# Patient Record
Sex: MALE | Race: WHITE | HISPANIC OR LATINO | Employment: UNEMPLOYED | URBAN - METROPOLITAN AREA
[De-identification: names, ages, dates, MRNs, and addresses within clinical notes are randomized per-mention and may not be internally consistent; named-entity substitution may affect disease eponyms.]

---

## 2021-04-07 ENCOUNTER — HOSPITAL ENCOUNTER (INPATIENT)
Facility: HOSPITAL | Age: 46
LOS: 12 days | Discharge: HOME | DRG: 674 | End: 2021-04-19
Attending: INTERNAL MEDICINE | Admitting: INTERNAL MEDICINE
Payer: MEDICAID

## 2021-04-07 DIAGNOSIS — N17.9 ACUTE RENAL FAILURE ON DIALYSIS: ICD-10-CM

## 2021-04-07 DIAGNOSIS — E11.9 TYPE 2 DIABETES MELLITUS WITHOUT COMPLICATION, WITH LONG-TERM CURRENT USE OF INSULIN: ICD-10-CM

## 2021-04-07 DIAGNOSIS — Z79.4 TYPE 2 DIABETES MELLITUS WITHOUT COMPLICATION, WITH LONG-TERM CURRENT USE OF INSULIN: ICD-10-CM

## 2021-04-07 DIAGNOSIS — T38.0X5S ADVERSE EFFECT OF ADRENAL CORTICAL STEROIDS, SEQUELA: ICD-10-CM

## 2021-04-07 DIAGNOSIS — Z99.2 ACUTE RENAL FAILURE ON DIALYSIS: ICD-10-CM

## 2021-04-07 DIAGNOSIS — N17.9 AKI (ACUTE KIDNEY INJURY): ICD-10-CM

## 2021-04-07 DIAGNOSIS — I10 HTN (HYPERTENSION): ICD-10-CM

## 2021-04-07 DIAGNOSIS — E11.29 TYPE 2 DIABETES MELLITUS WITH OTHER DIABETIC KIDNEY COMPLICATION, WITH LONG-TERM CURRENT USE OF INSULIN: ICD-10-CM

## 2021-04-07 DIAGNOSIS — Z94.0 H/O KIDNEY TRANSPLANT: ICD-10-CM

## 2021-04-07 DIAGNOSIS — J30.89 ALLERGIC RHINITIS DUE TO OTHER ALLERGIC TRIGGER, UNSPECIFIED SEASONALITY: ICD-10-CM

## 2021-04-07 DIAGNOSIS — Z79.60 LONG-TERM USE OF IMMUNOSUPPRESSANT MEDICATION: ICD-10-CM

## 2021-04-07 DIAGNOSIS — E87.20 ACIDOSIS: ICD-10-CM

## 2021-04-07 DIAGNOSIS — E87.5 HYPERKALEMIA: ICD-10-CM

## 2021-04-07 DIAGNOSIS — D84.9 IMMUNOCOMPROMISED STATE: ICD-10-CM

## 2021-04-07 DIAGNOSIS — I15.0 RENOVASCULAR HYPERTENSION: Primary | ICD-10-CM

## 2021-04-07 DIAGNOSIS — Z29.89 PROPHYLACTIC IMMUNOTHERAPY: ICD-10-CM

## 2021-04-07 DIAGNOSIS — Z79.4 TYPE 2 DIABETES MELLITUS WITH OTHER DIABETIC KIDNEY COMPLICATION, WITH LONG-TERM CURRENT USE OF INSULIN: ICD-10-CM

## 2021-04-07 DIAGNOSIS — T86.11 ACUTE REJECTION OF KIDNEY TRANSPLANT: ICD-10-CM

## 2021-04-07 LAB
25(OH)D3+25(OH)D2 SERPL-MCNC: 18 NG/ML (ref 30–96)
ALBUMIN SERPL BCP-MCNC: 3.4 G/DL (ref 3.5–5.2)
ALLENS TEST: ABNORMAL
ALP SERPL-CCNC: 86 U/L (ref 55–135)
ALT SERPL W/O P-5'-P-CCNC: 8 U/L (ref 10–44)
ANION GAP SERPL CALC-SCNC: 18 MMOL/L (ref 8–16)
ASCENDING AORTA: 3.5 CM
AST SERPL-CCNC: 11 U/L (ref 10–40)
AV INDEX (PROSTH): 1.03
AV MEAN GRADIENT: 4 MMHG
AV PEAK GRADIENT: 6 MMHG
AV VALVE AREA: 3.41 CM2
AV VELOCITY RATIO: 1.05
BACTERIA #/AREA URNS AUTO: ABNORMAL /HPF
BASOPHILS # BLD AUTO: 0.05 K/UL (ref 0–0.2)
BASOPHILS NFR BLD: 0.6 % (ref 0–1.9)
BILIRUB SERPL-MCNC: 0.3 MG/DL (ref 0.1–1)
BILIRUB UR QL STRIP: NEGATIVE
BSA FOR ECHO PROCEDURE: 2.19 M2
BUN SERPL-MCNC: 115 MG/DL (ref 6–20)
CALCIUM SERPL-MCNC: 8.2 MG/DL (ref 8.7–10.5)
CHLORIDE SERPL-SCNC: 106 MMOL/L (ref 95–110)
CLARITY UR REFRACT.AUTO: CLEAR
CO2 SERPL-SCNC: 11 MMOL/L (ref 23–29)
COLOR UR AUTO: ABNORMAL
CREAT SERPL-MCNC: 14.6 MG/DL (ref 0.5–1.4)
CREAT UR-MCNC: 68 MG/DL (ref 23–375)
CV ECHO LV RWT: 0.6 CM
DELSYS: ABNORMAL
DIFFERENTIAL METHOD: ABNORMAL
DOP CALC AO PEAK VEL: 1.21 M/S
DOP CALC AO VTI: 23.26 CM
DOP CALC LVOT AREA: 3.3 CM2
DOP CALC LVOT DIAMETER: 2.05 CM
DOP CALC LVOT PEAK VEL: 1.27 M/S
DOP CALC LVOT STROKE VOLUME: 79.31 CM3
DOP CALCLVOT PEAK VEL VTI: 24.04 CM
E WAVE DECELERATION TIME: 221.07 MSEC
E/A RATIO: 1.56
E/E' RATIO: 6.78 M/S
ECHO LV POSTERIOR WALL: 1.38 CM (ref 0.6–1.1)
EJECTION FRACTION: 70 %
EOSINOPHIL # BLD AUTO: 0.2 K/UL (ref 0–0.5)
EOSINOPHIL NFR BLD: 1.8 % (ref 0–8)
ERYTHROCYTE [DISTWIDTH] IN BLOOD BY AUTOMATED COUNT: 13.3 % (ref 11.5–14.5)
EST. GFR  (AFRICAN AMERICAN): 4.1 ML/MIN/1.73 M^2
EST. GFR  (NON AFRICAN AMERICAN): 3.6 ML/MIN/1.73 M^2
FRACTIONAL SHORTENING: 31 % (ref 28–44)
GLUCOSE SERPL-MCNC: 104 MG/DL (ref 70–110)
GLUCOSE UR QL STRIP: NEGATIVE
HCO3 UR-SCNC: 9.1 MMOL/L (ref 24–28)
HCT VFR BLD AUTO: 44.3 % (ref 40–54)
HGB BLD-MCNC: 15 G/DL (ref 14–18)
HGB UR QL STRIP: ABNORMAL
HYALINE CASTS UR QL AUTO: 0 /LPF
IMM GRANULOCYTES # BLD AUTO: 0.05 K/UL (ref 0–0.04)
IMM GRANULOCYTES NFR BLD AUTO: 0.6 % (ref 0–0.5)
INR PPP: 1 (ref 0.8–1.2)
INTERVENTRICULAR SEPTUM: 1.31 CM (ref 0.6–1.1)
KETONES UR QL STRIP: NEGATIVE
LA MAJOR: 6.51 CM
LA MINOR: 5.38 CM
LA WIDTH: 4.36 CM
LEFT ATRIUM SIZE: 3.34 CM
LEFT ATRIUM VOLUME INDEX: 34.2 ML/M2
LEFT ATRIUM VOLUME: 72.92 CM3
LEFT INTERNAL DIMENSION IN SYSTOLE: 3.17 CM (ref 2.1–4)
LEFT VENTRICLE DIASTOLIC VOLUME INDEX: 46.11 ML/M2
LEFT VENTRICLE DIASTOLIC VOLUME: 98.21 ML
LEFT VENTRICLE MASS INDEX: 114 G/M2
LEFT VENTRICLE SYSTOLIC VOLUME INDEX: 18.8 ML/M2
LEFT VENTRICLE SYSTOLIC VOLUME: 40.05 ML
LEFT VENTRICULAR INTERNAL DIMENSION IN DIASTOLE: 4.62 CM (ref 3.5–6)
LEFT VENTRICULAR MASS: 243.55 G
LEUKOCYTE ESTERASE UR QL STRIP: NEGATIVE
LV LATERAL E/E' RATIO: 6.5 M/S
LV SEPTAL E/E' RATIO: 7.09 M/S
LYMPHOCYTES # BLD AUTO: 1.1 K/UL (ref 1–4.8)
LYMPHOCYTES NFR BLD: 13.8 % (ref 18–48)
MAGNESIUM SERPL-MCNC: 1.9 MG/DL (ref 1.6–2.6)
MCH RBC QN AUTO: 28.8 PG (ref 27–31)
MCHC RBC AUTO-ENTMCNC: 33.9 G/DL (ref 32–36)
MCV RBC AUTO: 85 FL (ref 82–98)
MICROSCOPIC COMMENT: ABNORMAL
MONOCYTES # BLD AUTO: 0.7 K/UL (ref 0.3–1)
MONOCYTES NFR BLD: 8.3 % (ref 4–15)
MV PEAK A VEL: 0.5 M/S
MV PEAK E VEL: 0.78 M/S
MV STENOSIS PRESSURE HALF TIME: 64.11 MS
MV VALVE AREA P 1/2 METHOD: 3.43 CM2
NEUTROPHILS # BLD AUTO: 6.1 K/UL (ref 1.8–7.7)
NEUTROPHILS NFR BLD: 74.9 % (ref 38–73)
NITRITE UR QL STRIP: NEGATIVE
NRBC BLD-RTO: 0 /100 WBC
PCO2 BLDA: 25.3 MMHG (ref 35–45)
PH SMN: 7.16 [PH] (ref 7.35–7.45)
PH UR STRIP: 6 [PH] (ref 5–8)
PHOSPHATE SERPL-MCNC: 10.7 MG/DL (ref 2.7–4.5)
PLATELET # BLD AUTO: 291 K/UL (ref 150–450)
PMV BLD AUTO: 9.6 FL (ref 9.2–12.9)
PO2 BLDA: 110 MMHG (ref 40–60)
POC BE: -20 MMOL/L
POC PCO2 TEMP: 8.1 MMHG
POC PH TEMP: 7.5
POC PO2 TEMP: 26 MMHG
POC SATURATED O2: 97 % (ref 95–100)
POC TCO2: 10 MMOL/L (ref 24–29)
POC TEMPERATURE: ABNORMAL
POCT GLUCOSE: 115 MG/DL (ref 70–110)
POTASSIUM SERPL-SCNC: 4.8 MMOL/L (ref 3.5–5.1)
PROT SERPL-MCNC: 7 G/DL (ref 6–8.4)
PROT UR QL STRIP: ABNORMAL
PROT UR-MCNC: 82 MG/DL (ref 0–15)
PROT/CREAT UR: 1.21 MG/G{CREAT} (ref 0–0.2)
PROTHROMBIN TIME: 11 SEC (ref 9–12.5)
PTH-INTACT SERPL-MCNC: 841 PG/ML (ref 9–77)
RA MAJOR: 5.71 CM
RA PRESSURE: 3 MMHG
RA WIDTH: 3.37 CM
RBC # BLD AUTO: 5.2 M/UL (ref 4.6–6.2)
RBC #/AREA URNS AUTO: 2 /HPF (ref 0–4)
RIGHT VENTRICULAR END-DIASTOLIC DIMENSION: 3.19 CM
SAMPLE: ABNORMAL
SINUS: 3.58 CM
SITE: ABNORMAL
SODIUM SERPL-SCNC: 135 MMOL/L (ref 136–145)
SODIUM UR-SCNC: 66 MMOL/L (ref 20–250)
SP GR UR STRIP: 1.01 (ref 1–1.03)
SQUAMOUS #/AREA URNS AUTO: 1 /HPF
STJ: 3.22 CM
TACROLIMUS BLD-MCNC: 4.8 NG/ML (ref 5–15)
TDI LATERAL: 0.12 M/S
TDI SEPTAL: 0.11 M/S
TDI: 0.12 M/S
TRICUSPID ANNULAR PLANE SYSTOLIC EXCURSION: 2.81 CM
TSH SERPL DL<=0.005 MIU/L-ACNC: 0.98 UIU/ML (ref 0.4–4)
URN SPEC COLLECT METH UR: ABNORMAL
WBC # BLD AUTO: 8.2 K/UL (ref 3.9–12.7)
WBC #/AREA URNS AUTO: 6 /HPF (ref 0–5)

## 2021-04-07 PROCEDURE — 99223 1ST HOSP IP/OBS HIGH 75: CPT | Mod: ,,, | Performed by: EMERGENCY MEDICINE

## 2021-04-07 PROCEDURE — 82306 VITAMIN D 25 HYDROXY: CPT | Performed by: STUDENT IN AN ORGANIZED HEALTH CARE EDUCATION/TRAINING PROGRAM

## 2021-04-07 PROCEDURE — 25000003 PHARM REV CODE 250: Performed by: STUDENT IN AN ORGANIZED HEALTH CARE EDUCATION/TRAINING PROGRAM

## 2021-04-07 PROCEDURE — C9399 UNCLASSIFIED DRUGS OR BIOLOG: HCPCS | Performed by: STUDENT IN AN ORGANIZED HEALTH CARE EDUCATION/TRAINING PROGRAM

## 2021-04-07 PROCEDURE — 80197 ASSAY OF TACROLIMUS: CPT | Performed by: STUDENT IN AN ORGANIZED HEALTH CARE EDUCATION/TRAINING PROGRAM

## 2021-04-07 PROCEDURE — 20000000 HC ICU ROOM

## 2021-04-07 PROCEDURE — 85610 PROTHROMBIN TIME: CPT | Performed by: NURSE PRACTITIONER

## 2021-04-07 PROCEDURE — 99233 SBSQ HOSP IP/OBS HIGH 50: CPT | Mod: ,,, | Performed by: INTERNAL MEDICINE

## 2021-04-07 PROCEDURE — 87040 BLOOD CULTURE FOR BACTERIA: CPT | Performed by: STUDENT IN AN ORGANIZED HEALTH CARE EDUCATION/TRAINING PROGRAM

## 2021-04-07 PROCEDURE — 80100014 HC HEMODIALYSIS 1:1

## 2021-04-07 PROCEDURE — 83735 ASSAY OF MAGNESIUM: CPT | Performed by: NURSE PRACTITIONER

## 2021-04-07 PROCEDURE — 80053 COMPREHEN METABOLIC PANEL: CPT | Performed by: NURSE PRACTITIONER

## 2021-04-07 PROCEDURE — 84300 ASSAY OF URINE SODIUM: CPT | Performed by: STUDENT IN AN ORGANIZED HEALTH CARE EDUCATION/TRAINING PROGRAM

## 2021-04-07 PROCEDURE — 84100 ASSAY OF PHOSPHORUS: CPT | Performed by: NURSE PRACTITIONER

## 2021-04-07 PROCEDURE — 25000003 PHARM REV CODE 250: Performed by: NURSE PRACTITIONER

## 2021-04-07 PROCEDURE — 81001 URINALYSIS AUTO W/SCOPE: CPT | Performed by: NURSE PRACTITIONER

## 2021-04-07 PROCEDURE — 82570 ASSAY OF URINE CREATININE: CPT | Performed by: NURSE PRACTITIONER

## 2021-04-07 PROCEDURE — 99223 PR INITIAL HOSPITAL CARE,LEVL III: ICD-10-PCS | Mod: ,,, | Performed by: EMERGENCY MEDICINE

## 2021-04-07 PROCEDURE — 85025 COMPLETE CBC W/AUTO DIFF WBC: CPT | Performed by: NURSE PRACTITIONER

## 2021-04-07 PROCEDURE — 82803 BLOOD GASES ANY COMBINATION: CPT

## 2021-04-07 PROCEDURE — 84443 ASSAY THYROID STIM HORMONE: CPT | Performed by: EMERGENCY MEDICINE

## 2021-04-07 PROCEDURE — 99233 PR SUBSEQUENT HOSPITAL CARE,LEVL III: ICD-10-PCS | Mod: ,,, | Performed by: INTERNAL MEDICINE

## 2021-04-07 PROCEDURE — 83036 HEMOGLOBIN GLYCOSYLATED A1C: CPT | Performed by: NURSE PRACTITIONER

## 2021-04-07 PROCEDURE — 63600175 PHARM REV CODE 636 W HCPCS: Performed by: STUDENT IN AN ORGANIZED HEALTH CARE EDUCATION/TRAINING PROGRAM

## 2021-04-07 PROCEDURE — 83970 ASSAY OF PARATHORMONE: CPT | Performed by: STUDENT IN AN ORGANIZED HEALTH CARE EDUCATION/TRAINING PROGRAM

## 2021-04-07 PROCEDURE — 25000003 PHARM REV CODE 250: Performed by: EMERGENCY MEDICINE

## 2021-04-07 PROCEDURE — 99900035 HC TECH TIME PER 15 MIN (STAT)

## 2021-04-07 PROCEDURE — 25000003 PHARM REV CODE 250

## 2021-04-07 RX ORDER — LIDOCAINE HYDROCHLORIDE 10 MG/ML
INJECTION INFILTRATION; PERINEURAL
Status: COMPLETED
Start: 2021-04-07 | End: 2021-04-07

## 2021-04-07 RX ORDER — SODIUM CHLORIDE 0.9 % (FLUSH) 0.9 %
10 SYRINGE (ML) INJECTION
Status: DISCONTINUED | OUTPATIENT
Start: 2021-04-07 | End: 2021-04-12

## 2021-04-07 RX ORDER — LIDOCAINE HYDROCHLORIDE 10 MG/ML
10 INJECTION INFILTRATION; PERINEURAL ONCE
Status: COMPLETED | OUTPATIENT
Start: 2021-04-07 | End: 2021-04-07

## 2021-04-07 RX ORDER — ACETAMINOPHEN 325 MG/1
650 TABLET ORAL EVERY 4 HOURS PRN
Status: DISCONTINUED | OUTPATIENT
Start: 2021-04-07 | End: 2021-04-07

## 2021-04-07 RX ORDER — INSULIN ASPART 100 [IU]/ML
1-10 INJECTION, SOLUTION INTRAVENOUS; SUBCUTANEOUS EVERY 6 HOURS PRN
Status: DISCONTINUED | OUTPATIENT
Start: 2021-04-07 | End: 2021-04-16

## 2021-04-07 RX ORDER — NIFEDIPINE 30 MG/1
30 TABLET, EXTENDED RELEASE ORAL DAILY
Status: DISCONTINUED | OUTPATIENT
Start: 2021-04-07 | End: 2021-04-10

## 2021-04-07 RX ORDER — CARVEDILOL 25 MG/1
25 TABLET ORAL 2 TIMES DAILY
Status: DISCONTINUED | OUTPATIENT
Start: 2021-04-07 | End: 2021-04-19 | Stop reason: HOSPADM

## 2021-04-07 RX ORDER — ACETAMINOPHEN 325 MG/1
650 TABLET ORAL EVERY 4 HOURS PRN
Status: DISCONTINUED | OUTPATIENT
Start: 2021-04-07 | End: 2021-04-19 | Stop reason: HOSPADM

## 2021-04-07 RX ORDER — SODIUM CHLORIDE 9 MG/ML
INJECTION, SOLUTION INTRAVENOUS ONCE
Status: DISCONTINUED | OUTPATIENT
Start: 2021-04-07 | End: 2021-04-08

## 2021-04-07 RX ORDER — ONDANSETRON 2 MG/ML
4 INJECTION INTRAMUSCULAR; INTRAVENOUS EVERY 8 HOURS PRN
Status: DISCONTINUED | OUTPATIENT
Start: 2021-04-07 | End: 2021-04-19 | Stop reason: HOSPADM

## 2021-04-07 RX ORDER — MUPIROCIN 20 MG/G
OINTMENT TOPICAL 2 TIMES DAILY
Status: DISPENSED | OUTPATIENT
Start: 2021-04-07 | End: 2021-04-12

## 2021-04-07 RX ORDER — GLUCAGON 1 MG
1 KIT INJECTION
Status: DISCONTINUED | OUTPATIENT
Start: 2021-04-07 | End: 2021-04-16

## 2021-04-07 RX ORDER — ACETAMINOPHEN 650 MG/1
650 SUPPOSITORY RECTAL EVERY 4 HOURS PRN
Status: DISCONTINUED | OUTPATIENT
Start: 2021-04-07 | End: 2021-04-07

## 2021-04-07 RX ADMIN — LIDOCAINE HYDROCHLORIDE 10 ML: 10 INJECTION, SOLUTION INFILTRATION; PERINEURAL at 02:04

## 2021-04-07 RX ADMIN — NIFEDIPINE 30 MG: 30 TABLET, FILM COATED, EXTENDED RELEASE ORAL at 12:04

## 2021-04-07 RX ADMIN — CARVEDILOL 25 MG: 25 TABLET, FILM COATED ORAL at 11:04

## 2021-04-07 RX ADMIN — LIDOCAINE HYDROCHLORIDE 10 ML: 10 INJECTION INFILTRATION; PERINEURAL at 02:04

## 2021-04-07 RX ADMIN — ACETAMINOPHEN 650 MG: 325 TABLET ORAL at 07:04

## 2021-04-07 RX ADMIN — INSULIN ASPART 2 UNITS: 100 INJECTION, SOLUTION INTRAVENOUS; SUBCUTANEOUS at 09:04

## 2021-04-07 RX ADMIN — CARVEDILOL 25 MG: 25 TABLET, FILM COATED ORAL at 09:04

## 2021-04-07 RX ADMIN — ACETAMINOPHEN 650 MG: 325 TABLET ORAL at 11:04

## 2021-04-07 RX ADMIN — INSULIN DETEMIR 5 UNITS: 100 INJECTION, SOLUTION SUBCUTANEOUS at 09:04

## 2021-04-07 RX ADMIN — MUPIROCIN: 20 OINTMENT TOPICAL at 09:04

## 2021-04-07 RX ADMIN — MUPIROCIN: 20 OINTMENT TOPICAL at 11:04

## 2021-04-08 LAB
ALBUMIN SERPL BCP-MCNC: 3.2 G/DL (ref 3.5–5.2)
ALP SERPL-CCNC: 90 U/L (ref 55–135)
ALT SERPL W/O P-5'-P-CCNC: 9 U/L (ref 10–44)
ANION GAP SERPL CALC-SCNC: 17 MMOL/L (ref 8–16)
AST SERPL-CCNC: 12 U/L (ref 10–40)
BASOPHILS # BLD AUTO: 0.07 K/UL (ref 0–0.2)
BASOPHILS NFR BLD: 1.1 % (ref 0–1.9)
BILIRUB SERPL-MCNC: 0.4 MG/DL (ref 0.1–1)
BUN SERPL-MCNC: 113 MG/DL (ref 6–20)
CALCIUM SERPL-MCNC: 8 MG/DL (ref 8.7–10.5)
CHLORIDE SERPL-SCNC: 104 MMOL/L (ref 95–110)
CO2 SERPL-SCNC: 16 MMOL/L (ref 23–29)
CREAT SERPL-MCNC: 14.1 MG/DL (ref 0.5–1.4)
DIFFERENTIAL METHOD: ABNORMAL
EOSINOPHIL # BLD AUTO: 0.3 K/UL (ref 0–0.5)
EOSINOPHIL NFR BLD: 4.9 % (ref 0–8)
ERYTHROCYTE [DISTWIDTH] IN BLOOD BY AUTOMATED COUNT: 13.2 % (ref 11.5–14.5)
EST. GFR  (AFRICAN AMERICAN): 4.3 ML/MIN/1.73 M^2
EST. GFR  (NON AFRICAN AMERICAN): 3.7 ML/MIN/1.73 M^2
ESTIMATED AVG GLUCOSE: 240 MG/DL (ref 68–131)
GLUCOSE SERPL-MCNC: 110 MG/DL (ref 70–110)
HBA1C MFR BLD: 10 % (ref 4–5.6)
HBV CORE IGM SERPL QL IA: NEGATIVE
HBV SURFACE AG SERPL QL IA: NEGATIVE
HCT VFR BLD AUTO: 39.1 % (ref 40–54)
HGB BLD-MCNC: 13.9 G/DL (ref 14–18)
IMM GRANULOCYTES # BLD AUTO: 0.03 K/UL (ref 0–0.04)
IMM GRANULOCYTES NFR BLD AUTO: 0.5 % (ref 0–0.5)
LYMPHOCYTES # BLD AUTO: 0.8 K/UL (ref 1–4.8)
LYMPHOCYTES NFR BLD: 13.5 % (ref 18–48)
MAGNESIUM SERPL-MCNC: 1.9 MG/DL (ref 1.6–2.6)
MCH RBC QN AUTO: 29 PG (ref 27–31)
MCHC RBC AUTO-ENTMCNC: 35.5 G/DL (ref 32–36)
MCV RBC AUTO: 82 FL (ref 82–98)
MONOCYTES # BLD AUTO: 0.7 K/UL (ref 0.3–1)
MONOCYTES NFR BLD: 11.7 % (ref 4–15)
NEUTROPHILS # BLD AUTO: 4.2 K/UL (ref 1.8–7.7)
NEUTROPHILS NFR BLD: 68.3 % (ref 38–73)
NRBC BLD-RTO: 0 /100 WBC
PHOSPHATE SERPL-MCNC: 10.2 MG/DL (ref 2.7–4.5)
PLATELET # BLD AUTO: 262 K/UL (ref 150–450)
PMV BLD AUTO: 9.8 FL (ref 9.2–12.9)
POCT GLUCOSE: 116 MG/DL (ref 70–110)
POCT GLUCOSE: 124 MG/DL (ref 70–110)
POCT GLUCOSE: 126 MG/DL (ref 70–110)
POCT GLUCOSE: 151 MG/DL (ref 70–110)
POCT GLUCOSE: 184 MG/DL (ref 70–110)
POCT GLUCOSE: 194 MG/DL (ref 70–110)
POTASSIUM SERPL-SCNC: 3.8 MMOL/L (ref 3.5–5.1)
PROT SERPL-MCNC: 6.7 G/DL (ref 6–8.4)
RBC # BLD AUTO: 4.79 M/UL (ref 4.6–6.2)
SODIUM SERPL-SCNC: 137 MMOL/L (ref 136–145)
TACROLIMUS BLD-MCNC: 2.8 NG/ML (ref 5–15)
WBC # BLD AUTO: 6.09 K/UL (ref 3.9–12.7)

## 2021-04-08 PROCEDURE — 99233 PR SUBSEQUENT HOSPITAL CARE,LEVL III: ICD-10-PCS | Mod: ,,, | Performed by: INTERNAL MEDICINE

## 2021-04-08 PROCEDURE — 85025 COMPLETE CBC W/AUTO DIFF WBC: CPT | Performed by: NURSE PRACTITIONER

## 2021-04-08 PROCEDURE — 11000001 HC ACUTE MED/SURG PRIVATE ROOM

## 2021-04-08 PROCEDURE — 99233 SBSQ HOSP IP/OBS HIGH 50: CPT | Mod: ,,, | Performed by: EMERGENCY MEDICINE

## 2021-04-08 PROCEDURE — 86833 HLA CLASS II HIGH DEFIN QUAL: CPT | Performed by: STUDENT IN AN ORGANIZED HEALTH CARE EDUCATION/TRAINING PROGRAM

## 2021-04-08 PROCEDURE — 25000003 PHARM REV CODE 250: Performed by: STUDENT IN AN ORGANIZED HEALTH CARE EDUCATION/TRAINING PROGRAM

## 2021-04-08 PROCEDURE — 80100014 HC HEMODIALYSIS 1:1

## 2021-04-08 PROCEDURE — 63600175 PHARM REV CODE 636 W HCPCS: Performed by: STUDENT IN AN ORGANIZED HEALTH CARE EDUCATION/TRAINING PROGRAM

## 2021-04-08 PROCEDURE — 80197 ASSAY OF TACROLIMUS: CPT | Performed by: STUDENT IN AN ORGANIZED HEALTH CARE EDUCATION/TRAINING PROGRAM

## 2021-04-08 PROCEDURE — 87340 HEPATITIS B SURFACE AG IA: CPT | Performed by: EMERGENCY MEDICINE

## 2021-04-08 PROCEDURE — 86832 HLA CLASS I HIGH DEFIN QUAL: CPT | Performed by: STUDENT IN AN ORGANIZED HEALTH CARE EDUCATION/TRAINING PROGRAM

## 2021-04-08 PROCEDURE — 83735 ASSAY OF MAGNESIUM: CPT | Performed by: NURSE PRACTITIONER

## 2021-04-08 PROCEDURE — 36415 COLL VENOUS BLD VENIPUNCTURE: CPT | Performed by: NURSE PRACTITIONER

## 2021-04-08 PROCEDURE — 86977 RBC SERUM PRETX INCUBJ/INHIB: CPT | Mod: 59 | Performed by: STUDENT IN AN ORGANIZED HEALTH CARE EDUCATION/TRAINING PROGRAM

## 2021-04-08 PROCEDURE — 99233 PR SUBSEQUENT HOSPITAL CARE,LEVL III: ICD-10-PCS | Mod: ,,, | Performed by: EMERGENCY MEDICINE

## 2021-04-08 PROCEDURE — 80053 COMPREHEN METABOLIC PANEL: CPT | Performed by: NURSE PRACTITIONER

## 2021-04-08 PROCEDURE — 99233 SBSQ HOSP IP/OBS HIGH 50: CPT | Mod: ,,, | Performed by: INTERNAL MEDICINE

## 2021-04-08 PROCEDURE — 84100 ASSAY OF PHOSPHORUS: CPT | Performed by: NURSE PRACTITIONER

## 2021-04-08 PROCEDURE — 86705 HEP B CORE ANTIBODY IGM: CPT | Performed by: EMERGENCY MEDICINE

## 2021-04-08 PROCEDURE — 25000003 PHARM REV CODE 250: Performed by: EMERGENCY MEDICINE

## 2021-04-08 RX ORDER — HEPARIN SODIUM 5000 [USP'U]/ML
5000 INJECTION, SOLUTION INTRAVENOUS; SUBCUTANEOUS EVERY 8 HOURS
Status: DISCONTINUED | OUTPATIENT
Start: 2021-04-08 | End: 2021-04-19 | Stop reason: HOSPADM

## 2021-04-08 RX ORDER — BUTALBITAL, ACETAMINOPHEN AND CAFFEINE 50; 325; 40 MG/1; MG/1; MG/1
1 TABLET ORAL ONCE
Status: COMPLETED | OUTPATIENT
Start: 2021-04-08 | End: 2021-04-08

## 2021-04-08 RX ORDER — SEVELAMER CARBONATE 800 MG/1
1600 TABLET, FILM COATED ORAL
Status: DISCONTINUED | OUTPATIENT
Start: 2021-04-08 | End: 2021-04-19

## 2021-04-08 RX ORDER — SODIUM CHLORIDE 9 MG/ML
INJECTION, SOLUTION INTRAVENOUS CONTINUOUS
Status: ACTIVE | OUTPATIENT
Start: 2021-04-08 | End: 2021-04-08

## 2021-04-08 RX ORDER — SODIUM CHLORIDE 9 MG/ML
INJECTION, SOLUTION INTRAVENOUS ONCE
Status: DISCONTINUED | OUTPATIENT
Start: 2021-04-08 | End: 2021-04-09

## 2021-04-08 RX ADMIN — SEVELAMER CARBONATE 1600 MG: 800 TABLET, FILM COATED ORAL at 05:04

## 2021-04-08 RX ADMIN — MUPIROCIN: 20 OINTMENT TOPICAL at 09:04

## 2021-04-08 RX ADMIN — CARVEDILOL 25 MG: 25 TABLET, FILM COATED ORAL at 08:04

## 2021-04-08 RX ADMIN — SEVELAMER CARBONATE 1600 MG: 800 TABLET, FILM COATED ORAL at 11:04

## 2021-04-08 RX ADMIN — SEVELAMER CARBONATE 1600 MG: 800 TABLET, FILM COATED ORAL at 08:04

## 2021-04-08 RX ADMIN — BUTALBITAL, ACETAMINOPHEN, AND CAFFEINE 1 TABLET: 50; 325; 40 TABLET ORAL at 01:04

## 2021-04-08 RX ADMIN — CARVEDILOL 25 MG: 25 TABLET, FILM COATED ORAL at 11:04

## 2021-04-08 RX ADMIN — HEPARIN SODIUM 5000 UNITS: 5000 INJECTION INTRAVENOUS; SUBCUTANEOUS at 10:04

## 2021-04-08 RX ADMIN — NIFEDIPINE 30 MG: 30 TABLET, FILM COATED, EXTENDED RELEASE ORAL at 08:04

## 2021-04-08 RX ADMIN — HEPARIN SODIUM 5000 UNITS: 5000 INJECTION INTRAVENOUS; SUBCUTANEOUS at 02:04

## 2021-04-08 RX ADMIN — INSULIN ASPART 2 UNITS: 100 INJECTION, SOLUTION INTRAVENOUS; SUBCUTANEOUS at 11:04

## 2021-04-08 RX ADMIN — INSULIN ASPART 2 UNITS: 100 INJECTION, SOLUTION INTRAVENOUS; SUBCUTANEOUS at 05:04

## 2021-04-08 RX ADMIN — INSULIN DETEMIR 5 UNITS: 100 INJECTION, SOLUTION SUBCUTANEOUS at 10:04

## 2021-04-09 LAB
ALBUMIN SERPL BCP-MCNC: 3.3 G/DL (ref 3.5–5.2)
ALP SERPL-CCNC: 88 U/L (ref 55–135)
ALT SERPL W/O P-5'-P-CCNC: 9 U/L (ref 10–44)
ANION GAP SERPL CALC-SCNC: 17 MMOL/L (ref 8–16)
AST SERPL-CCNC: 14 U/L (ref 10–40)
BASOPHILS # BLD AUTO: 0.09 K/UL (ref 0–0.2)
BASOPHILS NFR BLD: 1.3 % (ref 0–1.9)
BILIRUB SERPL-MCNC: 0.5 MG/DL (ref 0.1–1)
BUN SERPL-MCNC: 91 MG/DL (ref 6–20)
CALCIUM SERPL-MCNC: 8.1 MG/DL (ref 8.7–10.5)
CHLORIDE SERPL-SCNC: 100 MMOL/L (ref 95–110)
CLASS I ANTIBODIES - LUMINEX: NORMAL
CLASS I ANTIBODY COMMENTS - LUMINEX: NORMAL
CLASS II ANTIBODY COMMENTS - LUMINEX: NORMAL
CO2 SERPL-SCNC: 19 MMOL/L (ref 23–29)
CREAT SERPL-MCNC: 12.5 MG/DL (ref 0.5–1.4)
DIFFERENTIAL METHOD: ABNORMAL
DSA1 TESTING DATE: NORMAL
DSA12 TESTING DATE: NORMAL
DSA2 TESTING DATE: NORMAL
EOSINOPHIL # BLD AUTO: 0.4 K/UL (ref 0–0.5)
EOSINOPHIL NFR BLD: 6.4 % (ref 0–8)
ERYTHROCYTE [DISTWIDTH] IN BLOOD BY AUTOMATED COUNT: 13.1 % (ref 11.5–14.5)
EST. GFR  (AFRICAN AMERICAN): 5 ML/MIN/1.73 M^2
EST. GFR  (NON AFRICAN AMERICAN): 4.3 ML/MIN/1.73 M^2
GLUCOSE SERPL-MCNC: 115 MG/DL (ref 70–110)
HCT VFR BLD AUTO: 39.2 % (ref 40–54)
HGB BLD-MCNC: 13.8 G/DL (ref 14–18)
IMM GRANULOCYTES # BLD AUTO: 0.01 K/UL (ref 0–0.04)
IMM GRANULOCYTES NFR BLD AUTO: 0.1 % (ref 0–0.5)
LYMPHOCYTES # BLD AUTO: 1.3 K/UL (ref 1–4.8)
LYMPHOCYTES NFR BLD: 18.8 % (ref 18–48)
MAGNESIUM SERPL-MCNC: 1.8 MG/DL (ref 1.6–2.6)
MCH RBC QN AUTO: 28 PG (ref 27–31)
MCHC RBC AUTO-ENTMCNC: 35.2 G/DL (ref 32–36)
MCV RBC AUTO: 80 FL (ref 82–98)
MONOCYTES # BLD AUTO: 0.8 K/UL (ref 0.3–1)
MONOCYTES NFR BLD: 12.2 % (ref 4–15)
NEUTROPHILS # BLD AUTO: 4.1 K/UL (ref 1.8–7.7)
NEUTROPHILS NFR BLD: 61.2 % (ref 38–73)
NRBC BLD-RTO: 0 /100 WBC
PHOSPHATE SERPL-MCNC: 7.2 MG/DL (ref 2.7–4.5)
PLATELET # BLD AUTO: 247 K/UL (ref 150–450)
PMV BLD AUTO: 9.8 FL (ref 9.2–12.9)
POCT GLUCOSE: 102 MG/DL (ref 70–110)
POCT GLUCOSE: 121 MG/DL (ref 70–110)
POCT GLUCOSE: 122 MG/DL (ref 70–110)
POCT GLUCOSE: 150 MG/DL (ref 70–110)
POTASSIUM SERPL-SCNC: 3.7 MMOL/L (ref 3.5–5.1)
PROT SERPL-MCNC: 7 G/DL (ref 6–8.4)
RBC # BLD AUTO: 4.93 M/UL (ref 4.6–6.2)
SERUM COLLECTION DT - LUMINEX CLASS I: NORMAL
SERUM COLLECTION DT - LUMINEX CLASS II: NORMAL
SODIUM SERPL-SCNC: 136 MMOL/L (ref 136–145)
WBC # BLD AUTO: 6.71 K/UL (ref 3.9–12.7)

## 2021-04-09 PROCEDURE — 25000003 PHARM REV CODE 250: Performed by: STUDENT IN AN ORGANIZED HEALTH CARE EDUCATION/TRAINING PROGRAM

## 2021-04-09 PROCEDURE — 36415 COLL VENOUS BLD VENIPUNCTURE: CPT | Performed by: NURSE PRACTITIONER

## 2021-04-09 PROCEDURE — 99233 SBSQ HOSP IP/OBS HIGH 50: CPT | Mod: ,,, | Performed by: INTERNAL MEDICINE

## 2021-04-09 PROCEDURE — 80053 COMPREHEN METABOLIC PANEL: CPT | Performed by: NURSE PRACTITIONER

## 2021-04-09 PROCEDURE — 90935 HEMODIALYSIS ONE EVALUATION: CPT

## 2021-04-09 PROCEDURE — 11000001 HC ACUTE MED/SURG PRIVATE ROOM

## 2021-04-09 PROCEDURE — 99233 PR SUBSEQUENT HOSPITAL CARE,LEVL III: ICD-10-PCS | Mod: ,,, | Performed by: INTERNAL MEDICINE

## 2021-04-09 PROCEDURE — 83735 ASSAY OF MAGNESIUM: CPT | Performed by: NURSE PRACTITIONER

## 2021-04-09 PROCEDURE — 99232 SBSQ HOSP IP/OBS MODERATE 35: CPT | Mod: ,,, | Performed by: HOSPITALIST

## 2021-04-09 PROCEDURE — 85025 COMPLETE CBC W/AUTO DIFF WBC: CPT | Performed by: NURSE PRACTITIONER

## 2021-04-09 PROCEDURE — 99232 PR SUBSEQUENT HOSPITAL CARE,LEVL II: ICD-10-PCS | Mod: ,,, | Performed by: HOSPITALIST

## 2021-04-09 PROCEDURE — 63600175 PHARM REV CODE 636 W HCPCS: Performed by: STUDENT IN AN ORGANIZED HEALTH CARE EDUCATION/TRAINING PROGRAM

## 2021-04-09 PROCEDURE — 63600175 PHARM REV CODE 636 W HCPCS: Performed by: INTERNAL MEDICINE

## 2021-04-09 PROCEDURE — 84100 ASSAY OF PHOSPHORUS: CPT | Performed by: NURSE PRACTITIONER

## 2021-04-09 RX ORDER — MYCOPHENOLATE MOFETIL 250 MG/1
1000 CAPSULE ORAL 2 TIMES DAILY
Status: DISCONTINUED | OUTPATIENT
Start: 2021-04-09 | End: 2021-04-19 | Stop reason: HOSPADM

## 2021-04-09 RX ORDER — SODIUM CHLORIDE 9 MG/ML
INJECTION, SOLUTION INTRAVENOUS ONCE
Status: COMPLETED | OUTPATIENT
Start: 2021-04-09 | End: 2021-04-09

## 2021-04-09 RX ORDER — TACROLIMUS 1 MG/1
4 CAPSULE ORAL 2 TIMES DAILY
Status: DISCONTINUED | OUTPATIENT
Start: 2021-04-09 | End: 2021-04-14

## 2021-04-09 RX ADMIN — NIFEDIPINE 30 MG: 30 TABLET, FILM COATED, EXTENDED RELEASE ORAL at 09:04

## 2021-04-09 RX ADMIN — MYCOPHENOLATE MOFETIL 1000 MG: 250 CAPSULE ORAL at 09:04

## 2021-04-09 RX ADMIN — MUPIROCIN: 20 OINTMENT TOPICAL at 09:04

## 2021-04-09 RX ADMIN — CARVEDILOL 25 MG: 25 TABLET, FILM COATED ORAL at 11:04

## 2021-04-09 RX ADMIN — SODIUM CHLORIDE: 0.9 INJECTION, SOLUTION INTRAVENOUS at 01:04

## 2021-04-09 RX ADMIN — SEVELAMER CARBONATE 1600 MG: 800 TABLET, FILM COATED ORAL at 12:04

## 2021-04-09 RX ADMIN — INSULIN DETEMIR 5 UNITS: 100 INJECTION, SOLUTION SUBCUTANEOUS at 10:04

## 2021-04-09 RX ADMIN — TACROLIMUS 4 MG: 1 CAPSULE ORAL at 05:04

## 2021-04-09 RX ADMIN — SEVELAMER CARBONATE 1600 MG: 800 TABLET, FILM COATED ORAL at 05:04

## 2021-04-09 RX ADMIN — HEPARIN SODIUM 5000 UNITS: 5000 INJECTION INTRAVENOUS; SUBCUTANEOUS at 10:04

## 2021-04-09 RX ADMIN — TACROLIMUS 4 MG: 1 CAPSULE ORAL at 09:04

## 2021-04-09 RX ADMIN — HEPARIN SODIUM 5000 UNITS: 5000 INJECTION INTRAVENOUS; SUBCUTANEOUS at 06:04

## 2021-04-09 RX ADMIN — SEVELAMER CARBONATE 1600 MG: 800 TABLET, FILM COATED ORAL at 09:04

## 2021-04-10 LAB
ALBUMIN SERPL BCP-MCNC: 3.2 G/DL (ref 3.5–5.2)
ALP SERPL-CCNC: 79 U/L (ref 55–135)
ALT SERPL W/O P-5'-P-CCNC: 10 U/L (ref 10–44)
ANION GAP SERPL CALC-SCNC: 14 MMOL/L (ref 8–16)
AST SERPL-CCNC: 19 U/L (ref 10–40)
BASOPHILS # BLD AUTO: 0.09 K/UL (ref 0–0.2)
BASOPHILS NFR BLD: 1.4 % (ref 0–1.9)
BILIRUB SERPL-MCNC: 0.5 MG/DL (ref 0.1–1)
BUN SERPL-MCNC: 57 MG/DL (ref 6–20)
CALCIUM SERPL-MCNC: 8.5 MG/DL (ref 8.7–10.5)
CHLORIDE SERPL-SCNC: 103 MMOL/L (ref 95–110)
CO2 SERPL-SCNC: 21 MMOL/L (ref 23–29)
CREAT SERPL-MCNC: 10.3 MG/DL (ref 0.5–1.4)
DIFFERENTIAL METHOD: ABNORMAL
EOSINOPHIL # BLD AUTO: 0.5 K/UL (ref 0–0.5)
EOSINOPHIL NFR BLD: 8.4 % (ref 0–8)
ERYTHROCYTE [DISTWIDTH] IN BLOOD BY AUTOMATED COUNT: 13.1 % (ref 11.5–14.5)
EST. GFR  (AFRICAN AMERICAN): 6.3 ML/MIN/1.73 M^2
EST. GFR  (NON AFRICAN AMERICAN): 5.5 ML/MIN/1.73 M^2
GLUCOSE SERPL-MCNC: 105 MG/DL (ref 70–110)
HCT VFR BLD AUTO: 38.9 % (ref 40–54)
HGB BLD-MCNC: 13.2 G/DL (ref 14–18)
IMM GRANULOCYTES # BLD AUTO: 0.02 K/UL (ref 0–0.04)
IMM GRANULOCYTES NFR BLD AUTO: 0.3 % (ref 0–0.5)
LYMPHOCYTES # BLD AUTO: 1.2 K/UL (ref 1–4.8)
LYMPHOCYTES NFR BLD: 18.9 % (ref 18–48)
MAGNESIUM SERPL-MCNC: 2 MG/DL (ref 1.6–2.6)
MCH RBC QN AUTO: 28.2 PG (ref 27–31)
MCHC RBC AUTO-ENTMCNC: 33.9 G/DL (ref 32–36)
MCV RBC AUTO: 83 FL (ref 82–98)
MONOCYTES # BLD AUTO: 0.8 K/UL (ref 0.3–1)
MONOCYTES NFR BLD: 11.9 % (ref 4–15)
NEUTROPHILS # BLD AUTO: 3.7 K/UL (ref 1.8–7.7)
NEUTROPHILS NFR BLD: 59.1 % (ref 38–73)
NRBC BLD-RTO: 0 /100 WBC
PHOSPHATE SERPL-MCNC: 5.5 MG/DL (ref 2.7–4.5)
PLATELET # BLD AUTO: 214 K/UL (ref 150–450)
PMV BLD AUTO: 9.4 FL (ref 9.2–12.9)
POCT GLUCOSE: 100 MG/DL (ref 70–110)
POCT GLUCOSE: 104 MG/DL (ref 70–110)
POCT GLUCOSE: 116 MG/DL (ref 70–110)
POCT GLUCOSE: 127 MG/DL (ref 70–110)
POTASSIUM SERPL-SCNC: 4 MMOL/L (ref 3.5–5.1)
PROT SERPL-MCNC: 6.5 G/DL (ref 6–8.4)
RBC # BLD AUTO: 4.68 M/UL (ref 4.6–6.2)
SODIUM SERPL-SCNC: 138 MMOL/L (ref 136–145)
WBC # BLD AUTO: 6.31 K/UL (ref 3.9–12.7)

## 2021-04-10 PROCEDURE — 80100014 HC HEMODIALYSIS 1:1

## 2021-04-10 PROCEDURE — 80053 COMPREHEN METABOLIC PANEL: CPT | Performed by: NURSE PRACTITIONER

## 2021-04-10 PROCEDURE — 25000003 PHARM REV CODE 250: Performed by: STUDENT IN AN ORGANIZED HEALTH CARE EDUCATION/TRAINING PROGRAM

## 2021-04-10 PROCEDURE — 83735 ASSAY OF MAGNESIUM: CPT | Performed by: NURSE PRACTITIONER

## 2021-04-10 PROCEDURE — 63600175 PHARM REV CODE 636 W HCPCS: Performed by: INTERNAL MEDICINE

## 2021-04-10 PROCEDURE — 99232 PR SUBSEQUENT HOSPITAL CARE,LEVL II: ICD-10-PCS | Mod: ,,, | Performed by: HOSPITALIST

## 2021-04-10 PROCEDURE — 99233 PR SUBSEQUENT HOSPITAL CARE,LEVL III: ICD-10-PCS | Mod: ,,, | Performed by: INTERNAL MEDICINE

## 2021-04-10 PROCEDURE — 36415 COLL VENOUS BLD VENIPUNCTURE: CPT | Performed by: NURSE PRACTITIONER

## 2021-04-10 PROCEDURE — 99233 SBSQ HOSP IP/OBS HIGH 50: CPT | Mod: ,,, | Performed by: INTERNAL MEDICINE

## 2021-04-10 PROCEDURE — 25000003 PHARM REV CODE 250: Performed by: EMERGENCY MEDICINE

## 2021-04-10 PROCEDURE — 11000001 HC ACUTE MED/SURG PRIVATE ROOM

## 2021-04-10 PROCEDURE — 99232 SBSQ HOSP IP/OBS MODERATE 35: CPT | Mod: ,,, | Performed by: HOSPITALIST

## 2021-04-10 PROCEDURE — 85025 COMPLETE CBC W/AUTO DIFF WBC: CPT | Performed by: NURSE PRACTITIONER

## 2021-04-10 PROCEDURE — 63600175 PHARM REV CODE 636 W HCPCS: Performed by: STUDENT IN AN ORGANIZED HEALTH CARE EDUCATION/TRAINING PROGRAM

## 2021-04-10 PROCEDURE — 84100 ASSAY OF PHOSPHORUS: CPT | Performed by: NURSE PRACTITIONER

## 2021-04-10 RX ORDER — NIFEDIPINE 30 MG/1
60 TABLET, EXTENDED RELEASE ORAL DAILY
Status: DISCONTINUED | OUTPATIENT
Start: 2021-04-11 | End: 2021-04-12

## 2021-04-10 RX ORDER — SODIUM CHLORIDE 9 MG/ML
INJECTION, SOLUTION INTRAVENOUS ONCE
Status: DISCONTINUED | OUTPATIENT
Start: 2021-04-10 | End: 2021-04-12

## 2021-04-10 RX ADMIN — CARVEDILOL 25 MG: 25 TABLET, FILM COATED ORAL at 09:04

## 2021-04-10 RX ADMIN — MUPIROCIN: 20 OINTMENT TOPICAL at 10:04

## 2021-04-10 RX ADMIN — MYCOPHENOLATE MOFETIL 1000 MG: 250 CAPSULE ORAL at 09:04

## 2021-04-10 RX ADMIN — SEVELAMER CARBONATE 1600 MG: 800 TABLET, FILM COATED ORAL at 04:04

## 2021-04-10 RX ADMIN — INSULIN DETEMIR 5 UNITS: 100 INJECTION, SOLUTION SUBCUTANEOUS at 09:04

## 2021-04-10 RX ADMIN — MYCOPHENOLATE MOFETIL 1000 MG: 250 CAPSULE ORAL at 10:04

## 2021-04-10 RX ADMIN — SEVELAMER CARBONATE 1600 MG: 800 TABLET, FILM COATED ORAL at 08:04

## 2021-04-10 RX ADMIN — TACROLIMUS 4 MG: 1 CAPSULE ORAL at 09:04

## 2021-04-10 RX ADMIN — SEVELAMER CARBONATE 1600 MG: 800 TABLET, FILM COATED ORAL at 12:04

## 2021-04-10 RX ADMIN — HEPARIN SODIUM 5000 UNITS: 5000 INJECTION INTRAVENOUS; SUBCUTANEOUS at 02:04

## 2021-04-10 RX ADMIN — HEPARIN SODIUM 5000 UNITS: 5000 INJECTION INTRAVENOUS; SUBCUTANEOUS at 06:04

## 2021-04-10 RX ADMIN — CARVEDILOL 25 MG: 25 TABLET, FILM COATED ORAL at 11:04

## 2021-04-10 RX ADMIN — MUPIROCIN: 20 OINTMENT TOPICAL at 09:04

## 2021-04-10 RX ADMIN — TACROLIMUS 4 MG: 1 CAPSULE ORAL at 06:04

## 2021-04-10 RX ADMIN — HEPARIN SODIUM 5000 UNITS: 5000 INJECTION INTRAVENOUS; SUBCUTANEOUS at 09:04

## 2021-04-11 PROBLEM — Z99.2 ACUTE RENAL FAILURE ON DIALYSIS: Status: ACTIVE | Noted: 2021-04-07

## 2021-04-11 LAB
ALBUMIN SERPL BCP-MCNC: 2.8 G/DL (ref 3.5–5.2)
ALP SERPL-CCNC: 71 U/L (ref 55–135)
ALT SERPL W/O P-5'-P-CCNC: 21 U/L (ref 10–44)
ANION GAP SERPL CALC-SCNC: 13 MMOL/L (ref 8–16)
AST SERPL-CCNC: 27 U/L (ref 10–40)
BASOPHILS # BLD AUTO: 0.08 K/UL (ref 0–0.2)
BASOPHILS NFR BLD: 1.1 % (ref 0–1.9)
BILIRUB SERPL-MCNC: 0.4 MG/DL (ref 0.1–1)
BUN SERPL-MCNC: 51 MG/DL (ref 6–20)
CALCIUM SERPL-MCNC: 8.5 MG/DL (ref 8.7–10.5)
CHLORIDE SERPL-SCNC: 105 MMOL/L (ref 95–110)
CO2 SERPL-SCNC: 21 MMOL/L (ref 23–29)
CREAT SERPL-MCNC: 9.7 MG/DL (ref 0.5–1.4)
DIFFERENTIAL METHOD: ABNORMAL
EOSINOPHIL # BLD AUTO: 0.6 K/UL (ref 0–0.5)
EOSINOPHIL NFR BLD: 7.8 % (ref 0–8)
ERYTHROCYTE [DISTWIDTH] IN BLOOD BY AUTOMATED COUNT: 12.9 % (ref 11.5–14.5)
EST. GFR  (AFRICAN AMERICAN): 6.8 ML/MIN/1.73 M^2
EST. GFR  (NON AFRICAN AMERICAN): 5.9 ML/MIN/1.73 M^2
GLUCOSE SERPL-MCNC: 79 MG/DL (ref 70–110)
HCT VFR BLD AUTO: 36.8 % (ref 40–54)
HGB BLD-MCNC: 12.2 G/DL (ref 14–18)
IMM GRANULOCYTES # BLD AUTO: 0.01 K/UL (ref 0–0.04)
IMM GRANULOCYTES NFR BLD AUTO: 0.1 % (ref 0–0.5)
LYMPHOCYTES # BLD AUTO: 1.4 K/UL (ref 1–4.8)
LYMPHOCYTES NFR BLD: 20.1 % (ref 18–48)
MAGNESIUM SERPL-MCNC: 1.8 MG/DL (ref 1.6–2.6)
MCH RBC QN AUTO: 28.1 PG (ref 27–31)
MCHC RBC AUTO-ENTMCNC: 33.2 G/DL (ref 32–36)
MCV RBC AUTO: 85 FL (ref 82–98)
MONOCYTES # BLD AUTO: 0.9 K/UL (ref 0.3–1)
MONOCYTES NFR BLD: 12.4 % (ref 4–15)
NEUTROPHILS # BLD AUTO: 4.2 K/UL (ref 1.8–7.7)
NEUTROPHILS NFR BLD: 58.5 % (ref 38–73)
NRBC BLD-RTO: 0 /100 WBC
PHOSPHATE SERPL-MCNC: 5.3 MG/DL (ref 2.7–4.5)
PLATELET # BLD AUTO: 202 K/UL (ref 150–450)
PMV BLD AUTO: 9.6 FL (ref 9.2–12.9)
POCT GLUCOSE: 100 MG/DL (ref 70–110)
POCT GLUCOSE: 109 MG/DL (ref 70–110)
POCT GLUCOSE: 142 MG/DL (ref 70–110)
POCT GLUCOSE: 149 MG/DL (ref 70–110)
POTASSIUM SERPL-SCNC: 4 MMOL/L (ref 3.5–5.1)
PROT SERPL-MCNC: 6.1 G/DL (ref 6–8.4)
RBC # BLD AUTO: 4.34 M/UL (ref 4.6–6.2)
SODIUM SERPL-SCNC: 139 MMOL/L (ref 136–145)
WBC # BLD AUTO: 7.16 K/UL (ref 3.9–12.7)

## 2021-04-11 PROCEDURE — 84100 ASSAY OF PHOSPHORUS: CPT | Performed by: NURSE PRACTITIONER

## 2021-04-11 PROCEDURE — 99233 SBSQ HOSP IP/OBS HIGH 50: CPT | Mod: 95,,, | Performed by: INTERNAL MEDICINE

## 2021-04-11 PROCEDURE — 63600175 PHARM REV CODE 636 W HCPCS: Performed by: INTERNAL MEDICINE

## 2021-04-11 PROCEDURE — 25000003 PHARM REV CODE 250: Performed by: INTERNAL MEDICINE

## 2021-04-11 PROCEDURE — 11000001 HC ACUTE MED/SURG PRIVATE ROOM

## 2021-04-11 PROCEDURE — 25000242 PHARM REV CODE 250 ALT 637 W/ HCPCS: Performed by: INTERNAL MEDICINE

## 2021-04-11 PROCEDURE — 25000003 PHARM REV CODE 250: Performed by: STUDENT IN AN ORGANIZED HEALTH CARE EDUCATION/TRAINING PROGRAM

## 2021-04-11 PROCEDURE — 63600175 PHARM REV CODE 636 W HCPCS: Performed by: STUDENT IN AN ORGANIZED HEALTH CARE EDUCATION/TRAINING PROGRAM

## 2021-04-11 PROCEDURE — 99233 PR SUBSEQUENT HOSPITAL CARE,LEVL III: ICD-10-PCS | Mod: 95,,, | Performed by: INTERNAL MEDICINE

## 2021-04-11 PROCEDURE — 80053 COMPREHEN METABOLIC PANEL: CPT | Performed by: NURSE PRACTITIONER

## 2021-04-11 PROCEDURE — 85025 COMPLETE CBC W/AUTO DIFF WBC: CPT | Performed by: NURSE PRACTITIONER

## 2021-04-11 PROCEDURE — 83735 ASSAY OF MAGNESIUM: CPT | Performed by: NURSE PRACTITIONER

## 2021-04-11 PROCEDURE — 25000003 PHARM REV CODE 250: Performed by: HOSPITALIST

## 2021-04-11 PROCEDURE — 36415 COLL VENOUS BLD VENIPUNCTURE: CPT | Performed by: NURSE PRACTITIONER

## 2021-04-11 PROCEDURE — 25000003 PHARM REV CODE 250: Performed by: EMERGENCY MEDICINE

## 2021-04-11 RX ORDER — CETIRIZINE HYDROCHLORIDE 5 MG/1
5 TABLET ORAL NIGHTLY
Status: DISCONTINUED | OUTPATIENT
Start: 2021-04-11 | End: 2021-04-19 | Stop reason: HOSPADM

## 2021-04-11 RX ORDER — FLUTICASONE PROPIONATE 50 MCG
2 SPRAY, SUSPENSION (ML) NASAL DAILY
Status: DISCONTINUED | OUTPATIENT
Start: 2021-04-11 | End: 2021-04-19 | Stop reason: HOSPADM

## 2021-04-11 RX ORDER — GUAIFENESIN/DEXTROMETHORPHAN 100-10MG/5
10 SYRUP ORAL EVERY 4 HOURS PRN
Status: DISCONTINUED | OUTPATIENT
Start: 2021-04-11 | End: 2021-04-19 | Stop reason: HOSPADM

## 2021-04-11 RX ORDER — HYDRALAZINE HYDROCHLORIDE 25 MG/1
25 TABLET, FILM COATED ORAL EVERY 6 HOURS PRN
Status: DISCONTINUED | OUTPATIENT
Start: 2021-04-11 | End: 2021-04-16

## 2021-04-11 RX ADMIN — TACROLIMUS 4 MG: 1 CAPSULE ORAL at 05:04

## 2021-04-11 RX ADMIN — CARVEDILOL 25 MG: 25 TABLET, FILM COATED ORAL at 09:04

## 2021-04-11 RX ADMIN — HEPARIN SODIUM 5000 UNITS: 5000 INJECTION INTRAVENOUS; SUBCUTANEOUS at 02:04

## 2021-04-11 RX ADMIN — ACETAMINOPHEN 650 MG: 325 TABLET ORAL at 09:04

## 2021-04-11 RX ADMIN — MYCOPHENOLATE MOFETIL 1000 MG: 250 CAPSULE ORAL at 08:04

## 2021-04-11 RX ADMIN — SEVELAMER CARBONATE 1600 MG: 800 TABLET, FILM COATED ORAL at 11:04

## 2021-04-11 RX ADMIN — INSULIN DETEMIR 5 UNITS: 100 INJECTION, SOLUTION SUBCUTANEOUS at 10:04

## 2021-04-11 RX ADMIN — NIFEDIPINE 60 MG: 30 TABLET, FILM COATED, EXTENDED RELEASE ORAL at 08:04

## 2021-04-11 RX ADMIN — MYCOPHENOLATE MOFETIL 1000 MG: 250 CAPSULE ORAL at 09:04

## 2021-04-11 RX ADMIN — SEVELAMER CARBONATE 1600 MG: 800 TABLET, FILM COATED ORAL at 08:04

## 2021-04-11 RX ADMIN — HEPARIN SODIUM 5000 UNITS: 5000 INJECTION INTRAVENOUS; SUBCUTANEOUS at 10:04

## 2021-04-11 RX ADMIN — FLUTICASONE PROPIONATE 100 MCG: 50 SPRAY, METERED NASAL at 03:04

## 2021-04-11 RX ADMIN — CETIRIZINE HYDROCHLORIDE 5 MG: 5 TABLET ORAL at 09:04

## 2021-04-11 RX ADMIN — HEPARIN SODIUM 5000 UNITS: 5000 INJECTION INTRAVENOUS; SUBCUTANEOUS at 06:04

## 2021-04-11 RX ADMIN — MUPIROCIN: 20 OINTMENT TOPICAL at 08:04

## 2021-04-11 RX ADMIN — SEVELAMER CARBONATE 1600 MG: 800 TABLET, FILM COATED ORAL at 04:04

## 2021-04-11 RX ADMIN — MUPIROCIN: 20 OINTMENT TOPICAL at 09:04

## 2021-04-11 RX ADMIN — TACROLIMUS 4 MG: 1 CAPSULE ORAL at 08:04

## 2021-04-12 LAB
BACTERIA BLD CULT: NORMAL
BACTERIA BLD CULT: NORMAL
BASOPHILS # BLD AUTO: 0.12 K/UL (ref 0–0.2)
BASOPHILS NFR BLD: 1.5 % (ref 0–1.9)
DIFFERENTIAL METHOD: ABNORMAL
EOSINOPHIL # BLD AUTO: 0.6 K/UL (ref 0–0.5)
EOSINOPHIL NFR BLD: 7.8 % (ref 0–8)
ERYTHROCYTE [DISTWIDTH] IN BLOOD BY AUTOMATED COUNT: 13 % (ref 11.5–14.5)
HCT VFR BLD AUTO: 41.6 % (ref 40–54)
HGB BLD-MCNC: 14.6 G/DL (ref 14–18)
IMM GRANULOCYTES # BLD AUTO: 0.2 K/UL (ref 0–0.04)
IMM GRANULOCYTES NFR BLD AUTO: 2.4 % (ref 0–0.5)
LYMPHOCYTES # BLD AUTO: 1.6 K/UL (ref 1–4.8)
LYMPHOCYTES NFR BLD: 19.7 % (ref 18–48)
MCH RBC QN AUTO: 29 PG (ref 27–31)
MCHC RBC AUTO-ENTMCNC: 35.1 G/DL (ref 32–36)
MCV RBC AUTO: 83 FL (ref 82–98)
MONOCYTES # BLD AUTO: 0.6 K/UL (ref 0.3–1)
MONOCYTES NFR BLD: 7.4 % (ref 4–15)
NEUTROPHILS # BLD AUTO: 5 K/UL (ref 1.8–7.7)
NEUTROPHILS NFR BLD: 61.2 % (ref 38–73)
NRBC BLD-RTO: 0 /100 WBC
PLATELET # BLD AUTO: 148 K/UL (ref 150–450)
PLATELET BLD QL SMEAR: ABNORMAL
PMV BLD AUTO: 10.5 FL (ref 9.2–12.9)
POCT GLUCOSE: 100 MG/DL (ref 70–110)
POCT GLUCOSE: 107 MG/DL (ref 70–110)
POCT GLUCOSE: 284 MG/DL (ref 70–110)
POCT GLUCOSE: 296 MG/DL (ref 70–110)
POCT GLUCOSE: 94 MG/DL (ref 70–110)
RBC # BLD AUTO: 5.03 M/UL (ref 4.6–6.2)
WBC # BLD AUTO: 8.23 K/UL (ref 3.9–12.7)

## 2021-04-12 PROCEDURE — 99233 PR SUBSEQUENT HOSPITAL CARE,LEVL III: ICD-10-PCS | Mod: ,,, | Performed by: INTERNAL MEDICINE

## 2021-04-12 PROCEDURE — 25000003 PHARM REV CODE 250: Performed by: EMERGENCY MEDICINE

## 2021-04-12 PROCEDURE — 99232 PR SUBSEQUENT HOSPITAL CARE,LEVL II: ICD-10-PCS | Mod: 95,,, | Performed by: INTERNAL MEDICINE

## 2021-04-12 PROCEDURE — 63600175 PHARM REV CODE 636 W HCPCS: Performed by: INTERNAL MEDICINE

## 2021-04-12 PROCEDURE — 25000003 PHARM REV CODE 250: Performed by: STUDENT IN AN ORGANIZED HEALTH CARE EDUCATION/TRAINING PROGRAM

## 2021-04-12 PROCEDURE — 99232 SBSQ HOSP IP/OBS MODERATE 35: CPT | Mod: 95,,, | Performed by: INTERNAL MEDICINE

## 2021-04-12 PROCEDURE — 85025 COMPLETE CBC W/AUTO DIFF WBC: CPT | Performed by: NURSE PRACTITIONER

## 2021-04-12 PROCEDURE — 90935 HEMODIALYSIS ONE EVALUATION: CPT

## 2021-04-12 PROCEDURE — C9399 UNCLASSIFIED DRUGS OR BIOLOG: HCPCS | Performed by: STUDENT IN AN ORGANIZED HEALTH CARE EDUCATION/TRAINING PROGRAM

## 2021-04-12 PROCEDURE — 99233 SBSQ HOSP IP/OBS HIGH 50: CPT | Mod: ,,, | Performed by: INTERNAL MEDICINE

## 2021-04-12 PROCEDURE — 63600175 PHARM REV CODE 636 W HCPCS: Performed by: STUDENT IN AN ORGANIZED HEALTH CARE EDUCATION/TRAINING PROGRAM

## 2021-04-12 PROCEDURE — 36415 COLL VENOUS BLD VENIPUNCTURE: CPT | Performed by: NURSE PRACTITIONER

## 2021-04-12 PROCEDURE — 11000001 HC ACUTE MED/SURG PRIVATE ROOM

## 2021-04-12 PROCEDURE — 63600175 PHARM REV CODE 636 W HCPCS: Performed by: RADIOLOGY

## 2021-04-12 PROCEDURE — 25000003 PHARM REV CODE 250: Performed by: INTERNAL MEDICINE

## 2021-04-12 RX ORDER — MIDAZOLAM HYDROCHLORIDE 1 MG/ML
INJECTION INTRAMUSCULAR; INTRAVENOUS CODE/TRAUMA/SEDATION MEDICATION
Status: COMPLETED | OUTPATIENT
Start: 2021-04-12 | End: 2021-04-12

## 2021-04-12 RX ORDER — FENTANYL CITRATE 50 UG/ML
INJECTION, SOLUTION INTRAMUSCULAR; INTRAVENOUS CODE/TRAUMA/SEDATION MEDICATION
Status: COMPLETED | OUTPATIENT
Start: 2021-04-12 | End: 2021-04-12

## 2021-04-12 RX ORDER — LIDOCAINE HYDROCHLORIDE 5 MG/ML
INJECTION, SOLUTION INFILTRATION; PERINEURAL CODE/TRAUMA/SEDATION MEDICATION
Status: COMPLETED | OUTPATIENT
Start: 2021-04-12 | End: 2021-04-12

## 2021-04-12 RX ORDER — HEPARIN SODIUM 1000 [USP'U]/ML
1000 INJECTION, SOLUTION INTRAVENOUS; SUBCUTANEOUS
Status: DISCONTINUED | OUTPATIENT
Start: 2021-04-12 | End: 2021-04-19 | Stop reason: HOSPADM

## 2021-04-12 RX ORDER — SODIUM CHLORIDE 9 MG/ML
INJECTION, SOLUTION INTRAVENOUS ONCE
Status: COMPLETED | OUTPATIENT
Start: 2021-04-12 | End: 2021-04-12

## 2021-04-12 RX ORDER — CEFAZOLIN SODIUM 1 G/50ML
SOLUTION INTRAVENOUS
Status: COMPLETED | OUTPATIENT
Start: 2021-04-12 | End: 2021-04-12

## 2021-04-12 RX ORDER — HEPARIN SODIUM 1000 [USP'U]/ML
INJECTION, SOLUTION INTRAVENOUS; SUBCUTANEOUS CODE/TRAUMA/SEDATION MEDICATION
Status: COMPLETED | OUTPATIENT
Start: 2021-04-12 | End: 2021-04-12

## 2021-04-12 RX ORDER — SODIUM CHLORIDE 9 MG/ML
INJECTION, SOLUTION INTRAVENOUS
Status: DISCONTINUED | OUTPATIENT
Start: 2021-04-12 | End: 2021-04-16

## 2021-04-12 RX ORDER — NIFEDIPINE 30 MG/1
90 TABLET, EXTENDED RELEASE ORAL DAILY
Status: DISCONTINUED | OUTPATIENT
Start: 2021-04-12 | End: 2021-04-14

## 2021-04-12 RX ORDER — NIFEDIPINE 30 MG/1
90 TABLET, EXTENDED RELEASE ORAL DAILY
Status: DISCONTINUED | OUTPATIENT
Start: 2021-04-13 | End: 2021-04-12

## 2021-04-12 RX ADMIN — HEPARIN SODIUM 1000 UNITS: 1000 INJECTION, SOLUTION INTRAVENOUS; SUBCUTANEOUS at 05:04

## 2021-04-12 RX ADMIN — CARVEDILOL 25 MG: 25 TABLET, FILM COATED ORAL at 01:04

## 2021-04-12 RX ADMIN — SEVELAMER CARBONATE 1600 MG: 800 TABLET, FILM COATED ORAL at 11:04

## 2021-04-12 RX ADMIN — FENTANYL CITRATE 50 MCG: 50 INJECTION INTRAMUSCULAR; INTRAVENOUS at 09:04

## 2021-04-12 RX ADMIN — HEPARIN SODIUM 3600 UNITS: 1000 INJECTION, SOLUTION INTRAVENOUS; SUBCUTANEOUS at 09:04

## 2021-04-12 RX ADMIN — NIFEDIPINE 90 MG: 30 TABLET, FILM COATED, EXTENDED RELEASE ORAL at 11:04

## 2021-04-12 RX ADMIN — LIDOCAINE HYDROCHLORIDE 5 ML: 5 INJECTION, SOLUTION INFILTRATION; PERINEURAL at 09:04

## 2021-04-12 RX ADMIN — CEFAZOLIN SODIUM 2 G: 1 SOLUTION INTRAVENOUS at 09:04

## 2021-04-12 RX ADMIN — CETIRIZINE HYDROCHLORIDE 5 MG: 5 TABLET ORAL at 09:04

## 2021-04-12 RX ADMIN — ACETAMINOPHEN 650 MG: 325 TABLET ORAL at 11:04

## 2021-04-12 RX ADMIN — TACROLIMUS 4 MG: 1 CAPSULE ORAL at 11:04

## 2021-04-12 RX ADMIN — TACROLIMUS 4 MG: 1 CAPSULE ORAL at 07:04

## 2021-04-12 RX ADMIN — HEPARIN SODIUM 5000 UNITS: 5000 INJECTION INTRAVENOUS; SUBCUTANEOUS at 01:04

## 2021-04-12 RX ADMIN — CARVEDILOL 25 MG: 25 TABLET, FILM COATED ORAL at 09:04

## 2021-04-12 RX ADMIN — MIDAZOLAM HYDROCHLORIDE 1 MG: 1 INJECTION, SOLUTION INTRAMUSCULAR; INTRAVENOUS at 09:04

## 2021-04-12 RX ADMIN — INSULIN DETEMIR 5 UNITS: 100 INJECTION, SOLUTION SUBCUTANEOUS at 09:04

## 2021-04-12 RX ADMIN — SODIUM CHLORIDE: 0.9 INJECTION, SOLUTION INTRAVENOUS at 03:04

## 2021-04-12 RX ADMIN — MYCOPHENOLATE MOFETIL 1000 MG: 250 CAPSULE ORAL at 09:04

## 2021-04-12 RX ADMIN — HEPARIN SODIUM 5000 UNITS: 5000 INJECTION INTRAVENOUS; SUBCUTANEOUS at 09:04

## 2021-04-12 RX ADMIN — MYCOPHENOLATE MOFETIL 1000 MG: 250 CAPSULE ORAL at 11:04

## 2021-04-13 LAB
ALBUMIN SERPL BCP-MCNC: 3.4 G/DL (ref 3.5–5.2)
ALP SERPL-CCNC: 82 U/L (ref 55–135)
ALT SERPL W/O P-5'-P-CCNC: 21 U/L (ref 10–44)
ANION GAP SERPL CALC-SCNC: 11 MMOL/L (ref 8–16)
AST SERPL-CCNC: 24 U/L (ref 10–40)
BASOPHILS # BLD AUTO: 0.1 K/UL (ref 0–0.2)
BASOPHILS NFR BLD: 1.2 % (ref 0–1.9)
BILIRUB SERPL-MCNC: 0.5 MG/DL (ref 0.1–1)
BUN SERPL-MCNC: 52 MG/DL (ref 6–20)
CALCIUM SERPL-MCNC: 8.8 MG/DL (ref 8.7–10.5)
CHLORIDE SERPL-SCNC: 102 MMOL/L (ref 95–110)
CO2 SERPL-SCNC: 23 MMOL/L (ref 23–29)
CREAT SERPL-MCNC: 10.1 MG/DL (ref 0.5–1.4)
DIFFERENTIAL METHOD: ABNORMAL
EOSINOPHIL # BLD AUTO: 0.7 K/UL (ref 0–0.5)
EOSINOPHIL NFR BLD: 7.9 % (ref 0–8)
ERYTHROCYTE [DISTWIDTH] IN BLOOD BY AUTOMATED COUNT: 12.8 % (ref 11.5–14.5)
EST. GFR  (AFRICAN AMERICAN): 6.5 ML/MIN/1.73 M^2
EST. GFR  (NON AFRICAN AMERICAN): 5.6 ML/MIN/1.73 M^2
GLUCOSE SERPL-MCNC: 81 MG/DL (ref 70–110)
HCT VFR BLD AUTO: 41 % (ref 40–54)
HGB BLD-MCNC: 13.9 G/DL (ref 14–18)
IMM GRANULOCYTES # BLD AUTO: 0.03 K/UL (ref 0–0.04)
IMM GRANULOCYTES NFR BLD AUTO: 0.4 % (ref 0–0.5)
LYMPHOCYTES # BLD AUTO: 1.5 K/UL (ref 1–4.8)
LYMPHOCYTES NFR BLD: 18.3 % (ref 18–48)
MAGNESIUM SERPL-MCNC: 2.2 MG/DL (ref 1.6–2.6)
MCH RBC QN AUTO: 28.4 PG (ref 27–31)
MCHC RBC AUTO-ENTMCNC: 33.9 G/DL (ref 32–36)
MCV RBC AUTO: 84 FL (ref 82–98)
MONOCYTES # BLD AUTO: 0.7 K/UL (ref 0.3–1)
MONOCYTES NFR BLD: 8.4 % (ref 4–15)
NEUTROPHILS # BLD AUTO: 5.3 K/UL (ref 1.8–7.7)
NEUTROPHILS NFR BLD: 63.8 % (ref 38–73)
NRBC BLD-RTO: 0 /100 WBC
PHOSPHATE SERPL-MCNC: 5.9 MG/DL (ref 2.7–4.5)
PLATELET # BLD AUTO: 234 K/UL (ref 150–450)
PMV BLD AUTO: 9.9 FL (ref 9.2–12.9)
POCT GLUCOSE: 120 MG/DL (ref 70–110)
POCT GLUCOSE: 146 MG/DL (ref 70–110)
POCT GLUCOSE: 155 MG/DL (ref 70–110)
POCT GLUCOSE: 196 MG/DL (ref 70–110)
POCT GLUCOSE: 286 MG/DL (ref 70–110)
POTASSIUM SERPL-SCNC: 4.6 MMOL/L (ref 3.5–5.1)
PROT SERPL-MCNC: 7.1 G/DL (ref 6–8.4)
RBC # BLD AUTO: 4.89 M/UL (ref 4.6–6.2)
SODIUM SERPL-SCNC: 136 MMOL/L (ref 136–145)
WBC # BLD AUTO: 8.24 K/UL (ref 3.9–12.7)

## 2021-04-13 PROCEDURE — 99232 SBSQ HOSP IP/OBS MODERATE 35: CPT | Mod: 95,,, | Performed by: INTERNAL MEDICINE

## 2021-04-13 PROCEDURE — 36415 COLL VENOUS BLD VENIPUNCTURE: CPT | Performed by: NURSE PRACTITIONER

## 2021-04-13 PROCEDURE — 25000003 PHARM REV CODE 250: Performed by: STUDENT IN AN ORGANIZED HEALTH CARE EDUCATION/TRAINING PROGRAM

## 2021-04-13 PROCEDURE — 85025 COMPLETE CBC W/AUTO DIFF WBC: CPT | Performed by: NURSE PRACTITIONER

## 2021-04-13 PROCEDURE — 80053 COMPREHEN METABOLIC PANEL: CPT | Performed by: NURSE PRACTITIONER

## 2021-04-13 PROCEDURE — 88342 IMHCHEM/IMCYTCHM 1ST ANTB: CPT | Performed by: PATHOLOGY

## 2021-04-13 PROCEDURE — 25000003 PHARM REV CODE 250: Performed by: INTERNAL MEDICINE

## 2021-04-13 PROCEDURE — 11000001 HC ACUTE MED/SURG PRIVATE ROOM

## 2021-04-13 PROCEDURE — 88305 TISSUE EXAM BY PATHOLOGIST: CPT | Performed by: PATHOLOGY

## 2021-04-13 PROCEDURE — 63600175 PHARM REV CODE 636 W HCPCS: Mod: JG | Performed by: STUDENT IN AN ORGANIZED HEALTH CARE EDUCATION/TRAINING PROGRAM

## 2021-04-13 PROCEDURE — 84100 ASSAY OF PHOSPHORUS: CPT | Performed by: NURSE PRACTITIONER

## 2021-04-13 PROCEDURE — 83735 ASSAY OF MAGNESIUM: CPT | Performed by: NURSE PRACTITIONER

## 2021-04-13 PROCEDURE — 99233 SBSQ HOSP IP/OBS HIGH 50: CPT | Mod: ,,, | Performed by: INTERNAL MEDICINE

## 2021-04-13 PROCEDURE — 63600175 PHARM REV CODE 636 W HCPCS: Performed by: STUDENT IN AN ORGANIZED HEALTH CARE EDUCATION/TRAINING PROGRAM

## 2021-04-13 PROCEDURE — 88346 IMFLUOR 1ST 1ANTB STAIN PX: CPT | Performed by: PATHOLOGY

## 2021-04-13 PROCEDURE — 99232 PR SUBSEQUENT HOSPITAL CARE,LEVL II: ICD-10-PCS | Mod: 95,,, | Performed by: INTERNAL MEDICINE

## 2021-04-13 PROCEDURE — 99233 PR SUBSEQUENT HOSPITAL CARE,LEVL III: ICD-10-PCS | Mod: ,,, | Performed by: INTERNAL MEDICINE

## 2021-04-13 PROCEDURE — 63600175 PHARM REV CODE 636 W HCPCS: Performed by: INTERNAL MEDICINE

## 2021-04-13 PROCEDURE — 88313 SPECIAL STAINS GROUP 2: CPT | Mod: 59 | Performed by: PATHOLOGY

## 2021-04-13 PROCEDURE — 88350 IMFLUOR EA ADDL 1ANTB STN PX: CPT | Mod: 59 | Performed by: PATHOLOGY

## 2021-04-13 RX ORDER — FENTANYL CITRATE 50 UG/ML
INJECTION, SOLUTION INTRAMUSCULAR; INTRAVENOUS CODE/TRAUMA/SEDATION MEDICATION
Status: COMPLETED | OUTPATIENT
Start: 2021-04-13 | End: 2021-04-13

## 2021-04-13 RX ORDER — MIDAZOLAM HYDROCHLORIDE 1 MG/ML
INJECTION INTRAMUSCULAR; INTRAVENOUS CODE/TRAUMA/SEDATION MEDICATION
Status: COMPLETED | OUTPATIENT
Start: 2021-04-13 | End: 2021-04-13

## 2021-04-13 RX ORDER — CALCITRIOL 0.5 UG/1
0.5 CAPSULE ORAL DAILY
Status: DISCONTINUED | OUTPATIENT
Start: 2021-04-13 | End: 2021-04-19 | Stop reason: HOSPADM

## 2021-04-13 RX ADMIN — NIFEDIPINE 90 MG: 30 TABLET, FILM COATED, EXTENDED RELEASE ORAL at 08:04

## 2021-04-13 RX ADMIN — INSULIN DETEMIR 5 UNITS: 100 INJECTION, SOLUTION SUBCUTANEOUS at 09:04

## 2021-04-13 RX ADMIN — HEPARIN SODIUM 5000 UNITS: 5000 INJECTION INTRAVENOUS; SUBCUTANEOUS at 09:04

## 2021-04-13 RX ADMIN — SEVELAMER CARBONATE 1600 MG: 800 TABLET, FILM COATED ORAL at 05:04

## 2021-04-13 RX ADMIN — TACROLIMUS 4 MG: 1 CAPSULE ORAL at 05:04

## 2021-04-13 RX ADMIN — INSULIN ASPART 2 UNITS: 100 INJECTION, SOLUTION INTRAVENOUS; SUBCUTANEOUS at 05:04

## 2021-04-13 RX ADMIN — DESMOPRESSIN ACETATE 15.09 MCG: 4 SOLUTION INTRAVENOUS at 11:04

## 2021-04-13 RX ADMIN — CETIRIZINE HYDROCHLORIDE 5 MG: 5 TABLET ORAL at 09:04

## 2021-04-13 RX ADMIN — FENTANYL CITRATE 50 MCG: 50 INJECTION INTRAMUSCULAR; INTRAVENOUS at 12:04

## 2021-04-13 RX ADMIN — FLUTICASONE PROPIONATE 100 MCG: 50 SPRAY, METERED NASAL at 09:04

## 2021-04-13 RX ADMIN — MYCOPHENOLATE MOFETIL 1000 MG: 250 CAPSULE ORAL at 08:04

## 2021-04-13 RX ADMIN — MIDAZOLAM HYDROCHLORIDE 1 MG: 1 INJECTION, SOLUTION INTRAMUSCULAR; INTRAVENOUS at 12:04

## 2021-04-13 RX ADMIN — TACROLIMUS 4 MG: 1 CAPSULE ORAL at 08:04

## 2021-04-13 RX ADMIN — CARVEDILOL 25 MG: 25 TABLET, FILM COATED ORAL at 09:04

## 2021-04-13 RX ADMIN — CALCITRIOL 0.5 MCG: 0.5 CAPSULE, LIQUID FILLED ORAL at 02:04

## 2021-04-13 RX ADMIN — MYCOPHENOLATE MOFETIL 1000 MG: 250 CAPSULE ORAL at 09:04

## 2021-04-13 RX ADMIN — CARVEDILOL 25 MG: 25 TABLET, FILM COATED ORAL at 08:04

## 2021-04-14 ENCOUNTER — TELEPHONE (OUTPATIENT)
Dept: TRANSPLANT | Facility: CLINIC | Age: 46
End: 2021-04-14

## 2021-04-14 LAB
ALBUMIN SERPL BCP-MCNC: 3.4 G/DL (ref 3.5–5.2)
ALP SERPL-CCNC: 79 U/L (ref 55–135)
ALT SERPL W/O P-5'-P-CCNC: 12 U/L (ref 10–44)
ANION GAP SERPL CALC-SCNC: 13 MMOL/L (ref 8–16)
AST SERPL-CCNC: 21 U/L (ref 10–40)
BASOPHILS # BLD AUTO: 0.07 K/UL (ref 0–0.2)
BASOPHILS NFR BLD: 1 % (ref 0–1.9)
BILIRUB SERPL-MCNC: 0.4 MG/DL (ref 0.1–1)
BUN SERPL-MCNC: 66 MG/DL (ref 6–20)
CALCIUM SERPL-MCNC: 8.9 MG/DL (ref 8.7–10.5)
CHLORIDE SERPL-SCNC: 101 MMOL/L (ref 95–110)
CO2 SERPL-SCNC: 20 MMOL/L (ref 23–29)
CREAT SERPL-MCNC: 12.4 MG/DL (ref 0.5–1.4)
DIFFERENTIAL METHOD: ABNORMAL
EOSINOPHIL # BLD AUTO: 0.6 K/UL (ref 0–0.5)
EOSINOPHIL NFR BLD: 8.4 % (ref 0–8)
ERYTHROCYTE [DISTWIDTH] IN BLOOD BY AUTOMATED COUNT: 12.9 % (ref 11.5–14.5)
EST. GFR  (AFRICAN AMERICAN): 5.1 ML/MIN/1.73 M^2
EST. GFR  (NON AFRICAN AMERICAN): 4.4 ML/MIN/1.73 M^2
GLUCOSE SERPL-MCNC: 125 MG/DL (ref 70–110)
HCT VFR BLD AUTO: 38.4 % (ref 40–54)
HGB BLD-MCNC: 12.6 G/DL (ref 14–18)
IMM GRANULOCYTES # BLD AUTO: 0.02 K/UL (ref 0–0.04)
IMM GRANULOCYTES NFR BLD AUTO: 0.3 % (ref 0–0.5)
LYMPHOCYTES # BLD AUTO: 1.3 K/UL (ref 1–4.8)
LYMPHOCYTES NFR BLD: 19.3 % (ref 18–48)
MAGNESIUM SERPL-MCNC: 2.1 MG/DL (ref 1.6–2.6)
MCH RBC QN AUTO: 27.9 PG (ref 27–31)
MCHC RBC AUTO-ENTMCNC: 32.8 G/DL (ref 32–36)
MCV RBC AUTO: 85 FL (ref 82–98)
MONOCYTES # BLD AUTO: 0.6 K/UL (ref 0.3–1)
MONOCYTES NFR BLD: 8.4 % (ref 4–15)
NEUTROPHILS # BLD AUTO: 4.3 K/UL (ref 1.8–7.7)
NEUTROPHILS NFR BLD: 62.6 % (ref 38–73)
NRBC BLD-RTO: 0 /100 WBC
PHOSPHATE SERPL-MCNC: 6.4 MG/DL (ref 2.7–4.5)
PLATELET # BLD AUTO: 188 K/UL (ref 150–450)
PMV BLD AUTO: 9.4 FL (ref 9.2–12.9)
POCT GLUCOSE: 127 MG/DL (ref 70–110)
POCT GLUCOSE: 184 MG/DL (ref 70–110)
POCT GLUCOSE: 84 MG/DL (ref 70–110)
POCT GLUCOSE: 98 MG/DL (ref 70–110)
POTASSIUM SERPL-SCNC: 4.5 MMOL/L (ref 3.5–5.1)
PROT SERPL-MCNC: 6.9 G/DL (ref 6–8.4)
RBC # BLD AUTO: 4.52 M/UL (ref 4.6–6.2)
SODIUM SERPL-SCNC: 134 MMOL/L (ref 136–145)
TACROLIMUS BLD-MCNC: 15 NG/ML (ref 5–15)
WBC # BLD AUTO: 6.8 K/UL (ref 3.9–12.7)

## 2021-04-14 PROCEDURE — 63600175 PHARM REV CODE 636 W HCPCS: Performed by: STUDENT IN AN ORGANIZED HEALTH CARE EDUCATION/TRAINING PROGRAM

## 2021-04-14 PROCEDURE — 90935 HEMODIALYSIS ONE EVALUATION: CPT

## 2021-04-14 PROCEDURE — 99232 SBSQ HOSP IP/OBS MODERATE 35: CPT | Mod: 95,,, | Performed by: INTERNAL MEDICINE

## 2021-04-14 PROCEDURE — 25000003 PHARM REV CODE 250: Performed by: INTERNAL MEDICINE

## 2021-04-14 PROCEDURE — 25000003 PHARM REV CODE 250: Performed by: STUDENT IN AN ORGANIZED HEALTH CARE EDUCATION/TRAINING PROGRAM

## 2021-04-14 PROCEDURE — 99233 PR SUBSEQUENT HOSPITAL CARE,LEVL III: ICD-10-PCS | Mod: ,,, | Performed by: INTERNAL MEDICINE

## 2021-04-14 PROCEDURE — 63600175 PHARM REV CODE 636 W HCPCS: Performed by: INTERNAL MEDICINE

## 2021-04-14 PROCEDURE — 83735 ASSAY OF MAGNESIUM: CPT | Performed by: INTERNAL MEDICINE

## 2021-04-14 PROCEDURE — 84100 ASSAY OF PHOSPHORUS: CPT | Performed by: INTERNAL MEDICINE

## 2021-04-14 PROCEDURE — 99233 SBSQ HOSP IP/OBS HIGH 50: CPT | Mod: ,,, | Performed by: INTERNAL MEDICINE

## 2021-04-14 PROCEDURE — 80197 ASSAY OF TACROLIMUS: CPT | Performed by: NURSE PRACTITIONER

## 2021-04-14 PROCEDURE — 80053 COMPREHEN METABOLIC PANEL: CPT | Performed by: INTERNAL MEDICINE

## 2021-04-14 PROCEDURE — 36415 COLL VENOUS BLD VENIPUNCTURE: CPT | Performed by: INTERNAL MEDICINE

## 2021-04-14 PROCEDURE — 87799 DETECT AGENT NOS DNA QUANT: CPT | Performed by: INTERNAL MEDICINE

## 2021-04-14 PROCEDURE — 11000001 HC ACUTE MED/SURG PRIVATE ROOM

## 2021-04-14 PROCEDURE — 99232 PR SUBSEQUENT HOSPITAL CARE,LEVL II: ICD-10-PCS | Mod: 95,,, | Performed by: INTERNAL MEDICINE

## 2021-04-14 PROCEDURE — 85025 COMPLETE CBC W/AUTO DIFF WBC: CPT | Performed by: INTERNAL MEDICINE

## 2021-04-14 RX ORDER — NIFEDIPINE 30 MG/1
60 TABLET, EXTENDED RELEASE ORAL DAILY
Status: DISCONTINUED | OUTPATIENT
Start: 2021-04-15 | End: 2021-04-19

## 2021-04-14 RX ORDER — NIFEDIPINE 30 MG/1
60 TABLET, EXTENDED RELEASE ORAL DAILY
Status: DISCONTINUED | OUTPATIENT
Start: 2021-04-14 | End: 2021-04-14

## 2021-04-14 RX ADMIN — CALCITRIOL 0.5 MCG: 0.5 CAPSULE, LIQUID FILLED ORAL at 10:04

## 2021-04-14 RX ADMIN — SEVELAMER CARBONATE 1600 MG: 800 TABLET, FILM COATED ORAL at 08:04

## 2021-04-14 RX ADMIN — SEVELAMER CARBONATE 1600 MG: 800 TABLET, FILM COATED ORAL at 12:04

## 2021-04-14 RX ADMIN — FLUTICASONE PROPIONATE 100 MCG: 50 SPRAY, METERED NASAL at 10:04

## 2021-04-14 RX ADMIN — INSULIN DETEMIR 5 UNITS: 100 INJECTION, SOLUTION SUBCUTANEOUS at 09:04

## 2021-04-14 RX ADMIN — MYCOPHENOLATE MOFETIL 1000 MG: 250 CAPSULE ORAL at 09:04

## 2021-04-14 RX ADMIN — SEVELAMER CARBONATE 1600 MG: 800 TABLET, FILM COATED ORAL at 05:04

## 2021-04-14 RX ADMIN — HEPARIN SODIUM 1000 UNITS: 1000 INJECTION, SOLUTION INTRAVENOUS; SUBCUTANEOUS at 04:04

## 2021-04-14 RX ADMIN — CARVEDILOL 25 MG: 25 TABLET, FILM COATED ORAL at 09:04

## 2021-04-14 RX ADMIN — CETIRIZINE HYDROCHLORIDE 5 MG: 5 TABLET ORAL at 09:04

## 2021-04-14 RX ADMIN — NIFEDIPINE 90 MG: 30 TABLET, FILM COATED, EXTENDED RELEASE ORAL at 08:04

## 2021-04-14 RX ADMIN — CARVEDILOL 25 MG: 25 TABLET, FILM COATED ORAL at 01:04

## 2021-04-14 RX ADMIN — TACROLIMUS 4 MG: 1 CAPSULE ORAL at 10:04

## 2021-04-14 RX ADMIN — SODIUM CHLORIDE: 0.9 INJECTION, SOLUTION INTRAVENOUS at 01:04

## 2021-04-15 PROBLEM — T86.11 ACUTE REJECTION OF KIDNEY TRANSPLANT: Status: ACTIVE | Noted: 2021-04-15

## 2021-04-15 LAB
ALBUMIN SERPL BCP-MCNC: 3.5 G/DL (ref 3.5–5.2)
ALP SERPL-CCNC: 91 U/L (ref 55–135)
ALT SERPL W/O P-5'-P-CCNC: 16 U/L (ref 10–44)
ANION GAP SERPL CALC-SCNC: 14 MMOL/L (ref 8–16)
AST SERPL-CCNC: 37 U/L (ref 10–40)
BASOPHILS # BLD AUTO: 0.08 K/UL (ref 0–0.2)
BASOPHILS NFR BLD: 1.1 % (ref 0–1.9)
BILIRUB SERPL-MCNC: 0.6 MG/DL (ref 0.1–1)
BUN SERPL-MCNC: 51 MG/DL (ref 6–20)
CALCIUM SERPL-MCNC: 9.2 MG/DL (ref 8.7–10.5)
CHLORIDE SERPL-SCNC: 101 MMOL/L (ref 95–110)
CO2 SERPL-SCNC: 20 MMOL/L (ref 23–29)
CREAT SERPL-MCNC: 10.9 MG/DL (ref 0.5–1.4)
DIFFERENTIAL METHOD: ABNORMAL
EOSINOPHIL # BLD AUTO: 0.5 K/UL (ref 0–0.5)
EOSINOPHIL NFR BLD: 7.3 % (ref 0–8)
ERYTHROCYTE [DISTWIDTH] IN BLOOD BY AUTOMATED COUNT: 12.6 % (ref 11.5–14.5)
EST. GFR  (AFRICAN AMERICAN): 5.9 ML/MIN/1.73 M^2
EST. GFR  (NON AFRICAN AMERICAN): 5.1 ML/MIN/1.73 M^2
GLUCOSE SERPL-MCNC: 103 MG/DL (ref 70–110)
HCT VFR BLD AUTO: 37.6 % (ref 40–54)
HGB BLD-MCNC: 12.7 G/DL (ref 14–18)
IMM GRANULOCYTES # BLD AUTO: 0.02 K/UL (ref 0–0.04)
IMM GRANULOCYTES NFR BLD AUTO: 0.3 % (ref 0–0.5)
LYMPHOCYTES # BLD AUTO: 1.3 K/UL (ref 1–4.8)
LYMPHOCYTES NFR BLD: 17.9 % (ref 18–48)
MAGNESIUM SERPL-MCNC: 2 MG/DL (ref 1.6–2.6)
MCH RBC QN AUTO: 28.2 PG (ref 27–31)
MCHC RBC AUTO-ENTMCNC: 33.8 G/DL (ref 32–36)
MCV RBC AUTO: 83 FL (ref 82–98)
MONOCYTES # BLD AUTO: 0.6 K/UL (ref 0.3–1)
MONOCYTES NFR BLD: 8.8 % (ref 4–15)
NEUTROPHILS # BLD AUTO: 4.7 K/UL (ref 1.8–7.7)
NEUTROPHILS NFR BLD: 64.6 % (ref 38–73)
NRBC BLD-RTO: 0 /100 WBC
PHOSPHATE SERPL-MCNC: 5.7 MG/DL (ref 2.7–4.5)
PLATELET # BLD AUTO: 192 K/UL (ref 150–450)
PMV BLD AUTO: 9.6 FL (ref 9.2–12.9)
POCT GLUCOSE: 110 MG/DL (ref 70–110)
POCT GLUCOSE: 110 MG/DL (ref 70–110)
POCT GLUCOSE: 169 MG/DL (ref 70–110)
POCT GLUCOSE: 241 MG/DL (ref 70–110)
POTASSIUM SERPL-SCNC: 4.4 MMOL/L (ref 3.5–5.1)
PROT SERPL-MCNC: 7.2 G/DL (ref 6–8.4)
RBC # BLD AUTO: 4.51 M/UL (ref 4.6–6.2)
SODIUM SERPL-SCNC: 135 MMOL/L (ref 136–145)
TACROLIMUS BLD-MCNC: 8.4 NG/ML (ref 5–15)
WBC # BLD AUTO: 7.31 K/UL (ref 3.9–12.7)

## 2021-04-15 PROCEDURE — 80197 ASSAY OF TACROLIMUS: CPT | Performed by: INTERNAL MEDICINE

## 2021-04-15 PROCEDURE — 63600175 PHARM REV CODE 636 W HCPCS: Performed by: PHYSICIAN ASSISTANT

## 2021-04-15 PROCEDURE — 94761 N-INVAS EAR/PLS OXIMETRY MLT: CPT

## 2021-04-15 PROCEDURE — 99233 PR SUBSEQUENT HOSPITAL CARE,LEVL III: ICD-10-PCS | Mod: ,,, | Performed by: INTERNAL MEDICINE

## 2021-04-15 PROCEDURE — 80053 COMPREHEN METABOLIC PANEL: CPT | Performed by: NURSE PRACTITIONER

## 2021-04-15 PROCEDURE — 11000001 HC ACUTE MED/SURG PRIVATE ROOM

## 2021-04-15 PROCEDURE — 25000003 PHARM REV CODE 250: Performed by: INTERNAL MEDICINE

## 2021-04-15 PROCEDURE — 63600175 PHARM REV CODE 636 W HCPCS: Performed by: INTERNAL MEDICINE

## 2021-04-15 PROCEDURE — 99233 SBSQ HOSP IP/OBS HIGH 50: CPT | Mod: 95,,, | Performed by: INTERNAL MEDICINE

## 2021-04-15 PROCEDURE — 36415 COLL VENOUS BLD VENIPUNCTURE: CPT | Performed by: INTERNAL MEDICINE

## 2021-04-15 PROCEDURE — 25000003 PHARM REV CODE 250: Performed by: STUDENT IN AN ORGANIZED HEALTH CARE EDUCATION/TRAINING PROGRAM

## 2021-04-15 PROCEDURE — 83735 ASSAY OF MAGNESIUM: CPT | Performed by: NURSE PRACTITIONER

## 2021-04-15 PROCEDURE — 99233 SBSQ HOSP IP/OBS HIGH 50: CPT | Mod: ,,, | Performed by: INTERNAL MEDICINE

## 2021-04-15 PROCEDURE — 85025 COMPLETE CBC W/AUTO DIFF WBC: CPT | Performed by: NURSE PRACTITIONER

## 2021-04-15 PROCEDURE — 99233 PR SUBSEQUENT HOSPITAL CARE,LEVL III: ICD-10-PCS | Mod: 95,,, | Performed by: INTERNAL MEDICINE

## 2021-04-15 PROCEDURE — 84100 ASSAY OF PHOSPHORUS: CPT | Performed by: NURSE PRACTITIONER

## 2021-04-15 PROCEDURE — 63600175 PHARM REV CODE 636 W HCPCS: Performed by: STUDENT IN AN ORGANIZED HEALTH CARE EDUCATION/TRAINING PROGRAM

## 2021-04-15 PROCEDURE — 25000003 PHARM REV CODE 250: Performed by: PHYSICIAN ASSISTANT

## 2021-04-15 PROCEDURE — 99900035 HC TECH TIME PER 15 MIN (STAT)

## 2021-04-15 RX ORDER — SODIUM CHLORIDE 0.9 % (FLUSH) 0.9 %
10 SYRINGE (ML) INJECTION
Status: CANCELLED | OUTPATIENT
Start: 2021-04-19

## 2021-04-15 RX ORDER — NYSTATIN 100000 [USP'U]/ML
500000 SUSPENSION ORAL
Status: DISCONTINUED | OUTPATIENT
Start: 2021-04-15 | End: 2021-04-19 | Stop reason: HOSPADM

## 2021-04-15 RX ORDER — TACROLIMUS 1 MG/1
2 CAPSULE ORAL 2 TIMES DAILY
Status: DISCONTINUED | OUTPATIENT
Start: 2021-04-15 | End: 2021-04-17

## 2021-04-15 RX ORDER — ACETAMINOPHEN 500 MG
500 TABLET ORAL
Status: CANCELLED | OUTPATIENT
Start: 2021-04-19

## 2021-04-15 RX ORDER — SULFAMETHOXAZOLE AND TRIMETHOPRIM 400; 80 MG/1; MG/1
1 TABLET ORAL
Status: DISCONTINUED | OUTPATIENT
Start: 2021-04-16 | End: 2021-04-16

## 2021-04-15 RX ORDER — SODIUM CHLORIDE 9 MG/ML
INJECTION, SOLUTION INTRAVENOUS
Status: DISCONTINUED | OUTPATIENT
Start: 2021-04-15 | End: 2021-04-19 | Stop reason: HOSPADM

## 2021-04-15 RX ORDER — VALGANCICLOVIR 450 MG/1
450 TABLET, FILM COATED ORAL
Status: DISCONTINUED | OUTPATIENT
Start: 2021-04-16 | End: 2021-04-19 | Stop reason: HOSPADM

## 2021-04-15 RX ORDER — HEPARIN 100 UNIT/ML
500 SYRINGE INTRAVENOUS
Status: CANCELLED | OUTPATIENT
Start: 2021-04-19

## 2021-04-15 RX ORDER — DIPHENHYDRAMINE HCL 25 MG
25 CAPSULE ORAL
Status: CANCELLED | OUTPATIENT
Start: 2021-04-19

## 2021-04-15 RX ADMIN — CETIRIZINE HYDROCHLORIDE 5 MG: 5 TABLET ORAL at 08:04

## 2021-04-15 RX ADMIN — SEVELAMER CARBONATE 1600 MG: 800 TABLET, FILM COATED ORAL at 08:04

## 2021-04-15 RX ADMIN — HEPARIN SODIUM 5000 UNITS: 5000 INJECTION INTRAVENOUS; SUBCUTANEOUS at 06:04

## 2021-04-15 RX ADMIN — MYCOPHENOLATE MOFETIL 1000 MG: 250 CAPSULE ORAL at 10:04

## 2021-04-15 RX ADMIN — HYDRALAZINE HYDROCHLORIDE 25 MG: 25 TABLET, FILM COATED ORAL at 02:04

## 2021-04-15 RX ADMIN — CARVEDILOL 25 MG: 25 TABLET, FILM COATED ORAL at 08:04

## 2021-04-15 RX ADMIN — FLUTICASONE PROPIONATE 100 MCG: 50 SPRAY, METERED NASAL at 10:04

## 2021-04-15 RX ADMIN — CARVEDILOL 25 MG: 25 TABLET, FILM COATED ORAL at 11:04

## 2021-04-15 RX ADMIN — SODIUM CHLORIDE: 0.9 INJECTION, SOLUTION INTRAVENOUS at 02:04

## 2021-04-15 RX ADMIN — DEXTROSE 500 MG: 50 INJECTION, SOLUTION INTRAVENOUS at 02:04

## 2021-04-15 RX ADMIN — NYSTATIN 500000 UNITS: 500000 SUSPENSION ORAL at 06:04

## 2021-04-15 RX ADMIN — INSULIN ASPART 2 UNITS: 100 INJECTION, SOLUTION INTRAVENOUS; SUBCUTANEOUS at 01:04

## 2021-04-15 RX ADMIN — NYSTATIN 500000 UNITS: 500000 SUSPENSION ORAL at 02:04

## 2021-04-15 RX ADMIN — NIFEDIPINE 60 MG: 30 TABLET, FILM COATED, EXTENDED RELEASE ORAL at 11:04

## 2021-04-15 RX ADMIN — CALCITRIOL 0.5 MCG: 0.5 CAPSULE, LIQUID FILLED ORAL at 10:04

## 2021-04-15 RX ADMIN — INSULIN DETEMIR 5 UNITS: 100 INJECTION, SOLUTION SUBCUTANEOUS at 08:04

## 2021-04-15 RX ADMIN — SEVELAMER CARBONATE 1600 MG: 800 TABLET, FILM COATED ORAL at 06:04

## 2021-04-15 RX ADMIN — SEVELAMER CARBONATE 1600 MG: 800 TABLET, FILM COATED ORAL at 12:04

## 2021-04-15 RX ADMIN — TACROLIMUS 2 MG: 1 CAPSULE ORAL at 06:04

## 2021-04-15 RX ADMIN — MYCOPHENOLATE MOFETIL 1000 MG: 250 CAPSULE ORAL at 08:04

## 2021-04-16 PROBLEM — Z29.89 PROPHYLACTIC IMMUNOTHERAPY: Status: ACTIVE | Noted: 2021-04-16

## 2021-04-16 PROBLEM — T38.0X5S ADVERSE EFFECT OF ADRENAL CORTICAL STEROIDS, SEQUELA: Status: ACTIVE | Noted: 2021-04-16

## 2021-04-16 LAB
ALBUMIN SERPL BCP-MCNC: 3.3 G/DL (ref 3.5–5.2)
ALP SERPL-CCNC: 96 U/L (ref 55–135)
ALT SERPL W/O P-5'-P-CCNC: 15 U/L (ref 10–44)
ANION GAP SERPL CALC-SCNC: 18 MMOL/L (ref 8–16)
AST SERPL-CCNC: 28 U/L (ref 10–40)
BASOPHILS # BLD AUTO: 0.01 K/UL (ref 0–0.2)
BASOPHILS NFR BLD: 0.1 % (ref 0–1.9)
BILIRUB SERPL-MCNC: 0.4 MG/DL (ref 0.1–1)
BKV DNA SERPL NAA+PROBE-ACNC: <125 COPIES/ML
BKV DNA SERPL NAA+PROBE-LOG#: <2.1 LOG (10) COPIES/ML
BKV DNA SERPL QL NAA+PROBE: NOT DETECTED
BUN SERPL-MCNC: 75 MG/DL (ref 6–20)
CALCIUM SERPL-MCNC: 9.2 MG/DL (ref 8.7–10.5)
CHLORIDE SERPL-SCNC: 100 MMOL/L (ref 95–110)
CO2 SERPL-SCNC: 14 MMOL/L (ref 23–29)
CREAT SERPL-MCNC: 13.5 MG/DL (ref 0.5–1.4)
DIFFERENTIAL METHOD: ABNORMAL
EOSINOPHIL # BLD AUTO: 0 K/UL (ref 0–0.5)
EOSINOPHIL NFR BLD: 0 % (ref 0–8)
ERYTHROCYTE [DISTWIDTH] IN BLOOD BY AUTOMATED COUNT: 12.2 % (ref 11.5–14.5)
EST. GFR  (AFRICAN AMERICAN): 4.6 ML/MIN/1.73 M^2
EST. GFR  (NON AFRICAN AMERICAN): 3.9 ML/MIN/1.73 M^2
FINAL PATHOLOGIC DIAGNOSIS: NORMAL
GLUCOSE SERPL-MCNC: 187 MG/DL (ref 70–110)
GROSS: NORMAL
HCT VFR BLD AUTO: 36.9 % (ref 40–54)
HGB BLD-MCNC: 12.8 G/DL (ref 14–18)
IMM GRANULOCYTES # BLD AUTO: 0.03 K/UL (ref 0–0.04)
IMM GRANULOCYTES NFR BLD AUTO: 0.4 % (ref 0–0.5)
LYMPHOCYTES # BLD AUTO: 0.7 K/UL (ref 1–4.8)
LYMPHOCYTES NFR BLD: 8.1 % (ref 18–48)
Lab: NORMAL
MAGNESIUM SERPL-MCNC: 2.1 MG/DL (ref 1.6–2.6)
MCH RBC QN AUTO: 28.2 PG (ref 27–31)
MCHC RBC AUTO-ENTMCNC: 34.7 G/DL (ref 32–36)
MCV RBC AUTO: 81 FL (ref 82–98)
MONOCYTES # BLD AUTO: 0.1 K/UL (ref 0.3–1)
MONOCYTES NFR BLD: 0.7 % (ref 4–15)
NEUTROPHILS # BLD AUTO: 7.8 K/UL (ref 1.8–7.7)
NEUTROPHILS NFR BLD: 90.7 % (ref 38–73)
NRBC BLD-RTO: 0 /100 WBC
PHOSPHATE SERPL-MCNC: 6.4 MG/DL (ref 2.7–4.5)
PLATELET # BLD AUTO: 205 K/UL (ref 150–450)
PMV BLD AUTO: 10 FL (ref 9.2–12.9)
POCT GLUCOSE: 142 MG/DL (ref 70–110)
POCT GLUCOSE: 183 MG/DL (ref 70–110)
POCT GLUCOSE: 243 MG/DL (ref 70–110)
POCT GLUCOSE: 318 MG/DL (ref 70–110)
POTASSIUM SERPL-SCNC: 5.6 MMOL/L (ref 3.5–5.1)
PROT SERPL-MCNC: 7.1 G/DL (ref 6–8.4)
RBC # BLD AUTO: 4.54 M/UL (ref 4.6–6.2)
SODIUM SERPL-SCNC: 132 MMOL/L (ref 136–145)
TACROLIMUS BLD-MCNC: 7.4 NG/ML (ref 5–15)
WBC # BLD AUTO: 8.55 K/UL (ref 3.9–12.7)

## 2021-04-16 PROCEDURE — 63600175 PHARM REV CODE 636 W HCPCS: Performed by: INTERNAL MEDICINE

## 2021-04-16 PROCEDURE — 84100 ASSAY OF PHOSPHORUS: CPT | Performed by: NURSE PRACTITIONER

## 2021-04-16 PROCEDURE — 25000003 PHARM REV CODE 250: Performed by: NURSE PRACTITIONER

## 2021-04-16 PROCEDURE — 99233 PR SUBSEQUENT HOSPITAL CARE,LEVL III: ICD-10-PCS | Mod: ,,, | Performed by: NURSE PRACTITIONER

## 2021-04-16 PROCEDURE — 85025 COMPLETE CBC W/AUTO DIFF WBC: CPT | Performed by: NURSE PRACTITIONER

## 2021-04-16 PROCEDURE — 20600001 HC STEP DOWN PRIVATE ROOM

## 2021-04-16 PROCEDURE — 80053 COMPREHEN METABOLIC PANEL: CPT | Performed by: NURSE PRACTITIONER

## 2021-04-16 PROCEDURE — 63600175 PHARM REV CODE 636 W HCPCS: Performed by: PATHOLOGY

## 2021-04-16 PROCEDURE — 63600175 PHARM REV CODE 636 W HCPCS: Performed by: STUDENT IN AN ORGANIZED HEALTH CARE EDUCATION/TRAINING PROGRAM

## 2021-04-16 PROCEDURE — 90935 HEMODIALYSIS ONE EVALUATION: CPT

## 2021-04-16 PROCEDURE — 25000003 PHARM REV CODE 250: Performed by: INTERNAL MEDICINE

## 2021-04-16 PROCEDURE — 36514 APHERESIS PLASMA: CPT | Mod: ,,, | Performed by: PATHOLOGY

## 2021-04-16 PROCEDURE — P9045 ALBUMIN (HUMAN), 5%, 250 ML: HCPCS | Mod: JG | Performed by: PATHOLOGY

## 2021-04-16 PROCEDURE — 99222 PR INITIAL HOSPITAL CARE,LEVL II: ICD-10-PCS | Mod: ,,, | Performed by: NURSE PRACTITIONER

## 2021-04-16 PROCEDURE — 63600175 PHARM REV CODE 636 W HCPCS: Performed by: NURSE PRACTITIONER

## 2021-04-16 PROCEDURE — 36514 APHERESIS PLASMA: CPT

## 2021-04-16 PROCEDURE — 25000003 PHARM REV CODE 250: Performed by: PHYSICIAN ASSISTANT

## 2021-04-16 PROCEDURE — 80197 ASSAY OF TACROLIMUS: CPT | Performed by: INTERNAL MEDICINE

## 2021-04-16 PROCEDURE — 36415 COLL VENOUS BLD VENIPUNCTURE: CPT | Performed by: INTERNAL MEDICINE

## 2021-04-16 PROCEDURE — 80502 PR  LAB PATHOLOGY CONSULT-COMPLETE: ICD-10-PCS | Mod: ,,, | Performed by: PATHOLOGY

## 2021-04-16 PROCEDURE — 63600175 PHARM REV CODE 636 W HCPCS: Performed by: PHYSICIAN ASSISTANT

## 2021-04-16 PROCEDURE — 25000003 PHARM REV CODE 250: Performed by: STUDENT IN AN ORGANIZED HEALTH CARE EDUCATION/TRAINING PROGRAM

## 2021-04-16 PROCEDURE — C9399 UNCLASSIFIED DRUGS OR BIOLOG: HCPCS | Performed by: NURSE PRACTITIONER

## 2021-04-16 PROCEDURE — 99222 1ST HOSP IP/OBS MODERATE 55: CPT | Mod: ,,, | Performed by: NURSE PRACTITIONER

## 2021-04-16 PROCEDURE — 36514 PR THER APHERESIS,PLASMA PHERESIS: ICD-10-PCS | Mod: ,,, | Performed by: PATHOLOGY

## 2021-04-16 PROCEDURE — 83735 ASSAY OF MAGNESIUM: CPT | Performed by: NURSE PRACTITIONER

## 2021-04-16 PROCEDURE — 99233 SBSQ HOSP IP/OBS HIGH 50: CPT | Mod: ,,, | Performed by: NURSE PRACTITIONER

## 2021-04-16 PROCEDURE — 25000003 PHARM REV CODE 250: Performed by: PATHOLOGY

## 2021-04-16 PROCEDURE — 80502 PR  LAB PATHOLOGY CONSULT-COMPLETE: CPT | Mod: ,,, | Performed by: PATHOLOGY

## 2021-04-16 RX ORDER — HEPARIN SODIUM 1000 [USP'U]/ML
3200 INJECTION, SOLUTION INTRAVENOUS; SUBCUTANEOUS ONCE
Status: COMPLETED | OUTPATIENT
Start: 2021-04-17 | End: 2021-04-17

## 2021-04-16 RX ORDER — GLUCAGON 1 MG
1 KIT INJECTION
Status: DISCONTINUED | OUTPATIENT
Start: 2021-04-16 | End: 2021-04-19 | Stop reason: HOSPADM

## 2021-04-16 RX ORDER — ALBUMIN HUMAN 50 G/1000ML
175 SOLUTION INTRAVENOUS ONCE
Status: COMPLETED | OUTPATIENT
Start: 2021-04-17 | End: 2021-04-17

## 2021-04-16 RX ORDER — IBUPROFEN 200 MG
24 TABLET ORAL
Status: DISCONTINUED | OUTPATIENT
Start: 2021-04-16 | End: 2021-04-19 | Stop reason: HOSPADM

## 2021-04-16 RX ORDER — SODIUM CHLORIDE 9 MG/ML
INJECTION, SOLUTION INTRAVENOUS
Status: DISCONTINUED | OUTPATIENT
Start: 2021-04-16 | End: 2021-04-19 | Stop reason: HOSPADM

## 2021-04-16 RX ORDER — HEPARIN SODIUM 1000 [USP'U]/ML
3000 INJECTION, SOLUTION INTRAVENOUS; SUBCUTANEOUS ONCE
Status: COMPLETED | OUTPATIENT
Start: 2021-04-16 | End: 2021-04-16

## 2021-04-16 RX ORDER — IBUPROFEN 200 MG
16 TABLET ORAL
Status: DISCONTINUED | OUTPATIENT
Start: 2021-04-16 | End: 2021-04-19 | Stop reason: HOSPADM

## 2021-04-16 RX ORDER — SODIUM CHLORIDE 9 MG/ML
INJECTION, SOLUTION INTRAVENOUS
Status: CANCELLED | OUTPATIENT
Start: 2021-04-16

## 2021-04-16 RX ORDER — INSULIN ASPART 100 [IU]/ML
1-10 INJECTION, SOLUTION INTRAVENOUS; SUBCUTANEOUS
Status: DISCONTINUED | OUTPATIENT
Start: 2021-04-16 | End: 2021-04-19 | Stop reason: HOSPADM

## 2021-04-16 RX ORDER — CLONIDINE HYDROCHLORIDE 0.1 MG/1
0.1 TABLET ORAL EVERY 6 HOURS PRN
Status: DISCONTINUED | OUTPATIENT
Start: 2021-04-16 | End: 2021-04-19 | Stop reason: HOSPADM

## 2021-04-16 RX ORDER — INSULIN ASPART 100 [IU]/ML
2 INJECTION, SOLUTION INTRAVENOUS; SUBCUTANEOUS
Status: DISCONTINUED | OUTPATIENT
Start: 2021-04-16 | End: 2021-04-17

## 2021-04-16 RX ORDER — HYDRALAZINE HYDROCHLORIDE 50 MG/1
50 TABLET, FILM COATED ORAL EVERY 8 HOURS
Status: DISCONTINUED | OUTPATIENT
Start: 2021-04-16 | End: 2021-04-17

## 2021-04-16 RX ORDER — SODIUM CHLORIDE 9 MG/ML
INJECTION, SOLUTION INTRAVENOUS ONCE
Status: CANCELLED | OUTPATIENT
Start: 2021-04-16 | End: 2021-04-16

## 2021-04-16 RX ORDER — ALBUMIN HUMAN 50 G/1000ML
175 SOLUTION INTRAVENOUS ONCE
Status: COMPLETED | OUTPATIENT
Start: 2021-04-16 | End: 2021-04-16

## 2021-04-16 RX ADMIN — HYDRALAZINE HYDROCHLORIDE 50 MG: 50 TABLET, FILM COATED ORAL at 03:04

## 2021-04-16 RX ADMIN — DEXTROSE 500 MG: 50 INJECTION, SOLUTION INTRAVENOUS at 03:04

## 2021-04-16 RX ADMIN — CALCIUM GLUCONATE 2000 MG: 98 INJECTION, SOLUTION INTRAVENOUS at 02:04

## 2021-04-16 RX ADMIN — VALGANCICLOVIR 450 MG: 450 TABLET, FILM COATED ORAL at 05:04

## 2021-04-16 RX ADMIN — HEPARIN SODIUM 1000 UNITS: 1000 INJECTION, SOLUTION INTRAVENOUS; SUBCUTANEOUS at 12:04

## 2021-04-16 RX ADMIN — TACROLIMUS 2 MG: 1 CAPSULE ORAL at 05:04

## 2021-04-16 RX ADMIN — NYSTATIN 500000 UNITS: 500000 SUSPENSION ORAL at 05:04

## 2021-04-16 RX ADMIN — CARVEDILOL 25 MG: 25 TABLET, FILM COATED ORAL at 09:04

## 2021-04-16 RX ADMIN — NIFEDIPINE 60 MG: 30 TABLET, FILM COATED, EXTENDED RELEASE ORAL at 08:04

## 2021-04-16 RX ADMIN — HYDRALAZINE HYDROCHLORIDE 50 MG: 50 TABLET, FILM COATED ORAL at 09:04

## 2021-04-16 RX ADMIN — CARVEDILOL 25 MG: 25 TABLET, FILM COATED ORAL at 08:04

## 2021-04-16 RX ADMIN — INSULIN ASPART 2 UNITS: 100 INJECTION, SOLUTION INTRAVENOUS; SUBCUTANEOUS at 09:04

## 2021-04-16 RX ADMIN — MYCOPHENOLATE MOFETIL 1000 MG: 250 CAPSULE ORAL at 09:04

## 2021-04-16 RX ADMIN — ALBUMIN (HUMAN) 175 G: 12.5 SOLUTION INTRAVENOUS at 02:04

## 2021-04-16 RX ADMIN — SEVELAMER CARBONATE 1600 MG: 800 TABLET, FILM COATED ORAL at 08:04

## 2021-04-16 RX ADMIN — INSULIN ASPART 8 UNITS: 100 INJECTION, SOLUTION INTRAVENOUS; SUBCUTANEOUS at 05:04

## 2021-04-16 RX ADMIN — CETIRIZINE HYDROCHLORIDE 5 MG: 5 TABLET ORAL at 09:04

## 2021-04-16 RX ADMIN — INSULIN DETEMIR 5 UNITS: 100 INJECTION, SOLUTION SUBCUTANEOUS at 09:04

## 2021-04-16 RX ADMIN — SODIUM CHLORIDE: 0.9 INJECTION, SOLUTION INTRAVENOUS at 09:04

## 2021-04-16 RX ADMIN — TACROLIMUS 2 MG: 1 CAPSULE ORAL at 08:04

## 2021-04-16 RX ADMIN — MYCOPHENOLATE MOFETIL 1000 MG: 250 CAPSULE ORAL at 08:04

## 2021-04-16 RX ADMIN — CALCITRIOL 0.5 MCG: 0.5 CAPSULE, LIQUID FILLED ORAL at 08:04

## 2021-04-16 RX ADMIN — FLUTICASONE PROPIONATE 100 MCG: 50 SPRAY, METERED NASAL at 09:04

## 2021-04-16 RX ADMIN — HEPARIN SODIUM 3200 UNITS: 1000 INJECTION, SOLUTION INTRAVENOUS; SUBCUTANEOUS at 03:04

## 2021-04-16 RX ADMIN — FUROSEMIDE 180 MG: 10 INJECTION, SOLUTION INTRAMUSCULAR; INTRAVENOUS at 04:04

## 2021-04-16 RX ADMIN — HYDRALAZINE HYDROCHLORIDE 25 MG: 25 TABLET, FILM COATED ORAL at 11:04

## 2021-04-16 RX ADMIN — SEVELAMER CARBONATE 1600 MG: 800 TABLET, FILM COATED ORAL at 05:04

## 2021-04-17 LAB
ALBUMIN SERPL BCP-MCNC: 3.9 G/DL (ref 3.5–5.2)
ALP SERPL-CCNC: 55 U/L (ref 55–135)
ALT SERPL W/O P-5'-P-CCNC: 7 U/L (ref 10–44)
ANION GAP SERPL CALC-SCNC: 17 MMOL/L (ref 8–16)
AST SERPL-CCNC: 12 U/L (ref 10–40)
BASOPHILS # BLD AUTO: 0.01 K/UL (ref 0–0.2)
BASOPHILS NFR BLD: 0.1 % (ref 0–1.9)
BILIRUB SERPL-MCNC: 0.5 MG/DL (ref 0.1–1)
BUN SERPL-MCNC: 68 MG/DL (ref 6–20)
CALCIUM SERPL-MCNC: 8.3 MG/DL (ref 8.7–10.5)
CHLORIDE SERPL-SCNC: 95 MMOL/L (ref 95–110)
CO2 SERPL-SCNC: 20 MMOL/L (ref 23–29)
CREAT SERPL-MCNC: 10.8 MG/DL (ref 0.5–1.4)
DIFFERENTIAL METHOD: ABNORMAL
EOSINOPHIL # BLD AUTO: 0 K/UL (ref 0–0.5)
EOSINOPHIL NFR BLD: 0 % (ref 0–8)
ERYTHROCYTE [DISTWIDTH] IN BLOOD BY AUTOMATED COUNT: 12.5 % (ref 11.5–14.5)
EST. GFR  (AFRICAN AMERICAN): 6 ML/MIN/1.73 M^2
EST. GFR  (NON AFRICAN AMERICAN): 5.2 ML/MIN/1.73 M^2
GLUCOSE SERPL-MCNC: 270 MG/DL (ref 70–110)
HCT VFR BLD AUTO: 34.8 % (ref 40–54)
HGB BLD-MCNC: 11.9 G/DL (ref 14–18)
IMM GRANULOCYTES # BLD AUTO: 0.08 K/UL (ref 0–0.04)
IMM GRANULOCYTES NFR BLD AUTO: 0.6 % (ref 0–0.5)
LYMPHOCYTES # BLD AUTO: 0.7 K/UL (ref 1–4.8)
LYMPHOCYTES NFR BLD: 5.2 % (ref 18–48)
MAGNESIUM SERPL-MCNC: 2 MG/DL (ref 1.6–2.6)
MCH RBC QN AUTO: 28.3 PG (ref 27–31)
MCHC RBC AUTO-ENTMCNC: 34.2 G/DL (ref 32–36)
MCV RBC AUTO: 83 FL (ref 82–98)
MONOCYTES # BLD AUTO: 0.5 K/UL (ref 0.3–1)
MONOCYTES NFR BLD: 3.6 % (ref 4–15)
NEUTROPHILS # BLD AUTO: 12.3 K/UL (ref 1.8–7.7)
NEUTROPHILS NFR BLD: 90.5 % (ref 38–73)
NRBC BLD-RTO: 0 /100 WBC
PHOSPHATE SERPL-MCNC: 6.3 MG/DL (ref 2.7–4.5)
PLATELET # BLD AUTO: 186 K/UL (ref 150–450)
PMV BLD AUTO: 10.3 FL (ref 9.2–12.9)
POCT GLUCOSE: 224 MG/DL (ref 70–110)
POCT GLUCOSE: 236 MG/DL (ref 70–110)
POCT GLUCOSE: 266 MG/DL (ref 70–110)
POCT GLUCOSE: 310 MG/DL (ref 70–110)
POTASSIUM SERPL-SCNC: 5 MMOL/L (ref 3.5–5.1)
PROT SERPL-MCNC: 5.7 G/DL (ref 6–8.4)
RBC # BLD AUTO: 4.2 M/UL (ref 4.6–6.2)
SODIUM SERPL-SCNC: 132 MMOL/L (ref 136–145)
TACROLIMUS BLD-MCNC: 5.8 NG/ML (ref 5–15)
WBC # BLD AUTO: 13.55 K/UL (ref 3.9–12.7)

## 2021-04-17 PROCEDURE — 83735 ASSAY OF MAGNESIUM: CPT | Performed by: NURSE PRACTITIONER

## 2021-04-17 PROCEDURE — 63600175 PHARM REV CODE 636 W HCPCS: Performed by: INTERNAL MEDICINE

## 2021-04-17 PROCEDURE — 63600175 PHARM REV CODE 636 W HCPCS: Performed by: PHYSICIAN ASSISTANT

## 2021-04-17 PROCEDURE — 25000003 PHARM REV CODE 250: Performed by: NURSE PRACTITIONER

## 2021-04-17 PROCEDURE — 25000003 PHARM REV CODE 250: Performed by: PATHOLOGY

## 2021-04-17 PROCEDURE — 25000003 PHARM REV CODE 250: Performed by: STUDENT IN AN ORGANIZED HEALTH CARE EDUCATION/TRAINING PROGRAM

## 2021-04-17 PROCEDURE — P9045 ALBUMIN (HUMAN), 5%, 250 ML: HCPCS | Mod: JG | Performed by: PATHOLOGY

## 2021-04-17 PROCEDURE — 63600175 PHARM REV CODE 636 W HCPCS: Performed by: NURSE PRACTITIONER

## 2021-04-17 PROCEDURE — 25000003 PHARM REV CODE 250: Performed by: PHYSICIAN ASSISTANT

## 2021-04-17 PROCEDURE — 99233 PR SUBSEQUENT HOSPITAL CARE,LEVL III: ICD-10-PCS | Mod: ,,, | Performed by: INTERNAL MEDICINE

## 2021-04-17 PROCEDURE — 85025 COMPLETE CBC W/AUTO DIFF WBC: CPT | Performed by: NURSE PRACTITIONER

## 2021-04-17 PROCEDURE — 63600175 PHARM REV CODE 636 W HCPCS: Performed by: PATHOLOGY

## 2021-04-17 PROCEDURE — 99233 SBSQ HOSP IP/OBS HIGH 50: CPT | Mod: ,,, | Performed by: INTERNAL MEDICINE

## 2021-04-17 PROCEDURE — 82960 TEST FOR G6PD ENZYME: CPT | Performed by: NURSE PRACTITIONER

## 2021-04-17 PROCEDURE — 80197 ASSAY OF TACROLIMUS: CPT | Performed by: INTERNAL MEDICINE

## 2021-04-17 PROCEDURE — 63600175 PHARM REV CODE 636 W HCPCS: Performed by: STUDENT IN AN ORGANIZED HEALTH CARE EDUCATION/TRAINING PROGRAM

## 2021-04-17 PROCEDURE — 25000003 PHARM REV CODE 250: Performed by: INTERNAL MEDICINE

## 2021-04-17 PROCEDURE — 20600001 HC STEP DOWN PRIVATE ROOM

## 2021-04-17 PROCEDURE — 36514 APHERESIS PLASMA: CPT

## 2021-04-17 PROCEDURE — 80053 COMPREHEN METABOLIC PANEL: CPT | Performed by: NURSE PRACTITIONER

## 2021-04-17 PROCEDURE — 84100 ASSAY OF PHOSPHORUS: CPT | Performed by: NURSE PRACTITIONER

## 2021-04-17 RX ORDER — INSULIN ASPART 100 [IU]/ML
4 INJECTION, SOLUTION INTRAVENOUS; SUBCUTANEOUS
Status: DISCONTINUED | OUTPATIENT
Start: 2021-04-18 | End: 2021-04-18

## 2021-04-17 RX ORDER — FUROSEMIDE 10 MG/ML
120 INJECTION INTRAMUSCULAR; INTRAVENOUS
Status: DISCONTINUED | OUTPATIENT
Start: 2021-04-18 | End: 2021-04-18

## 2021-04-17 RX ORDER — TACROLIMUS 1 MG/1
3 CAPSULE ORAL 2 TIMES DAILY
Status: DISCONTINUED | OUTPATIENT
Start: 2021-04-17 | End: 2021-04-18

## 2021-04-17 RX ORDER — TACROLIMUS 1 MG/1
1 CAPSULE ORAL ONCE
Status: COMPLETED | OUTPATIENT
Start: 2021-04-17 | End: 2021-04-17

## 2021-04-17 RX ORDER — MAG HYDROX/ALUMINUM HYD/SIMETH 200-200-20
30 SUSPENSION, ORAL (FINAL DOSE FORM) ORAL ONCE
Status: COMPLETED | OUTPATIENT
Start: 2021-04-17 | End: 2021-04-17

## 2021-04-17 RX ORDER — FUROSEMIDE 10 MG/ML
120 INJECTION INTRAMUSCULAR; INTRAVENOUS
Status: DISCONTINUED | OUTPATIENT
Start: 2021-04-17 | End: 2021-04-17

## 2021-04-17 RX ORDER — HYDRALAZINE HYDROCHLORIDE 50 MG/1
100 TABLET, FILM COATED ORAL EVERY 8 HOURS
Status: DISCONTINUED | OUTPATIENT
Start: 2021-04-17 | End: 2021-04-19 | Stop reason: HOSPADM

## 2021-04-17 RX ADMIN — INSULIN ASPART 4 UNITS: 100 INJECTION, SOLUTION INTRAVENOUS; SUBCUTANEOUS at 01:04

## 2021-04-17 RX ADMIN — DEXTROSE 500 MG: 50 INJECTION, SOLUTION INTRAVENOUS at 10:04

## 2021-04-17 RX ADMIN — FUROSEMIDE 180 MG: 10 INJECTION, SOLUTION INTRAMUSCULAR; INTRAVENOUS at 11:04

## 2021-04-17 RX ADMIN — NYSTATIN 500000 UNITS: 500000 SUSPENSION ORAL at 08:04

## 2021-04-17 RX ADMIN — SEVELAMER CARBONATE 1600 MG: 800 TABLET, FILM COATED ORAL at 12:04

## 2021-04-17 RX ADMIN — HYDRALAZINE HYDROCHLORIDE 50 MG: 50 TABLET, FILM COATED ORAL at 06:04

## 2021-04-17 RX ADMIN — CARVEDILOL 25 MG: 25 TABLET, FILM COATED ORAL at 08:04

## 2021-04-17 RX ADMIN — CALCITRIOL 0.5 MCG: 0.5 CAPSULE, LIQUID FILLED ORAL at 08:04

## 2021-04-17 RX ADMIN — HEPARIN SODIUM 3200 UNITS: 1000 INJECTION, SOLUTION INTRAVENOUS; SUBCUTANEOUS at 08:04

## 2021-04-17 RX ADMIN — SEVELAMER CARBONATE 1600 MG: 800 TABLET, FILM COATED ORAL at 08:04

## 2021-04-17 RX ADMIN — CETIRIZINE HYDROCHLORIDE 5 MG: 5 TABLET ORAL at 09:04

## 2021-04-17 RX ADMIN — NIFEDIPINE 60 MG: 30 TABLET, FILM COATED, EXTENDED RELEASE ORAL at 08:04

## 2021-04-17 RX ADMIN — TACROLIMUS 1 MG: 1 CAPSULE ORAL at 12:04

## 2021-04-17 RX ADMIN — INSULIN ASPART 3 UNITS: 100 INJECTION, SOLUTION INTRAVENOUS; SUBCUTANEOUS at 09:04

## 2021-04-17 RX ADMIN — INSULIN ASPART 2 UNITS: 100 INJECTION, SOLUTION INTRAVENOUS; SUBCUTANEOUS at 01:04

## 2021-04-17 RX ADMIN — INSULIN ASPART 2 UNITS: 100 INJECTION, SOLUTION INTRAVENOUS; SUBCUTANEOUS at 09:04

## 2021-04-17 RX ADMIN — FLUTICASONE PROPIONATE 100 MCG: 50 SPRAY, METERED NASAL at 09:04

## 2021-04-17 RX ADMIN — INSULIN ASPART 4 UNITS: 100 INJECTION, SOLUTION INTRAVENOUS; SUBCUTANEOUS at 09:04

## 2021-04-17 RX ADMIN — ALUMINUM HYDROXIDE, MAGNESIUM HYDROXIDE, AND SIMETHICONE 30 ML: 200; 200; 20 SUSPENSION ORAL at 12:04

## 2021-04-17 RX ADMIN — TACROLIMUS 3 MG: 1 CAPSULE ORAL at 05:04

## 2021-04-17 RX ADMIN — MYCOPHENOLATE MOFETIL 1000 MG: 250 CAPSULE ORAL at 08:04

## 2021-04-17 RX ADMIN — HEPARIN SODIUM 5000 UNITS: 5000 INJECTION INTRAVENOUS; SUBCUTANEOUS at 06:04

## 2021-04-17 RX ADMIN — INSULIN DETEMIR 5 UNITS: 100 INJECTION, SOLUTION SUBCUTANEOUS at 09:04

## 2021-04-17 RX ADMIN — SEVELAMER CARBONATE 1600 MG: 800 TABLET, FILM COATED ORAL at 05:04

## 2021-04-17 RX ADMIN — INSULIN ASPART 8 UNITS: 100 INJECTION, SOLUTION INTRAVENOUS; SUBCUTANEOUS at 05:04

## 2021-04-17 RX ADMIN — ALBUMIN (HUMAN) 175 G: 12.5 SOLUTION INTRAVENOUS at 08:04

## 2021-04-17 RX ADMIN — HYDRALAZINE HYDROCHLORIDE 100 MG: 50 TABLET, FILM COATED ORAL at 03:04

## 2021-04-17 RX ADMIN — CALCIUM GLUCONATE 2000 MG: 98 INJECTION, SOLUTION INTRAVENOUS at 08:04

## 2021-04-17 RX ADMIN — NYSTATIN 500000 UNITS: 500000 SUSPENSION ORAL at 12:04

## 2021-04-17 RX ADMIN — HYDRALAZINE HYDROCHLORIDE 100 MG: 50 TABLET, FILM COATED ORAL at 09:04

## 2021-04-17 RX ADMIN — TACROLIMUS 2 MG: 1 CAPSULE ORAL at 08:04

## 2021-04-17 RX ADMIN — MYCOPHENOLATE MOFETIL 1000 MG: 250 CAPSULE ORAL at 09:04

## 2021-04-17 RX ADMIN — CARVEDILOL 25 MG: 25 TABLET, FILM COATED ORAL at 09:04

## 2021-04-17 RX ADMIN — NYSTATIN 500000 UNITS: 500000 SUSPENSION ORAL at 05:04

## 2021-04-18 LAB
ALBUMIN SERPL BCP-MCNC: 4.1 G/DL (ref 3.5–5.2)
ALP SERPL-CCNC: 49 U/L (ref 55–135)
ALT SERPL W/O P-5'-P-CCNC: 7 U/L (ref 10–44)
ANION GAP SERPL CALC-SCNC: 18 MMOL/L (ref 8–16)
AST SERPL-CCNC: 11 U/L (ref 10–40)
BASOPHILS # BLD AUTO: 0.02 K/UL (ref 0–0.2)
BASOPHILS NFR BLD: 0.1 % (ref 0–1.9)
BILIRUB SERPL-MCNC: 0.4 MG/DL (ref 0.1–1)
BUN SERPL-MCNC: 92 MG/DL (ref 6–20)
CALCIUM SERPL-MCNC: 8 MG/DL (ref 8.7–10.5)
CHLORIDE SERPL-SCNC: 97 MMOL/L (ref 95–110)
CO2 SERPL-SCNC: 16 MMOL/L (ref 23–29)
CREAT SERPL-MCNC: 12.3 MG/DL (ref 0.5–1.4)
DIFFERENTIAL METHOD: ABNORMAL
EOSINOPHIL # BLD AUTO: 0 K/UL (ref 0–0.5)
EOSINOPHIL NFR BLD: 0 % (ref 0–8)
ERYTHROCYTE [DISTWIDTH] IN BLOOD BY AUTOMATED COUNT: 12.5 % (ref 11.5–14.5)
EST. GFR  (AFRICAN AMERICAN): 5.1 ML/MIN/1.73 M^2
EST. GFR  (NON AFRICAN AMERICAN): 4.4 ML/MIN/1.73 M^2
GLUCOSE SERPL-MCNC: 220 MG/DL (ref 70–110)
HCT VFR BLD AUTO: 34.3 % (ref 40–54)
HGB BLD-MCNC: 11.8 G/DL (ref 14–18)
IMM GRANULOCYTES # BLD AUTO: 0.14 K/UL (ref 0–0.04)
IMM GRANULOCYTES NFR BLD AUTO: 0.7 % (ref 0–0.5)
LYMPHOCYTES # BLD AUTO: 1 K/UL (ref 1–4.8)
LYMPHOCYTES NFR BLD: 5.3 % (ref 18–48)
MAGNESIUM SERPL-MCNC: 2 MG/DL (ref 1.6–2.6)
MCH RBC QN AUTO: 28.4 PG (ref 27–31)
MCHC RBC AUTO-ENTMCNC: 34.4 G/DL (ref 32–36)
MCV RBC AUTO: 83 FL (ref 82–98)
MONOCYTES # BLD AUTO: 1.1 K/UL (ref 0.3–1)
MONOCYTES NFR BLD: 5.8 % (ref 4–15)
NEUTROPHILS # BLD AUTO: 16.8 K/UL (ref 1.8–7.7)
NEUTROPHILS NFR BLD: 88.1 % (ref 38–73)
NRBC BLD-RTO: 0 /100 WBC
PHOSPHATE SERPL-MCNC: 7.8 MG/DL (ref 2.7–4.5)
PLATELET # BLD AUTO: 190 K/UL (ref 150–450)
PMV BLD AUTO: 10.6 FL (ref 9.2–12.9)
POCT GLUCOSE: 189 MG/DL (ref 70–110)
POCT GLUCOSE: 202 MG/DL (ref 70–110)
POCT GLUCOSE: 204 MG/DL (ref 70–110)
POCT GLUCOSE: 261 MG/DL (ref 70–110)
POTASSIUM SERPL-SCNC: 5.7 MMOL/L (ref 3.5–5.1)
PROT SERPL-MCNC: 5.5 G/DL (ref 6–8.4)
RBC # BLD AUTO: 4.15 M/UL (ref 4.6–6.2)
SODIUM SERPL-SCNC: 131 MMOL/L (ref 136–145)
TACROLIMUS BLD-MCNC: 6.4 NG/ML (ref 5–15)
WBC # BLD AUTO: 19.01 K/UL (ref 3.9–12.7)

## 2021-04-18 PROCEDURE — 25000003 PHARM REV CODE 250: Performed by: INTERNAL MEDICINE

## 2021-04-18 PROCEDURE — 85025 COMPLETE CBC W/AUTO DIFF WBC: CPT | Performed by: NURSE PRACTITIONER

## 2021-04-18 PROCEDURE — 63600175 PHARM REV CODE 636 W HCPCS: Performed by: PATHOLOGY

## 2021-04-18 PROCEDURE — 25000003 PHARM REV CODE 250: Performed by: PATHOLOGY

## 2021-04-18 PROCEDURE — 36415 COLL VENOUS BLD VENIPUNCTURE: CPT | Performed by: INTERNAL MEDICINE

## 2021-04-18 PROCEDURE — 80197 ASSAY OF TACROLIMUS: CPT | Performed by: INTERNAL MEDICINE

## 2021-04-18 PROCEDURE — 99233 SBSQ HOSP IP/OBS HIGH 50: CPT | Mod: ,,, | Performed by: INTERNAL MEDICINE

## 2021-04-18 PROCEDURE — 36514 PR THER APHERESIS,PLASMA PHERESIS: ICD-10-PCS | Mod: ,,, | Performed by: PATHOLOGY

## 2021-04-18 PROCEDURE — 63600175 PHARM REV CODE 636 W HCPCS: Performed by: INTERNAL MEDICINE

## 2021-04-18 PROCEDURE — 36514 APHERESIS PLASMA: CPT | Mod: ,,, | Performed by: PATHOLOGY

## 2021-04-18 PROCEDURE — P9045 ALBUMIN (HUMAN), 5%, 250 ML: HCPCS | Mod: JG | Performed by: PATHOLOGY

## 2021-04-18 PROCEDURE — 36514 APHERESIS PLASMA: CPT

## 2021-04-18 PROCEDURE — 25000003 PHARM REV CODE 250: Performed by: PHYSICIAN ASSISTANT

## 2021-04-18 PROCEDURE — 25000003 PHARM REV CODE 250: Performed by: STUDENT IN AN ORGANIZED HEALTH CARE EDUCATION/TRAINING PROGRAM

## 2021-04-18 PROCEDURE — 20600001 HC STEP DOWN PRIVATE ROOM

## 2021-04-18 PROCEDURE — 99233 PR SUBSEQUENT HOSPITAL CARE,LEVL III: ICD-10-PCS | Mod: ,,, | Performed by: INTERNAL MEDICINE

## 2021-04-18 PROCEDURE — 80100014 HC HEMODIALYSIS 1:1

## 2021-04-18 PROCEDURE — 63600175 PHARM REV CODE 636 W HCPCS: Performed by: STUDENT IN AN ORGANIZED HEALTH CARE EDUCATION/TRAINING PROGRAM

## 2021-04-18 PROCEDURE — 80053 COMPREHEN METABOLIC PANEL: CPT | Performed by: NURSE PRACTITIONER

## 2021-04-18 PROCEDURE — 84100 ASSAY OF PHOSPHORUS: CPT | Performed by: NURSE PRACTITIONER

## 2021-04-18 PROCEDURE — 83735 ASSAY OF MAGNESIUM: CPT | Performed by: NURSE PRACTITIONER

## 2021-04-18 RX ORDER — ALBUMIN HUMAN 50 G/1000ML
175 SOLUTION INTRAVENOUS ONCE
Status: COMPLETED | OUTPATIENT
Start: 2021-04-18 | End: 2021-04-18

## 2021-04-18 RX ORDER — PREDNISONE 20 MG/1
20 TABLET ORAL DAILY
Status: DISCONTINUED | OUTPATIENT
Start: 2021-04-18 | End: 2021-04-19 | Stop reason: HOSPADM

## 2021-04-18 RX ORDER — SODIUM CHLORIDE 9 MG/ML
INJECTION, SOLUTION INTRAVENOUS ONCE
Status: CANCELLED | OUTPATIENT
Start: 2021-04-18 | End: 2021-04-18

## 2021-04-18 RX ORDER — TACROLIMUS 1 MG/1
4 CAPSULE ORAL 2 TIMES DAILY
Status: DISCONTINUED | OUTPATIENT
Start: 2021-04-18 | End: 2021-04-19 | Stop reason: HOSPADM

## 2021-04-18 RX ORDER — HEPARIN SODIUM 1000 [USP'U]/ML
3200 INJECTION, SOLUTION INTRAVENOUS; SUBCUTANEOUS ONCE
Status: COMPLETED | OUTPATIENT
Start: 2021-04-18 | End: 2021-04-18

## 2021-04-18 RX ORDER — CALCIUM CARBONATE 200(500)MG
1000 TABLET,CHEWABLE ORAL DAILY PRN
Status: DISCONTINUED | OUTPATIENT
Start: 2021-04-18 | End: 2021-04-19 | Stop reason: HOSPADM

## 2021-04-18 RX ORDER — TACROLIMUS 1 MG/1
1 CAPSULE ORAL ONCE
Status: COMPLETED | OUTPATIENT
Start: 2021-04-18 | End: 2021-04-18

## 2021-04-18 RX ORDER — SODIUM CHLORIDE 9 MG/ML
INJECTION, SOLUTION INTRAVENOUS
Status: CANCELLED | OUTPATIENT
Start: 2021-04-18

## 2021-04-18 RX ADMIN — HYDRALAZINE HYDROCHLORIDE 100 MG: 50 TABLET, FILM COATED ORAL at 02:04

## 2021-04-18 RX ADMIN — CARVEDILOL 25 MG: 25 TABLET, FILM COATED ORAL at 07:04

## 2021-04-18 RX ADMIN — MYCOPHENOLATE MOFETIL 1000 MG: 250 CAPSULE ORAL at 08:04

## 2021-04-18 RX ADMIN — SEVELAMER CARBONATE 1600 MG: 800 TABLET, FILM COATED ORAL at 11:04

## 2021-04-18 RX ADMIN — NYSTATIN 500000 UNITS: 500000 SUSPENSION ORAL at 05:04

## 2021-04-18 RX ADMIN — INSULIN ASPART 2 UNITS: 100 INJECTION, SOLUTION INTRAVENOUS; SUBCUTANEOUS at 08:04

## 2021-04-18 RX ADMIN — CARVEDILOL 25 MG: 25 TABLET, FILM COATED ORAL at 08:04

## 2021-04-18 RX ADMIN — NYSTATIN 500000 UNITS: 500000 SUSPENSION ORAL at 02:04

## 2021-04-18 RX ADMIN — INSULIN ASPART 2 UNITS: 100 INJECTION, SOLUTION INTRAVENOUS; SUBCUTANEOUS at 09:04

## 2021-04-18 RX ADMIN — FLUTICASONE PROPIONATE 100 MCG: 50 SPRAY, METERED NASAL at 07:04

## 2021-04-18 RX ADMIN — HYDRALAZINE HYDROCHLORIDE 100 MG: 50 TABLET, FILM COATED ORAL at 08:04

## 2021-04-18 RX ADMIN — TACROLIMUS 3 MG: 1 CAPSULE ORAL at 07:04

## 2021-04-18 RX ADMIN — TACROLIMUS 4 MG: 1 CAPSULE ORAL at 05:04

## 2021-04-18 RX ADMIN — HEPARIN SODIUM 1000 UNITS: 1000 INJECTION, SOLUTION INTRAVENOUS; SUBCUTANEOUS at 01:04

## 2021-04-18 RX ADMIN — INSULIN ASPART 6 UNITS: 100 INJECTION, SOLUTION INTRAVENOUS; SUBCUTANEOUS at 05:04

## 2021-04-18 RX ADMIN — CALCITRIOL 0.5 MCG: 0.5 CAPSULE, LIQUID FILLED ORAL at 07:04

## 2021-04-18 RX ADMIN — NYSTATIN 500000 UNITS: 500000 SUSPENSION ORAL at 07:04

## 2021-04-18 RX ADMIN — FUROSEMIDE 120 MG: 10 INJECTION, SOLUTION INTRAMUSCULAR; INTRAVENOUS at 07:04

## 2021-04-18 RX ADMIN — TACROLIMUS 1 MG: 1 CAPSULE, GELATIN COATED ORAL at 12:04

## 2021-04-18 RX ADMIN — CALCIUM GLUCONATE 2000 MG: 98 INJECTION, SOLUTION INTRAVENOUS at 10:04

## 2021-04-18 RX ADMIN — SEVELAMER CARBONATE 1600 MG: 800 TABLET, FILM COATED ORAL at 05:04

## 2021-04-18 RX ADMIN — HYDRALAZINE HYDROCHLORIDE 100 MG: 50 TABLET, FILM COATED ORAL at 05:04

## 2021-04-18 RX ADMIN — HEPARIN SODIUM 3200 UNITS: 1000 INJECTION, SOLUTION INTRAVENOUS; SUBCUTANEOUS at 10:04

## 2021-04-18 RX ADMIN — NIFEDIPINE 60 MG: 30 TABLET, FILM COATED, EXTENDED RELEASE ORAL at 07:04

## 2021-04-18 RX ADMIN — INSULIN ASPART 4 UNITS: 100 INJECTION, SOLUTION INTRAVENOUS; SUBCUTANEOUS at 09:04

## 2021-04-18 RX ADMIN — ALBUMIN (HUMAN) 175 G: 12.5 SOLUTION INTRAVENOUS at 10:04

## 2021-04-18 RX ADMIN — MYCOPHENOLATE MOFETIL 1000 MG: 250 CAPSULE ORAL at 07:04

## 2021-04-18 RX ADMIN — PREDNISONE 20 MG: 20 TABLET ORAL at 10:04

## 2021-04-18 RX ADMIN — SEVELAMER CARBONATE 1600 MG: 800 TABLET, FILM COATED ORAL at 07:04

## 2021-04-18 RX ADMIN — CALCIUM CARBONATE (ANTACID) CHEW TAB 500 MG 1000 MG: 500 CHEW TAB at 10:04

## 2021-04-18 RX ADMIN — CETIRIZINE HYDROCHLORIDE 5 MG: 5 TABLET ORAL at 08:04

## 2021-04-19 VITALS
OXYGEN SATURATION: 95 % | TEMPERATURE: 98 F | HEIGHT: 68 IN | RESPIRATION RATE: 18 BRPM | HEART RATE: 85 BPM | WEIGHT: 221.56 LBS | DIASTOLIC BLOOD PRESSURE: 94 MMHG | SYSTOLIC BLOOD PRESSURE: 163 MMHG | BODY MASS INDEX: 33.58 KG/M2

## 2021-04-19 LAB
ALBUMIN SERPL BCP-MCNC: 4.3 G/DL (ref 3.5–5.2)
ALP SERPL-CCNC: 31 U/L (ref 55–135)
ALT SERPL W/O P-5'-P-CCNC: 6 U/L (ref 10–44)
ANION GAP SERPL CALC-SCNC: 14 MMOL/L (ref 8–16)
AST SERPL-CCNC: 11 U/L (ref 10–40)
BASOPHILS # BLD AUTO: 0.01 K/UL (ref 0–0.2)
BASOPHILS NFR BLD: 0.1 % (ref 0–1.9)
BILIRUB SERPL-MCNC: 0.6 MG/DL (ref 0.1–1)
BUN SERPL-MCNC: 85 MG/DL (ref 6–20)
CALCIUM SERPL-MCNC: 8.1 MG/DL (ref 8.7–10.5)
CHLORIDE SERPL-SCNC: 96 MMOL/L (ref 95–110)
CO2 SERPL-SCNC: 20 MMOL/L (ref 23–29)
CREAT SERPL-MCNC: 11.2 MG/DL (ref 0.5–1.4)
DIFFERENTIAL METHOD: ABNORMAL
EOSINOPHIL # BLD AUTO: 0 K/UL (ref 0–0.5)
EOSINOPHIL NFR BLD: 0.2 % (ref 0–8)
ERYTHROCYTE [DISTWIDTH] IN BLOOD BY AUTOMATED COUNT: 12.8 % (ref 11.5–14.5)
EST. GFR  (AFRICAN AMERICAN): 5.7 ML/MIN/1.73 M^2
EST. GFR  (NON AFRICAN AMERICAN): 4.9 ML/MIN/1.73 M^2
GLUCOSE SERPL-MCNC: 151 MG/DL (ref 70–110)
GLUCOSE-6-PHOSPHATE DEHYDROGENASE QUAL: NORMAL
HCT VFR BLD AUTO: 37.3 % (ref 40–54)
HGB BLD-MCNC: 12.4 G/DL (ref 14–18)
IMM GRANULOCYTES # BLD AUTO: 0.09 K/UL (ref 0–0.04)
IMM GRANULOCYTES NFR BLD AUTO: 0.6 % (ref 0–0.5)
LYMPHOCYTES # BLD AUTO: 1.8 K/UL (ref 1–4.8)
LYMPHOCYTES NFR BLD: 10.9 % (ref 18–48)
MAGNESIUM SERPL-MCNC: 1.9 MG/DL (ref 1.6–2.6)
MCH RBC QN AUTO: 27.9 PG (ref 27–31)
MCHC RBC AUTO-ENTMCNC: 33.2 G/DL (ref 32–36)
MCV RBC AUTO: 84 FL (ref 82–98)
MONOCYTES # BLD AUTO: 1.7 K/UL (ref 0.3–1)
MONOCYTES NFR BLD: 10.5 % (ref 4–15)
NEUTROPHILS # BLD AUTO: 12.6 K/UL (ref 1.8–7.7)
NEUTROPHILS NFR BLD: 77.7 % (ref 38–73)
NRBC BLD-RTO: 0 /100 WBC
PHOSPHATE SERPL-MCNC: 7.7 MG/DL (ref 2.7–4.5)
PLATELET # BLD AUTO: 197 K/UL (ref 150–450)
PMV BLD AUTO: 10.1 FL (ref 9.2–12.9)
POCT GLUCOSE: 133 MG/DL (ref 70–110)
POCT GLUCOSE: 211 MG/DL (ref 70–110)
POTASSIUM SERPL-SCNC: 4.9 MMOL/L (ref 3.5–5.1)
PROT SERPL-MCNC: 5.5 G/DL (ref 6–8.4)
RBC # BLD AUTO: 4.44 M/UL (ref 4.6–6.2)
SODIUM SERPL-SCNC: 130 MMOL/L (ref 136–145)
TACROLIMUS BLD-MCNC: 6.6 NG/ML (ref 5–15)
WBC # BLD AUTO: 16.14 K/UL (ref 3.9–12.7)

## 2021-04-19 PROCEDURE — 80053 COMPREHEN METABOLIC PANEL: CPT | Performed by: NURSE PRACTITIONER

## 2021-04-19 PROCEDURE — 99239 PR HOSPITAL DISCHARGE DAY,>30 MIN: ICD-10-PCS | Mod: ,,, | Performed by: PHYSICIAN ASSISTANT

## 2021-04-19 PROCEDURE — 99231 SBSQ HOSP IP/OBS SF/LOW 25: CPT | Mod: ,,, | Performed by: NURSE PRACTITIONER

## 2021-04-19 PROCEDURE — 25000003 PHARM REV CODE 250: Performed by: INTERNAL MEDICINE

## 2021-04-19 PROCEDURE — 63600175 PHARM REV CODE 636 W HCPCS: Performed by: INTERNAL MEDICINE

## 2021-04-19 PROCEDURE — 83735 ASSAY OF MAGNESIUM: CPT | Performed by: NURSE PRACTITIONER

## 2021-04-19 PROCEDURE — 80197 ASSAY OF TACROLIMUS: CPT | Performed by: INTERNAL MEDICINE

## 2021-04-19 PROCEDURE — 25000003 PHARM REV CODE 250: Performed by: PHYSICIAN ASSISTANT

## 2021-04-19 PROCEDURE — 84100 ASSAY OF PHOSPHORUS: CPT | Performed by: NURSE PRACTITIONER

## 2021-04-19 PROCEDURE — 85025 COMPLETE CBC W/AUTO DIFF WBC: CPT | Performed by: NURSE PRACTITIONER

## 2021-04-19 PROCEDURE — 25000003 PHARM REV CODE 250: Performed by: STUDENT IN AN ORGANIZED HEALTH CARE EDUCATION/TRAINING PROGRAM

## 2021-04-19 PROCEDURE — 99231 PR SUBSEQUENT HOSPITAL CARE,LEVL I: ICD-10-PCS | Mod: ,,, | Performed by: NURSE PRACTITIONER

## 2021-04-19 PROCEDURE — 99239 HOSP IP/OBS DSCHRG MGMT >30: CPT | Mod: ,,, | Performed by: PHYSICIAN ASSISTANT

## 2021-04-19 PROCEDURE — 36415 COLL VENOUS BLD VENIPUNCTURE: CPT | Performed by: INTERNAL MEDICINE

## 2021-04-19 RX ORDER — NIFEDIPINE 30 MG/1
60 TABLET, EXTENDED RELEASE ORAL 2 TIMES DAILY
Status: DISCONTINUED | OUTPATIENT
Start: 2021-04-19 | End: 2021-04-19 | Stop reason: HOSPADM

## 2021-04-19 RX ORDER — DAPSONE 100 MG/1
100 TABLET ORAL DAILY
Qty: 30 TABLET | Refills: 2 | Status: SHIPPED | OUTPATIENT
Start: 2021-04-19 | End: 2021-12-14

## 2021-04-19 RX ORDER — MYCOPHENOLATE MOFETIL 250 MG/1
1000 CAPSULE ORAL 2 TIMES DAILY
Qty: 240 CAPSULE | Refills: 11 | Status: SHIPPED | OUTPATIENT
Start: 2021-04-19 | End: 2021-12-14

## 2021-04-19 RX ORDER — LANCETS 33 GAUGE
EACH MISCELLANEOUS
Qty: 100 EACH | Refills: 3 | Status: SHIPPED | OUTPATIENT
Start: 2021-04-19

## 2021-04-19 RX ORDER — CARVEDILOL 25 MG/1
25 TABLET ORAL 2 TIMES DAILY
Qty: 60 TABLET | Refills: 11 | Status: SHIPPED | OUTPATIENT
Start: 2021-04-19 | End: 2022-10-10

## 2021-04-19 RX ORDER — DEXTROSE 4 G
TABLET,CHEWABLE ORAL
Qty: 1 EACH | Refills: 0 | Status: SHIPPED | OUTPATIENT
Start: 2021-04-19

## 2021-04-19 RX ORDER — TACROLIMUS 1 MG/1
4 CAPSULE ORAL EVERY 12 HOURS
Qty: 240 CAPSULE | Refills: 11 | Status: ON HOLD | OUTPATIENT
Start: 2021-04-19 | End: 2022-10-07

## 2021-04-19 RX ORDER — NIFEDIPINE 60 MG/1
60 TABLET, EXTENDED RELEASE ORAL 2 TIMES DAILY
Qty: 60 TABLET | Refills: 11 | Status: SHIPPED | OUTPATIENT
Start: 2021-04-19 | End: 2021-12-14

## 2021-04-19 RX ORDER — HYDRALAZINE HYDROCHLORIDE 100 MG/1
100 TABLET, FILM COATED ORAL EVERY 8 HOURS
Qty: 90 TABLET | Refills: 11 | Status: SHIPPED | OUTPATIENT
Start: 2021-04-19 | End: 2022-10-10

## 2021-04-19 RX ORDER — PREDNISONE 5 MG/1
5 TABLET ORAL DAILY
Qty: 30 TABLET | Refills: 11 | Status: ON HOLD | OUTPATIENT
Start: 2021-04-20 | End: 2022-10-07 | Stop reason: SDUPTHER

## 2021-04-19 RX ORDER — ACETAMINOPHEN 325 MG/1
650 TABLET ORAL ONCE
Status: CANCELLED | OUTPATIENT
Start: 2021-04-19

## 2021-04-19 RX ORDER — DAPSONE 100 MG/1
100 TABLET ORAL DAILY
Status: DISCONTINUED | OUTPATIENT
Start: 2021-04-19 | End: 2021-04-19 | Stop reason: HOSPADM

## 2021-04-19 RX ORDER — CALCITRIOL 0.5 UG/1
0.5 CAPSULE ORAL DAILY
Qty: 30 CAPSULE | Refills: 11 | Status: SHIPPED | OUTPATIENT
Start: 2021-04-20 | End: 2021-12-14

## 2021-04-19 RX ORDER — INSULIN ASPART 100 [IU]/ML
1-5 INJECTION, SOLUTION INTRAVENOUS; SUBCUTANEOUS
Qty: 15 ML | Refills: 3 | Status: SHIPPED | OUTPATIENT
Start: 2021-04-19 | End: 2023-05-25

## 2021-04-19 RX ORDER — SEVELAMER CARBONATE 800 MG/1
2400 TABLET, FILM COATED ORAL
Qty: 270 TABLET | Refills: 11 | Status: SHIPPED | OUTPATIENT
Start: 2021-04-19 | End: 2022-10-10

## 2021-04-19 RX ORDER — SEVELAMER CARBONATE 800 MG/1
2400 TABLET, FILM COATED ORAL
Status: DISCONTINUED | OUTPATIENT
Start: 2021-04-19 | End: 2021-04-19 | Stop reason: HOSPADM

## 2021-04-19 RX ORDER — DIPHENHYDRAMINE HCL 25 MG
25 CAPSULE ORAL ONCE
Status: CANCELLED | OUTPATIENT
Start: 2021-04-19

## 2021-04-19 RX ORDER — PEN NEEDLE, DIABETIC 30 GX3/16"
1 NEEDLE, DISPOSABLE MISCELLANEOUS
Qty: 100 EACH | Refills: 2 | Status: SHIPPED | OUTPATIENT
Start: 2021-04-19 | End: 2023-05-25

## 2021-04-19 RX ADMIN — INSULIN ASPART 4 UNITS: 100 INJECTION, SOLUTION INTRAVENOUS; SUBCUTANEOUS at 12:04

## 2021-04-19 RX ADMIN — HYDRALAZINE HYDROCHLORIDE 100 MG: 50 TABLET, FILM COATED ORAL at 01:04

## 2021-04-19 RX ADMIN — SEVELAMER CARBONATE 1600 MG: 800 TABLET, FILM COATED ORAL at 08:04

## 2021-04-19 RX ADMIN — NYSTATIN 500000 UNITS: 500000 SUSPENSION ORAL at 08:04

## 2021-04-19 RX ADMIN — NIFEDIPINE 60 MG: 30 TABLET, FILM COATED, EXTENDED RELEASE ORAL at 08:04

## 2021-04-19 RX ADMIN — MYCOPHENOLATE MOFETIL 1000 MG: 250 CAPSULE ORAL at 08:04

## 2021-04-19 RX ADMIN — DAPSONE 100 MG: 100 TABLET ORAL at 11:04

## 2021-04-19 RX ADMIN — SEVELAMER CARBONATE 2400 MG: 800 TABLET, FILM COATED ORAL at 12:04

## 2021-04-19 RX ADMIN — PREDNISONE 20 MG: 20 TABLET ORAL at 08:04

## 2021-04-19 RX ADMIN — CARVEDILOL 25 MG: 25 TABLET, FILM COATED ORAL at 08:04

## 2021-04-19 RX ADMIN — HYDRALAZINE HYDROCHLORIDE 100 MG: 50 TABLET, FILM COATED ORAL at 05:04

## 2021-04-19 RX ADMIN — NYSTATIN 500000 UNITS: 500000 SUSPENSION ORAL at 01:04

## 2021-04-19 RX ADMIN — TACROLIMUS 4 MG: 1 CAPSULE ORAL at 08:04

## 2021-04-19 RX ADMIN — CALCITRIOL 0.5 MCG: 0.5 CAPSULE, LIQUID FILLED ORAL at 08:04

## 2021-07-14 DIAGNOSIS — N18.6 END STAGE RENAL DISEASE: Primary | ICD-10-CM

## 2021-07-21 ENCOUNTER — OFFICE VISIT (OUTPATIENT)
Dept: SURGERY | Facility: CLINIC | Age: 46
End: 2021-07-21
Payer: MEDICAID

## 2021-07-21 DIAGNOSIS — N18.6 END STAGE RENAL DISEASE: Primary | ICD-10-CM

## 2021-07-21 PROCEDURE — 99214 OFFICE O/P EST MOD 30 MIN: CPT | Mod: S$GLB,,, | Performed by: SURGERY

## 2021-07-21 PROCEDURE — 99214 PR OFFICE/OUTPT VISIT, EST, LEVL IV, 30-39 MIN: ICD-10-PCS | Mod: S$GLB,,, | Performed by: SURGERY

## 2021-07-26 LAB
ANION GAP SERPL CALC-SCNC: 8 MMOL/L (ref 3–11)
BANDS: 8 % (ref 0–5)
BUN SERPL-MCNC: 24 MG/DL (ref 7–18)
BUN/CREAT SERPL: 3.35 RATIO (ref 7–18)
CALCIUM BLD-MCNC: POSITIVE MG/DL
CALCIUM SERPL-MCNC: 8.3 MG/DL (ref 8.8–10.5)
CELLS COUNTED: 100
CHLORIDE SERPL-SCNC: 91 MMOL/L (ref 100–108)
CO2 SERPL-SCNC: 35 MMOL/L (ref 21–32)
COVID-19 AB, IGM: NEGATIVE
CREAT SERPL-MCNC: 7.16 MG/DL (ref 0.7–1.3)
ERYTHROCYTE [DISTWIDTH] IN BLOOD BY AUTOMATED COUNT: 13.2 % (ref 12.5–18)
GFR ESTIMATION: 8
GLUCOSE SERPL-MCNC: 114 MG/DL (ref 70–110)
HCT VFR BLD AUTO: 40.4 % (ref 42–52)
HGB BLD-MCNC: 13.5 G/DL (ref 14–18)
LYMPHOCYTES NFR BLD: 15 % (ref 25–40)
MCH RBC QN AUTO: 30.3 PG (ref 27–31.2)
MCHC RBC AUTO-ENTMCNC: 33.4 G/DL (ref 31.8–35.4)
MCV RBC AUTO: 90.8 FL (ref 80–97)
MONOCYTES NFR BLD: 4 % (ref 1–15)
NEUTROPHILS # BLD AUTO: 2.8 10*3/UL (ref 1.8–7.7)
NEUTROPHILS NFR BLD: 73 % (ref 37–80)
NUCLEATED RED BLOOD CELLS: 0 %
PLATELETS: 129 10*3/UL (ref 142–424)
POTASSIUM SERPL-SCNC: 4 MMOL/L (ref 3.6–5.2)
RBC # BLD AUTO: 4.45 10*6/UL (ref 4.7–6.1)
RBC MORPH BLD: ABNORMAL
SMALL PLATELETS BLD QL SMEAR: ADEQUATE
SODIUM BLD-SCNC: 134 MMOL/L (ref 135–145)
WBC # BLD: 3.4 10*3/UL (ref 4.6–10.2)

## 2021-07-27 ENCOUNTER — OUTSIDE PLACE OF SERVICE (OUTPATIENT)
Dept: ADMINISTRATIVE | Facility: OTHER | Age: 46
End: 2021-07-27
Payer: MEDICAID

## 2021-07-27 LAB
GLUCOSE SERPL-MCNC: 107 MG/DL (ref 70–105)
GLUCOSE SERPL-MCNC: 114 MG/DL (ref 70–105)
POTASSIUM SERPL-SCNC: 4.5 MMOL/L (ref 3.6–5.2)

## 2021-07-27 PROCEDURE — 49324 LAP INSERT TUNNEL IP CATH: CPT | Mod: ,,, | Performed by: SURGERY

## 2021-07-27 PROCEDURE — 49324 PR LAP INSERTION TUNNELED INTRAPERITONEAL CATHETER: ICD-10-PCS | Mod: ,,, | Performed by: SURGERY

## 2021-08-04 ENCOUNTER — OFFICE VISIT (OUTPATIENT)
Dept: SURGERY | Facility: CLINIC | Age: 46
End: 2021-08-04
Payer: MEDICAID

## 2021-08-04 DIAGNOSIS — Z98.890 POST-OPERATIVE STATE: Primary | ICD-10-CM

## 2021-08-04 PROCEDURE — 99024 PR POST-OP FOLLOW-UP VISIT: ICD-10-PCS | Mod: S$GLB,,, | Performed by: SURGERY

## 2021-08-04 PROCEDURE — 99024 POSTOP FOLLOW-UP VISIT: CPT | Mod: S$GLB,,, | Performed by: SURGERY

## 2021-10-20 ENCOUNTER — TELEPHONE (OUTPATIENT)
Dept: TRANSPLANT | Facility: CLINIC | Age: 46
End: 2021-10-20

## 2021-10-26 DIAGNOSIS — Z76.82 ORGAN TRANSPLANT CANDIDATE: Primary | ICD-10-CM

## 2021-10-28 ENCOUNTER — TELEPHONE (OUTPATIENT)
Dept: TRANSPLANT | Facility: CLINIC | Age: 46
End: 2021-10-28
Payer: MEDICAID

## 2021-10-29 ENCOUNTER — TELEPHONE (OUTPATIENT)
Dept: TRANSPLANT | Facility: CLINIC | Age: 46
End: 2021-10-29
Payer: MEDICAID

## 2021-12-14 ENCOUNTER — HOSPITAL ENCOUNTER (OUTPATIENT)
Dept: RADIOLOGY | Facility: HOSPITAL | Age: 46
Discharge: HOME | End: 2021-12-14
Attending: NURSE PRACTITIONER
Payer: MEDICAID

## 2021-12-14 ENCOUNTER — TELEPHONE (OUTPATIENT)
Dept: TRANSPLANT | Facility: CLINIC | Age: 46
End: 2021-12-14
Payer: MEDICAID

## 2021-12-14 ENCOUNTER — OFFICE VISIT (OUTPATIENT)
Dept: TRANSPLANT | Facility: CLINIC | Age: 46
End: 2021-12-14
Payer: MEDICAID

## 2021-12-14 VITALS
RESPIRATION RATE: 16 BRPM | HEIGHT: 68 IN | TEMPERATURE: 98 F | BODY MASS INDEX: 32.14 KG/M2 | HEART RATE: 70 BPM | DIASTOLIC BLOOD PRESSURE: 76 MMHG | OXYGEN SATURATION: 96 % | WEIGHT: 212.06 LBS | SYSTOLIC BLOOD PRESSURE: 111 MMHG

## 2021-12-14 DIAGNOSIS — Z01.818 PRE-TRANSPLANT EVALUATION FOR CHRONIC KIDNEY DISEASE: Primary | ICD-10-CM

## 2021-12-14 DIAGNOSIS — T86.12 FAILED KIDNEY TRANSPLANT: ICD-10-CM

## 2021-12-14 DIAGNOSIS — Z76.82 ORGAN TRANSPLANT CANDIDATE: ICD-10-CM

## 2021-12-14 DIAGNOSIS — Z79.60 LONG-TERM USE OF IMMUNOSUPPRESSANT MEDICATION: ICD-10-CM

## 2021-12-14 DIAGNOSIS — E11.22 TYPE 2 DIABETES MELLITUS WITH CHRONIC KIDNEY DISEASE ON CHRONIC DIALYSIS, WITH LONG-TERM CURRENT USE OF INSULIN: ICD-10-CM

## 2021-12-14 DIAGNOSIS — N25.81 SECONDARY HYPERPARATHYROIDISM OF RENAL ORIGIN: ICD-10-CM

## 2021-12-14 DIAGNOSIS — D63.1 ANEMIA IN CHRONIC KIDNEY DISEASE, ON CHRONIC DIALYSIS: ICD-10-CM

## 2021-12-14 DIAGNOSIS — Z79.4 TYPE 2 DIABETES MELLITUS WITH CHRONIC KIDNEY DISEASE ON CHRONIC DIALYSIS, WITH LONG-TERM CURRENT USE OF INSULIN: ICD-10-CM

## 2021-12-14 DIAGNOSIS — Q61.3 PKD (POLYCYSTIC KIDNEY DISEASE): ICD-10-CM

## 2021-12-14 DIAGNOSIS — Z99.2 ANEMIA IN CHRONIC KIDNEY DISEASE, ON CHRONIC DIALYSIS: ICD-10-CM

## 2021-12-14 DIAGNOSIS — Z99.2 ESRD ON PERITONEAL DIALYSIS: ICD-10-CM

## 2021-12-14 DIAGNOSIS — Z99.2 TYPE 2 DIABETES MELLITUS WITH CHRONIC KIDNEY DISEASE ON CHRONIC DIALYSIS, WITH LONG-TERM CURRENT USE OF INSULIN: ICD-10-CM

## 2021-12-14 DIAGNOSIS — N18.6 TYPE 2 DIABETES MELLITUS WITH CHRONIC KIDNEY DISEASE ON CHRONIC DIALYSIS, WITH LONG-TERM CURRENT USE OF INSULIN: ICD-10-CM

## 2021-12-14 DIAGNOSIS — N18.6 ESRD ON PERITONEAL DIALYSIS: ICD-10-CM

## 2021-12-14 DIAGNOSIS — I15.0 RENOVASCULAR HYPERTENSION: ICD-10-CM

## 2021-12-14 DIAGNOSIS — N18.6 ANEMIA IN CHRONIC KIDNEY DISEASE, ON CHRONIC DIALYSIS: ICD-10-CM

## 2021-12-14 DIAGNOSIS — E66.01 CLASS 2 SEVERE OBESITY DUE TO EXCESS CALORIES WITH SERIOUS COMORBIDITY IN ADULT, UNSPECIFIED BMI: ICD-10-CM

## 2021-12-14 PROBLEM — E66.812 CLASS 2 SEVERE OBESITY DUE TO EXCESS CALORIES WITH SERIOUS COMORBIDITY IN ADULT: Status: ACTIVE | Noted: 2021-12-14

## 2021-12-14 PROBLEM — N18.9 ANEMIA IN CHRONIC KIDNEY DISEASE: Status: ACTIVE | Noted: 2021-11-26

## 2021-12-14 PROCEDURE — 76770 US RETROPERITONEAL COMPLETE: ICD-10-PCS | Mod: 26,TXP,, | Performed by: STUDENT IN AN ORGANIZED HEALTH CARE EDUCATION/TRAINING PROGRAM

## 2021-12-14 PROCEDURE — 71046 X-RAY EXAM CHEST 2 VIEWS: CPT | Mod: TC,TXP

## 2021-12-14 PROCEDURE — 3066F PR DOCUMENTATION OF TREATMENT FOR NEPHROPATHY: ICD-10-PCS | Mod: CPTII,TXP,, | Performed by: NURSE PRACTITIONER

## 2021-12-14 PROCEDURE — 76770 US EXAM ABDO BACK WALL COMP: CPT | Mod: 26,TXP,, | Performed by: STUDENT IN AN ORGANIZED HEALTH CARE EDUCATION/TRAINING PROGRAM

## 2021-12-14 PROCEDURE — 99204 OFFICE O/P NEW MOD 45 MIN: CPT | Mod: S$PBB,TXP,, | Performed by: TRANSPLANT SURGERY

## 2021-12-14 PROCEDURE — 72170 X-RAY EXAM OF PELVIS: CPT | Mod: 26,TXP,, | Performed by: RADIOLOGY

## 2021-12-14 PROCEDURE — 76770 US EXAM ABDO BACK WALL COMP: CPT | Mod: TC,TXP

## 2021-12-14 PROCEDURE — 99999 PR PBB SHADOW E&M-EST. PATIENT-LVL IV: ICD-10-PCS | Mod: PBBFAC,TXP,, | Performed by: NURSE PRACTITIONER

## 2021-12-14 PROCEDURE — 99999 PR PBB SHADOW E&M-EST. PATIENT-LVL IV: CPT | Mod: PBBFAC,TXP,, | Performed by: NURSE PRACTITIONER

## 2021-12-14 PROCEDURE — 72170 XR PELVIS ROUTINE AP: ICD-10-PCS | Mod: 26,TXP,, | Performed by: RADIOLOGY

## 2021-12-14 PROCEDURE — 93978 VASCULAR STUDY: CPT | Mod: 26,TXP,, | Performed by: STUDENT IN AN ORGANIZED HEALTH CARE EDUCATION/TRAINING PROGRAM

## 2021-12-14 PROCEDURE — 93978 US DOPP ILIACS BILATERAL: ICD-10-PCS | Mod: 26,TXP,, | Performed by: STUDENT IN AN ORGANIZED HEALTH CARE EDUCATION/TRAINING PROGRAM

## 2021-12-14 PROCEDURE — 99204 PR OFFICE/OUTPT VISIT, NEW, LEVL IV, 45-59 MIN: ICD-10-PCS | Mod: S$PBB,TXP,, | Performed by: TRANSPLANT SURGERY

## 2021-12-14 PROCEDURE — 3066F NEPHROPATHY DOC TX: CPT | Mod: CPTII,TXP,, | Performed by: NURSE PRACTITIONER

## 2021-12-14 PROCEDURE — 99205 OFFICE O/P NEW HI 60 MIN: CPT | Mod: S$PBB,TXP,, | Performed by: NURSE PRACTITIONER

## 2021-12-14 PROCEDURE — 76776 US EXAM K TRANSPL W/DOPPLER: CPT | Mod: 26,TXP,, | Performed by: STUDENT IN AN ORGANIZED HEALTH CARE EDUCATION/TRAINING PROGRAM

## 2021-12-14 PROCEDURE — 93978 VASCULAR STUDY: CPT | Mod: TC,TXP

## 2021-12-14 PROCEDURE — 71046 XR CHEST PA AND LATERAL: ICD-10-PCS | Mod: 26,TXP,, | Performed by: RADIOLOGY

## 2021-12-14 PROCEDURE — 71046 X-RAY EXAM CHEST 2 VIEWS: CPT | Mod: 26,TXP,, | Performed by: RADIOLOGY

## 2021-12-14 PROCEDURE — 99214 OFFICE O/P EST MOD 30 MIN: CPT | Mod: PBBFAC,25,TXP | Performed by: NURSE PRACTITIONER

## 2021-12-14 PROCEDURE — 72170 X-RAY EXAM OF PELVIS: CPT | Mod: TC,TXP

## 2021-12-14 PROCEDURE — 76776 US EXAM K TRANSPL W/DOPPLER: CPT | Mod: TC,TXP

## 2021-12-14 PROCEDURE — 76776 US TRANSPLANT KIDNEY WITH DOPPLER: ICD-10-PCS | Mod: 26,TXP,, | Performed by: STUDENT IN AN ORGANIZED HEALTH CARE EDUCATION/TRAINING PROGRAM

## 2021-12-14 PROCEDURE — 99205 PR OFFICE/OUTPT VISIT, NEW, LEVL V, 60-74 MIN: ICD-10-PCS | Mod: S$PBB,TXP,, | Performed by: NURSE PRACTITIONER

## 2021-12-14 RX ORDER — ASCORBIC ACID, THIAMINE, RIBOFLAVIN, NIACINAMIDE, PYRIDOXINE, FOLIC ACID, COBALAMIN, BIOTIN, PANTOTHENIC ACID 100; 1.5; 1.7; 20; 10; 1; 6; 300; 1 MG/1; MG/1; MG/1; MG/1; MG/1; MG/1; UG/1; UG/1; MG/1
1 TABLET, COATED ORAL DAILY
COMMUNITY
Start: 2021-11-21 | End: 2021-12-14 | Stop reason: SDUPTHER

## 2021-12-14 RX ORDER — FUROSEMIDE 80 MG/1
80 TABLET ORAL DAILY
COMMUNITY
Start: 2021-12-14

## 2021-12-14 RX ORDER — ASCORBIC ACID, THIAMINE, RIBOFLAVIN, NIACINAMIDE, PYRIDOXINE, FOLIC ACID, COBALAMIN, BIOTIN, PANTOTHENIC ACID, ZINC 100; 1.5; 1.7; 20; 10; 1; 6; 300; 10; 5 MG/1; MG/1; MG/1; MG/1; MG/1; MG/1; UG/1; UG/1; MG/1; MG/1
1 TABLET, COATED ORAL DAILY
COMMUNITY
Start: 2021-09-15

## 2021-12-14 RX ORDER — NIFEDIPINE 30 MG/1
30 TABLET, FILM COATED, EXTENDED RELEASE ORAL NIGHTLY
COMMUNITY
Start: 2021-12-14

## 2021-12-14 RX ORDER — CALCITRIOL 0.25 UG/1
0.25 CAPSULE ORAL DAILY
COMMUNITY
Start: 2021-11-13

## 2021-12-14 RX ORDER — ERGOCALCIFEROL 1.25 MG/1
50000 CAPSULE ORAL WEEKLY
COMMUNITY
Start: 2021-11-11

## 2021-12-21 ENCOUNTER — TELEPHONE (OUTPATIENT)
Dept: TRANSPLANT | Facility: CLINIC | Age: 46
End: 2021-12-21
Payer: MEDICAID

## 2022-01-10 ENCOUNTER — EPISODE CHANGES (OUTPATIENT)
Dept: TRANSPLANT | Facility: CLINIC | Age: 47
End: 2022-01-10

## 2022-01-18 ENCOUNTER — TELEPHONE (OUTPATIENT)
Dept: TRANSPLANT | Facility: CLINIC | Age: 47
End: 2022-01-18
Payer: MEDICAID

## 2022-01-18 DIAGNOSIS — Z76.82 ORGAN TRANSPLANT CANDIDATE: ICD-10-CM

## 2022-01-18 DIAGNOSIS — Q61.3 POLYCYSTIC KIDNEY DISEASE: Primary | ICD-10-CM

## 2022-01-27 ENCOUNTER — DOCUMENTATION ONLY (OUTPATIENT)
Dept: TRANSPLANT | Facility: CLINIC | Age: 47
End: 2022-01-27
Payer: MEDICAID

## 2022-02-10 ENCOUNTER — HOSPITAL ENCOUNTER (OUTPATIENT)
Dept: RADIOLOGY | Facility: HOSPITAL | Age: 47
Discharge: HOME | End: 2022-02-10
Attending: NURSE PRACTITIONER
Payer: MEDICAID

## 2022-02-10 ENCOUNTER — OFFICE VISIT (OUTPATIENT)
Dept: INFECTIOUS DISEASES | Facility: CLINIC | Age: 47
End: 2022-02-10
Payer: MEDICAID

## 2022-02-10 VITALS
SYSTOLIC BLOOD PRESSURE: 123 MMHG | DIASTOLIC BLOOD PRESSURE: 79 MMHG | WEIGHT: 223.75 LBS | HEART RATE: 74 BPM | HEIGHT: 68 IN | TEMPERATURE: 99 F | BODY MASS INDEX: 33.91 KG/M2

## 2022-02-10 DIAGNOSIS — Q61.3 POLYCYSTIC KIDNEY DISEASE: ICD-10-CM

## 2022-02-10 DIAGNOSIS — Z01.818 PRE-TRANSPLANT EVALUATION FOR CHRONIC KIDNEY DISEASE: Primary | ICD-10-CM

## 2022-02-10 DIAGNOSIS — Z76.82 ORGAN TRANSPLANT CANDIDATE: ICD-10-CM

## 2022-02-10 DIAGNOSIS — Z51.81 THERAPEUTIC DRUG MONITORING: ICD-10-CM

## 2022-02-10 DIAGNOSIS — Z22.7 TB LUNG, LATENT: ICD-10-CM

## 2022-02-10 PROCEDURE — 99204 PR OFFICE/OUTPT VISIT, NEW, LEVL IV, 45-59 MIN: ICD-10-PCS | Mod: S$PBB,TXP,, | Performed by: PHYSICIAN ASSISTANT

## 2022-02-10 PROCEDURE — 99214 OFFICE O/P EST MOD 30 MIN: CPT | Mod: PBBFAC,25,TXP | Performed by: PHYSICIAN ASSISTANT

## 2022-02-10 PROCEDURE — 3078F PR MOST RECENT DIASTOLIC BLOOD PRESSURE < 80 MM HG: ICD-10-PCS | Mod: CPTII,TXP,, | Performed by: PHYSICIAN ASSISTANT

## 2022-02-10 PROCEDURE — 90471 IMMUNIZATION ADMIN: CPT | Mod: PBBFAC,TXP

## 2022-02-10 PROCEDURE — 99999 PR PBB SHADOW E&M-EST. PATIENT-LVL IV: ICD-10-PCS | Mod: PBBFAC,TXP,, | Performed by: PHYSICIAN ASSISTANT

## 2022-02-10 PROCEDURE — 3078F DIAST BP <80 MM HG: CPT | Mod: CPTII,TXP,, | Performed by: PHYSICIAN ASSISTANT

## 2022-02-10 PROCEDURE — 3008F BODY MASS INDEX DOCD: CPT | Mod: CPTII,TXP,, | Performed by: PHYSICIAN ASSISTANT

## 2022-02-10 PROCEDURE — 70544 MRA BRAIN WITHOUT CONTRAST: ICD-10-PCS | Mod: 26,TXP,, | Performed by: RADIOLOGY

## 2022-02-10 PROCEDURE — 1159F MED LIST DOCD IN RCRD: CPT | Mod: CPTII,TXP,, | Performed by: PHYSICIAN ASSISTANT

## 2022-02-10 PROCEDURE — 70544 MR ANGIOGRAPHY HEAD W/O DYE: CPT | Mod: 26,TXP,, | Performed by: RADIOLOGY

## 2022-02-10 PROCEDURE — 1159F PR MEDICATION LIST DOCUMENTED IN MEDICAL RECORD: ICD-10-PCS | Mod: CPTII,TXP,, | Performed by: PHYSICIAN ASSISTANT

## 2022-02-10 PROCEDURE — 99999 PR PBB SHADOW E&M-EST. PATIENT-LVL IV: CPT | Mod: PBBFAC,TXP,, | Performed by: PHYSICIAN ASSISTANT

## 2022-02-10 PROCEDURE — 3008F PR BODY MASS INDEX (BMI) DOCUMENTED: ICD-10-PCS | Mod: CPTII,TXP,, | Performed by: PHYSICIAN ASSISTANT

## 2022-02-10 PROCEDURE — 3074F PR MOST RECENT SYSTOLIC BLOOD PRESSURE < 130 MM HG: ICD-10-PCS | Mod: CPTII,TXP,, | Performed by: PHYSICIAN ASSISTANT

## 2022-02-10 PROCEDURE — 3074F SYST BP LT 130 MM HG: CPT | Mod: CPTII,TXP,, | Performed by: PHYSICIAN ASSISTANT

## 2022-02-10 PROCEDURE — 99204 OFFICE O/P NEW MOD 45 MIN: CPT | Mod: S$PBB,TXP,, | Performed by: PHYSICIAN ASSISTANT

## 2022-02-10 PROCEDURE — 70544 MR ANGIOGRAPHY HEAD W/O DYE: CPT | Mod: TC,TXP

## 2022-02-10 PROCEDURE — 90715 TDAP VACCINE 7 YRS/> IM: CPT | Mod: PBBFAC,TXP

## 2022-02-10 RX ORDER — ISONIAZID 300 MG/1
300 TABLET ORAL DAILY
Qty: 30 TABLET | Refills: 8 | Status: SHIPPED | OUTPATIENT
Start: 2022-02-10 | End: 2023-02-10

## 2022-02-10 RX ORDER — LANOLIN ALCOHOL/MO/W.PET/CERES
50 CREAM (GRAM) TOPICAL DAILY
Qty: 90 TABLET | Refills: 3 | Status: SHIPPED | OUTPATIENT
Start: 2022-02-10 | End: 2023-02-10

## 2022-02-10 NOTE — PROGRESS NOTES
Pre Transplant Infectious Diseases Consult  Kidney Transplant Recipient Evaluation    Requesting Physician: Emigdio Santana NP    Reason for Visit:  No chief complaint on file.    History of Present Illness  Adilson Sylvester is a 46 y.o. year old  or  male with advanced Kidney disease currently being evaluated for Kidney transplant. He has a history of HTN, DM, polycystic kidney disease sp renal transplant 2015. The transplant failed 4/2021 and he was transitioned to HD then now to PD.  He denies any PD infectious issues.  Patient denies any recent fever, chills, or infective illnesses.      1) Do you have a history of:   YES NO   Diabetes      [] [x]     Diabetic Foot Infection/Bone Infection  []        [x]     Surgical Removal of Spleen   []  [x]                  2) Have you had recurrent infections involving:             YES NO  Sinus infections  []         [x]   Sore Throat   []         [x]                 Prostate Infections  []         [x]              Bladder Infections  []         [x]                     Kidney Infections  []         [x]                               Intestinal Infections  []         [x]      Skin Infections   []         [x]       Reproductive Infections          []  [x]   Periodontal Disease  []         [x]        3)Have you ever had: YES     NO UNKNOWN      Chicken Pox   [x]         []  []   Shingles   []         [x]  []   Orolabial Herpes             []  [x]  []   Genital Herpes  []         [x]  []   Cytomegalovirus  []         [x]  []   Naseem-Barr Virus  []         [x]  []   Genital Warts   []         [x]  []   Hepatitis A   []         [x]  []   Hepatitis B   []         [x]  []   Hepatitis C   []         [x]  []   Syphilis   []         [x]  []   Gonorrhea   []         [x]  []   Pelvic Inflammatory   Disease   []         []  []   Chlamydia Infection  []         [x]  []   Intestinal parasites   or worms   []         [x]  []   Fungal Infections  []         [x]  []    Blood Infections  []         [x]  []       Comment:       4) Have you ever been exposed   YES NO  To someone with tuberculosis?  []   [x]   If yes, what treatment did you receive:   He denies any exposure or risk factors after extensive questioning.    5) What states have you lived in? [place], [place], [place] -emigrated from [place] 25 yr ago      6) What countries have you visited for more than 2 weeks?    [place]                     YES NO  7) Did you have any associated infections?  []  [x]       8) Are you planning to travel outside the    []  [x]   [place] after your transplant?    9) Household                   YES NO  Do you have pets living in your house?    []         [x]   If yes, describe: cat (indoor) - son changes litter box    Do you spend time or live on a farm or     []         [x]   have livestock or other farm animals?  If yes, which ones:    Do you have a fish tank?          []  [x]       Do you have a litter box?      [x]         []     Do you fish or hunt?     `  []         [x]     Do you clean or skin fish or animals?    []         [x]     Do you consume raw or undercooked    []         [x]   meat, fish, or shellfish?      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          12)Do you garden or otherwise  YES NO   work in the soil?    [x]         []   13)Do you hike, camp, or spend     time in wooded areas?   []         [x]        14) The patient's immunization history was reviewed.    Have you ever received:  YES NO UNKNOWN DATES   Routine Childhood vaccines  [x]         []  []      Influenza vaccine   [x]  []  [] 10/21   Pneumovax/Prevnar   [x]  []  []     Tetanus-diptheria-p   []         [x]  []    Hepatitis A vaccine series       []  [x]  []    Hepatitis B vaccine series         [x]  []  []    Meningitis vaccine   []         [x]  []    Varicella vaccine   []         [x]  []    COVID 3/4/21    Y    Based on the patients  immunization history and serologies, immunizations were ordered:         Ordered  Not Ordered  Influenza Vaccine     []    [x]   Hepatitis A series at 0,  6 months   [x]    []   Hepatitis B seriesat 0, 1, and 6 months  []    [x]   Hepatitis B High Dose 0,1, and 6 months  []    [x]   Prevnar      []    [x]   Pneumovax      []    [x]    TDap       [x]    []    Zoster       []    [x]   Menactra      []    [x]            The patient was encouraged to contact us about any problems that may develop after immunization and possible side effects were reviewed.      Previous Transplant: yes; organ: kidney, date: 2015, complications: none but ultimately failed.    Etiology of Kidney Disease: polycystic kidney disease    Allergies: Patient has no known allergies.    There is no immunization history on file for this patient.  Past Medical History:   Diagnosis Date    Diabetes     Diabetes mellitus, type 2     Gout     Hypertension     Hypothyroidism     Hypothyroidism     Kidney transplant recipient     Obesity     Polycystic kidney disease      Past Surgical History:   Procedure Laterality Date    ANKLE SURGERY      HERNIA REPAIR      KIDNEY TRANSPLANT  2015    [facility] [employer]    LITHOTRIPSY Left 2017    naitive kidney     PD catheter      PERITONEAL CATHETER INSERTION        Social History     Socioeconomic History    Marital status:      Spouse name: Vianey Sylvester    Number of children: 2   Tobacco Use    Smoking status: Former Smoker    Smokeless tobacco: Never Used   Substance and Sexual Activity    Alcohol use: Not Currently    Drug use: Never    Sexual activity: Yes     Partners: Female   Social History Narrative    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 Negative for chills, decreased appetite, malaise/fatigue, night sweats, weight gain and weight loss.   HENT: Negative for congestion, ear pain, hearing loss, hoarse voice, sore throat and tinnitus.     Eyes: Negative for blurred vision, redness and visual disturbance.   Cardiovascular: Negative for chest pain, leg swelling and palpitations.   Respiratory: Negative for cough, hemoptysis, shortness of breath, sputum production and wheezing.    Endocrine: Negative for cold intolerance and heat intolerance.   Hematologic/Lymphatic: Negative for adenopathy. Does not bruise/bleed easily.   Skin: Negative for dry skin, itching, rash and suspicious lesions.   Musculoskeletal: Negative for back pain, joint pain, myalgias and neck pain.   Gastrointestinal: Positive for diarrhea. Negative for abdominal pain, constipation, heartburn, nausea and vomiting.   Genitourinary: Negative for dysuria, flank pain, frequency, hematuria, hesitancy and urgency.   Neurological: Negative for dizziness, headaches, numbness, paresthesias and weakness.   Psychiatric/Behavioral: Negative for depression and memory loss. The patient has insomnia. The patient is not nervous/anxious.    Allergic/Immunologic: Negative for environmental allergies, HIV exposure, hives and persistent infections.     Physical Exam  Constitutional:       Appearance: Normal appearance. He is well-developed and well-nourished. He is not diaphoretic.       HENT:      Head: Normocephalic and atraumatic.      Mouth/Throat:      Mouth: Oropharynx is clear and moist and mucous membranes are normal. No lacerations or oral lesions.      Dentition: Normal dentition. Does not have dentures. No dental caries or dental abscesses.      Pharynx: Uvula midline.   Eyes:      General: Lids are normal. No scleral icterus.     Conjunctiva/sclera: Conjunctivae normal.      Pupils: Pupils are equal, round, and reactive to light.   Cardiovascular:      Rate and Rhythm: Normal rate and regular rhythm.      Heart sounds: No murmur heard.  No friction rub. No gallop.    Pulmonary:      Effort: Pulmonary effort is normal. No respiratory distress.      Breath sounds: Normal breath sounds. No  decreased breath sounds, wheezing, rhonchi or rales.   Chest:   Breasts:      Right: No supraclavicular adenopathy.      Left: No supraclavicular adenopathy.       Abdominal:      General: Bowel sounds are normal. There is no distension. Aorta is normal.      Palpations: Abdomen is soft. There is no hepatosplenomegaly or mass.      Tenderness: There is no abdominal tenderness. There is no guarding or rebound.   Musculoskeletal:         General: No edema.      Cervical back: Neck supple.   Lymphadenopathy:      Head:      Right side of head: No submental, submandibular, tonsillar, preauricular, posterior auricular or occipital adenopathy.      Left side of head: No submental, submandibular, tonsillar, preauricular, posterior auricular or occipital adenopathy.      Cervical: No cervical adenopathy.      Upper Body:   No axillary adenopathy present.     Right upper body: No supraclavicular or epitrochlear adenopathy.      Left upper body: No supraclavicular or epitrochlear adenopathy.      Lower Body: No right inguinal adenopathy. No left inguinal adenopathy.   Skin:     General: Skin is warm, dry and intact.      Coloration: Skin is not pale.      Findings: No erythema, lesion or rash.   Neurological:      Mental Status: He is alert and oriented to person, place, and time.      Cranial Nerves: No cranial nerve deficit.   Psychiatric:         Mood and Affect: Mood and affect normal.         Behavior: Behavior normal.       Diagnostics:   RPR   Date Value Ref Range Status   12/14/2021 Non-reactive Non-reactive Final     No results found for: CMVANTIBODIE  No results found for: HIV1X2  No results found for: HTLVIIIANTIB  Hepatitis B Surface Ag   Date Value Ref Range Status   12/14/2021 Negative Negative Final     Hep B Core Total Ab   Date Value Ref Range Status   12/14/2021 Negative  Final     Hepatitis C Ab   Date Value Ref Range Status   12/14/2021 Negative Negative Final     No results found for: TOXOIGG  No  components found for: TOXOIGGINTER  No results found for: SKV2GOH  No results found for: JPG5LZZ  Varicella Zoster IgG   Date Value Ref Range Status   12/14/2021 3.79 (H) 0.00 - 0.90 ISR Final     Varicella Interpretation   Date Value Ref Range Status   12/14/2021 Positive (A) Negative Final     Comment:     <or=0.8    Negative        No detectable IgG antibody to Varicella zoster  by the KAROLYN test. Such individuals are presumed to be   uninfected with Varicella zoster and to be susceptible to   primary infection.    0.9-1.0    Equivocal    >or=1.1    Positive        Indicates presence of detectable IgG antibody to Varicella   zoster by the KAROLYN test. Indicative of previous or current   infection.       Strongyloides Ab IgG   Date Value Ref Range Status   12/14/2021 Negative Negative Final     Comment:     No detectable levels of IgG antibodies to Strongyloides.  Repeat testing in 1-2 weeks if clinically indicated.    Test Performed by:  [facility]  [address] [place] [address]  : Frank Hale M.D. Ph.D.; CLIA# [id number]        Ref. Range 4/8/2021 11:17 4/14/2021 11:17 7/26/2021 14:26 12/14/2021 07:26   Hep B C IgM Latest Ref Range: Negative  Negative      Hep B Core Total Ab Unknown    Negative   Hep. B Surf Ab, Qual Unknown    POSITIVE   Hep. B Surf Ab, Quant. Latest Units: mIU/mL    36   Hepatitis B Surface Ag Latest Ref Range: Negative  Negative   Negative   Hepatitis C Ab Latest Ref Range: Negative     Negative   BK Virus DNA PCR, Quant, Blood Latest Ref Range: <125 Copies/mL  <125     BK Virus DNA, Blood Latest Ref Range: Not detected   Not detected     Mitogen - Nil Latest Ref Range: See text IU/mL    8.340   NIL Latest Ref Range: See text IU/mL    0.040   TB Gold Plus Unknown    Positive (A)   TB1 - Nil Latest Ref Range: See text IU/mL    0.560   TB2 - Nil Latest Ref Range: See text IU/mL    0.500   HIV 1/2 Ag/Ab Latest Ref Range:  Negative     Negative   RPR Latest Ref Range: Non-reactive     Non-reactive   CMV IgG Interpretation Latest Ref Range: Non-Reactive     Reactive (A)   Strongyloides Ab IgG Latest Ref Range: Negative     Negative   Varicella IgG Latest Ref Range: 0.00 - 0.90 ISR    3.79 (H)   Varicella Interpretation Latest Ref Range: Negative     Positive (A)   Antibody SARS CoV-2 Latest Ref Range: Negative    Positive (A)            Imaging:  Transthoracic echo (TTE) complete  Order# [id number]  Reading physician: Zeke Conroy MD Ordering physician: Peyman Briseno MD Study date: 4/7/21       Reason for Exam  Priority: STAT  Dx: HTN (hypertension) [I10 (ICD-10-CM)]     Result Image Hyperlink     Show images for Echo Color Flow Doppler? Yes    Summary    · The left ventricle is normal in size with concentric remodeling and normal systolic function.  · The estimated ejection fraction is 70%.  · Normal left ventricular diastolic function.  · Normal right ventricular size with normal right ventricular systolic function.  · Moderate right atrial enlargement.  · Normal central venous pressure (3 mmHg).      X-ray chest PA and lateral  Order: [id number]   Status: Final result     Visible to patient: Yes (not seen)     Next appt: Today at 10:00 AM in Infectious Diseases ([facility])     Dx: Organ transplant candidate     0 Result Notes    Details    Reading Physician Reading Date Result Priority   Mar Parnell MD  [phone]  [phone] 12/14/2021 Routine   Kathie Damian MD  [phone]  [phone] 12/14/2021      Narrative & Impression  EXAMINATION:  XR CHEST PA AND LATERAL     CLINICAL HISTORY:  Awaiting organ transplant status     TECHNIQUE:  PA and lateral views of the chest were performed.     COMPARISON:  Chest x-ray 07/26/2021     FINDINGS:  The lungs are clear, with normal appearance of pulmonary vasculature and no pleural effusion or pneumothorax.     The cardiac silhouette is normal in size. The hilar  and mediastinal contours are unremarkable.     Bones are intact.     Impression:     No acute abnormality.     Electronically signed by resident: Kathie Damian  Date:                                            12/14/2021  Time:                                           14:20     Electronically signed by: Mar Parnell  Date:                                            12/14/2021  Time:                                           14:44     X-Ray Pelvis Routine AP  Order: [id number]   Status: Final result     Visible to patient: Yes (not seen)     Next appt: Today at 10:00 AM in Infectious Diseases ([facility])     Dx: Organ transplant candidate     1 Result Note    Details    Reading Physician Reading Date Result Priority   Laila Williamson MD  [phone] 12/14/2021 Routine     Narrative & Impression  EXAMINATION:  XR PELVIS ROUTINE AP     CLINICAL HISTORY:  assess for aorta/iliac calcifications;  Awaiting organ transplant status     TECHNIQUE:  AP view of the pelvis was performed.     COMPARISON:  None     FINDINGS:  The distal and of a coiled line is seen, probably and intraperitoneal dialysis catheter given the history of awaiting organ transplant.     No significant vascular calcifications are seen.  The bones are unremarkable.  The bowel gas pattern is nonobstructive.     Impression:     Peritoneal dialysis catheter.  Otherwise unremarkable radiograph of the pelvis.        Electronically signed by: Laila Williamson  Date:                                            12/14/2021  Time:                                           15:07                         Transplant Infectious Diseases - Candidacy    Assessment/Plan:     Transplant Candidacy: Based on available information, there are no identified significant barriers to transplantation from an infectious disease standpoint pending starting treatment for latent TB. He will start on planned 9 month course of INH 300mg po daily and B6 50mg po daily.  He will  have CMP today, in 2 weeks,1 month then monthly with FU at 3,6,and 9 months with ID.  He was counseled heavily on treatment of latent TB, plan, required fu, and is to contact ID immediately for any neurologic symptoms or ocular complaints while on INH - he agreed to do.  Final determination of transplant candidacy will be made once evaluation is complete and reviewed by the Transplant Selection Committee.    [facility]  Vaccine needs:  1. TDAP  2. Hep A series - rx given for second dose  3. Shingrix series - rx given  4. COVID 19 vaccine series - need to complete       Counseling:I discussed with Adilson the risk for increased susceptibility to infections following transplantation including increased risk for infection right after transplant and if rejection should occur.  The patients has been counseled on the importance of vaccinations including but not limited to a yearly flu vaccine.  Specific guidance has been provided to the patient regarding the patients occupation, hobbies and activities to avoid future infectious complications including but not limited to avoiding undercooked meats and seafood, proper hygiene, and contact with animals.

## 2022-02-10 NOTE — PROGRESS NOTES
Patient received 2 vaccines IM to the right deltoid, Tdap anterior, and Hep A posterior.  Tolerated well and left in NAD

## 2022-02-10 NOTE — Clinical Note
FU 3 months CMP in 2 weeks then 1 month then monthly - lives in [place] but says there is an [facility] there where he can get labs - see if he can have done there and if not then let me know. thx

## 2022-02-15 ENCOUNTER — TELEPHONE (OUTPATIENT)
Dept: TRANSPLANT | Facility: CLINIC | Age: 47
End: 2022-02-15
Payer: MEDICAID

## 2022-02-16 ENCOUNTER — TELEPHONE (OUTPATIENT)
Dept: TRANSPLANT | Facility: CLINIC | Age: 47
End: 2022-02-16
Payer: MEDICAID

## 2022-02-16 DIAGNOSIS — Z76.82 ORGAN TRANSPLANT CANDIDATE: Primary | ICD-10-CM

## 2022-02-16 NOTE — TELEPHONE ENCOUNTER
Contacted the pt regarding MRA results. The pt requested that we schedule neurosx at [facility]. The pt stated he has never had a colonoscopy and requested that we sched with [facility] and stress test. The pt stated he had only the 1st dose of pfizer vaccine and he will f/u to finish the series.

## 2022-02-18 ENCOUNTER — PATIENT MESSAGE (OUTPATIENT)
Dept: INFECTIOUS DISEASES | Facility: CLINIC | Age: 47
End: 2022-02-18
Payer: MEDICAID

## 2022-02-18 ENCOUNTER — TELEPHONE (OUTPATIENT)
Dept: INFECTIOUS DISEASES | Facility: CLINIC | Age: 47
End: 2022-02-18
Payer: MEDICAID

## 2022-02-18 NOTE — TELEPHONE ENCOUNTER
----- Message from Lulu Colbert MA sent at 2/14/2022  4:33 PM CST -----  Luciano Olivas Jr., PA  Lulu Colbert MA  FU 3 months   CMP in 2 weeks then 1 month then monthly - lives in [place] but says there is an [facility] there where he can get labs - see if he can have done there and if not then let me know.   thx

## 2022-02-18 NOTE — TELEPHONE ENCOUNTER
----- Message from Lulu Colbert MA sent at 2/14/2022  4:33 PM CST -----  Luciano Olivas Jr., PA  Lulu Colbert MA  FU 3 months   CMP in 2 weeks then 1 month then monthly - lives in [place] but says there is an [facility] there where he can get labs - see if he can have done there and if not then let me know.   thx

## 2022-02-18 NOTE — TELEPHONE ENCOUNTER
Called patient back and explained to check with [facility] to see if they can see our order. He stated he may not need the labs due to him finding a donor and would like to speak with his coordinator. Transferred him to transplant.

## 2022-03-25 ENCOUNTER — TELEPHONE (OUTPATIENT)
Dept: TRANSPLANT | Facility: CLINIC | Age: 47
End: 2022-03-25
Payer: MEDICAID

## 2022-03-25 DIAGNOSIS — Z76.82 ORGAN TRANSPLANT CANDIDATE: Primary | ICD-10-CM

## 2022-03-28 ENCOUNTER — TELEPHONE (OUTPATIENT)
Dept: TRANSPLANT | Facility: CLINIC | Age: 47
End: 2022-03-28
Payer: MEDICAID

## 2022-03-28 NOTE — TELEPHONE ENCOUNTER
----- Message from Scout Stauffer sent at 3/28/2022 11:36 AM CDT -----  Regarding: call back  Pt call to speak with someone in regards to his appt    Call

## 2022-03-28 NOTE — TELEPHONE ENCOUNTER
Spoke w/pt regarding upcoming appts. Pt confirmed appt date, time, and location. Pt states that he will try to set up stress and neurosurgery at home. He will call if he can and I will cancel scheduled appts.

## 2022-04-22 ENCOUNTER — TELEPHONE (OUTPATIENT)
Dept: CARDIOLOGY | Facility: HOSPITAL | Age: 47
End: 2022-04-22
Payer: MEDICAID

## 2022-04-26 ENCOUNTER — HOSPITAL ENCOUNTER (OUTPATIENT)
Dept: CARDIOLOGY | Facility: HOSPITAL | Age: 47
Discharge: HOME | End: 2022-04-26
Attending: NURSE PRACTITIONER
Payer: MEDICAID

## 2022-04-26 ENCOUNTER — OFFICE VISIT (OUTPATIENT)
Dept: NEUROSURGERY | Facility: CLINIC | Age: 47
End: 2022-04-26
Payer: MEDICAID

## 2022-04-26 VITALS
BODY MASS INDEX: 33.8 KG/M2 | SYSTOLIC BLOOD PRESSURE: 118 MMHG | HEIGHT: 68 IN | WEIGHT: 223 LBS | HEART RATE: 69 BPM | DIASTOLIC BLOOD PRESSURE: 83 MMHG | TEMPERATURE: 98 F

## 2022-04-26 VITALS
BODY MASS INDEX: 33.8 KG/M2 | SYSTOLIC BLOOD PRESSURE: 125 MMHG | DIASTOLIC BLOOD PRESSURE: 81 MMHG | HEART RATE: 64 BPM | HEIGHT: 68 IN | WEIGHT: 223 LBS

## 2022-04-26 DIAGNOSIS — Z76.82 ORGAN TRANSPLANT CANDIDATE: ICD-10-CM

## 2022-04-26 DIAGNOSIS — I72.5 BASILAR ARTERY ANEURYSM: ICD-10-CM

## 2022-04-26 LAB
CV PHARM DOSE: 0.4 MG
CV STRESS BASE HR: 64 BPM
DIASTOLIC BLOOD PRESSURE: 81 MMHG
EJECTION FRACTION- HIGH: 65 %
END DIASTOLIC INDEX-HIGH: 153 ML/M2
END DIASTOLIC INDEX-LOW: 93 ML/M2
END SYSTOLIC INDEX-HIGH: 71 ML/M2
END SYSTOLIC INDEX-LOW: 31 ML/M2
NUC REST DIASTOLIC VOLUME INDEX: 132
NUC REST EJECTION FRACTION: 52
NUC REST SYSTOLIC VOLUME INDEX: 64
NUC STRESS DIASTOLIC VOLUME INDEX: 105
NUC STRESS EJECTION FRACTION: 53 %
NUC STRESS SYSTOLIC VOLUME INDEX: 49
OHS CV CPX 85 PERCENT MAX PREDICTED HEART RATE MALE: 150
OHS CV CPX MAX PREDICTED HEART RATE: 176
OHS CV CPX PATIENT IS FEMALE: 0
OHS CV CPX PATIENT IS MALE: 1
OHS CV CPX PEAK DIASTOLIC BLOOD PRESSURE: 82 MMHG
OHS CV CPX PEAK HEAR RATE: 65 BPM
OHS CV CPX PEAK RATE PRESSURE PRODUCT: 7605
OHS CV CPX PEAK SYSTOLIC BLOOD PRESSURE: 117 MMHG
OHS CV CPX PERCENT MAX PREDICTED HEART RATE ACHIEVED: 37
OHS CV CPX RATE PRESSURE PRODUCT PRESENTING: 8000
RETIRED EF AND QEF - SEE NOTES: 53 %
SYSTOLIC BLOOD PRESSURE: 125 MMHG

## 2022-04-26 PROCEDURE — A9502 TC99M TETROFOSMIN: HCPCS | Mod: TXP

## 2022-04-26 PROCEDURE — 99999 PR PBB SHADOW E&M-EST. PATIENT-LVL V: ICD-10-PCS | Mod: PBBFAC,TXP,, | Performed by: PHYSICIAN ASSISTANT

## 2022-04-26 PROCEDURE — 63600175 PHARM REV CODE 636 W HCPCS: Mod: TXP | Performed by: NURSE PRACTITIONER

## 2022-04-26 PROCEDURE — 78452 HT MUSCLE IMAGE SPECT MULT: CPT | Mod: 26,TXP,, | Performed by: INTERNAL MEDICINE

## 2022-04-26 PROCEDURE — 78452 STRESS TEST WITH MYOCARDIAL PERFUSION (CUPID ONLY): ICD-10-PCS | Mod: 26,TXP,, | Performed by: INTERNAL MEDICINE

## 2022-04-26 PROCEDURE — 93016 CV STRESS TEST SUPVJ ONLY: CPT | Mod: TXP,,, | Performed by: INTERNAL MEDICINE

## 2022-04-26 PROCEDURE — 1160F PR REVIEW ALL MEDS BY PRESCRIBER/CLIN PHARMACIST DOCUMENTED: ICD-10-PCS | Mod: CPTII,TXP,, | Performed by: PHYSICIAN ASSISTANT

## 2022-04-26 PROCEDURE — 3074F SYST BP LT 130 MM HG: CPT | Mod: CPTII,TXP,, | Performed by: PHYSICIAN ASSISTANT

## 2022-04-26 PROCEDURE — 93016 STRESS TEST WITH MYOCARDIAL PERFUSION (CUPID ONLY): ICD-10-PCS | Mod: TXP,,, | Performed by: INTERNAL MEDICINE

## 2022-04-26 PROCEDURE — 1159F MED LIST DOCD IN RCRD: CPT | Mod: CPTII,TXP,, | Performed by: PHYSICIAN ASSISTANT

## 2022-04-26 PROCEDURE — 93018 STRESS TEST WITH MYOCARDIAL PERFUSION (CUPID ONLY): ICD-10-PCS | Mod: TXP,,, | Performed by: INTERNAL MEDICINE

## 2022-04-26 PROCEDURE — 99205 PR OFFICE/OUTPT VISIT, NEW, LEVL V, 60-74 MIN: ICD-10-PCS | Mod: S$PBB,TXP,, | Performed by: PHYSICIAN ASSISTANT

## 2022-04-26 PROCEDURE — 3074F PR MOST RECENT SYSTOLIC BLOOD PRESSURE < 130 MM HG: ICD-10-PCS | Mod: CPTII,TXP,, | Performed by: PHYSICIAN ASSISTANT

## 2022-04-26 PROCEDURE — 3079F PR MOST RECENT DIASTOLIC BLOOD PRESSURE 80-89 MM HG: ICD-10-PCS | Mod: CPTII,TXP,, | Performed by: PHYSICIAN ASSISTANT

## 2022-04-26 PROCEDURE — 3008F PR BODY MASS INDEX (BMI) DOCUMENTED: ICD-10-PCS | Mod: CPTII,TXP,, | Performed by: PHYSICIAN ASSISTANT

## 2022-04-26 PROCEDURE — 3008F BODY MASS INDEX DOCD: CPT | Mod: CPTII,TXP,, | Performed by: PHYSICIAN ASSISTANT

## 2022-04-26 PROCEDURE — 1160F RVW MEDS BY RX/DR IN RCRD: CPT | Mod: CPTII,TXP,, | Performed by: PHYSICIAN ASSISTANT

## 2022-04-26 PROCEDURE — 1159F PR MEDICATION LIST DOCUMENTED IN MEDICAL RECORD: ICD-10-PCS | Mod: CPTII,TXP,, | Performed by: PHYSICIAN ASSISTANT

## 2022-04-26 PROCEDURE — 3079F DIAST BP 80-89 MM HG: CPT | Mod: CPTII,TXP,, | Performed by: PHYSICIAN ASSISTANT

## 2022-04-26 PROCEDURE — 99205 OFFICE O/P NEW HI 60 MIN: CPT | Mod: S$PBB,TXP,, | Performed by: PHYSICIAN ASSISTANT

## 2022-04-26 PROCEDURE — 93018 CV STRESS TEST I&R ONLY: CPT | Mod: TXP,,, | Performed by: INTERNAL MEDICINE

## 2022-04-26 PROCEDURE — 99215 OFFICE O/P EST HI 40 MIN: CPT | Mod: PBBFAC,25,TXP | Performed by: PHYSICIAN ASSISTANT

## 2022-04-26 PROCEDURE — 99417 PR PROLONGED SVC, OUTPT, W/WO DIRECT PT CONTACT,  EA ADDTL 15 MIN: ICD-10-PCS | Mod: S$PBB,TXP,, | Performed by: PHYSICIAN ASSISTANT

## 2022-04-26 PROCEDURE — 99999 PR PBB SHADOW E&M-EST. PATIENT-LVL V: CPT | Mod: PBBFAC,TXP,, | Performed by: PHYSICIAN ASSISTANT

## 2022-04-26 PROCEDURE — 99417 PROLNG OP E/M EACH 15 MIN: CPT | Mod: S$PBB,TXP,, | Performed by: PHYSICIAN ASSISTANT

## 2022-04-26 RX ORDER — REGADENOSON 0.08 MG/ML
0.4 INJECTION, SOLUTION INTRAVENOUS ONCE
Status: COMPLETED | OUTPATIENT
Start: 2022-04-26 | End: 2022-04-26

## 2022-04-26 RX ORDER — ATORVASTATIN CALCIUM 10 MG/1
TABLET, FILM COATED ORAL DAILY
COMMUNITY
Start: 2022-04-08

## 2022-04-26 RX ORDER — AMINOPHYLLINE 25 MG/ML
75 INJECTION, SOLUTION INTRAVENOUS ONCE
Status: COMPLETED | OUTPATIENT
Start: 2022-04-26 | End: 2022-04-26

## 2022-04-26 RX ORDER — ASCORBIC ACID, THIAMINE, RIBOFLAVIN, NIACINAMIDE, PYRIDOXINE, FOLIC ACID, COBALAMIN, BIOTIN, PANTOTHENIC ACID 100; 1.5; 1.7; 20; 10; 1; 6; 300; 1 MG/1; MG/1; MG/1; MG/1; MG/1; MG/1; UG/1; UG/1; MG/1
1 TABLET, COATED ORAL DAILY
COMMUNITY
Start: 2022-04-18 | End: 2022-09-20 | Stop reason: SDUPTHER

## 2022-04-26 RX ORDER — NIFEDIPINE 60 MG/1
TABLET, EXTENDED RELEASE ORAL
Status: ON HOLD | COMMUNITY
Start: 2022-04-08 | End: 2022-10-07 | Stop reason: HOSPADM

## 2022-04-26 RX ADMIN — AMINOPHYLLINE 75 MG: 25 INJECTION, SOLUTION INTRAVENOUS at 08:04

## 2022-04-26 RX ADMIN — REGADENOSON 0.4 MG: 0.08 INJECTION, SOLUTION INTRAVENOUS at 08:04

## 2022-04-26 NOTE — PROGRESS NOTES
Neurosurgery  History & Physical    SUBJECTIVE:     Chief Complaint: Basilar artery aneurysm    History of Present Illness:  Adilson Sylvester is a 46 y.o. 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 Patient denies any HA's, visual disturbance, history of seizures, strokes or TIAs. No focal weakness or numbness or other neurologic deficits. No known family h/o aneurysms, thinks mom's sister  suddenly in her 30's from something to do with the brain but unsure of exact cause. Reports he used to smoke cigarettes occassionally, quit about 15 years ago. Pt is from [facility], moved to [place] at age 20, now living in [place], [place] for past 2 years.     Review of patient's allergies indicates:  No Known Allergies    Current Outpatient Medications   Medication Sig Dispense Refill    blood sugar diagnostic Strp Test 3 (three) times daily with meals. 100 each 3    blood-glucose meter Misc Use as instructed (Patient taking differently: Use as instructed) 1 each 0    calcitRIOL (ROCALTROL) 0.25 MCG Cap Take 0.25 mcg by mouth once daily.      carvediloL (COREG) 25 MG tablet Take 1 tablet (25 mg total) by mouth 2 (two) times daily. 60 tablet 11    DIALYVITE 3-148-988-50 mg-mg-mcg-mg Tab Take 1 tablet by mouth once daily.      ergocalciferol (ERGOCALCIFEROL) 50,000 unit Cap Take 50,000 Units by mouth once a week.       furosemide (LASIX) 80 MG tablet Take 80 mg by mouth once daily.      hydrALAZINE (APRESOLINE) 100 MG tablet Take 1 tablet (100 mg total) by mouth every 8 (eight) hours. 90 tablet 11    insulin aspart U-100 (NOVOLOG) 100 unit/mL (3 mL) InPn pen Inject 1-5 Units into the skin 3 (three) times daily with  "meals. 15 mL 3    isoniazid (NYDRAZID) 300 MG Tab Take 1 tablet (300 mg total) by mouth once daily. 30 tablet 8    lancets 33 gauge Misc Test 3 (three) times daily with meals. 100 each 3    NIFEdipine (ADALAT CC) 30 MG TbSR Take 30 mg by mouth once daily.      pen needle, diabetic 32 gauge x 5/32" Ndle 1 each by Misc.(Non-Drug; Combo Route) route 3 (three) times daily with meals. 100 each 2    predniSONE (DELTASONE) 5 MG tablet Take 1 tablet (5 mg total) by mouth once daily. 30 tablet 11    pyridoxine, vitamin B6, (VITAMIN B-6) 50 MG Tab Take 1 tablet (50 mg total) by mouth once daily. 90 tablet 3    sevelamer carbonate (RENVELA) 800 mg Tab Take 3 tablets (2,400 mg total) by mouth 3 (three) times daily with meals. (Patient taking differently: Take 3,200 mg by mouth 3 (three) times daily with meals.) 270 tablet 11    tacrolimus (PROGRAF) 1 MG Cap Take 4 capsules (4 mg total) by mouth every 12 (twelve) hours. (Patient taking differently: Take 1 mg by mouth every 12 (twelve) hours.) 240 capsule 11     No current facility-administered medications for this visit.       Past Medical History:   Diagnosis Date    Diabetes     Diabetes mellitus, type 2     Gout     Hypertension     Hypothyroidism     Hypothyroidism     Kidney transplant recipient     Obesity     Polycystic kidney disease      Past Surgical History:   Procedure Laterality Date    ANKLE SURGERY      HERNIA REPAIR      KIDNEY TRANSPLANT  2015    [employer]    LITHOTRIPSY Left 2017    naitive kidney     PD catheter      PERITONEAL CATHETER INSERTION       Family History     Problem Relation (Age of Onset)    Diabetes Mother    Hypertension Mother, Paternal Aunt    Kidney disease Father, Sister, Paternal Aunt        Social History     Socioeconomic History    Marital status:      Spouse name: Vianey Odoms    Number of children: 2   Tobacco Use    Smoking status: Former Smoker    Smokeless tobacco: Never Used   Substance " and Sexual Activity    Alcohol use: Not Currently    Drug use: Never    Sexual activity: Yes     Partners: Female   Social History Narrative    Caregiver Wife Vianey       Review of Systems  Positive per HPI, otherwise a pertinent ROS was performed and was negative.        OBJECTIVE:     Vital Signs     There is no height or weight on file to calculate BMI.      Neurosurgery Physical Exam  General: well developed, well nourished, no distress.   Head: normocephalic, atraumatic  Neck: No tracheal deviation. No palpable masses. Full ROM.   Neurologic: Alert and oriented. Thought content appropriate.  GCS: E4 V5 M6; Total: 15  Mental Status: Awake, Alert, Oriented x 4  Language: No aphasia  Speech: No dysarthria  Cranial nerves: face symmetric, tongue midline, CN II-XII grossly intact.   Eyes: pupils equal, round, reactive to light with accomodation, EOMI.   Pulmonary: normal respirations, no signs of respiratory distress  Abdomen: soft, non-distended, not tender to palpation  Vascular: No LE edema.   Skin: Skin is warm, dry and intact.    Sensory: intact to light touch throughout  Motor Strength: Moves all extremities spontaneously with good tone.  Full strength upper and lower extremities. No abnormal movements seen.     Finger-to-nose: intact bilaterally   Pronator drift: absent bilaterally  Gait: stable        Diagnostic Results:  Imaging was independently reviewed by myself, along with the associated radiology report.    MRA brain without contrast 2/10/22:  - Superiorly projecting basilar tip aneurysm, approximately 2.6 x 1.8 mm      ASSESSMENT/PLAN:     Adilson Sylvester is a 46 y.o. 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 Patient asymptomatic without any neurologic deficits. MRA with 2.6 x 1.8 mm superiorly projecting  basilar tip aneurysm. No prior imaging for comparison.     - Case discussed with staff Dr. Pastrana. Unable to obtain MRI with vessel wall imaging to assess for enhancement due to ESRD.  - Recommend diagnostic cerebral angiogram for further characterization for aneurysm prior to clearance for kidney transplant.   - Discussed procedure with patient, who is in agreement. We will schedule the angiogram.  - Encouraged patient to call the clinic with any questions, concerns, or exam changes prior to follow up appt.     Time spent on this encounter: 101 minutes. This includes face to face time and non-face to face time preparing to see the patient (eg, review of tests), obtaining and/or reviewing separately obtained history, documenting clinical information in the electronic or other health record, independently interpreting results and communicating results to the patient/family/caregiver, or care coordinator.        Francine Scales PA-C  Neurosurgery  [facility]

## 2022-05-17 ENCOUNTER — TELEPHONE (OUTPATIENT)
Dept: TRANSPLANT | Facility: CLINIC | Age: 47
End: 2022-05-17
Payer: MEDICAID

## 2022-05-17 NOTE — TELEPHONE ENCOUNTER
Spoke to the patient regarding plan going forward regarding aneurysm seen on MRA. Verified with pt that he is having a cerebral angiogram on 5/26 and we will have to wait for neurosx comments. The pt voiced understanding. He stated he will update his dialysis nurse. In the meantime, the pt was advised to contact his nephrologist regarding refill request for program that was sent to us. He stated he is not sure why that would have been requested from us. He is going to contact his nephrologist. He stated he has enough prograf for now and until he can get his refill.

## 2022-06-21 ENCOUNTER — TELEPHONE (OUTPATIENT)
Dept: INTERVENTIONAL RADIOLOGY/VASCULAR | Facility: HOSPITAL | Age: 47
End: 2022-06-21
Payer: MEDICAID

## 2022-06-21 NOTE — NURSING
Pre-procedure call complete.  Pt instructed not to eat or drink anything after midnight the night before procedure.  Pt aware will need someone to provide transport home and monitor pt 8 hours post procedure.  No driving for at least 24 hours after procedure.   Medications reviewed.  Arrival time and location given.  Expected length of stay reviewed.  Covid screening completed.  Pt verbalized understanding.   normal...

## 2022-06-23 ENCOUNTER — HOSPITAL ENCOUNTER (OUTPATIENT)
Dept: INTERVENTIONAL RADIOLOGY/VASCULAR | Facility: HOSPITAL | Age: 47
Discharge: HOME | End: 2022-06-23
Attending: PHYSICIAN ASSISTANT
Payer: MEDICAID

## 2022-06-23 VITALS
HEIGHT: 68 IN | DIASTOLIC BLOOD PRESSURE: 87 MMHG | HEART RATE: 55 BPM | TEMPERATURE: 98 F | OXYGEN SATURATION: 100 % | WEIGHT: 223 LBS | BODY MASS INDEX: 33.8 KG/M2 | RESPIRATION RATE: 16 BRPM | SYSTOLIC BLOOD PRESSURE: 147 MMHG

## 2022-06-23 DIAGNOSIS — I72.5 BASILAR ARTERY ANEURYSM: ICD-10-CM

## 2022-06-23 PROCEDURE — 36226 PLACE CATH VERTEBRAL ART: CPT | Mod: NTX | Performed by: NEUROLOGICAL SURGERY

## 2022-06-23 PROCEDURE — 76377 3D RENDER W/INTRP POSTPROCES: CPT | Mod: 26,NTX,, | Performed by: NEUROLOGICAL SURGERY

## 2022-06-23 PROCEDURE — C1894 INTRO/SHEATH, NON-LASER: HCPCS | Mod: TXP

## 2022-06-23 PROCEDURE — 63600175 PHARM REV CODE 636 W HCPCS: Mod: TXP | Performed by: FAMILY MEDICINE

## 2022-06-23 PROCEDURE — 76377 3D RENDER W/INTRP POSTPROCES: CPT | Mod: TC,TXP | Performed by: NEUROLOGICAL SURGERY

## 2022-06-23 PROCEDURE — A4550 SURGICAL TRAYS: HCPCS | Mod: TXP

## 2022-06-23 PROCEDURE — 36226 PLACE CATH VERTEBRAL ART: CPT | Mod: 51,RT,NTX, | Performed by: NEUROLOGICAL SURGERY

## 2022-06-23 PROCEDURE — 36224 PLACE CATH CAROTD ART: CPT | Mod: 50,NTX | Performed by: NEUROLOGICAL SURGERY

## 2022-06-23 PROCEDURE — 25500020 PHARM REV CODE 255: Mod: TXP | Performed by: NEUROLOGICAL SURGERY

## 2022-06-23 PROCEDURE — G0269 OCCLUSIVE DEVICE IN VEIN ART: HCPCS | Mod: TXP | Performed by: NEUROLOGICAL SURGERY

## 2022-06-23 PROCEDURE — 36226 PR ANGIO VERTEBRAL ARTERY +/- CERVIOCEREBRAL ARCH, VERTEBRAL ART, SELECTV CATH,S&I: ICD-10-PCS | Mod: 51,RT,NTX, | Performed by: NEUROLOGICAL SURGERY

## 2022-06-23 PROCEDURE — 76377 PR  3D RENDERING W/ IMAGE POSTPROCESS: ICD-10-PCS | Mod: 26,NTX,, | Performed by: NEUROLOGICAL SURGERY

## 2022-06-23 PROCEDURE — 63600175 PHARM REV CODE 636 W HCPCS: Mod: TXP | Performed by: STUDENT IN AN ORGANIZED HEALTH CARE EDUCATION/TRAINING PROGRAM

## 2022-06-23 PROCEDURE — 25000003 PHARM REV CODE 250: Mod: NTX | Performed by: FAMILY MEDICINE

## 2022-06-23 PROCEDURE — 36224 IR ANGIOGRAM CAROTID INTERNAL INC ARCH AND CEREBRAL BILAT: ICD-10-PCS | Mod: 50,NTX,, | Performed by: NEUROLOGICAL SURGERY

## 2022-06-23 PROCEDURE — 25000003 PHARM REV CODE 250: Mod: TXP | Performed by: STUDENT IN AN ORGANIZED HEALTH CARE EDUCATION/TRAINING PROGRAM

## 2022-06-23 PROCEDURE — 82962 GLUCOSE BLOOD TEST: CPT | Mod: NTX

## 2022-06-23 RX ORDER — LIDOCAINE AND PRILOCAINE 25; 25 MG/G; MG/G
CREAM TOPICAL ONCE
Status: COMPLETED | OUTPATIENT
Start: 2022-06-23 | End: 2022-06-23

## 2022-06-23 RX ORDER — MIDAZOLAM HYDROCHLORIDE 1 MG/ML
1 INJECTION INTRAMUSCULAR; INTRAVENOUS
Status: DISCONTINUED | OUTPATIENT
Start: 2022-06-23 | End: 2022-06-24 | Stop reason: HOSPADM

## 2022-06-23 RX ORDER — SODIUM CHLORIDE 0.9 % (FLUSH) 0.9 %
10 SYRINGE (ML) INJECTION
Status: DISCONTINUED | OUTPATIENT
Start: 2022-06-23 | End: 2022-06-24 | Stop reason: HOSPADM

## 2022-06-23 RX ORDER — VERAPAMIL HYDROCHLORIDE 2.5 MG/ML
10 INJECTION, SOLUTION INTRAVENOUS ONCE
Status: DISCONTINUED | OUTPATIENT
Start: 2022-06-23 | End: 2022-06-23 | Stop reason: SDUPTHER

## 2022-06-23 RX ORDER — IODIXANOL 320 MG/ML
150 INJECTION, SOLUTION INTRAVASCULAR
Status: COMPLETED | OUTPATIENT
Start: 2022-06-23 | End: 2022-06-23

## 2022-06-23 RX ORDER — VERAPAMIL HYDROCHLORIDE 2.5 MG/ML
INJECTION, SOLUTION INTRAVENOUS CODE/TRAUMA/SEDATION MEDICATION
Status: COMPLETED | OUTPATIENT
Start: 2022-06-23 | End: 2022-06-23

## 2022-06-23 RX ORDER — LIDOCAINE HYDROCHLORIDE 10 MG/ML
INJECTION INFILTRATION; PERINEURAL CODE/TRAUMA/SEDATION MEDICATION
Status: COMPLETED | OUTPATIENT
Start: 2022-06-23 | End: 2022-06-23

## 2022-06-23 RX ORDER — SODIUM CHLORIDE 9 MG/ML
INJECTION, SOLUTION INTRAVENOUS CONTINUOUS
Status: DISCONTINUED | OUTPATIENT
Start: 2022-06-23 | End: 2022-06-24 | Stop reason: HOSPADM

## 2022-06-23 RX ORDER — FENTANYL CITRATE 50 UG/ML
50 INJECTION, SOLUTION INTRAMUSCULAR; INTRAVENOUS
Status: DISCONTINUED | OUTPATIENT
Start: 2022-06-23 | End: 2022-06-24 | Stop reason: HOSPADM

## 2022-06-23 RX ORDER — FENTANYL CITRATE 50 UG/ML
INJECTION, SOLUTION INTRAMUSCULAR; INTRAVENOUS CODE/TRAUMA/SEDATION MEDICATION
Status: COMPLETED | OUTPATIENT
Start: 2022-06-23 | End: 2022-06-23

## 2022-06-23 RX ORDER — HEPARIN SODIUM 1000 [USP'U]/ML
5000 INJECTION, SOLUTION INTRAVENOUS; SUBCUTANEOUS ONCE
Status: DISCONTINUED | OUTPATIENT
Start: 2022-06-23 | End: 2022-06-24 | Stop reason: HOSPADM

## 2022-06-23 RX ORDER — HEPARIN SODIUM 1000 [USP'U]/ML
INJECTION, SOLUTION INTRAVENOUS; SUBCUTANEOUS CODE/TRAUMA/SEDATION MEDICATION
Status: COMPLETED | OUTPATIENT
Start: 2022-06-23 | End: 2022-06-23

## 2022-06-23 RX ORDER — MIDAZOLAM HYDROCHLORIDE 1 MG/ML
INJECTION INTRAMUSCULAR; INTRAVENOUS CODE/TRAUMA/SEDATION MEDICATION
Status: COMPLETED | OUTPATIENT
Start: 2022-06-23 | End: 2022-06-23

## 2022-06-23 RX ADMIN — HEPARIN SODIUM 2000 UNITS: 1000 INJECTION, SOLUTION INTRAVENOUS; SUBCUTANEOUS at 11:06

## 2022-06-23 RX ADMIN — MIDAZOLAM HYDROCHLORIDE 0.5 MG: 1 INJECTION INTRAMUSCULAR; INTRAVENOUS at 11:06

## 2022-06-23 RX ADMIN — LIDOCAINE HYDROCHLORIDE 5 ML: 10 INJECTION, SOLUTION INFILTRATION; PERINEURAL at 10:06

## 2022-06-23 RX ADMIN — FENTANYL CITRATE 50 MCG: 50 INJECTION, SOLUTION INTRAMUSCULAR; INTRAVENOUS at 10:06

## 2022-06-23 RX ADMIN — FENTANYL CITRATE 25 MCG: 50 INJECTION, SOLUTION INTRAMUSCULAR; INTRAVENOUS at 11:06

## 2022-06-23 RX ADMIN — SODIUM CHLORIDE: 0.9 INJECTION, SOLUTION INTRAVENOUS at 09:06

## 2022-06-23 RX ADMIN — VERAPAMIL HYDROCHLORIDE 10 MG: 2.5 INJECTION, SOLUTION INTRAVENOUS at 11:06

## 2022-06-23 RX ADMIN — IODIXANOL 70 ML: 320 INJECTION, SOLUTION INTRAVASCULAR at 12:06

## 2022-06-23 RX ADMIN — MIDAZOLAM HYDROCHLORIDE 1 MG: 1 INJECTION INTRAMUSCULAR; INTRAVENOUS at 10:06

## 2022-06-23 RX ADMIN — LIDOCAINE AND PRILOCAINE: 25; 25 CREAM TOPICAL at 09:06

## 2022-06-23 NOTE — PROCEDURES
Radiology Post-Procedure Note    Pre Op Diagnosis: Basilar artery aneurysm     Post Op Diagnosis: Same    Procedure: Cerebral angiogram    Procedure performed by: MEHRDAD ROBERT    Written Informed Consent Obtained: Yes    Specimen Removed: NO    Estimated Blood Loss: Minimal    Procedure report:     A 5F sheath was placed into the right radial artery and a 5F Frank 2 catheter was advanced into the aortic arch.  The right ICA, left ICA, right vertebral arteries were subselected and angiography of the brain was performed after injection into each of these vessels.    Preliminary interpretation: Prior described basilar tip end wall aneurysm was 3D characterized.  Please see Imaging report for full details.    A right radial artery angiogram was performed, the sheath removed and hemostasis achieved using TR compression band.  No hematoma was present at the time of hemostasis.    The patient tolerated the procedure well.         Deepa Castañeda MD     Neuro Endovascular Surgery Fellow   [facility]

## 2022-06-23 NOTE — PROGRESS NOTES
Pt w/ NAD / sleeping / periods of sleep apnea Sat decreasing to 85% pt placed to O2 2L N/C / site check done and no additional enlargement noted

## 2022-06-23 NOTE — PLAN OF CARE
Pt arrived to IR room 4 for diagnostic cerebral angiogram for characterization of intracranial aneurysm & rest of the vasculature,  no acute distress noted. Orders, consent and labs reviewed on chart.

## 2022-06-23 NOTE — DISCHARGE INSTRUCTIONS
"For scheduling: Call  [phone]    For questions or concerns call: KASHIF MON-FRI 8 AM- 5PM: [phone].   **After hours and weekends: Call [phone] and ask for "Radiology Resident on call".  Keep your incision clean & dry for 24 hours.  You may remove your bandage after 24 hours and shower.  Do not bathe or submerge your incision site in water until your incision site is completely healed.   "

## 2022-06-23 NOTE — PLAN OF CARE
Chart reviewed. Pre-op nursing care and surgery checklist complete. Family at bedside. Patient belongings given to family. Groins clipped. Lido cream placed to right wrist. IR called.

## 2022-06-23 NOTE — PLAN OF CARE
Hemostasis achieved with 13 cc air in TR band to right wrist at 1149. Non-pulsatile swelling noted to right forearm just proximal to TR band. Dr. Pastrana assessed. Sensation and cap refill WNL. Pulse ox to right index finger 100%. Bedside handoff to Brady BARKSDALE.

## 2022-06-23 NOTE — H&P
Radiology History & Physical      SUBJECTIVE:     Chief Complaint: Intracranial aneurysm     History of Present Illness:  Adilson Sylvester is a 46 y.o. male with medical history of HTN, DM, PCKD on PD with recent work-up for kidney transplant evaluation revealing basilar tip end wall aneurysm. Hence, plans for diagnostic cerebral angiogram for characterization of intracranial aneurysm & rest of the vasculature      Past Medical History:   Diagnosis Date    Diabetes     Diabetes mellitus, type 2     Gout     Hypertension     Hypothyroidism     Hypothyroidism     Kidney transplant recipient     Obesity     Polycystic kidney disease      Past Surgical History:   Procedure Laterality Date    ANKLE SURGERY      HERNIA REPAIR      KIDNEY TRANSPLANT  2015    [employer]    LITHOTRIPSY Left 2017    naitive kidney     PD catheter      PERITONEAL CATHETER INSERTION         Home Meds:   Prior to Admission medications    Medication Sig Start Date End Date Taking? Authorizing Provider   atorvastatin (LIPITOR) 10 MG tablet  4/8/22   Historical Provider   blood sugar diagnostic Strp Test 3 (three) times daily with meals.  Patient not taking: Reported on 4/26/2022 4/19/21   Maru Back MD   blood-glucose meter Misc Use as instructed  Patient taking differently: Use as instructed 4/19/21   Maru Back MD   calcitRIOL (ROCALTROL) 0.25 MCG Cap Take 0.25 mcg by mouth once daily. 11/13/21   Historical Provider   carvediloL (COREG) 25 MG tablet Take 1 tablet (25 mg total) by mouth 2 (two) times daily. 4/19/21 4/19/22  Veronica Ma MD   DIALYVITE 5-898-064-50 mg-mg-mcg-mg Tab Take 1 tablet by mouth once daily. 9/15/21   Historical Provider   DIALYVITE 100-1 mg Tab Take 1 tablet by mouth once daily. 4/18/22   Historical Provider   ergocalciferol (ERGOCALCIFEROL) 50,000 unit Cap Take 50,000 Units by mouth once a week. Tuesdays 11/11/21   Historical Provider   furosemide (LASIX) 80 MG tablet Take  "80 mg by mouth once daily. 12/14/21   Historical Provider   hydrALAZINE (APRESOLINE) 100 MG tablet Take 1 tablet (100 mg total) by mouth every 8 (eight) hours. 4/19/21 4/19/22  Veronica Ma MD   insulin aspart U-100 (NOVOLOG) 100 unit/mL (3 mL) InPn pen Inject 1-5 Units into the skin 3 (three) times daily with meals. 4/19/21 4/19/22  Maru Back MD   isoniazid (NYDRAZID) 300 MG Tab Take 1 tablet (300 mg total) by mouth once daily. 2/10/22 2/10/23  Luciano Olvias Jr., PA   lancets 33 gauge Misc Test 3 (three) times daily with meals. 4/19/21   Maru Back MD   NIFEdipine (ADALAT CC) 30 MG TbSR Take 30 mg by mouth once daily. 12/14/21   Historical Provider   NIFEdipine (PROCARDIA-XL) 60 MG (OSM) 24 hr tablet  4/8/22   Historical Provider   pen needle, diabetic 32 gauge x 5/32" Ndle 1 each by Misc.(Non-Drug; Combo Route) route 3 (three) times daily with meals. 4/19/21   Veronica Ma MD   predniSONE (DELTASONE) 5 MG tablet Take 1 tablet (5 mg total) by mouth once daily. 4/20/21   Veronica Ma MD   pyridoxine, vitamin B6, (VITAMIN B-6) 50 MG Tab Take 1 tablet (50 mg total) by mouth once daily. 2/10/22 2/10/23  Luciano Olivas Jr., PA   sevelamer carbonate (RENVELA) 800 mg Tab Take 3 tablets (2,400 mg total) by mouth 3 (three) times daily with meals.  Patient taking differently: Take 3,200 mg by mouth 3 (three) times daily with meals. 4/19/21 4/19/22  Veronica Ma MD   tacrolimus (PROGRAF) 1 MG Cap Take 4 capsules (4 mg total) by mouth every 12 (twelve) hours.  Patient taking differently: Take 1 mg by mouth every 12 (twelve) hours. 4/19/21 4/19/22  Veronica Ma MD     Anticoagulants/Antiplatelets: no anticoagulation    Allergies: Review of patient's allergies indicates:  No Known Allergies  Sedation History:  no adverse reactions    Review of Systems:   Hematological: no known coagulopathies  Respiratory: no shortness of " breath  Cardiovascular: no chest pain  Gastrointestinal: no abdominal pain  Genito-Urinary: no dysuria  Musculoskeletal: negative  Neurological: no TIA or stroke symptoms         OBJECTIVE:     Vital Signs (Most Recent)       Physical Exam:  ASA: 3  Mallampati: 1    General: no acute distress  Mental Status: alert and oriented to person, place and time  HEENT: normocephalic, atraumatic  Chest: unlabored breathing  Heart: regular heart rate  Abdomen: nondistended  Extremity: moves all extremities    Laboratory  Lab Results   Component Value Date    INR 0.9 12/14/2021       Lab Results   Component Value Date    WBC 8.73 12/14/2021    HGB 12.0 (L) 12/14/2021    HCT 36.7 (L) 12/14/2021    MCV 88 12/14/2021     12/14/2021      Lab Results   Component Value Date    GLU 96 06/23/2022     06/23/2022    K 4.4 06/23/2022    CL 98 06/23/2022    CO2 25 06/23/2022    BUN 69 (H) 06/23/2022    CREATININE 18.3 (H) 06/23/2022    CALCIUM 10.2 06/23/2022    MG 1.9 04/19/2021    ALT 9 (L) 02/10/2022    AST 9 (L) 02/10/2022    ALBUMIN 3.4 (L) 02/10/2022    BILITOT 0.7 02/10/2022    BILIDIR 0.1 12/14/2021       ASSESSMENT/PLAN:     Sedation Plan: Up to moderate     Patient will undergo: diagnostic cerebral angiogram for characterization of intracranial aneurysm & rest of the vasculature            Deepa Castañeda MD     Neuro Endovascular Surgery Fellow   [facility]

## 2022-06-23 NOTE — NURSING
Incision site CDI, gauze and Tegaderm applied. No bleeding. Swelling above site going down. WILTON Levin MD assessed area before discharge. Discharge instructions given in preferred language. Pt verbalized understanding and stated no further questions. Pt ambulated to restroom. Transported via wheelchair. Pt wife was .

## 2022-06-23 NOTE — PROGRESS NOTES
Pt arrived to room #4 from IR [facility] w/ NAD noted / TR Band to R wrist with noted lump / Dr Ariza aware of issue / cap refill <3 sec and no pain to site /

## 2022-06-28 DIAGNOSIS — Z12.11 COLON CANCER SCREENING: Primary | ICD-10-CM

## 2022-06-28 DIAGNOSIS — Z76.82 ORGAN TRANSPLANT CANDIDATE: ICD-10-CM

## 2022-07-06 LAB — POCT GLUCOSE: 96 MG/DL (ref 70–110)

## 2022-07-07 ENCOUNTER — TELEPHONE (OUTPATIENT)
Dept: NEUROSURGERY | Facility: CLINIC | Age: 47
End: 2022-07-07
Payer: MEDICAID

## 2022-07-07 DIAGNOSIS — I72.5 BASILAR ARTERY ANEURYSM: Primary | ICD-10-CM

## 2022-07-07 NOTE — TELEPHONE ENCOUNTER
Spoke with patient. Pt reports it is too short of notice to drive from [place]. Pt is requesting appointment to be done virtually. I switched patient appointment to virtual and advised to call with any questions or concerns.     ----- Message from Bela Tavarez sent at 7/7/2022  3:01 PM CDT -----  Type:  Patient Returning Call    Who Called: self     Who Left Message for Patient: n/a     Does the patient know what this is regarding?: no     Would the patient rather a call back or a response via [facility]? Call back     Best Call Back Number: [phone]

## 2022-07-11 ENCOUNTER — PATIENT MESSAGE (OUTPATIENT)
Dept: NEUROSURGERY | Facility: CLINIC | Age: 47
End: 2022-07-11
Payer: MEDICAID

## 2022-07-12 ENCOUNTER — OFFICE VISIT (OUTPATIENT)
Dept: NEUROSURGERY | Facility: CLINIC | Age: 47
End: 2022-07-12
Payer: MEDICAID

## 2022-07-12 ENCOUNTER — TELEPHONE (OUTPATIENT)
Dept: NEUROSURGERY | Facility: CLINIC | Age: 47
End: 2022-07-12

## 2022-07-12 ENCOUNTER — PATIENT MESSAGE (OUTPATIENT)
Dept: NEUROSURGERY | Facility: CLINIC | Age: 47
End: 2022-07-12

## 2022-07-12 DIAGNOSIS — I72.5 BASILAR ARTERY ANEURYSM: Primary | ICD-10-CM

## 2022-07-12 PROCEDURE — 1159F MED LIST DOCD IN RCRD: CPT | Mod: CPTII,95,TXP, | Performed by: NEUROLOGICAL SURGERY

## 2022-07-12 PROCEDURE — 99214 PR OFFICE/OUTPT VISIT, EST, LEVL IV, 30-39 MIN: ICD-10-PCS | Mod: 95,TXP,, | Performed by: NEUROLOGICAL SURGERY

## 2022-07-12 PROCEDURE — 1159F PR MEDICATION LIST DOCUMENTED IN MEDICAL RECORD: ICD-10-PCS | Mod: CPTII,95,TXP, | Performed by: NEUROLOGICAL SURGERY

## 2022-07-12 PROCEDURE — 1160F RVW MEDS BY RX/DR IN RCRD: CPT | Mod: CPTII,95,TXP, | Performed by: NEUROLOGICAL SURGERY

## 2022-07-12 PROCEDURE — 1160F PR REVIEW ALL MEDS BY PRESCRIBER/CLIN PHARMACIST DOCUMENTED: ICD-10-PCS | Mod: CPTII,95,TXP, | Performed by: NEUROLOGICAL SURGERY

## 2022-07-12 PROCEDURE — 99214 OFFICE O/P EST MOD 30 MIN: CPT | Mod: 95,TXP,, | Performed by: NEUROLOGICAL SURGERY

## 2022-07-12 NOTE — PROGRESS NOTES
Neurosurgery  Established Patient    SUBJECTIVE:     History of Present Illness:  Adilson Sylvester is a 46 y.o. 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 Patient denies any HA's, visual disturbance, history of seizures, strokes or TIAs. No focal weakness or numbness or other neurologic deficits. No known family h/o aneurysms, thinks mom's sister  suddenly in her 30's from something to do with the brain but unsure of exact cause. Reports he used to smoke cigarettes occassionally, quit about 15 years ago. Pt is from [facility], moved to [place] at age 20, now living in [place], [place] for past 2 years.        Interval history:  Patient most recently underwent a diagnostic cerebral angiogram.  Which revealed a sessile appearing 2.2 mm x 2 mm basilar apex aneurysm with some slight laterality to the left.  He denies any new headache, nausea, vomiting.  He denies any significant pain at his right radial arteriotomy access site.  He does note he had some minor tenderness to palpation 1-2 days after the right radial cerebral angiogram, but currently denies any new neurologic symptoms.  This is a virtual audio visual visit.    Review of patient's allergies indicates:  No Known Allergies    Current Outpatient Medications   Medication Sig Dispense Refill    atorvastatin (LIPITOR) 10 MG tablet       blood sugar diagnostic Strp Test 3 (three) times daily with meals. 100 each 3    blood-glucose meter Misc Use as instructed (Patient taking differently: Use as instructed) 1 each 0    calcitRIOL (ROCALTROL) 0.25 MCG Cap Take 0.25 mcg by mouth once daily.      carvediloL (COREG) 25 MG tablet Take 1 tablet (25 mg total) by mouth 2  "(two) times daily. 60 tablet 11    DIALYVITE [id number] mg-mg-mcg-mg Tab Take 1 tablet by mouth once daily.      DIALYVITE 100-1 mg Tab Take 1 tablet by mouth once daily.      ergocalciferol (ERGOCALCIFEROL) 50,000 unit Cap Take 50,000 Units by mouth once a week. Tuesdays      furosemide (LASIX) 80 MG tablet Take 80 mg by mouth once daily.      hydrALAZINE (APRESOLINE) 100 MG tablet Take 1 tablet (100 mg total) by mouth every 8 (eight) hours. 90 tablet 11    insulin aspart U-100 (NOVOLOG) 100 unit/mL (3 mL) InPn pen Inject 1-5 Units into the skin 3 (three) times daily with meals. 15 mL 3    isoniazid (NYDRAZID) 300 MG Tab Take 1 tablet (300 mg total) by mouth once daily. 30 tablet 8    lancets 33 gauge Misc Test 3 (three) times daily with meals. 100 each 3    NIFEdipine (ADALAT CC) 30 MG TbSR Take 30 mg by mouth once daily.      NIFEdipine (PROCARDIA-XL) 60 MG (OSM) 24 hr tablet       pen needle, diabetic 32 gauge x 5/32" Ndle 1 each by Misc.(Non-Drug; Combo Route) route 3 (three) times daily with meals. 100 each 2    predniSONE (DELTASONE) 5 MG tablet Take 1 tablet (5 mg total) by mouth once daily. 30 tablet 11    pyridoxine, vitamin B6, (VITAMIN B-6) 50 MG Tab Take 1 tablet (50 mg total) by mouth once daily. 90 tablet 3    sevelamer carbonate (RENVELA) 800 mg Tab Take 3 tablets (2,400 mg total) by mouth 3 (three) times daily with meals. (Patient taking differently: Take 3,200 mg by mouth 3 (three) times daily with meals.) 270 tablet 11    tacrolimus (PROGRAF) 1 MG Cap Take 4 capsules (4 mg total) by mouth every 12 (twelve) hours. (Patient taking differently: Take 1 mg by mouth every 12 (twelve) hours.) 240 capsule 11     No current facility-administered medications for this visit.       Past Medical History:   Diagnosis Date    Diabetes     Diabetes mellitus, type 2     Gout     Hypertension     Hypothyroidism     Hypothyroidism     Kidney transplant recipient     Obesity     Polycystic " kidney disease      Past Surgical History:   Procedure Laterality Date    ANKLE SURGERY      HERNIA REPAIR      KIDNEY TRANSPLANT  2015    [facility]    LITHOTRIPSY Left 2017    naitive kidney     PD catheter      PERITONEAL CATHETER INSERTION       Family History     Problem Relation (Age of Onset)    Diabetes Mother    Hypertension Mother, Paternal Aunt    Kidney disease Father, Sister, Paternal Aunt        Social History     Socioeconomic History    Marital status:      Spouse name: Vianey Sylvester    Number of children: 2   Tobacco Use    Smoking status: Former Smoker    Smokeless tobacco: Never Used   Substance and Sexual Activity    Alcohol use: Not Currently    Drug use: Never    Sexual activity: Yes     Partners: Female   Social History Narrative    Caregiver Wife Vianey       Review of Systems    OBJECTIVE:     Vital Signs     There is no height or weight on file to calculate BMI.    Neurosurgery Physical Exam  On virtual audio visual visit  The patient's head is normocephalic and atraumatic  The neck is grossly supple  No appreciable labored breathing on virtual audio visual visit  Patient able to move all extremities    The patient speech is fluent, goal directed without any noted dysarthria or aphasia  Unable to evaluate pupils on virtual audio visual visit  Face is grossly symmetric  Tongue is midline  Unable to assess palate on virtual audio visual visit  No gross motor asymmetry on virtual audio visual visit  Gait deferred    Diagnostic Results:  I personally reviewed patient's Diagnostic Imaging.  Patient has a wide necked 2.2 x 2 mm basilar apex aneurysm.  There is mild laterality to the left P1.      ASSESSMENT/PLAN:     46-year-old man with a history of polycystic kidney disease, and renal failure on the transplant list.  Is incidentally found to 0.2 mm x 2 mm basilar apex aneurysm.    1. No significant change impressions clinical exam on virtual audio visual visit  2.  Diagnostic cerebral angiogram with a 2.2 mm x 2 mm basilar apex aneurysm, sessile appealing, with slight laterality to the left.  3. Had long discussion with patient.  Patient is unclear history with respect to aneurysmal rupture.  He does feel he had an aunt, who may have had subarachnoid hemorrhage associated death.  Patient notes his both parents and other first-degree relatives are no longer alive.  He is currently a nonsmoker but does have a history of hypertension associated renal failure.  I would recommend an MRA with vessel wall imaging to assess for any concern for possible aneurysmal fragility.  I discussed with the patient in great detail.  Answered all of his questions to his satisfaction.  Will follow-up with the MRA with vessel wall imaging for the basilar apex aneurysm.    Thank you so very much for allowing me to participate in the care of this patient.  Please feel free to call any questions, comments, or concerns.    Ranjit Pastrana MD,MSc  Department of Neurosurgery   Department of Radiology  Department of Neurology  [facility]    [facility]   [employer] [facility] [employer]    Virtual visit Attestation   The patient location is: Home  The chief complaint leading to consultation is:  Basilar apex aneurysm  Visit type: audiovisual  Total time spent with patient: 30 min     Each patient to whom he or she provides medical services by telemedicine is:  (1) informed of the relationship between the physician and patient and the respective role of any other health care provider with respect to management of the patient; and (2) notified that he or she may decline to receive medical services by telemedicine and may withdraw from such care at any time.          Note dictated with voice recognition software, please excuse any grammatical errors.

## 2022-08-04 ENCOUNTER — TELEPHONE (OUTPATIENT)
Dept: TRANSPLANT | Facility: CLINIC | Age: 47
End: 2022-08-04
Payer: MEDICAID

## 2022-08-12 ENCOUNTER — HOSPITAL ENCOUNTER (OUTPATIENT)
Dept: RADIOLOGY | Facility: HOSPITAL | Age: 47
Discharge: HOME | End: 2022-08-12
Attending: NEUROLOGICAL SURGERY
Payer: MEDICAID

## 2022-08-12 DIAGNOSIS — I72.5 BASILAR ARTERY ANEURYSM: ICD-10-CM

## 2022-08-12 PROCEDURE — 70544 MR ANGIOGRAPHY HEAD W/O DYE: CPT | Mod: TC,NTX

## 2022-08-12 PROCEDURE — 70544 MR ANGIOGRAPHY HEAD W/O DYE: CPT | Mod: 26,TXP,, | Performed by: RADIOLOGY

## 2022-08-12 PROCEDURE — 70544 MRA BRAIN WITHOUT CONTRAST: ICD-10-PCS | Mod: 26,TXP,, | Performed by: RADIOLOGY

## 2022-08-16 ENCOUNTER — OFFICE VISIT (OUTPATIENT)
Dept: NEUROSURGERY | Facility: CLINIC | Age: 47
End: 2022-08-16
Payer: MEDICAID

## 2022-08-16 DIAGNOSIS — I72.5 BASILAR ARTERY ANEURYSM: Primary | ICD-10-CM

## 2022-08-16 PROCEDURE — 1159F PR MEDICATION LIST DOCUMENTED IN MEDICAL RECORD: ICD-10-PCS | Mod: CPTII,95,TXP, | Performed by: NEUROLOGICAL SURGERY

## 2022-08-16 PROCEDURE — 99215 OFFICE O/P EST HI 40 MIN: CPT | Mod: 95,TXP,, | Performed by: NEUROLOGICAL SURGERY

## 2022-08-16 PROCEDURE — 1160F PR REVIEW ALL MEDS BY PRESCRIBER/CLIN PHARMACIST DOCUMENTED: ICD-10-PCS | Mod: CPTII,95,TXP, | Performed by: NEUROLOGICAL SURGERY

## 2022-08-16 PROCEDURE — 99215 PR OFFICE/OUTPT VISIT, EST, LEVL V, 40-54 MIN: ICD-10-PCS | Mod: 95,TXP,, | Performed by: NEUROLOGICAL SURGERY

## 2022-08-16 PROCEDURE — 1160F RVW MEDS BY RX/DR IN RCRD: CPT | Mod: CPTII,95,TXP, | Performed by: NEUROLOGICAL SURGERY

## 2022-08-16 PROCEDURE — 1159F MED LIST DOCD IN RCRD: CPT | Mod: CPTII,95,TXP, | Performed by: NEUROLOGICAL SURGERY

## 2022-08-16 NOTE — PROGRESS NOTES
Neurosurgery  Established Patient    SUBJECTIVE:     History of Present Illness:  Adilson Sylvester is a 46 y.o. 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 Patient denies any HA's, visual disturbance, history of seizures, strokes or TIAs. No focal weakness or numbness or other neurologic deficits. No known family h/o aneurysms, thinks mom's sister  suddenly in her 30's from something to do with the brain but unsure of exact cause. Reports he used to smoke cigarettes occassionally, quit about 15 years ago. Pt is from [facility], moved to [place] at age 20, now living in [place], [place] for past 2 years.         Interval history:  Patient most recently underwent a diagnostic cerebral angiogram.  Which revealed a sessile appearing 2.2 mm x 2 mm basilar apex aneurysm with some slight laterality to the left.  He denies any new headache, nausea, vomiting.  He denies any significant pain at his right radial arteriotomy access site.  He does note he had some minor tenderness to palpation 1-2 days after the right radial cerebral angiogram, but currently denies any new neurologic symptoms.  We had recommended a MRA with vessel wall imaging to assess for any concerning vessel wall enhancement.  He was unable to obtained vessel wall imaging, secondary to his current renal function  This is a virtual audio visual visit.       Review of patient's allergies indicates:  No Known Allergies    Current Outpatient Medications   Medication Sig Dispense Refill    atorvastatin (LIPITOR) 10 MG tablet       blood sugar diagnostic Strp Test 3 (three) times daily with meals. 100 each 3    blood-glucose meter Misc Use as instructed (Patient taking  "differently: Use as instructed) 1 each 0    calcitRIOL (ROCALTROL) 0.25 MCG Cap Take 0.25 mcg by mouth once daily.      carvediloL (COREG) 25 MG tablet Take 1 tablet (25 mg total) by mouth 2 (two) times daily. 60 tablet 11    DIALYVITE [id number] mg-mg-mcg-mg Tab Take 1 tablet by mouth once daily.      DIALYVITE 100-1 mg Tab Take 1 tablet by mouth once daily.      ergocalciferol (ERGOCALCIFEROL) 50,000 unit Cap Take 50,000 Units by mouth once a week. Tuesdays      furosemide (LASIX) 80 MG tablet Take 80 mg by mouth once daily.      hydrALAZINE (APRESOLINE) 100 MG tablet Take 1 tablet (100 mg total) by mouth every 8 (eight) hours. 90 tablet 11    insulin aspart U-100 (NOVOLOG) 100 unit/mL (3 mL) InPn pen Inject 1-5 Units into the skin 3 (three) times daily with meals. 15 mL 3    isoniazid (NYDRAZID) 300 MG Tab Take 1 tablet (300 mg total) by mouth once daily. 30 tablet 8    lancets 33 gauge Misc Test 3 (three) times daily with meals. 100 each 3    NIFEdipine (ADALAT CC) 30 MG TbSR Take 30 mg by mouth once daily.      NIFEdipine (PROCARDIA-XL) 60 MG (OSM) 24 hr tablet       pen needle, diabetic 32 gauge x 5/32" Ndle 1 each by Misc.(Non-Drug; Combo Route) route 3 (three) times daily with meals. 100 each 2    predniSONE (DELTASONE) 5 MG tablet Take 1 tablet (5 mg total) by mouth once daily. 30 tablet 11    pyridoxine, vitamin B6, (VITAMIN B-6) 50 MG Tab Take 1 tablet (50 mg total) by mouth once daily. 90 tablet 3    sevelamer carbonate (RENVELA) 800 mg Tab Take 3 tablets (2,400 mg total) by mouth 3 (three) times daily with meals. (Patient taking differently: Take 3,200 mg by mouth 3 (three) times daily with meals.) 270 tablet 11    tacrolimus (PROGRAF) 1 MG Cap Take 4 capsules (4 mg total) by mouth every 12 (twelve) hours. (Patient taking differently: Take 1 mg by mouth every 12 (twelve) hours.) 240 capsule 11     No current facility-administered medications for this visit.       Past Medical History:   Diagnosis Date "    Diabetes     Diabetes mellitus, type 2     Gout     Hypertension     Hypothyroidism     Hypothyroidism     Kidney transplant recipient     Obesity     Polycystic kidney disease      Past Surgical History:   Procedure Laterality Date    ANKLE SURGERY      HERNIA REPAIR      KIDNEY TRANSPLANT  2015    [facility]    LITHOTRIPSY Left 2017    naitive kidney     PD catheter      PERITONEAL CATHETER INSERTION       Family History       Problem Relation (Age of Onset)    Diabetes Mother    Hypertension Mother, Paternal Aunt    Kidney disease Father, Sister, Paternal Aunt          Social History     Socioeconomic History    Marital status:      Spouse name: Vianey Sylvester    Number of children: 2   Tobacco Use    Smoking status: Former Smoker    Smokeless tobacco: Never Used   Substance and Sexual Activity    Alcohol use: Not Currently    Drug use: Never    Sexual activity: Yes     Partners: Female   Social History Narrative    Caregiver Wife Vianey       Review of Systems    OBJECTIVE:     Vital Signs     There is no height or weight on file to calculate BMI.    Neurosurgery Physical Exam      Diagnostic Results:  I personally reviewed patient's Diagnostic Imaging.  Patient has a wide necked 2.2 x 2 mm basilar apex aneurysm.  There is mild laterality to the left P1.         ASSESSMENT/PLAN:      46-year-old man with the small basilar apex aneurysm, incidentally found on workup for transplant.       No focal deficits on examination   2.  Patient has a wide neck 2.2 x 2 mm basilar apex aneurysm with some mild laterally to the left   Patient has been unable to take vessel wall imaging secondary to his renal status, as result unable to really adequately stratify.  Given the size of the aneurysm would recommend the primary coil embolization given the need for transplant.  Would like need need to do this in a balloon assisted verses coil assisted fashion.  Will start patient on dual anti-platelet therapy with  assessment of ARU/PRU prior to  embolization.     Ranjit Pastrana MD,MSc  Department of Neurosurgery   Department of Radiology  Department of Neurology  [facility]    [facility]   [employer] [facility] [employer]              Note dictated with voice recognition software, please excuse any grammatical errors.

## 2022-08-31 DIAGNOSIS — I72.5 BASILAR ARTERY ANEURYSM: Primary | ICD-10-CM

## 2022-08-31 RX ORDER — ASPIRIN 325 MG
325 TABLET ORAL DAILY
Qty: 30 TABLET | Refills: 11 | Status: SHIPPED | OUTPATIENT
Start: 2022-08-31 | End: 2022-09-21

## 2022-08-31 RX ORDER — CLOPIDOGREL BISULFATE 75 MG/1
75 TABLET ORAL DAILY
Qty: 30 TABLET | Refills: 11 | Status: SHIPPED | OUTPATIENT
Start: 2022-08-31 | End: 2022-09-21 | Stop reason: ALTCHOICE

## 2022-09-09 ENCOUNTER — TELEPHONE (OUTPATIENT)
Dept: NEUROSURGERY | Facility: CLINIC | Age: 47
End: 2022-09-09
Payer: MEDICAID

## 2022-09-09 ENCOUNTER — TELEPHONE (OUTPATIENT)
Dept: PREADMISSION TESTING | Facility: HOSPITAL | Age: 47
End: 2022-09-09
Payer: MEDICAID

## 2022-09-09 DIAGNOSIS — Z76.82 ORGAN TRANSPLANT CANDIDATE: ICD-10-CM

## 2022-09-09 DIAGNOSIS — I72.5 BASILAR ARTERY ANEURYSM: Primary | ICD-10-CM

## 2022-09-09 NOTE — TELEPHONE ENCOUNTER
Called and spoke with pt in regards to angiogram. Pt has not been taking plavix or asa. Instructed to start taking. Pt does not have a pcp to get pre clearance. Message sent to pre op desk clinic pool to see if can get done on 09/21/22 day before angio. Explained to pt will call once arranged. Pt verbalized understanding.

## 2022-09-09 NOTE — TELEPHONE ENCOUNTER
----- Message from Izabel Zavala RN sent at 9/9/2022  8:16 AM CDT -----  Good morning. Pt lives in [place]. He is having an angiogram for embolization and is requiring anesthesia. He is asking if he could be seen on Wednesday 09/21 the day prior to the angiogram. Please advise.

## 2022-09-20 ENCOUNTER — TELEPHONE (OUTPATIENT)
Dept: TRANSPLANT | Facility: HOSPITAL | Age: 47
End: 2022-09-20
Payer: MEDICAID

## 2022-09-20 NOTE — PRE-PROCEDURE INSTRUCTIONS
PreOp Instructions given:    -- Medication information (what to hold and what to take)   -- NPO guidelines as follows: (or as per your Surgeon)  Stop ALL solid food, gum, candy (including vitamins) 8 hours before surgery/procedure time.  Stop all CLOUDY liquids: coffee with creamer, cloudy juices, 6 hours prior to surgery/procedure  time.  The patient should be ENCOURAGED to drink carbohydrate-rich clear liquids (sports drinks, clear juices) until 2 hours prior to surgery/procedure  time.  CLEAR liquids include only water, black coffee NO creamer, clear oral rehydration drinks, clear sports drinks or clear fruit juices (no orange juice, no pulpy juices, no apple cider).   IF IN DOUBT, drink water instead.   NOTHING TO DRINK 2 hours before to surgery/procedure  time. If you are told to take medication on the morning of surgery, it may be taken with a sip of water.   -- Arrival place and directions given; time to be given the day before procedure by the Surgeon's Office   -- Bathing with antibacterial soap   -- Don't wear any jewelry or bring any valuables AM of surgery   -- No makeup or moisturizer to face   -- No perfume/cologne, powder, lotions or aftershave     Pt verbalized understanding.

## 2022-09-21 ENCOUNTER — PATIENT MESSAGE (OUTPATIENT)
Dept: NEUROSURGERY | Facility: CLINIC | Age: 47
End: 2022-09-21
Payer: MEDICAID

## 2022-09-21 ENCOUNTER — HOSPITAL ENCOUNTER (OUTPATIENT)
Dept: PREADMISSION TESTING | Facility: HOSPITAL | Age: 47
Discharge: HOME | End: 2022-09-21
Payer: MEDICAID

## 2022-09-21 ENCOUNTER — HOSPITAL ENCOUNTER (OUTPATIENT)
Dept: PREADMISSION TESTING | Facility: HOSPITAL | Age: 47
Discharge: HOME | End: 2022-09-21
Attending: NEUROLOGICAL SURGERY
Payer: MEDICAID

## 2022-09-21 ENCOUNTER — TELEPHONE (OUTPATIENT)
Dept: NEUROSURGERY | Facility: CLINIC | Age: 47
End: 2022-09-21
Payer: MEDICAID

## 2022-09-21 ENCOUNTER — DOCUMENTATION ONLY (OUTPATIENT)
Dept: PREADMISSION TESTING | Facility: HOSPITAL | Age: 47
End: 2022-09-21
Payer: MEDICAID

## 2022-09-21 ENCOUNTER — ANESTHESIA EVENT (OUTPATIENT)
Dept: INTERVENTIONAL RADIOLOGY/VASCULAR | Facility: HOSPITAL | Age: 47
DRG: 025 | End: 2022-09-21
Payer: MEDICAID

## 2022-09-21 VITALS
DIASTOLIC BLOOD PRESSURE: 90 MMHG | RESPIRATION RATE: 16 BRPM | HEART RATE: 63 BPM | OXYGEN SATURATION: 97 % | TEMPERATURE: 98 F | HEIGHT: 68 IN | BODY MASS INDEX: 33.95 KG/M2 | WEIGHT: 224 LBS | SYSTOLIC BLOOD PRESSURE: 149 MMHG

## 2022-09-21 DIAGNOSIS — I72.5 BASILAR ARTERY ANEURYSM: Primary | ICD-10-CM

## 2022-09-21 DIAGNOSIS — N18.6 ESRD ON PERITONEAL DIALYSIS: Primary | ICD-10-CM

## 2022-09-21 DIAGNOSIS — Z99.2 ESRD ON PERITONEAL DIALYSIS: Primary | ICD-10-CM

## 2022-09-21 DIAGNOSIS — Z76.82 ORGAN TRANSPLANT CANDIDATE: ICD-10-CM

## 2022-09-21 RX ORDER — ASPIRIN 81 MG/1
81 TABLET ORAL DAILY
Qty: 30 TABLET | Refills: 11 | Status: ON HOLD | OUTPATIENT
Start: 2022-09-21 | End: 2022-10-07 | Stop reason: HOSPADM

## 2022-09-21 NOTE — TELEPHONE ENCOUNTER
Dr. Pastrana notified plavix/asa not therapeutic. Ordered to hold angiogram. Change to brulinta and baby asa. Pt notified of change. Medication ordered. Pt received from pharmacy. Instructed angiogram now scheduled for 10/06/22. Arival time 830am. Pt to arrive day before to have labs done again. Pt verbalized understanding.

## 2022-09-21 NOTE — ANESTHESIA PREPROCEDURE EVALUATION
[facility]  Anesthesia Pre-Operative Evaluation         Patient Name/: Adilson Sylvester, 1975  MRN: [id number]    SUBJECTIVE:     Pre-operative evaluation for * No surgery found *     2022    Adilson Sylvester is a 46 y.o. male w/ a significant PMHx of HTN, GERD, DM2, polycystic kidney disease s/p KTx in 2017 that failed on nightly PD (being evaluated for kidney transplant), and recently discovered basilar artery aneurysm. Now planning for IR angiogram with possible intervention.     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    Labs drawn 22 pending    Patient now presents for the above procedure(s).    TTE 21  Summary     The left ventricle is normal in size with concentric remodeling and normal systolic function.   The estimated ejection fraction is 70%.   Normal left ventricular diastolic function.   Normal right ventricular size with normal right ventricular systolic function.   Moderate right atrial enlargement.   Normal central venous pressure (3 mmHg).    Stress test 22  Conclusion         Normal myocardial perfusion scan. There is no evidence of myocardial ischemia or infarction.    There is mild apical thinning which is a normal variant.    The gated perfusion images showed an ejection fraction of 52% at rest. The gated perfusion images showed an ejection fraction of 53% post stress. Normal ejection fraction is greater than 53%.    There is normal wall motion at rest and post stress.    LV cavity size is normal at rest and normal at stress.    The EKG portion of this study is negative for ischemia.    The patient reported chest pain during the stress test.    During stress, rare PVCs are noted.    There are no prior studies for comparison.        Prev airway: None documented.    LDA:        Hemodialysis Catheter 21 0949 right subclavian (Active)   Number of days: 526       Drips:  None documented.      Patient Active Problem List   Diagnosis    JUSTINE (acute kidney injury)    Renovascular hypertension    Type 2 diabetes mellitus    H/O kidney transplant    Long-term use of immunosuppressant medication    Allergic rhinitis    Immunocompromised state    Acidosis    Hyperkalemia    Acute rejection of kidney transplant    Prophylactic immunotherapy    Adverse effect of adrenal cortical steroids, sequela    PKD (polycystic kidney disease)    Class 2 severe obesity due to excess calories with serious comorbidity in adult    BMI 31.0-31.9,adult    Anemia in chronic kidney disease    Secondary hyperparathyroidism of renal origin    Pre-transplant evaluation for chronic kidney disease    ESRD on peritoneal dialysis    Failed kidney transplant    Basilar artery aneurysm       Review of patient's allergies indicates:  No Known Allergies    Current Inpatient Medications:       Current Outpatient Medications on File Prior to Encounter   Medication Sig Dispense Refill    aspirin 325 MG tablet Take 1 tablet (325 mg total) by mouth once daily. 30 tablet 11    atorvastatin (LIPITOR) 10 MG tablet       blood sugar diagnostic Strp Test 3 (three) times daily with meals. 100 each 3    blood-glucose meter Misc Use as instructed (Patient taking differently: Use as instructed) 1 each 0    calcitRIOL (ROCALTROL) 0.25 MCG Cap Take 0.25 mcg by mouth once daily.      carvediloL (COREG) 25 MG tablet Take 1 tablet (25 mg total) by mouth 2 (two) times daily. 60 tablet 11    clopidogreL (PLAVIX) 75 mg tablet Take 1 tablet (75 mg total) by mouth once daily. 30 tablet 11    DIALYVITE [id number] mg-mg-mcg-mg Tab Take 1 tablet by mouth once daily.      ergocalciferol (ERGOCALCIFEROL) 50,000 unit Cap Take 50,000 Units by mouth once a week. Monday      furosemide (LASIX) 80 MG tablet Take 80 mg by mouth once daily.      hydrALAZINE (APRESOLINE) 100 MG tablet Take 1 tablet (100 mg total) by mouth  "every 8 (eight) hours. 90 tablet 11    insulin aspart U-100 (NOVOLOG) 100 unit/mL (3 mL) InPn pen Inject 1-5 Units into the skin 3 (three) times daily with meals. 15 mL 3    isoniazid (NYDRAZID) 300 MG Tab Take 1 tablet (300 mg total) by mouth once daily. 30 tablet 8    lancets 33 gauge Misc Test 3 (three) times daily with meals. 100 each 3    NIFEdipine (ADALAT CC) 30 MG TbSR Take 30 mg by mouth once daily.      NIFEdipine (PROCARDIA-XL) 60 MG (OSM) 24 hr tablet       pen needle, diabetic 32 gauge x 5/32" Ndle 1 each by Misc.(Non-Drug; Combo Route) route 3 (three) times daily with meals. 100 each 2    predniSONE (DELTASONE) 5 MG tablet Take 1 tablet (5 mg total) by mouth once daily. 30 tablet 11    pyridoxine, vitamin B6, (VITAMIN B-6) 50 MG Tab Take 1 tablet (50 mg total) by mouth once daily. 90 tablet 3    sevelamer carbonate (RENVELA) 800 mg Tab Take 3 tablets (2,400 mg total) by mouth 3 (three) times daily with meals. (Patient taking differently: Take 3,200 mg by mouth 3 (three) times daily with meals.) 270 tablet 11    tacrolimus (PROGRAF) 1 MG Cap Take 4 capsules (4 mg total) by mouth every 12 (twelve) hours. (Patient taking differently: Take 1 mg by mouth every 12 (twelve) hours.) 240 capsule 11     No current facility-administered medications on file prior to encounter.       Past Surgical History:   Procedure Laterality Date    ANKLE SURGERY      HERNIA REPAIR      KIDNEY TRANSPLANT  2015    [facility]    LITHOTRIPSY Left 2017    naitive kidney     PD catheter      PERITONEAL CATHETER INSERTION         Social History:  Tobacco Use: Medium Risk    Smoking Tobacco Use: Former    Smokeless Tobacco Use: Never       Alcohol Use: Not on file       OBJECTIVE:     Vital Signs Range:  BMI Readings from Last 1 Encounters:   06/23/22 33.91 kg/m²               Significant Labs:        Component Value Date/Time    WBC 8.73 12/14/2021 0726    WBC 3.4 (L) 07/26/2021 1426    HGB 12.0 (L) 12/14/2021 " 0726    HCT 36.7 (L) 12/14/2021 0726     12/14/2021 0726     06/23/2022 0709    K 4.4 06/23/2022 0709    CL 98 06/23/2022 0709    CO2 25 06/23/2022 0709    GLU 96 06/23/2022 0709    BUN 69 (H) 06/23/2022 0709    CREATININE 18.3 (H) 06/23/2022 0709    MG 1.9 04/19/2021 0614    PHOS 6.9 (H) 12/14/2021 0726    CALCIUM 10.2 06/23/2022 0709    ALBUMIN 3.4 (L) 02/10/2022 1037    PROT 6.5 02/10/2022 1037    ALKPHOS 73 02/10/2022 1037    BILITOT 0.7 02/10/2022 1037    AST 9 (L) 02/10/2022 1037    ALT 9 (L) 02/10/2022 1037    INR 0.9 12/14/2021 0726    HGBA1C 5.5 12/14/2021 0726        Please see Results Review for additional labs.     Diagnostic Studies: No relevant studies.    EKG:   Results for orders placed or performed in visit on 04/07/21   EKG 12-lead    Collection Time: 04/07/21  9:53 AM    Narrative    Test Reason : R07.9    Vent. Rate : 071 BPM     Atrial Rate : 071 BPM     P-R Int : 162 ms          QRS Dur : 092 ms      QT Int : 412 ms       P-R-T Axes : 030 -85 069 degrees     QTc Int : 447 ms    Normal sinus rhythm  Left anterior fascicular block  Abnormal ECG  No previous ECGs available  Confirmed by Gilson Vang MD ([id number]) on 4/8/2021 10:52:24 AM    [facility]           Confirmed By:Gilson Vang MD       ECHO:  Results for orders placed during the hospital encounter of 04/07/21    Echo Color Flow Doppler? Yes    Interpretation Summary  · The left ventricle is normal in size with concentric remodeling and normal systolic function.  · The estimated ejection fraction is 70%.  · Normal left ventricular diastolic function.  · Normal right ventricular size with normal right ventricular systolic function.  · Moderate right atrial enlargement.  · Normal central venous pressure (3 mmHg).        ASSESSMENT/PLAN:       Pre-op Assessment    I have reviewed the Patient Summary Reports.     I have reviewed the Nursing Notes.    I have reviewed the Medications.     Review of  Systems  Anesthesia Hx:   Denies Personal Hx of Anesthesia complications.   Social:  Former Smoker    Hematology/Oncology:  Hematology Normal   Oncology Normal     Cardiovascular:   Hypertension    Pulmonary:  Pulmonary Normal    Renal/:   Chronic Renal Disease, ESRD, Dialysis    Hepatic/GI:   GERD    Musculoskeletal:  Musculoskeletal Normal    Neurological:   Aneurysm   Endocrine:   Diabetes Hypothyroidism    Psych:  Psychiatric Normal           Physical Exam  General: Well nourished, Cooperative, Alert and Oriented    Airway:  Mallampati: III / II  Mouth Opening: Normal  TM Distance: Normal  Tongue: Normal  Neck ROM: Normal ROM    Dental:        Anesthesia Plan  Type of Anesthesia, risks & benefits discussed:    Anesthesia Type: Gen ETT  Intra-op Monitoring Plan: Standard ASA Monitors and Art Line  Post Op Pain Control Plan: multimodal analgesia and IV/PO Opioids PRN  Induction:  IV  Airway Plan: Direct and Video, Post-Induction  Informed Consent: Informed consent signed with the Patient and all parties understand the risks and agree with anesthesia plan.  All questions answered. Patient consented to blood products? Yes  ASA Score: 3  Day of Surgery Review of History & Physical: H&P Update referred to the surgeon/provider.  Anesthesia Plan Notes: Patient evaluated in pre-op clinic 9-21-22, E-consent obtained.     Ready For Surgery From Anesthesia Perspective.     .

## 2022-09-22 ENCOUNTER — EPISODE CHANGES (OUTPATIENT)
Dept: TRANSPLANT | Facility: CLINIC | Age: 47
End: 2022-09-22

## 2022-09-29 ENCOUNTER — TELEPHONE (OUTPATIENT)
Dept: TRANSPLANT | Facility: CLINIC | Age: 47
End: 2022-09-29
Payer: MEDICAID

## 2022-10-05 ENCOUNTER — DOCUMENTATION ONLY (OUTPATIENT)
Dept: PREADMISSION TESTING | Facility: HOSPITAL | Age: 47
End: 2022-10-05
Payer: MEDICAID

## 2022-10-05 ENCOUNTER — TELEPHONE (OUTPATIENT)
Dept: NEUROSURGERY | Facility: CLINIC | Age: 47
End: 2022-10-05
Payer: MEDICAID

## 2022-10-05 DIAGNOSIS — I72.5 BASILAR ARTERY ANEURYSM: Primary | ICD-10-CM

## 2022-10-05 NOTE — TELEPHONE ENCOUNTER
Was notified by lab aspirin response showed errors and needs to be reordered and redrawn. Pt notified and is returning to have done.

## 2022-10-05 NOTE — TELEPHONE ENCOUNTER
----- Message from Irma Aleman sent at 10/5/2022 11:07 AM CDT -----  Contact: nurse lab  Call back nurse in regards to a problem with specimen sent over.  Nurse is requesting a call back at her ext.       Confirmed contact below:   Contact Name:[facility]   Phone Number:  Ext. [phone]

## 2022-10-05 NOTE — PRE-PROCEDURE INSTRUCTIONS
PRE-OP INSTRUCTIONS:  Instructed patient to have no food,milk or milk products after midnight   It is ok to take AM medications with a few sips of water   Medication instructions for pm prior to and am of surgery reviewed.  Instructed patient to avoid taking vitamins,supplements,aspirin or ibuprofen the am of surgery.  Shower instructions provided    Patient denies any side effects or issues with anesthesia or sedation.        S/P KIDNEY TRANSPLANT

## 2022-10-06 ENCOUNTER — HOSPITAL ENCOUNTER (INPATIENT)
Dept: INTERVENTIONAL RADIOLOGY/VASCULAR | Facility: HOSPITAL | Age: 47
LOS: 1 days | Discharge: HOME | DRG: 025 | End: 2022-10-07
Attending: PSYCHIATRY & NEUROLOGY | Admitting: PSYCHIATRY & NEUROLOGY
Payer: MEDICAID

## 2022-10-06 ENCOUNTER — ANESTHESIA (OUTPATIENT)
Dept: INTERVENTIONAL RADIOLOGY/VASCULAR | Facility: HOSPITAL | Age: 47
DRG: 025 | End: 2022-10-06
Payer: MEDICAID

## 2022-10-06 DIAGNOSIS — I72.5 BASILAR ARTERY ANEURYSM: Primary | ICD-10-CM

## 2022-10-06 PROBLEM — Z99.2 ESRD ON PERITONEAL DIALYSIS: Chronic | Status: ACTIVE | Noted: 2021-12-14

## 2022-10-06 PROBLEM — N18.6 ESRD ON PERITONEAL DIALYSIS: Chronic | Status: ACTIVE | Noted: 2021-12-14

## 2022-10-06 PROBLEM — Z94.0 H/O KIDNEY TRANSPLANT: Chronic | Status: ACTIVE | Noted: 2021-04-07

## 2022-10-06 PROBLEM — E11.9 TYPE 2 DIABETES MELLITUS: Chronic | Status: ACTIVE | Noted: 2021-04-07

## 2022-10-06 PROBLEM — I15.0 RENOVASCULAR HYPERTENSION: Chronic | Status: ACTIVE | Noted: 2021-04-07

## 2022-10-06 LAB
ALBUMIN SERPL BCP-MCNC: 3.2 G/DL (ref 3.5–5.2)
ALP SERPL-CCNC: 58 U/L (ref 55–135)
ALT SERPL W/O P-5'-P-CCNC: 7 U/L (ref 10–44)
ANION GAP SERPL CALC-SCNC: 20 MMOL/L (ref 8–16)
AST SERPL-CCNC: 7 U/L (ref 10–40)
BASOPHILS # BLD AUTO: 0.09 K/UL (ref 0–0.2)
BASOPHILS NFR BLD: 1 % (ref 0–1.9)
BILIRUB SERPL-MCNC: 0.6 MG/DL (ref 0.1–1)
BUN SERPL-MCNC: 69 MG/DL (ref 6–20)
CALCIUM SERPL-MCNC: 7.7 MG/DL (ref 8.7–10.5)
CHLORIDE SERPL-SCNC: 99 MMOL/L (ref 95–110)
CO2 SERPL-SCNC: 16 MMOL/L (ref 23–29)
CREAT SERPL-MCNC: 19.7 MG/DL (ref 0.5–1.4)
CTP QC/QA: YES
DIFFERENTIAL METHOD: ABNORMAL
EOSINOPHIL # BLD AUTO: 0.3 K/UL (ref 0–0.5)
EOSINOPHIL NFR BLD: 3 % (ref 0–8)
ERYTHROCYTE [DISTWIDTH] IN BLOOD BY AUTOMATED COUNT: 14.2 % (ref 11.5–14.5)
EST. GFR  (NO RACE VARIABLE): 2.7 ML/MIN/1.73 M^2
GLUCOSE SERPL-MCNC: 99 MG/DL (ref 70–110)
HCT VFR BLD AUTO: 31.9 % (ref 40–54)
HGB BLD-MCNC: 10.5 G/DL (ref 14–18)
IMM GRANULOCYTES # BLD AUTO: 0.06 K/UL (ref 0–0.04)
IMM GRANULOCYTES NFR BLD AUTO: 0.6 % (ref 0–0.5)
LYMPHOCYTES # BLD AUTO: 1.2 K/UL (ref 1–4.8)
LYMPHOCYTES NFR BLD: 12.8 % (ref 18–48)
MAGNESIUM SERPL-MCNC: 1.9 MG/DL (ref 1.6–2.6)
MCH RBC QN AUTO: 30.5 PG (ref 27–31)
MCHC RBC AUTO-ENTMCNC: 32.9 G/DL (ref 32–36)
MCV RBC AUTO: 93 FL (ref 82–98)
MONOCYTES # BLD AUTO: 0.3 K/UL (ref 0.3–1)
MONOCYTES NFR BLD: 3.1 % (ref 4–15)
NEUTROPHILS # BLD AUTO: 7.5 K/UL (ref 1.8–7.7)
NEUTROPHILS NFR BLD: 79.5 % (ref 38–73)
NRBC BLD-RTO: 0 /100 WBC
PHOSPHATE SERPL-MCNC: 8.6 MG/DL (ref 2.7–4.5)
PLATELET # BLD AUTO: 309 K/UL (ref 150–450)
PMV BLD AUTO: 9.8 FL (ref 9.2–12.9)
POCT GLUCOSE: 99 MG/DL (ref 70–110)
POTASSIUM SERPL-SCNC: 3.7 MMOL/L (ref 3.5–5.1)
POTASSIUM SERPL-SCNC: 4.2 MMOL/L (ref 3.5–5.1)
PROT SERPL-MCNC: 5.9 G/DL (ref 6–8.4)
RBC # BLD AUTO: 3.44 M/UL (ref 4.6–6.2)
SARS-COV-2 AG RESP QL IA.RAPID: NEGATIVE
SODIUM SERPL-SCNC: 135 MMOL/L (ref 136–145)
WBC # BLD AUTO: 9.38 K/UL (ref 3.9–12.7)

## 2022-10-06 PROCEDURE — 93010 EKG 12-LEAD: ICD-10-PCS | Mod: NTX,,, | Performed by: INTERNAL MEDICINE

## 2022-10-06 PROCEDURE — 83735 ASSAY OF MAGNESIUM: CPT | Mod: NTX | Performed by: PSYCHIATRY & NEUROLOGY

## 2022-10-06 PROCEDURE — 80053 COMPREHEN METABOLIC PANEL: CPT | Mod: NTX | Performed by: PSYCHIATRY & NEUROLOGY

## 2022-10-06 PROCEDURE — 84132 ASSAY OF SERUM POTASSIUM: CPT | Mod: NTX | Performed by: STUDENT IN AN ORGANIZED HEALTH CARE EDUCATION/TRAINING PROGRAM

## 2022-10-06 PROCEDURE — D9220A PRA ANESTHESIA: ICD-10-PCS | Mod: CRNA,NTX,, | Performed by: NURSE ANESTHETIST, CERTIFIED REGISTERED

## 2022-10-06 PROCEDURE — 25000003 PHARM REV CODE 250: Mod: NTX | Performed by: NURSE ANESTHETIST, CERTIFIED REGISTERED

## 2022-10-06 PROCEDURE — 63600175 PHARM REV CODE 636 W HCPCS: Mod: NTX

## 2022-10-06 PROCEDURE — 25500020 PHARM REV CODE 255: Mod: NTX | Performed by: NEUROLOGICAL SURGERY

## 2022-10-06 PROCEDURE — D9220A PRA ANESTHESIA: ICD-10-PCS | Mod: ANES,NTX,, | Performed by: STUDENT IN AN ORGANIZED HEALTH CARE EDUCATION/TRAINING PROGRAM

## 2022-10-06 PROCEDURE — 61624 TCAT PERM OCCLS/EMBOLJ CNS: CPT | Mod: NTX | Performed by: NEUROLOGICAL SURGERY

## 2022-10-06 PROCEDURE — 75898 FOLLOW-UP ANGIOGRAPHY: CPT | Mod: 26,59,NTX, | Performed by: NEUROLOGICAL SURGERY

## 2022-10-06 PROCEDURE — 61624 IR ANGIOGRAM CAROTID INTERNAL INC ARCH AND CEREBRAL BILAT: ICD-10-PCS | Mod: NTX,,, | Performed by: NEUROLOGICAL SURGERY

## 2022-10-06 PROCEDURE — 61624 TCAT PERM OCCLS/EMBOLJ CNS: CPT | Mod: NTX,,, | Performed by: NEUROLOGICAL SURGERY

## 2022-10-06 PROCEDURE — 36224 PLACE CATH CAROTD ART: CPT | Mod: 50,NTX | Performed by: NEUROLOGICAL SURGERY

## 2022-10-06 PROCEDURE — 36415 COLL VENOUS BLD VENIPUNCTURE: CPT | Mod: NTX | Performed by: PSYCHIATRY & NEUROLOGY

## 2022-10-06 PROCEDURE — 94761 N-INVAS EAR/PLS OXIMETRY MLT: CPT | Mod: NTX

## 2022-10-06 PROCEDURE — 36226 PLACE CATH VERTEBRAL ART: CPT | Mod: 51,LT,NTX, | Performed by: NEUROLOGICAL SURGERY

## 2022-10-06 PROCEDURE — 37000009 HC ANESTHESIA EA ADD 15 MINS: Mod: NTX

## 2022-10-06 PROCEDURE — 75898 FOLLOW-UP ANGIOGRAPHY: CPT | Mod: TC,59,NTX | Performed by: NEUROLOGICAL SURGERY

## 2022-10-06 PROCEDURE — 63600175 PHARM REV CODE 636 W HCPCS: Mod: NTX | Performed by: NURSE ANESTHETIST, CERTIFIED REGISTERED

## 2022-10-06 PROCEDURE — D9220A PRA ANESTHESIA: Mod: CRNA,NTX,, | Performed by: NURSE ANESTHETIST, CERTIFIED REGISTERED

## 2022-10-06 PROCEDURE — 85025 COMPLETE CBC W/AUTO DIFF WBC: CPT | Mod: NTX | Performed by: PSYCHIATRY & NEUROLOGY

## 2022-10-06 PROCEDURE — 80197 ASSAY OF TACROLIMUS: CPT | Mod: NTX | Performed by: PSYCHIATRY & NEUROLOGY

## 2022-10-06 PROCEDURE — D9220A PRA ANESTHESIA: Mod: ANES,NTX,, | Performed by: STUDENT IN AN ORGANIZED HEALTH CARE EDUCATION/TRAINING PROGRAM

## 2022-10-06 PROCEDURE — 20000000 HC ICU ROOM: Mod: NTX

## 2022-10-06 PROCEDURE — 75894 X-RAYS TRANSCATH THERAPY: CPT | Mod: TC,NTX | Performed by: NEUROLOGICAL SURGERY

## 2022-10-06 PROCEDURE — 75894 PR  TRANSCATHETER RX EMBOLIZATN: ICD-10-PCS | Mod: 26,NTX,, | Performed by: NEUROLOGICAL SURGERY

## 2022-10-06 PROCEDURE — 27201037 HC PRESSURE MONITORING SET UP: Mod: TXP

## 2022-10-06 PROCEDURE — 25000003 PHARM REV CODE 250: Mod: NTX

## 2022-10-06 PROCEDURE — 99223 1ST HOSP IP/OBS HIGH 75: CPT | Mod: FS,NTX,,

## 2022-10-06 PROCEDURE — 25000003 PHARM REV CODE 250: Mod: TXP | Performed by: STUDENT IN AN ORGANIZED HEALTH CARE EDUCATION/TRAINING PROGRAM

## 2022-10-06 PROCEDURE — 93005 ELECTROCARDIOGRAM TRACING: CPT | Mod: NTX

## 2022-10-06 PROCEDURE — 75898 PR  ANGIOGRAM,F/U STUDY,CATH THER/EMBOL/INF: ICD-10-PCS | Mod: 26,59,NTX, | Performed by: NEUROLOGICAL SURGERY

## 2022-10-06 PROCEDURE — 37000008 HC ANESTHESIA 1ST 15 MINUTES: Mod: NTX

## 2022-10-06 PROCEDURE — 99223 PR INITIAL HOSPITAL CARE,LEVL III: ICD-10-PCS | Mod: FS,NTX,,

## 2022-10-06 PROCEDURE — 75894 X-RAYS TRANSCATH THERAPY: CPT | Mod: 26,NTX,, | Performed by: NEUROLOGICAL SURGERY

## 2022-10-06 PROCEDURE — 84100 ASSAY OF PHOSPHORUS: CPT | Mod: NTX | Performed by: PSYCHIATRY & NEUROLOGY

## 2022-10-06 PROCEDURE — 27201082 IR ANGIOGRAM CAROTID INTERNAL INC ARCH AND CEREBRAL BILAT: Mod: NTX

## 2022-10-06 PROCEDURE — 93010 ELECTROCARDIOGRAM REPORT: CPT | Mod: NTX,,, | Performed by: INTERNAL MEDICINE

## 2022-10-06 PROCEDURE — 36226 PR ANGIO VERTEBRAL ARTERY +/- CERVIOCEREBRAL ARCH, VERTEBRAL ART, SELECTV CATH,S&I: ICD-10-PCS | Mod: 51,LT,NTX, | Performed by: NEUROLOGICAL SURGERY

## 2022-10-06 RX ORDER — FENTANYL CITRATE 50 UG/ML
INJECTION, SOLUTION INTRAMUSCULAR; INTRAVENOUS
Status: DISCONTINUED | OUTPATIENT
Start: 2022-10-06 | End: 2022-10-06

## 2022-10-06 RX ORDER — ONDANSETRON 2 MG/ML
4 INJECTION INTRAMUSCULAR; INTRAVENOUS DAILY PRN
Status: CANCELLED | OUTPATIENT
Start: 2022-10-06

## 2022-10-06 RX ORDER — ONDANSETRON 2 MG/ML
INJECTION INTRAMUSCULAR; INTRAVENOUS
Status: DISCONTINUED | OUTPATIENT
Start: 2022-10-06 | End: 2022-10-06

## 2022-10-06 RX ORDER — ATORVASTATIN CALCIUM 10 MG/1
10 TABLET, FILM COATED ORAL DAILY
Status: DISCONTINUED | OUTPATIENT
Start: 2022-10-07 | End: 2022-10-07 | Stop reason: HOSPADM

## 2022-10-06 RX ORDER — SODIUM CHLORIDE 0.9 % (FLUSH) 0.9 %
10 SYRINGE (ML) INJECTION
Status: DISCONTINUED | OUTPATIENT
Start: 2022-10-06 | End: 2022-10-07 | Stop reason: HOSPADM

## 2022-10-06 RX ORDER — INSULIN ASPART 100 [IU]/ML
1-10 INJECTION, SOLUTION INTRAVENOUS; SUBCUTANEOUS EVERY 6 HOURS PRN
Status: DISCONTINUED | OUTPATIENT
Start: 2022-10-06 | End: 2022-10-07

## 2022-10-06 RX ORDER — HYDRALAZINE HYDROCHLORIDE 20 MG/ML
10 INJECTION INTRAMUSCULAR; INTRAVENOUS EVERY 6 HOURS PRN
Status: DISCONTINUED | OUTPATIENT
Start: 2022-10-06 | End: 2022-10-07 | Stop reason: HOSPADM

## 2022-10-06 RX ORDER — NEOSTIGMINE METHYLSULFATE 0.5 MG/ML
INJECTION, SOLUTION INTRAVENOUS
Status: DISCONTINUED | OUTPATIENT
Start: 2022-10-06 | End: 2022-10-06

## 2022-10-06 RX ORDER — LIDOCAINE HYDROCHLORIDE 10 MG/ML
1 INJECTION, SOLUTION EPIDURAL; INFILTRATION; INTRACAUDAL; PERINEURAL ONCE
Status: DISCONTINUED | OUTPATIENT
Start: 2022-10-06 | End: 2022-10-06

## 2022-10-06 RX ORDER — ACETAMINOPHEN 325 MG/1
650 TABLET ORAL EVERY 6 HOURS PRN
Status: DISCONTINUED | OUTPATIENT
Start: 2022-10-06 | End: 2022-10-07 | Stop reason: HOSPADM

## 2022-10-06 RX ORDER — PHENYLEPHRINE HCL IN 0.9% NACL 1 MG/10 ML
SYRINGE (ML) INTRAVENOUS
Status: DISCONTINUED | OUTPATIENT
Start: 2022-10-06 | End: 2022-10-06

## 2022-10-06 RX ORDER — HYDROMORPHONE HYDROCHLORIDE 1 MG/ML
0.2 INJECTION, SOLUTION INTRAMUSCULAR; INTRAVENOUS; SUBCUTANEOUS EVERY 5 MIN PRN
Status: CANCELLED | OUTPATIENT
Start: 2022-10-06

## 2022-10-06 RX ORDER — VERAPAMIL HYDROCHLORIDE 2.5 MG/ML
10 INJECTION, SOLUTION INTRAVENOUS ONCE
Status: DISCONTINUED | OUTPATIENT
Start: 2022-10-06 | End: 2022-10-06

## 2022-10-06 RX ORDER — EPHEDRINE SULFATE 50 MG/ML
INJECTION, SOLUTION INTRAVENOUS
Status: DISCONTINUED | OUTPATIENT
Start: 2022-10-06 | End: 2022-10-06

## 2022-10-06 RX ORDER — LIDOCAINE HYDROCHLORIDE 10 MG/ML
INJECTION INFILTRATION; PERINEURAL
Status: COMPLETED | OUTPATIENT
Start: 2022-10-06 | End: 2022-10-06

## 2022-10-06 RX ORDER — LABETALOL HCL 20 MG/4 ML
10 SYRINGE (ML) INTRAVENOUS
Status: DISCONTINUED | OUTPATIENT
Start: 2022-10-06 | End: 2022-10-06

## 2022-10-06 RX ORDER — DEXAMETHASONE SODIUM PHOSPHATE 4 MG/ML
INJECTION, SOLUTION INTRA-ARTICULAR; INTRALESIONAL; INTRAMUSCULAR; INTRAVENOUS; SOFT TISSUE
Status: DISCONTINUED | OUTPATIENT
Start: 2022-10-06 | End: 2022-10-06

## 2022-10-06 RX ORDER — PROPOFOL 10 MG/ML
VIAL (ML) INTRAVENOUS
Status: DISCONTINUED | OUTPATIENT
Start: 2022-10-06 | End: 2022-10-06

## 2022-10-06 RX ORDER — FENTANYL CITRATE 50 UG/ML
25 INJECTION, SOLUTION INTRAMUSCULAR; INTRAVENOUS EVERY 5 MIN PRN
Status: CANCELLED | OUTPATIENT
Start: 2022-10-06

## 2022-10-06 RX ORDER — OXYCODONE HYDROCHLORIDE 5 MG/1
5 TABLET ORAL
Status: CANCELLED | OUTPATIENT
Start: 2022-10-06

## 2022-10-06 RX ORDER — SEVELAMER CARBONATE 800 MG/1
3200 TABLET, FILM COATED ORAL
Status: DISCONTINUED | OUTPATIENT
Start: 2022-10-07 | End: 2022-10-07 | Stop reason: HOSPADM

## 2022-10-06 RX ORDER — MIDAZOLAM HYDROCHLORIDE 1 MG/ML
INJECTION, SOLUTION INTRAMUSCULAR; INTRAVENOUS
Status: DISCONTINUED | OUTPATIENT
Start: 2022-10-06 | End: 2022-10-06

## 2022-10-06 RX ORDER — GLUCAGON 1 MG
1 KIT INJECTION
Status: DISCONTINUED | OUTPATIENT
Start: 2022-10-06 | End: 2022-10-07 | Stop reason: HOSPADM

## 2022-10-06 RX ORDER — LIDOCAINE HYDROCHLORIDE 20 MG/ML
INJECTION INTRAVENOUS
Status: DISCONTINUED | OUTPATIENT
Start: 2022-10-06 | End: 2022-10-06

## 2022-10-06 RX ORDER — AMOXICILLIN 250 MG
1 CAPSULE ORAL 2 TIMES DAILY
Status: DISCONTINUED | OUTPATIENT
Start: 2022-10-06 | End: 2022-10-07 | Stop reason: HOSPADM

## 2022-10-06 RX ORDER — HEPARIN SODIUM 1000 [USP'U]/ML
INJECTION, SOLUTION INTRAVENOUS; SUBCUTANEOUS
Status: DISCONTINUED | OUTPATIENT
Start: 2022-10-06 | End: 2022-10-06

## 2022-10-06 RX ORDER — TACROLIMUS 1 MG/1
1 CAPSULE ORAL 2 TIMES DAILY
Status: DISCONTINUED | OUTPATIENT
Start: 2022-10-06 | End: 2022-10-07 | Stop reason: HOSPADM

## 2022-10-06 RX ORDER — SODIUM CHLORIDE 9 MG/ML
75 INJECTION, SOLUTION INTRAVENOUS CONTINUOUS
Status: DISCONTINUED | OUTPATIENT
Start: 2022-10-06 | End: 2022-10-06

## 2022-10-06 RX ORDER — IODIXANOL 320 MG/ML
150 INJECTION, SOLUTION INTRAVASCULAR
Status: COMPLETED | OUTPATIENT
Start: 2022-10-06 | End: 2022-10-06

## 2022-10-06 RX ORDER — ROCURONIUM BROMIDE 10 MG/ML
INJECTION, SOLUTION INTRAVENOUS
Status: DISCONTINUED | OUTPATIENT
Start: 2022-10-06 | End: 2022-10-06

## 2022-10-06 RX ORDER — LABETALOL HCL 20 MG/4 ML
10 SYRINGE (ML) INTRAVENOUS EVERY 6 HOURS PRN
Status: DISCONTINUED | OUTPATIENT
Start: 2022-10-06 | End: 2022-10-07 | Stop reason: HOSPADM

## 2022-10-06 RX ORDER — VERAPAMIL HYDROCHLORIDE 2.5 MG/ML
INJECTION, SOLUTION INTRAVENOUS
Status: COMPLETED | OUTPATIENT
Start: 2022-10-06 | End: 2022-10-06

## 2022-10-06 RX ORDER — LIDOCAINE AND PRILOCAINE 25; 25 MG/G; MG/G
CREAM TOPICAL ONCE
Status: DISCONTINUED | OUTPATIENT
Start: 2022-10-06 | End: 2022-10-06

## 2022-10-06 RX ADMIN — HEPARIN SODIUM 1000 UNITS: 1000 INJECTION, SOLUTION INTRAVENOUS; SUBCUTANEOUS at 02:10

## 2022-10-06 RX ADMIN — MIDAZOLAM HYDROCHLORIDE 2 MG: 1 INJECTION, SOLUTION INTRAMUSCULAR; INTRAVENOUS at 01:10

## 2022-10-06 RX ADMIN — PROPOFOL 140 MG: 10 INJECTION, EMULSION INTRAVENOUS at 01:10

## 2022-10-06 RX ADMIN — LIDOCAINE HYDROCHLORIDE 3 ML: 10 INJECTION, SOLUTION INFILTRATION; PERINEURAL at 01:10

## 2022-10-06 RX ADMIN — GLYCOPYRROLATE 0.8 MG: 0.2 INJECTION INTRAMUSCULAR; INTRAVENOUS at 03:10

## 2022-10-06 RX ADMIN — SENNOSIDES AND DOCUSATE SODIUM 1 TABLET: 50; 8.6 TABLET ORAL at 09:10

## 2022-10-06 RX ADMIN — ROCURONIUM BROMIDE 40 MG: 10 INJECTION INTRAVENOUS at 01:10

## 2022-10-06 RX ADMIN — DEXAMETHASONE SODIUM PHOSPHATE 4 MG: 4 INJECTION INTRA-ARTICULAR; INTRALESIONAL; INTRAMUSCULAR; INTRAVENOUS; SOFT TISSUE at 01:10

## 2022-10-06 RX ADMIN — IODIXANOL 110 ML: 320 INJECTION, SOLUTION INTRAVASCULAR at 02:10

## 2022-10-06 RX ADMIN — LIDOCAINE HYDROCHLORIDE 100 MG: 20 INJECTION INTRAVENOUS at 01:10

## 2022-10-06 RX ADMIN — FENTANYL CITRATE 100 MCG: 50 INJECTION, SOLUTION INTRAMUSCULAR; INTRAVENOUS at 01:10

## 2022-10-06 RX ADMIN — NEOSTIGMINE METHYLSULFATE 5 MG: 0.5 INJECTION, SOLUTION INTRAVENOUS at 03:10

## 2022-10-06 RX ADMIN — EPHEDRINE SULFATE 10 MG: 50 INJECTION INTRAVENOUS at 01:10

## 2022-10-06 RX ADMIN — TACROLIMUS 1 MG: 1 CAPSULE ORAL at 07:10

## 2022-10-06 RX ADMIN — LIDOCAINE HYDROCHLORIDE 100 MG: 20 INJECTION INTRAVENOUS at 03:10

## 2022-10-06 RX ADMIN — SODIUM CHLORIDE 0.5 MCG/KG/MIN: 9 INJECTION, SOLUTION INTRAVENOUS at 01:10

## 2022-10-06 RX ADMIN — VERAPAMIL HYDROCHLORIDE 10 MG: 2.5 INJECTION, SOLUTION INTRAVENOUS at 01:10

## 2022-10-06 RX ADMIN — SODIUM CHLORIDE: 9 INJECTION, SOLUTION INTRAVENOUS at 01:10

## 2022-10-06 RX ADMIN — ONDANSETRON 4 MG: 2 INJECTION INTRAMUSCULAR; INTRAVENOUS at 01:10

## 2022-10-06 RX ADMIN — HEPARIN SODIUM 5000 UNITS: 1000 INJECTION, SOLUTION INTRAVENOUS; SUBCUTANEOUS at 01:10

## 2022-10-06 RX ADMIN — Medication 200 MCG: at 01:10

## 2022-10-06 RX ADMIN — PROPOFOL 50 MG: 10 INJECTION, EMULSION INTRAVENOUS at 03:10

## 2022-10-06 RX ADMIN — Medication 100 MCG: at 01:10

## 2022-10-06 NOTE — SUBJECTIVE & OBJECTIVE
Past Medical History:   Diagnosis Date    Diabetes     Diabetes mellitus, type 2     Gout     Hypertension     Hypothyroidism     Hypothyroidism     Kidney transplant recipient     Obesity     Polycystic kidney disease      Past Surgical History:   Procedure Laterality Date    ANKLE SURGERY      HERNIA REPAIR      KIDNEY TRANSPLANT  2015    [facility]    LITHOTRIPSY Left 2017    naitive kidney     PD catheter      PERITONEAL CATHETER INSERTION        No current facility-administered medications on file prior to encounter.     Current Outpatient Medications on File Prior to Encounter   Medication Sig Dispense Refill    aspirin (ECOTRIN) 81 MG EC tablet Take 1 tablet (81 mg total) by mouth once daily. 30 tablet 11    atorvastatin (LIPITOR) 10 MG tablet Take by mouth once daily.      calcitRIOL (ROCALTROL) 0.25 MCG Cap Take 0.25 mcg by mouth once daily.      carvediloL (COREG) 25 MG tablet Take 1 tablet (25 mg total) by mouth 2 (two) times daily. 60 tablet 11    DIALYVITE [id number] mg-mg-mcg-mg Tab Take 1 tablet by mouth once daily.      furosemide (LASIX) 80 MG tablet Take 80 mg by mouth once daily.      hydrALAZINE (APRESOLINE) 100 MG tablet Take 1 tablet (100 mg total) by mouth every 8 (eight) hours. 90 tablet 11    isoniazid (NYDRAZID) 300 MG Tab Take 1 tablet (300 mg total) by mouth once daily. (Patient taking differently: Take 300 mg by mouth every evening.) 30 tablet 8    NIFEdipine (ADALAT CC) 30 MG TbSR Take 30 mg by mouth every evening.      pyridoxine, vitamin B6, (VITAMIN B-6) 50 MG Tab Take 1 tablet (50 mg total) by mouth once daily. 90 tablet 3    sevelamer carbonate (RENVELA) 800 mg Tab Take 3 tablets (2,400 mg total) by mouth 3 (three) times daily with meals. (Patient taking differently: Take 3,200 mg by mouth 3 (three) times daily with meals.) 270 tablet 11    tacrolimus (PROGRAF) 1 MG Cap Take 4 capsules (4 mg total) by mouth every 12 (twelve) hours. (Patient taking differently: Take 1 mg by  "mouth every 12 (twelve) hours.) 240 capsule 11    ticagrelor (BRILINTA) 90 mg tablet Take 1 tablet (90 mg total) by mouth 2 (two) times daily. 60 tablet 11    blood sugar diagnostic Strp Test 3 (three) times daily with meals. 100 each 3    blood-glucose meter Misc Use as instructed (Patient taking differently: Use as instructed) 1 each 0    ergocalciferol (ERGOCALCIFEROL) 50,000 unit Cap Take 50,000 Units by mouth once a week. Monday      insulin aspart U-100 (NOVOLOG) 100 unit/mL (3 mL) InPn pen Inject 1-5 Units into the skin 3 (three) times daily with meals. 15 mL 3    lancets 33 gauge Misc Test 3 (three) times daily with meals. 100 each 3    NIFEdipine (PROCARDIA-XL) 60 MG (OSM) 24 hr tablet       pen needle, diabetic 32 gauge x 5/32" Ndle 1 each by Misc.(Non-Drug; Combo Route) route 3 (three) times daily with meals. 100 each 2    predniSONE (DELTASONE) 5 MG tablet Take 1 tablet (5 mg total) by mouth once daily. (Patient taking differently: Take 5 mg by mouth every other day.) 30 tablet 11      Allergies: Patient has no known allergies.    Family History   Problem Relation Age of Onset    Diabetes Mother     Hypertension Mother     Kidney disease Father     Kidney disease Sister     Kidney disease Paternal Aunt     Hypertension Paternal Aunt      Social History     Tobacco Use    Smoking status: Former     Types: Cigarettes     Quit date: 10/1/2008     Years since quittin.0    Smokeless tobacco: Never   Substance Use Topics    Alcohol use: Not Currently     Comment: occasional    Drug use: Never     Review of Systems   Constitutional:  Negative for chills, fatigue and fever.   HENT:  Negative for trouble swallowing.    Eyes:  Negative for visual disturbance.   Respiratory:  Negative for shortness of breath.    Cardiovascular:  Negative for chest pain.   Gastrointestinal:  Negative for abdominal pain, nausea and vomiting.   Musculoskeletal:  Negative for neck pain.   Skin:  Negative for wound.   Neurological: "  Negative for dizziness, seizures, facial asymmetry, speech difficulty, weakness, numbness and headaches.   Hematological:  Does not bruise/bleed easily.   Psychiatric/Behavioral:  Negative for agitation and confusion.    Objective:     Vitals:    Temp: 97 °F (36.1 °C)  Pulse: (!) 57  Rhythm: normal sinus rhythm  BP: 109/68  MAP (mmHg): 84  Resp: 16  ETCO2 (mmHg):  (Anesthesia at bedside; Refer to anesthesia record)  SpO2: (!) 94 %  O2 Device (Oxygen Therapy): room air    Temp  Min: 96.6 °F (35.9 °C)  Max: 97.9 °F (36.6 °C)  Pulse  Min: 57  Max: 72  BP  Min: 98/57  Max: 118/77  MAP (mmHg)  Min: 71  Max: 91  Resp  Min: 12  Max: 19  SpO2  Min: 93 %  Max: 96 %    No intake/output data recorded.           Physical Exam  Vitals and nursing note reviewed.   Constitutional:       General: He is not in acute distress.     Appearance: Normal appearance.      Comments: Well developed. Well nourished. Resting comfortably in bed.   HENT:      Head: Normocephalic and atraumatic.      Right Ear: External ear normal.      Left Ear: External ear normal.      Nose: Nose normal.      Mouth/Throat:      Mouth: Mucous membranes are moist.      Pharynx: Oropharynx is clear.   Eyes:      Extraocular Movements: Extraocular movements intact.      Pupils: Pupils are equal, round, and reactive to light.   Cardiovascular:      Rate and Rhythm: Normal rate and regular rhythm.      Pulses: Normal pulses.   Pulmonary:      Effort: Pulmonary effort is normal. No respiratory distress.   Abdominal:      General: Bowel sounds are normal. There is no distension.      Palpations: Abdomen is soft.      Tenderness: There is no abdominal tenderness.   Musculoskeletal:      Right lower leg: No edema.      Left lower leg: No edema.      Comments: Right groin site C/D/I. No hematoma.    Skin:     General: Skin is warm and dry.   Neurological:      Mental Status: He is alert.      Comments: [id number]  AA&Ox4. Speech fluent. Answers questions appropriately.  Follows commands briskly.  PERRL. EOMI. CN II-XII grossly intact.  HAYLEY & AG. SILT in all 4 extremities. No pronator drift.      Gait & coordination exams deferred.     Today I personally reviewed pertinent medications, lines/drains/airways, imaging,

## 2022-10-06 NOTE — PROCEDURES
Radiology Post-Procedure Note    Pre Op Diagnosis: Basilar tip aneurysm     Post Op Diagnosis: Basilar tip aneurysm S/p WEB embolization     Procedure: Cerebral angiogram    Procedure performed by: MEHRDAD ROBERT    Written Informed Consent Obtained: Yes    Specimen Removed: NO    Estimated Blood Loss: Minimal    Procedure report:     A 6F sheath was placed into the right femoral artery and a 5F Pen[employer] was advanced into the aortic arch. The right / left vertebral arteries were subselected and angiography of the brain was performed after injection into each of these vessels.    Preliminary interpretation: Basilar end wall aneurysm was identified and performed WEB embolization using VIA/Radha/synchro system.     Please see Imaging report for full details.    A right femoral artery angiogram was performed, the sheath removed and hemostasis achieved using mynx.  No hematoma was present at the time of hemostasis.        Deepa Castañeda MD     Neuro Endovascular Surgery Fellow   [facility]

## 2022-10-06 NOTE — ASSESSMENT & PLAN NOTE
History of, on PD nightly   - See h/o kidney transplant  - Consult nephrology  - Continue home meds

## 2022-10-06 NOTE — HPI
Adilson Sylvester is a 46 y.o. male with past medical history of of HTN, DM, PCKD on PD (nightly) s/p kidney transplant (2015, on tacro) with recent DSA work-up on unruptured basilar tip end wall aneurysm. Now s/p elective cerebral angiogram and web embolization. The procedure was tolerated well and was without complications. Currently, the patient denies any complaints, including HA, weakness, numbness, tingling, and confusion. He reports no identifying, alleviating, or exacerbating factors. The patient will be admitted to [facility] for close monitoring and a higher level of care.

## 2022-10-06 NOTE — H&P
Outpatient Radiology Pre-procedure Note    History of Present Illness:  Adilson Sylvester is a 46 y.o. male with medical history of HTN, DM, PCKD on PD with recent DSA work-up on unruptured basilar tip end wall aneurysm. Plans for characterization of intracranial aneurysm + WEB embolization.     Admission H&P reviewed.    Past Medical History:   Diagnosis Date    Diabetes     Diabetes mellitus, type 2     Gout     Hypertension     Hypothyroidism     Hypothyroidism     Kidney transplant recipient     Obesity     Polycystic kidney disease      Past Surgical History:   Procedure Laterality Date    ANKLE SURGERY      HERNIA REPAIR      KIDNEY TRANSPLANT  2015    [facility]    LITHOTRIPSY Left 2017    naitive kidney     PD catheter      PERITONEAL CATHETER INSERTION         Review of Systems:   As documented in primary team H&P    Home Meds:   Prior to Admission medications    Medication Sig Start Date End Date Taking? Authorizing Provider   aspirin (ECOTRIN) 81 MG EC tablet Take 1 tablet (81 mg total) by mouth once daily. 9/21/22 9/21/23 Yes Francine Scales PA-C   atorvastatin (LIPITOR) 10 MG tablet Take by mouth once daily. 4/8/22  Yes Historical Provider   calcitRIOL (ROCALTROL) 0.25 MCG Cap Take 0.25 mcg by mouth once daily. 11/13/21  Yes Historical Provider   carvediloL (COREG) 25 MG tablet Take 1 tablet (25 mg total) by mouth 2 (two) times daily. 4/19/21 10/6/22 Yes Veronica Ma MD   DIALYVITE [id number] mg-mg-mcg-mg Tab Take 1 tablet by mouth once daily. 9/15/21  Yes Historical Provider   furosemide (LASIX) 80 MG tablet Take 80 mg by mouth once daily. 12/14/21  Yes Historical Provider   hydrALAZINE (APRESOLINE) 100 MG tablet Take 1 tablet (100 mg total) by mouth every 8 (eight) hours. 4/19/21 10/6/22 Yes Veronica Ma MD   isoniazid (NYDRAZID) 300 MG Tab Take 1 tablet (300 mg total) by mouth once daily.  Patient taking differently: Take 300 mg by mouth every evening. 2/10/22  "2/10/23 Yes JAZMÍN Todd Jr.   NIFEdipine (ADALAT CC) 30 MG TbSR Take 30 mg by mouth every evening. 12/14/21  Yes Historical Provider   pyridoxine, vitamin B6, (VITAMIN B-6) 50 MG Tab Take 1 tablet (50 mg total) by mouth once daily. 2/10/22 2/10/23 Yes JAZMÍN Todd Jr.   sevelamer carbonate (RENVELA) 800 mg Tab Take 3 tablets (2,400 mg total) by mouth 3 (three) times daily with meals.  Patient taking differently: Take 3,200 mg by mouth 3 (three) times daily with meals. 4/19/21 10/6/22 Yes Veronica Ma MD   tacrolimus (PROGRAF) 1 MG Cap Take 4 capsules (4 mg total) by mouth every 12 (twelve) hours.  Patient taking differently: Take 1 mg by mouth every 12 (twelve) hours. 4/19/21 10/6/22 Yes Veronica Ma MD   ticagrelor (BRILINTA) 90 mg tablet Take 1 tablet (90 mg total) by mouth 2 (two) times daily. 9/21/22 9/21/23 Yes Francine Scales PA-C   blood sugar diagnostic Strp Test 3 (three) times daily with meals. 4/19/21   Maru Back MD   blood-glucose meter Misc Use as instructed  Patient taking differently: Use as instructed 4/19/21   Maru Back MD   ergocalciferol (ERGOCALCIFEROL) 50,000 unit Cap Take 50,000 Units by mouth once a week. Monday 11/11/21   Historical Provider   insulin aspart U-100 (NOVOLOG) 100 unit/mL (3 mL) InPn pen Inject 1-5 Units into the skin 3 (three) times daily with meals. 4/19/21 4/19/22  Maru Back MD   lancets 33 gauge Misc Test 3 (three) times daily with meals. 4/19/21   Maru Back MD   NIFEdipine (PROCARDIA-XL) 60 MG (OSM) 24 hr tablet  4/8/22   Historical Provider   pen needle, diabetic 32 gauge x 5/32" Ndle 1 each by Misc.(Non-Drug; Combo Route) route 3 (three) times daily with meals. 4/19/21   Veronica Ma MD   predniSONE (DELTASONE) 5 MG tablet Take 1 tablet (5 mg total) by mouth once daily.  Patient taking differently: Take 5 mg by mouth every other day. 4/20/21   Veronica Ma MD "     Scheduled Meds:    LIDOcaine (PF) 10 mg/ml (1%)  1 mL Other Once    LIDOcaine-prilocaine   Topical (Top) Once    verapamiL  10 mg Intra-arterial Once     Continuous Infusions:    sodium chloride 0.9%       PRN Meds:  Anticoagulants/Antiplatelets: aspirin and Plavix    Allergies: Review of patient's allergies indicates:  No Known Allergies  Sedation Hx: have not been any systemic reactions    Labs:  No results for input(s): INR in the last 168 hours.    Invalid input(s):  PT,  PTT  No results for input(s): WBC, HGB, HCT, MCV, PLT in the last 168 hours. No results for input(s): GLU, NA, K, CL, CO2, BUN, CREATININE, CALCIUM, MG, ALT, AST, ALBUMIN, BILITOT, BILIDIR in the last 168 hours.      Vitals:  Temp: 97.9 °F (36.6 °C) (10/06/22 0920)  Pulse: 72 (10/06/22 0920)  Resp: 18 (10/06/22 0920)  BP: 118/77 (10/06/22 0920)  SpO2: 96 % (10/06/22 0920)     Physical Exam:  ASA: 3  Mallampati: 2    General: no acute distress  Mental Status: alert and oriented to person, place and time  HEENT: normocephalic, atraumatic  Chest: unlabored breathing  Heart: regular heart rate  Abdomen: nondistended  Extremity: moves all extremities      Plan:  Sedation Plan: General anesthesia  Patient will undergo: cerebral angiogram +/- WEB embolization of the basilar end wall aneurysm         Deepa Castañeda MD     Neuro Endovascular Surgery Fellow   [facility]

## 2022-10-06 NOTE — ASSESSMENT & PLAN NOTE
46 y.o. 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 Now s/p elective cerebral angiogram with web embolization.   - Admit to [facility]  - q1 neuro checks, vitals, I/Os  - IR following  - Daily CBC, CMP, mag, phos  - SBP < 160  - PRN labetalol, hydralazine  - ASA tomorrow AM   - PT/OT as appropriate  - VTE prophylaxis: mechanical, hold chemical in the acute setting

## 2022-10-06 NOTE — NURSING
Patient arrived to [facility] from  IR to room 9090    Report received from: BROOK Perez    Type of stroke/diagnosis:  Basilar artery aneurysm    TPA : NA    Procedure: Angiography with WEB embo start and end time : 1325- 1508  Current symptoms: ELENA pt sedated    Skin assessment done: yes  Wounds noted: none    NA  Motley Completed? Pt sedated     Patient Belongings on Admit:yes. Blue jeans, , black socks, blue tshirt, white sandals.     NCC notified: JAZMÍN Aburto with NCC team

## 2022-10-06 NOTE — ANESTHESIA PROCEDURE NOTES
Intubation    Date/Time: 10/6/2022 1:12 PM  Performed by: Criselda Pizarro CRNA  Authorized by: Donna Jaffe MD     Intubation:     Induction:  Intravenous    Intubated:  Postinduction    Mask Ventilation:  Easy with oral airway    Attempts:  1    Attempted By:  CRNA    Method of Intubation:  Video laryngoscopy    Blade:  Tonia 3    Laryngeal View Grade: Grade I - full view of cords      Laryngeal View Grade comment:  Large floppy epiglottis    Difficult Airway Encountered?: No      Complications:  None    Airway Device:  Oral endotracheal tube    Airway Device Size:  7.5    Style/Cuff Inflation:  Cuffed (inflated to minimal occlusive pressure)    Tube secured:  22    Secured at:  The lips    Placement Verified By:  Capnometry    Complicating Factors:  None    Findings Post-Intubation:  BS equal bilateral and atraumatic/condition of teeth unchanged

## 2022-10-06 NOTE — H&P
[facility] Neuro Critical Care  Neurocritical Care  History & Physical    Admit Date: 10/6/2022  Service Date: 10/06/2022  Length of Stay: 0    Subjective:     Chief Complaint: Basilar artery aneurysm    History of Present Illness: Adilson Sylvester is a 46 y.o. male with past medical history of of HTN, DM, PCKD on PD (nightly) s/p kidney transplant (2015, on tacro) with recent DSA work-up on unruptured basilar tip end wall aneurysm. Now s/p elective cerebral angiogram and web embolization. The procedure was tolerated well and was without complications. Currently, the patient denies any complaints, including HA, weakness, numbness, tingling, and confusion. He reports no identifying, alleviating, or exacerbating factors. The patient will be admitted to [facility] for close monitoring and a higher level of care.       Past Medical History:   Diagnosis Date    Diabetes     Diabetes mellitus, type 2     Gout     Hypertension     Hypothyroidism     Hypothyroidism     Kidney transplant recipient     Obesity     Polycystic kidney disease      Past Surgical History:   Procedure Laterality Date    ANKLE SURGERY      HERNIA REPAIR      KIDNEY TRANSPLANT  2015    [facility]    LITHOTRIPSY Left 2017    naitive kidney     PD catheter      PERITONEAL CATHETER INSERTION        No current facility-administered medications on file prior to encounter.     Current Outpatient Medications on File Prior to Encounter   Medication Sig Dispense Refill    aspirin (ECOTRIN) 81 MG EC tablet Take 1 tablet (81 mg total) by mouth once daily. 30 tablet 11    atorvastatin (LIPITOR) 10 MG tablet Take by mouth once daily.      calcitRIOL (ROCALTROL) 0.25 MCG Cap Take 0.25 mcg by mouth once daily.      carvediloL (COREG) 25 MG tablet Take 1 tablet (25 mg total) by mouth 2 (two) times daily. 60 tablet 11    DIALYVITE 6-325-277-50 mg-mg-mcg-mg Tab Take 1 tablet by mouth once daily.      furosemide (LASIX) 80 MG tablet Take 80 mg by  "mouth once daily.      hydrALAZINE (APRESOLINE) 100 MG tablet Take 1 tablet (100 mg total) by mouth every 8 (eight) hours. 90 tablet 11    isoniazid (NYDRAZID) 300 MG Tab Take 1 tablet (300 mg total) by mouth once daily. (Patient taking differently: Take 300 mg by mouth every evening.) 30 tablet 8    NIFEdipine (ADALAT CC) 30 MG TbSR Take 30 mg by mouth every evening.      pyridoxine, vitamin B6, (VITAMIN B-6) 50 MG Tab Take 1 tablet (50 mg total) by mouth once daily. 90 tablet 3    sevelamer carbonate (RENVELA) 800 mg Tab Take 3 tablets (2,400 mg total) by mouth 3 (three) times daily with meals. (Patient taking differently: Take 3,200 mg by mouth 3 (three) times daily with meals.) 270 tablet 11    tacrolimus (PROGRAF) 1 MG Cap Take 4 capsules (4 mg total) by mouth every 12 (twelve) hours. (Patient taking differently: Take 1 mg by mouth every 12 (twelve) hours.) 240 capsule 11    ticagrelor (BRILINTA) 90 mg tablet Take 1 tablet (90 mg total) by mouth 2 (two) times daily. 60 tablet 11    blood sugar diagnostic Strp Test 3 (three) times daily with meals. 100 each 3    blood-glucose meter Misc Use as instructed (Patient taking differently: Use as instructed) 1 each 0    ergocalciferol (ERGOCALCIFEROL) 50,000 unit Cap Take 50,000 Units by mouth once a week. Monday      insulin aspart U-100 (NOVOLOG) 100 unit/mL (3 mL) InPn pen Inject 1-5 Units into the skin 3 (three) times daily with meals. 15 mL 3    lancets 33 gauge Misc Test 3 (three) times daily with meals. 100 each 3    NIFEdipine (PROCARDIA-XL) 60 MG (OSM) 24 hr tablet       pen needle, diabetic 32 gauge x 5/32" Ndle 1 each by Misc.(Non-Drug; Combo Route) route 3 (three) times daily with meals. 100 each 2    predniSONE (DELTASONE) 5 MG tablet Take 1 tablet (5 mg total) by mouth once daily. (Patient taking differently: Take 5 mg by mouth every other day.) 30 tablet 11      Allergies: Patient has no known allergies.    Family History   Problem " Relation Age of Onset    Diabetes Mother     Hypertension Mother     Kidney disease Father     Kidney disease Sister     Kidney disease Paternal Aunt     Hypertension Paternal Aunt      Social History     Tobacco Use    Smoking status: Former     Types: Cigarettes     Quit date: 10/1/2008     Years since quittin.0    Smokeless tobacco: Never   Substance Use Topics    Alcohol use: Not Currently     Comment: occasional    Drug use: Never     Review of Systems   Constitutional:  Negative for chills, fatigue and fever.   HENT:  Negative for trouble swallowing.    Eyes:  Negative for visual disturbance.   Respiratory:  Negative for shortness of breath.    Cardiovascular:  Negative for chest pain.   Gastrointestinal:  Negative for abdominal pain, nausea and vomiting.   Musculoskeletal:  Negative for neck pain.   Skin:  Negative for wound.   Neurological:  Negative for dizziness, seizures, facial asymmetry, speech difficulty, weakness, numbness and headaches.   Hematological:  Does not bruise/bleed easily.   Psychiatric/Behavioral:  Negative for agitation and confusion.    Objective:     Vitals:    Temp: 97 °F (36.1 °C)  Pulse: (!) 57  Rhythm: normal sinus rhythm  BP: 109/68  MAP (mmHg): 84  Resp: 16  ETCO2 (mmHg):  (Anesthesia at bedside; Refer to anesthesia record)  SpO2: (!) 94 %  O2 Device (Oxygen Therapy): room air    Temp  Min: 96.6 °F (35.9 °C)  Max: 97.9 °F (36.6 °C)  Pulse  Min: 57  Max: 72  BP  Min: 98/57  Max: 118/77  MAP (mmHg)  Min: 71  Max: 91  Resp  Min: 12  Max: 19  SpO2  Min: 93 %  Max: 96 %    No intake/output data recorded.           Physical Exam  Vitals and nursing note reviewed.   Constitutional:       General: He is not in acute distress.     Appearance: Normal appearance.      Comments: Well developed. Well nourished. Resting comfortably in bed.   HENT:      Head: Normocephalic and atraumatic.      Right Ear: External ear normal.      Left Ear: External ear normal.      Nose: Nose  normal.      Mouth/Throat:      Mouth: Mucous membranes are moist.      Pharynx: Oropharynx is clear.   Eyes:      Extraocular Movements: Extraocular movements intact.      Pupils: Pupils are equal, round, and reactive to light.   Cardiovascular:      Rate and Rhythm: Normal rate and regular rhythm.      Pulses: Normal pulses.   Pulmonary:      Effort: Pulmonary effort is normal. No respiratory distress.   Abdominal:      General: Bowel sounds are normal. There is no distension.      Palpations: Abdomen is soft.      Tenderness: There is no abdominal tenderness.   Musculoskeletal:      Right lower leg: No edema.      Left lower leg: No edema.      Comments: Right groin site C/D/I. No hematoma.    Skin:     General: Skin is warm and dry.   Neurological:      Mental Status: He is alert.      Comments: E4V5M6  AA&Ox4. Speech fluent. Answers questions appropriately. Follows commands briskly.  PERRL. EOMI. CN II-XII grossly intact.  HAYLEY & AG. SILT in all 4 extremities. No pronator drift.      Gait & coordination exams deferred.     Today I personally reviewed pertinent medications, lines/drains/airways, imaging,       Assessment/Plan:     Neuro  * Basilar artery aneurysm  46 y.o. 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 Now s/p elective cerebral angiogram with web embolization.   - Admit to [facility]  - q1 neuro checks, vitals, I/Os  - IR following  - Daily CBC, CMP, mag, phos  - SBP < 160  - PRN labetalol, hydralazine  - ASA tomorrow AM   - PT/OT as appropriate  - VTE prophylaxis: mechanical, hold chemical in the acute setting     Cardiac/Vascular  Renovascular hypertension  History of  - SBP < 160  - PRN labetalol, hydralazine  - Hold home medications    Renal/  ESRD on peritoneal dialysis  History of, on PD nightly   - See h/o kidney transplant  - Consult nephrology  - Continue home meds    H/O  kidney transplant  s/p kidney transplant in 2015, on prograf  - Consult kidney transplant  - Prograf level pending  - Continue home prograf  - Avoid nephrotoxic medications and renally dose medications as able    Endocrine  Type 2 diabetes mellitus  History of  - Last A1c 4/2022 10.0  - Repeat A1c pending  - Diabetic diet   - SSI with accuchecks  - Hold home medications          The patient is being Prophylaxed for:  Venous Thromboembolism with: Mechanical  Stress Ulcer with: Not Applicable   Ventilator Pneumonia with: not applicable    Activity Orders          Progressive Mobility Protocol (mobilize patient to their highest level of functioning at least twice daily) starting at 10/06 2000    Diet diabetic [facility]; 2000 Calorie; Renal: Diabetic starting at 10/06 1807    Turn patient starting at 10/06 1800        Full Code     Level III    Criselda Dunlap PA-C  Neurocritical Care  [facility] - Neuro Critical Care

## 2022-10-06 NOTE — PLAN OF CARE
[facility] Care Plan    POC reviewed with Adilson Sylvester and family at 1400. Pt verbalized understanding. Questions and concerns addressed. No acute events today. Pt progressing toward goals. Will continue to monitor. See below and flowsheets for full assessment and VS info.     -Spouse updated at the bedside.   -pt AAO x4, following commands.   -R groin Incision site oozing with saturated dressing. JAZMÍN Aburto with NCC team notified. RN instructed to hold pressure and monitor site. WCTM  -Pt passed DEGROOT.           Is this a stroke patient? yes- Stroke booklet reviewed with patient, risk factors identified for patient and stroke booklet remains at bedside for ongoing education.     Neuro:  Rod Coma Scale  Best Eye Response: 3-->(E3) to speech  Best Motor Response: 6-->(M6) obeys commands  Best Verbal Response: 5-->(V5) oriented  Rod Coma Scale Score: 14  Assessment Qualifiers: patient chemically sedated or paralyzed        24 hr Temp:  [96.6 °F (35.9 °C)-97.9 °F (36.6 °C)]     CV:   Rhythm: normal sinus rhythm  BP goals:   SBP < 160  MAP > 65    Resp:   O2 Device (Oxygen Therapy): room air       Plan: N/A    GI/:     Diet/Nutrition Received: NPO  Last Bowel Movement: 10/05/22  Voiding Characteristics: patient on peritoneal dialysis    Intake/Output Summary (Last 24 hours) at 10/6/2022 1821  Last data filed at 10/6/2022 1510  Gross per 24 hour   Intake 550 ml   Output --   Net 550 ml          Labs/Accuchecks:  No results for input(s): WBC, RBC, HGB, HCT, PLT in the last 168 hours.   Recent Labs   Lab 10/06/22  1616   *   K 4.2   CO2 16*   CL 99   BUN 69*   CREATININE 19.7*   ALKPHOS 58   ALT 7*   AST 7*   BILITOT 0.6    No results for input(s): PROTIME, INR, APTT, HEPANTIXA in the last 168 hours. No results for input(s): CPK, CPKMB, TROPONINI, MB in the last 168 hours.    Electrolytes: N/A - electrolytes WDL  Accuchecks: Q6H    Gtts:      LDA/Wounds:  Lines/Drains/Airways       Central Venous Catheter  Line  Duration                  Hemodialysis Catheter 04/12/21 0949 right subclavian 542 days              Peripheral Intravenous Line  Duration                  Peripheral IV - Single Lumen 10/06/22 1002 20 G Right Antecubital <1 day                  Wounds: No  Wound care consulted: No

## 2022-10-06 NOTE — TRANSFER OF CARE
"Anesthesia Transfer of Care Note    Patient: Adilson Sylvester    Procedure(s) Performed: * No procedures listed *    Patient location: ICU    Anesthesia Type: general    Transport from OR: Transported from OR on 6-10 L/min O2 by face mask with adequate spontaneous ventilation    Post pain: adequate analgesia    Post assessment: no apparent anesthetic complications and tolerated procedure well    Post vital signs: stable    Level of consciousness: awake, alert and oriented    Nausea/Vomiting: no nausea/vomiting    Complications: none    Transfer of care protocol was followed      Last vitals:   Visit Vitals  /77 (BP Location: Left arm, Patient Position: Lying)   Pulse 72   Temp 36.6 °C (97.9 °F) (Oral)   Resp 18   Ht 5' 8" (1.727 m)   Wt 96 kg (211 lb 10.3 oz)   SpO2 96%   BMI 32.18 kg/m²     "

## 2022-10-06 NOTE — ASSESSMENT & PLAN NOTE
History of  - Last A1c 4/2022 10.0  - Repeat A1c pending  - Diabetic diet   - SSI with accuchecks  - Hold home medications

## 2022-10-06 NOTE — ANESTHESIA POSTPROCEDURE EVALUATION
Anesthesia Post Evaluation    Patient: Adilson Sylvester    Procedure(s) Performed: * No procedures listed *    Final Anesthesia Type: general      Patient location during evaluation: ICU  Patient participation: Yes- Able to Participate  Level of consciousness: awake and alert  Post-procedure vital signs: reviewed and stable  Pain management: adequate  Airway patency: patent    PONV status at discharge: No PONV  Anesthetic complications: no      Cardiovascular status: blood pressure returned to baseline  Respiratory status: unassisted  Hydration status: euvolemic  Follow-up not needed.          Vitals Value Taken Time   /68 10/06/22 1802   Temp 36.1 °C (97 °F) 10/06/22 1702   Pulse 61 10/06/22 1812   Resp 16 10/06/22 1738   SpO2 95 % 10/06/22 1812   Vitals shown include unvalidated device data.      No case tracking events are documented in the log.      Pain/Donny Score: No data recorded

## 2022-10-06 NOTE — ASSESSMENT & PLAN NOTE
s/p kidney transplant in 2015, on prograf  - Consult kidney transplant  - Prograf level pending  - Continue home prograf  - Avoid nephrotoxic medications and renally dose medications as able

## 2022-10-06 NOTE — PLAN OF CARE
Procedure completed. Patient tolerated well; monitoring maintained per CRNA. Hemostasis achieved to right groin via 6 Fr Perclose at 1508; patient to remain flat until 1708. Site CDI. Patient to be transported to Neuro ICU 9090 accompanied by this RN and CRNA; report to be called to inpatient RN.

## 2022-10-07 ENCOUNTER — TELEPHONE (OUTPATIENT)
Dept: NEUROSURGERY | Facility: CLINIC | Age: 47
End: 2022-10-07
Payer: MEDICAID

## 2022-10-07 VITALS
HEIGHT: 68 IN | DIASTOLIC BLOOD PRESSURE: 90 MMHG | HEART RATE: 73 BPM | SYSTOLIC BLOOD PRESSURE: 162 MMHG | RESPIRATION RATE: 20 BRPM | BODY MASS INDEX: 31.98 KG/M2 | TEMPERATURE: 98 F | WEIGHT: 211 LBS | OXYGEN SATURATION: 98 %

## 2022-10-07 LAB
ALBUMIN SERPL BCP-MCNC: 3.3 G/DL (ref 3.5–5.2)
ALP SERPL-CCNC: 62 U/L (ref 55–135)
ALT SERPL W/O P-5'-P-CCNC: 9 U/L (ref 10–44)
ANION GAP SERPL CALC-SCNC: 21 MMOL/L (ref 8–16)
APTT BLDCRRT: 25.9 SEC (ref 21–32)
AST SERPL-CCNC: 9 U/L (ref 10–40)
BASOPHILS # BLD AUTO: 0.02 K/UL (ref 0–0.2)
BASOPHILS NFR BLD: 0.2 % (ref 0–1.9)
BILIRUB SERPL-MCNC: 0.6 MG/DL (ref 0.1–1)
BUN SERPL-MCNC: 83 MG/DL (ref 6–20)
CALCIUM SERPL-MCNC: 8.2 MG/DL (ref 8.7–10.5)
CHLORIDE SERPL-SCNC: 99 MMOL/L (ref 95–110)
CO2 SERPL-SCNC: 16 MMOL/L (ref 23–29)
CREAT SERPL-MCNC: 21 MG/DL (ref 0.5–1.4)
DIFFERENTIAL METHOD: ABNORMAL
EOSINOPHIL # BLD AUTO: 0 K/UL (ref 0–0.5)
EOSINOPHIL NFR BLD: 0 % (ref 0–8)
ERYTHROCYTE [DISTWIDTH] IN BLOOD BY AUTOMATED COUNT: 13.9 % (ref 11.5–14.5)
EST. GFR  (NO RACE VARIABLE): 2.5 ML/MIN/1.73 M^2
ESTIMATED AVG GLUCOSE: 108 MG/DL (ref 68–131)
GLUCOSE SERPL-MCNC: 105 MG/DL (ref 70–110)
HBA1C MFR BLD: 5.4 % (ref 4–5.6)
HCT VFR BLD AUTO: 33.5 % (ref 40–54)
HGB BLD-MCNC: 11.3 G/DL (ref 14–18)
IMM GRANULOCYTES # BLD AUTO: 0.05 K/UL (ref 0–0.04)
IMM GRANULOCYTES NFR BLD AUTO: 0.4 % (ref 0–0.5)
INR PPP: 1 (ref 0.8–1.2)
LYMPHOCYTES # BLD AUTO: 0.8 K/UL (ref 1–4.8)
LYMPHOCYTES NFR BLD: 6.3 % (ref 18–48)
MAGNESIUM SERPL-MCNC: 1.9 MG/DL (ref 1.6–2.6)
MCH RBC QN AUTO: 30.6 PG (ref 27–31)
MCHC RBC AUTO-ENTMCNC: 33.7 G/DL (ref 32–36)
MCV RBC AUTO: 91 FL (ref 82–98)
MONOCYTES # BLD AUTO: 0.3 K/UL (ref 0.3–1)
MONOCYTES NFR BLD: 2.2 % (ref 4–15)
NEUTROPHILS # BLD AUTO: 11 K/UL (ref 1.8–7.7)
NEUTROPHILS NFR BLD: 90.9 % (ref 38–73)
NRBC BLD-RTO: 0 /100 WBC
PHOSPHATE SERPL-MCNC: 9.1 MG/DL (ref 2.7–4.5)
PLATELET # BLD AUTO: 313 K/UL (ref 150–450)
PMV BLD AUTO: 10 FL (ref 9.2–12.9)
POC ACTIVATED CLOTTING TIME K: 213 SEC (ref 74–137)
POC ACTIVATED CLOTTING TIME K: 225 SEC (ref 74–137)
POCT GLUCOSE: 103 MG/DL (ref 70–110)
POCT GLUCOSE: 119 MG/DL (ref 70–110)
POTASSIUM SERPL-SCNC: 5 MMOL/L (ref 3.5–5.1)
PROT SERPL-MCNC: 6.5 G/DL (ref 6–8.4)
PROTHROMBIN TIME: 10 SEC (ref 9–12.5)
RBC # BLD AUTO: 3.69 M/UL (ref 4.6–6.2)
SAMPLE: ABNORMAL
SAMPLE: ABNORMAL
SODIUM SERPL-SCNC: 136 MMOL/L (ref 136–145)
TACROLIMUS BLD-MCNC: 11.3 NG/ML (ref 5–15)
TACROLIMUS BLD-MCNC: 8.6 NG/ML (ref 5–15)
WBC # BLD AUTO: 12.07 K/UL (ref 3.9–12.7)

## 2022-10-07 PROCEDURE — 99499 UNLISTED E&M SERVICE: CPT | Mod: NTX,,, | Performed by: NEUROLOGICAL SURGERY

## 2022-10-07 PROCEDURE — 94761 N-INVAS EAR/PLS OXIMETRY MLT: CPT | Mod: NTX

## 2022-10-07 PROCEDURE — 99499 NO LOS: ICD-10-PCS | Mod: NTX,,, | Performed by: NEUROLOGICAL SURGERY

## 2022-10-07 PROCEDURE — 85025 COMPLETE CBC W/AUTO DIFF WBC: CPT | Mod: NTX

## 2022-10-07 PROCEDURE — 97161 PT EVAL LOW COMPLEX 20 MIN: CPT | Mod: NTX

## 2022-10-07 PROCEDURE — 99222 1ST HOSP IP/OBS MODERATE 55: CPT | Mod: NTX,,, | Performed by: NURSE PRACTITIONER

## 2022-10-07 PROCEDURE — 99232 PR SUBSEQUENT HOSPITAL CARE,LEVL II: ICD-10-PCS | Mod: NTX,,, | Performed by: INTERNAL MEDICINE

## 2022-10-07 PROCEDURE — 83735 ASSAY OF MAGNESIUM: CPT | Mod: NTX

## 2022-10-07 PROCEDURE — 63600175 PHARM REV CODE 636 W HCPCS: Mod: NTX | Performed by: NURSE PRACTITIONER

## 2022-10-07 PROCEDURE — 99222 PR INITIAL HOSPITAL CARE,LEVL II: ICD-10-PCS | Mod: NTX,,, | Performed by: NURSE PRACTITIONER

## 2022-10-07 PROCEDURE — 99233 SBSQ HOSP IP/OBS HIGH 50: CPT | Mod: 95,NTX,, | Performed by: PSYCHIATRY & NEUROLOGY

## 2022-10-07 PROCEDURE — 25000003 PHARM REV CODE 250: Mod: NTX

## 2022-10-07 PROCEDURE — 99233 PR SUBSEQUENT HOSPITAL CARE,LEVL III: ICD-10-PCS | Mod: 95,NTX,, | Performed by: PSYCHIATRY & NEUROLOGY

## 2022-10-07 PROCEDURE — 85610 PROTHROMBIN TIME: CPT | Mod: NTX

## 2022-10-07 PROCEDURE — 83036 HEMOGLOBIN GLYCOSYLATED A1C: CPT | Mod: NTX

## 2022-10-07 PROCEDURE — 99238 PR HOSPITAL DISCHARGE DAY,<30 MIN: ICD-10-PCS | Mod: NTX,,, | Performed by: NEUROLOGICAL SURGERY

## 2022-10-07 PROCEDURE — 99232 SBSQ HOSP IP/OBS MODERATE 35: CPT | Mod: NTX,,, | Performed by: INTERNAL MEDICINE

## 2022-10-07 PROCEDURE — 99238 HOSP IP/OBS DSCHRG MGMT 30/<: CPT | Mod: NTX,,, | Performed by: NEUROLOGICAL SURGERY

## 2022-10-07 PROCEDURE — 84100 ASSAY OF PHOSPHORUS: CPT | Mod: NTX

## 2022-10-07 PROCEDURE — 25000003 PHARM REV CODE 250: Mod: NTX | Performed by: NURSE PRACTITIONER

## 2022-10-07 PROCEDURE — 80197 ASSAY OF TACROLIMUS: CPT | Mod: NTX | Performed by: NEUROLOGICAL SURGERY

## 2022-10-07 PROCEDURE — 85730 THROMBOPLASTIN TIME PARTIAL: CPT | Mod: NTX

## 2022-10-07 PROCEDURE — 97530 THERAPEUTIC ACTIVITIES: CPT | Mod: NTX

## 2022-10-07 PROCEDURE — 63600175 PHARM REV CODE 636 W HCPCS: Mod: NTX

## 2022-10-07 PROCEDURE — 80053 COMPREHEN METABOLIC PANEL: CPT | Mod: NTX

## 2022-10-07 RX ORDER — NAPROXEN SODIUM 220 MG/1
81 TABLET, FILM COATED ORAL DAILY
Status: DISCONTINUED | OUTPATIENT
Start: 2022-10-07 | End: 2022-10-07 | Stop reason: HOSPADM

## 2022-10-07 RX ORDER — PREDNISONE 5 MG/1
5 TABLET ORAL EVERY OTHER DAY
Qty: 30 TABLET | Refills: 12 | Status: SHIPPED | OUTPATIENT
Start: 2022-10-07

## 2022-10-07 RX ORDER — NIFEDIPINE 30 MG/1
30 TABLET, EXTENDED RELEASE ORAL DAILY
Status: DISCONTINUED | OUTPATIENT
Start: 2022-10-07 | End: 2022-10-07 | Stop reason: HOSPADM

## 2022-10-07 RX ORDER — INSULIN ASPART 100 [IU]/ML
1-10 INJECTION, SOLUTION INTRAVENOUS; SUBCUTANEOUS
Status: DISCONTINUED | OUTPATIENT
Start: 2022-10-07 | End: 2022-10-07 | Stop reason: HOSPADM

## 2022-10-07 RX ORDER — ASPIRIN 325 MG
325 TABLET ORAL DAILY
Qty: 360 TABLET | Refills: 0 | Status: SHIPPED | OUTPATIENT
Start: 2022-10-07 | End: 2023-05-25

## 2022-10-07 RX ORDER — CALCITRIOL 0.25 UG/1
0.25 CAPSULE ORAL DAILY
Status: DISCONTINUED | OUTPATIENT
Start: 2022-10-07 | End: 2022-10-07 | Stop reason: HOSPADM

## 2022-10-07 RX ORDER — AMOXICILLIN 250 MG
2 CAPSULE ORAL DAILY
Qty: 20 TABLET | Refills: 0 | Status: SHIPPED | OUTPATIENT
Start: 2022-10-07

## 2022-10-07 RX ORDER — ONDANSETRON 4 MG/1
4 TABLET, FILM COATED ORAL EVERY 8 HOURS PRN
Qty: 20 TABLET | Refills: 0 | Status: SHIPPED | OUTPATIENT
Start: 2022-10-07 | End: 2023-05-25

## 2022-10-07 RX ORDER — TACROLIMUS 1 MG/1
1 CAPSULE ORAL EVERY 12 HOURS
Qty: 60 CAPSULE | Refills: 11
Start: 2022-10-07 | End: 2023-10-07

## 2022-10-07 RX ORDER — CINACALCET 30 MG/1
30 TABLET, FILM COATED ORAL
Status: DISCONTINUED | OUTPATIENT
Start: 2022-10-07 | End: 2022-10-07 | Stop reason: HOSPADM

## 2022-10-07 RX ORDER — PREDNISONE 5 MG/1
5 TABLET ORAL EVERY OTHER DAY
Status: DISCONTINUED | OUTPATIENT
Start: 2022-10-07 | End: 2022-10-07 | Stop reason: HOSPADM

## 2022-10-07 RX ORDER — CARVEDILOL 25 MG/1
25 TABLET ORAL 2 TIMES DAILY
Status: DISCONTINUED | OUTPATIENT
Start: 2022-10-07 | End: 2022-10-07 | Stop reason: HOSPADM

## 2022-10-07 RX ORDER — ATORVASTATIN CALCIUM 10 MG/1
10 TABLET, FILM COATED ORAL DAILY
Status: DISCONTINUED | OUTPATIENT
Start: 2022-10-07 | End: 2022-10-07

## 2022-10-07 RX ORDER — OXYCODONE AND ACETAMINOPHEN 5; 325 MG/1; MG/1
1 TABLET ORAL EVERY 6 HOURS PRN
Qty: 16 TABLET | Refills: 0 | Status: SHIPPED | OUTPATIENT
Start: 2022-10-07 | End: 2023-05-25

## 2022-10-07 RX ADMIN — PREDNISONE 5 MG: 5 TABLET ORAL at 09:10

## 2022-10-07 RX ADMIN — CALCITRIOL CAPSULES 0.25 MCG 0.25 MCG: 0.25 CAPSULE ORAL at 12:10

## 2022-10-07 RX ADMIN — CINACALCET 30 MG: 30 TABLET, FILM COATED ORAL at 11:10

## 2022-10-07 RX ADMIN — ATORVASTATIN CALCIUM 10 MG: 10 TABLET, FILM COATED ORAL at 09:10

## 2022-10-07 RX ADMIN — SENNOSIDES AND DOCUSATE SODIUM 1 TABLET: 50; 8.6 TABLET ORAL at 09:10

## 2022-10-07 RX ADMIN — SEVELAMER CARBONATE 3200 MG: 800 TABLET, FILM COATED ORAL at 07:10

## 2022-10-07 RX ADMIN — SEVELAMER CARBONATE 3200 MG: 800 TABLET, FILM COATED ORAL at 11:10

## 2022-10-07 RX ADMIN — CARVEDILOL 25 MG: 25 TABLET, FILM COATED ORAL at 11:10

## 2022-10-07 RX ADMIN — ASPIRIN 81 MG: 81 TABLET, CHEWABLE ORAL at 09:10

## 2022-10-07 RX ADMIN — TACROLIMUS 1 MG: 1 CAPSULE ORAL at 07:10

## 2022-10-07 NOTE — PLAN OF CARE
Recommendations     1. Continue current renal/diabetic diet- encourage adequate PO intake.  2. Add Novasource Renal BID.   3. RD following.     Goals: Will meet % EEN/EPN by next RD f/u.  Nutrition Goal Status: new  Communication of RD Recs:  (POC)    Lulu Solorio PL-LDN

## 2022-10-07 NOTE — PLAN OF CARE
Problem: Physical Therapy  Goal: Physical Therapy Goal  Description: PT eval complete 10/7, no goals established at this time. D/c from acute PT services  Outcome: Met

## 2022-10-07 NOTE — DISCHARGE SUMMARY
[facility] Neuro Critical Care  Neurosurgery  Discharge Summary      Patient Name: Adilson Sylvester  MRN: [id number]  Admission Date: 10/6/2022  Hospital Length of Stay: 1 days  Discharge Date and Time:  10/07/2022 9:47 AM  Attending Physician: Kyler Hopkins MD   Discharging Provider: Ervin Garcia MD  Primary Care Provider: To Obtain Unable    HPI:   Adilson Sylvester is a 46 y.o. male with medical history of HTN, DM, PCKD on PD with recent DSA work-up on unruptured basilar tip end wall aneurysm. Plans for characterization of intracranial aneurysm + WEB embolization.       * No procedures listed *     Hospital Course: 10/7: PPD 1 s/p WEB for basilar apex aneurysm. NAEON. AFVSS. Exam stable. Pain controlled. Tolerating PO. Voiding.       Goals of Care Treatment Preferences:  Code Status: Full Code      Consults:   Consults (From admission, onward)        Status Ordering Provider     Inpatient consult to Kidney/Pancreas Transplant Medicine  Once        Provider:  (Not yet assigned)    Completed FIORELLA MARK     Inpatient consult to Nephrology  Once        Provider:  (Not yet assigned)    Acknowledged FIORELLA MARK     Inpatient consult to Physical Medicine Rehab  Once        Provider:  (Not yet assigned)    Completed FIORELLA MARK     Inpatient consult to Registered Dietitian/Nutritionist  Once        Provider:  (Not yet assigned)    Completed FIORELLA MARK     IP consult to case management/social work  Once        Provider:  (Not yet assigned)    Acknowledged FIORELLA MARK          Significant Diagnostic Studies: X-Ray Chest AP Single View    Result Date: 10/7/2022  No acute intrathoracic abnormality appreciated on this single hypoventilatory radiographic view of the chest. Electronically signed by: Hollie Sheriff MD Date:    10/07/2022 Time:    06:52      Pending Diagnostic Studies:     Procedure Component Value Units Date/Time    IR Angiogram Carotid Cerebral Bilateral inc Arch [[id number]]  Resulted: 10/06/22 1647    Order Status: Sent Lab Status: In process Updated: 10/06/22 1650    Tacrolimus level [[id number]] Collected: 10/06/22 1900    Order Status: Sent Lab Status: In process Updated: 10/07/22 0733    Specimen: Blood         Final Active Diagnoses:    Diagnosis Date Noted POA    PRINCIPAL PROBLEM:  Basilar artery aneurysm [I72.5] 04/26/2022 Not Applicable     Chronic    ESRD on peritoneal dialysis [N18.6, Z99.2] 12/14/2021 Not Applicable     Chronic    H/O kidney transplant [Z94.0] 04/07/2021 Not Applicable     Chronic    Type 2 diabetes mellitus [E11.9] 04/07/2021 Yes     Chronic    Renovascular hypertension [I15.0] 04/07/2021 Yes     Chronic      Problems Resolved During this Admission:      Discharged Condition: stable     Disposition: Home or Self Care    Follow Up:   Follow-up Information     Ranjit Pastrana MD Follow up in 2 week(s).    Specialty: Neurosurgery  Contact information:  [address] [place] [address] [id number] [phone]                       Patient Instructions:      Diet Adult Regular     Notify your health care provider if you experience any of the following:  temperature >100.4     Notify your health care provider if you experience any of the following:  persistent nausea and vomiting or diarrhea     Notify your health care provider if you experience any of the following:  severe uncontrolled pain     Notify your health care provider if you experience any of the following:  redness, tenderness, or signs of infection (pain, swelling, redness, odor or green/yellow discharge around incision site)     Notify your health care provider if you experience any of the following:  difficulty breathing or increased cough     Notify your health care provider if you experience any of the following:  severe persistent headache     Notify your health care provider if you experience any of the following:  worsening rash     Notify your health care provider if you experience any of  "the following:  persistent dizziness, light-headedness, or visual disturbances     Notify your health care provider if you experience any of the following:  increased confusion or weakness     No dressing needed     Activity as tolerated     Medications:b  Reconciled Home Medications:      Medication List      START taking these medications    aspirin 325 MG tablet  Take 1 tablet (325 mg total) by mouth once daily.  Replaces: aspirin 81 MG EC tablet     ondansetron 4 MG tablet  Commonly known as: ZOFRAN  Take 1 tablet (4 mg total) by mouth every 8 (eight) hours as needed for Nausea.     oxyCODONE-acetaminophen 5-325 mg per tablet  Commonly known as: PERCOCET  Take 1 tablet by mouth every 6 (six) hours as needed for Pain.     senna-docusate 8.6-50 mg 8.6-50 mg per tablet  Commonly known as: SENNA WITH DOCUSATE SODIUM  Take 2 tablets by mouth once daily.        CHANGE how you take these medications    isoniazid 300 MG Tab  Commonly known as: NYDRAZID  Take 1 tablet (300 mg total) by mouth once daily.  What changed: when to take this     NIFEdipine 30 MG Tbsr  Commonly known as: ADALAT CC  Take 30 mg by mouth every evening.  What changed: Another medication with the same name was removed. Continue taking this medication, and follow the directions you see here.     sevelamer carbonate 800 mg Tab  Commonly known as: RENVELA  Take 3 tablets (2,400 mg total) by mouth 3 (three) times daily with meals.  What changed: how much to take     tacrolimus 1 MG Cap  Commonly known as: PROGRAF  Take 4 capsules (4 mg total) by mouth every 12 (twelve) hours.  What changed: how much to take        CONTINUE taking these medications    atorvastatin 10 MG tablet  Commonly known as: LIPITOR  Take by mouth once daily.     BD ULTRA-FINE JANE PEN NEEDLE 32 gauge x 5/32" Ndle  Generic drug: pen needle, diabetic  1 each by Misc.(Non-Drug; Combo Route) route 3 (three) times daily with meals.     calcitRIOL 0.25 MCG Cap  Commonly known as: " ROCALTROL  Take 0.25 mcg by mouth once daily.     carvediloL 25 MG tablet  Commonly known as: COREG  Take 1 tablet (25 mg total) by mouth 2 (two) times daily.     DIALYVITE [phone] mg-mg-mcg-mg Tab  Generic drug: B complex 11-folic-C-biot-zinc  Take 1 tablet by mouth once daily.     ergocalciferol 50,000 unit Cap  Commonly known as: ERGOCALCIFEROL  Take 50,000 Units by mouth once a week. Monday     furosemide 80 MG tablet  Commonly known as: LASIX  Take 80 mg by mouth once daily.     hydrALAZINE 100 MG tablet  Commonly known as: APRESOLINE  Take 1 tablet (100 mg total) by mouth every 8 (eight) hours.     NovoLOG Flexpen U-100 Insulin 100 unit/mL (3 mL) Inpn pen  Generic drug: insulin aspart U-100  Inject 1-5 Units into the skin 3 (three) times daily with meals.     predniSONE 5 MG tablet  Commonly known as: DELTASONE  Take 1 tablet (5 mg total) by mouth every other day.     pyridoxine (vitamin B6) 50 MG Tab  Commonly known as: VITAMIN B-6  Take 1 tablet (50 mg total) by mouth once daily.     TRUE METRIX GLUCOSE METER Misc  Generic drug: blood-glucose meter  Use as instructed     TRUE METRIX GLUCOSE TEST STRIP Strp  Generic drug: blood sugar diagnostic  Test 3 (three) times daily with meals.     TRUEPLUS LANCETS 33 gauge Misc  Generic drug: lancets  Test 3 (three) times daily with meals.        STOP taking these medications    aspirin 81 MG EC tablet  Commonly known as: ECOTRIN  Replaced by: aspirin 325 MG tablet     BRILINTA 90 mg tablet  Generic drug: ticagrelor            Ervin Garcia MD  Neurosurgery  [facility] - Neuro Critical Care

## 2022-10-07 NOTE — NURSING
DC instructions review with pt and spouse at the bedside all questions and concerns addressed. VSS. Pt and spouse voiced understanding of instructions. Both stated pt was ready to DC home via private vehicle. VSS. Darcy pain at this time. PIV Dc'd tip intact and dressed with 2x2 and tape. Pt in room and dressed. Will travel to car via wheelchair with RN.

## 2022-10-07 NOTE — ASSESSMENT & PLAN NOTE
- PCKD s/p kidney transplant 2015, On PD for 4 years prior to his transplant,   - Received his transplant at [facility] transplant doctor there was Dr. Sheets, His nephrologist now is Dr Padilla  - Resume prograf 4/4 on discharge   - Resume prednisone 5mg every other day on discharge

## 2022-10-07 NOTE — SUBJECTIVE & OBJECTIVE
Past Medical History:   Diagnosis Date    Diabetes     Diabetes mellitus, type 2     Gout     Hypertension     Hypothyroidism     Hypothyroidism     Kidney transplant recipient     Obesity     Polycystic kidney disease        Past Surgical History:   Procedure Laterality Date    ANKLE SURGERY      HERNIA REPAIR      KIDNEY TRANSPLANT  2015    [facility]    LITHOTRIPSY Left 2017    naitive kidney     PD catheter      PERITONEAL CATHETER INSERTION         Review of patient's allergies indicates:  No Known Allergies  Current Facility-Administered Medications   Medication Frequency    acetaminophen tablet 650 mg Q6H PRN    aspirin chewable tablet 81 mg Daily    atorvastatin tablet 10 mg Daily    calcitRIOL capsule 0.25 mcg Daily    carvediloL tablet 25 mg BID    cinacalcet tablet 30 mg Daily with breakfast    dextrose 10% bolus 125 mL PRN    dextrose 10% bolus 250 mL PRN    glucagon (human recombinant) injection 1 mg PRN    hydrALAZINE injection 10 mg Q6H PRN    insulin aspart U-100 pen 1-10 Units QID (AC + HS) PRN    labetalol 20 mg/4 mL (5 mg/mL) IV syring Q6H PRN    NIFEdipine 24 hr tablet 30 mg Daily    predniSONE tablet 5 mg Every other day    senna-docusate 8.6-50 mg per tablet 1 tablet BID    sevelamer carbonate tablet 3,200 mg TID WM    sodium chloride 0.9% flush 10 mL PRN    tacrolimus capsule 1 mg BID     Family History       Problem Relation (Age of Onset)    Diabetes Mother    Hypertension Mother, Paternal Aunt    Kidney disease Father, Sister, Paternal Aunt          Tobacco Use    Smoking status: Former     Types: Cigarettes     Quit date: 10/1/2008     Years since quittin.0    Smokeless tobacco: Never   Substance and Sexual Activity    Alcohol use: Not Currently     Comment: occasional    Drug use: Never    Sexual activity: Yes     Partners: Female     Review of Systems   Constitutional:  Negative for chills, fatigue and fever.   HENT:  Negative for trouble swallowing.    Eyes:  Negative for  visual disturbance.   Respiratory:  Negative for shortness of breath.    Cardiovascular:  Negative for chest pain.   Gastrointestinal:  Negative for abdominal pain, nausea and vomiting.   Musculoskeletal:  Negative for neck pain.   Skin:  Negative for wound.   Neurological:  Negative for dizziness, seizures, facial asymmetry, speech difficulty, weakness, numbness and headaches.   Hematological:  Does not bruise/bleed easily.   Psychiatric/Behavioral:  Negative for agitation and confusion.    Objective:     Vital Signs (Most Recent):  Temp: 97.7 °F (36.5 °C) (10/07/22 0708)  Pulse: 80 (10/07/22 0908)  Resp: 20 (10/07/22 0908)  BP: (!) 162/90 (10/07/22 1101)  SpO2: 98 % (10/07/22 0908)  O2 Device (Oxygen Therapy): room air (10/07/22 0908)   Vital Signs (24h Range):  Temp:  [96.6 °F (35.9 °C)-97.9 °F (36.6 °C)] 97.7 °F (36.5 °C)  Pulse:  [57-85] 80  Resp:  [12-20] 20  SpO2:  [93 %-98 %] 98 %  BP: ()/(56-90) 162/90     Weight: 95.7 kg (210 lb 15.7 oz) (10/07/22 0933)  Body mass index is 32.08 kg/m².  Body surface area is 2.14 meters squared.    I/O last 3 completed shifts:  In: 550 [IV Piggyback:550]  Out: -     Physical Exam  Vitals and nursing note reviewed.   Constitutional:       Appearance: Normal appearance. He is normal weight.   HENT:      Head: Normocephalic and atraumatic.      Nose: Nose normal.   Eyes:      Extraocular Movements: Extraocular movements intact.      Pupils: Pupils are equal, round, and reactive to light.   Cardiovascular:      Rate and Rhythm: Normal rate and regular rhythm.      Pulses: Normal pulses.      Heart sounds: Normal heart sounds.   Pulmonary:      Effort: Pulmonary effort is normal.   Abdominal:      General: Abdomen is flat. There is distension.      Palpations: Abdomen is soft.      Tenderness: There is no abdominal tenderness.      Comments: PD catheter    Musculoskeletal:         General: Normal range of motion.      Cervical back: Normal range of motion and neck supple.    Skin:     General: Skin is warm and dry.   Neurological:      General: No focal deficit present.      Mental Status: He is alert and oriented to person, place, and time.   Psychiatric:         Mood and Affect: Mood normal.         Behavior: Behavior normal.       Significant Labs:  CBC:   Recent Labs   Lab 10/07/22  0324   WBC 12.07   RBC 3.69*   HGB 11.3*   HCT 33.5*      MCV 91   MCH 30.6   MCHC 33.7     CMP:   Recent Labs   Lab 10/07/22  0324      CALCIUM 8.2*   ALBUMIN 3.3*   PROT 6.5      K 5.0   CO2 16*   CL 99   BUN 83*   CREATININE 21.0*   ALKPHOS 62   ALT 9*   AST 9*   BILITOT 0.6

## 2022-10-07 NOTE — SUBJECTIVE & OBJECTIVE
Subjective:     History of Present Illness:   46 y.o. 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 Direct admit. Now s/p elective cerebral angiogram and web embolization. In [facility] for close monitotiring. KTM servcie consulted for IS management.      Transplant hx:  - PCKD s/p kidney transplant 2015, On PD for 4 years prior to his transplant,   - Received his transplant at [facility] transplant doctor there was Dr. Sheets, His nephrologist now is Dr Padilla  - Transferred to [facility] 04/2021 from local ED after prograf and myfortic 2-3 weeks non-compliance (insurance ran out after loss of employment), lead to JUSTINE.  - Kidney bx 4/13/21 resulted: 30% IF. 10/28 gloms are sclerosed, +ACR (tublitis and interstitial lymphocytic infiltration) and ABMR (glomerulitis, microcirculatory infiltration, + C4d). Pt has ACR, AVR, and ABMR in the setting of significant fibrosis. DSA positive for class 1: A24 (2113), B7 (1898).  - Transferred to KTS service 4/16 for treatment of rejection. Pheresis consulted for PLEX therapy. Patient underwent treatment with SM 5mg/kg x 3 and PLEX x 3 without improvement in UOP or labwork. Also given lasix trial without improvement.   - Discharged on TTS HD, Started PD 09/2021. Still remains on PD.   - Was on prograf 4/4 and Cellcept 1000/1000. Off Cellcept by his general nephrologist shortly after stating PD last year.   - Now on prograf 4/4     Immunosuppressants (From admission, onward)      Start     Stop Route Frequency Ordered    10/06/22 1800  tacrolimus capsule 1 mg         -- Oral 2 times daily 10/06/22 1744            Interval History:  Pt seen and examined at bedside. No AOE. S/p elective cerebral angiogram and web embolization. AAOx4. No acute complaints. Makes minimal urine at home. On lasix 80 po once daily at home. For  discharge today     Past Medical and Surgical History: Mr. Sylvester has a past medical history of Diabetes, Diabetes mellitus, type 2, Gout, Hypertension, Hypothyroidism, Hypothyroidism, Kidney transplant recipient, Obesity, and Polycystic kidney disease.  He has a past surgical history that includes Kidney transplant (2015); Hernia repair; PD catheter; Ankle surgery; Peritoneal catheter insertion; and Lithotripsy (Left, 2017).    Past Social and Family History: Mr. Sylvester reports that he quit smoking about 14 years ago. His smoking use included cigarettes. He has never used smokeless tobacco. He reports that he does not currently use alcohol. He reports that he does not use drugs.His family history includes Diabetes in his mother; Hypertension in his mother and paternal aunt; Kidney disease in his father, paternal aunt, and sister.    Intake/Output - Last 3 Shifts         10/05 0700  10/06 0659 10/06 0700  10/07 0659 10/07 0700  10/08 0659    P.O.   250    I.V. (mL/kg)   0 (0)    IV Piggyback  550     Total Intake(mL/kg)  550 (5.7) 250 (2.6)    Net  +550 +250           Stool Occurrence  0 x              Review of Systems   Constitutional: Negative.    HENT: Negative.     Eyes: Negative.    Respiratory: Negative.     Cardiovascular: Negative.    Gastrointestinal: Negative.    Endocrine: Negative.    Genitourinary: Negative.    Musculoskeletal: Negative.    Skin: Negative.    Allergic/Immunologic: Negative.    Neurological: Negative.    Hematological: Negative.    Psychiatric/Behavioral: Negative.     Objective:     Vital Signs (Most Recent):  Temp: 97.7 °F (36.5 °C) (10/07/22 0708)  Pulse: 76 (10/07/22 0807)  Resp: 20 (10/07/22 0807)  BP: (!) 142/86 (10/07/22 0807)  SpO2: 98 % (10/07/22 0807)   Vital Signs (24h Range):  Temp:  [96.6 °F (35.9 °C)-97.9 °F (36.6 °C)] 97.7 °F (36.5 °C)  Pulse:  [57-85] 76  Resp:  [12-20] 20  SpO2:  [93 %-98 %] 98 %  BP: ()/(56-86) 142/86     Weight: 95.7 kg (211 lb)  Height:  "5' 8" (172.7 cm)  Body mass index is 32.08 kg/m².    Physical Exam  Constitutional:       Appearance: Normal appearance. He is normal weight.   HENT:      Head: Normocephalic and atraumatic.      Nose: Nose normal.   Eyes:      Extraocular Movements: Extraocular movements intact.      Pupils: Pupils are equal, round, and reactive to light.   Cardiovascular:      Rate and Rhythm: Normal rate and regular rhythm.      Pulses: Normal pulses.      Heart sounds: Normal heart sounds.   Pulmonary:      Effort: Pulmonary effort is normal.   Abdominal:      General: Abdomen is flat.      Palpations: Abdomen is soft.   Musculoskeletal:         General: Normal range of motion.      Cervical back: Normal range of motion and neck supple.   Skin:     General: Skin is warm and dry.   Neurological:      General: No focal deficit present.      Mental Status: He is alert and oriented to person, place, and time.   Psychiatric:         Mood and Affect: Mood normal.         Behavior: Behavior normal.       Significant Labs:  CBC:   Recent Labs   Lab 10/06/22  1616 10/07/22  0324   WBC 9.38 12.07   RBC 3.44* 3.69*   HGB 10.5* 11.3*   HCT 31.9* 33.5*    313   MCV 93 91   MCH 30.5 30.6   MCHC 32.9 33.7     CMP:   Recent Labs   Lab 10/06/22  0856 10/06/22  1616 10/07/22  0324   GLU  --  99 105   CALCIUM  --  7.7* 8.2*   ALBUMIN  --  3.2* 3.3*   PROT  --  5.9* 6.5   NA  --  135* 136   K 3.7 4.2 5.0   CO2  --  16* 16*   CL  --  99 99   BUN  --  69* 83*   CREATININE  --  19.7* 21.0*   ALKPHOS  --  58 62   ALT  --  7* 9*   AST  --  7* 9*       Diagnostics:  US - Kidney: Results for orders placed during the hospital encounter of 12/14/21    US Transplant Kidney With Doppler    Narrative  EXAMINATION:  US TRANSPLANT KIDNEY WITH DOPPLER    CLINICAL HISTORY:  Awaiting organ transplant status    TECHNIQUE:  Real-time ultrasound evaluation was performed over the patient's renal allograft.    COMPARISON:  Ultrasound transplant kidney with " Doppler 04/07/2021    FINDINGS:  Patient is status post renal transplant around 2017 in the right lower quadrant.    The renal transplant measures 8.8 cm and demonstrates no focal parenchymal abnormality. No transplant hydronephrosis. No peritransplant fluid.    Renal arterial resistive indices are as follows: Interlobar 0.73, segmental Up 0.77, segmental Mid 0.78, segmental Low 0.74.  There is no evidence of a tardus parvus waveform. The maximum velocity in the main renal artery is 71 cm/sec, with renal artery to iliac artery ratio of 0.8.    The renal transplant venous system is unremarkable.    Right iliac artery demonstrates normal waveform with peak systolic velocity of 86 cm/sec.    Bladder is not well distended and poorly characterized on today's exam.    Impression  Satisfactory sonographic appearance of the renal transplant.    Electronically signed by resident: Mata Sandoval  Date:    12/14/2021  Time:    15:28    Electronically signed by: Ant Knight  Date:    12/14/2021  Time:    15:42

## 2022-10-07 NOTE — CONSULTS
[facility]  Kidney Transplant  Consult Note    Inpatient consult to Kidney/Pancreas Transplant Medicine  Consult performed by: Miguel Rivera MD  Consult ordered by: Criselda Dunlap PA-C  Reason for consult: IS mangement inpatient   Assessment/Recommendations: See note             Subjective:     History of Present Illness:   46 y.o. 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 Direct admit. Now s/p elective cerebral angiogram and web embolization. In [facility] for close monitotiring. KTM marycitheodora consulted for IS management.      Transplant hx:  - PCKD s/p kidney transplant 2015, On PD for 4 years prior to his transplant,   - Received his transplant at [facility] transplant doctor there was Dr. Sheets, His nephrologist now is Dr Padilla  - Transferred to [facility] 04/2021 from local ED after prograf and myfortic 2-3 weeks non-compliance (insurance ran out after loss of employment), lead to JUSTINE.  - Kidney bx 4/13/21 resulted: 30% IF. 10/28 gloms are sclerosed, +ACR (tublitis and interstitial lymphocytic infiltration) and ABMR (glomerulitis, microcirculatory infiltration, + C4d). Pt has ACR, AVR, and ABMR in the setting of significant fibrosis. DSA positive for class 1: A24 (2113), B7 (1898).  - Transferred to KTS service 4/16 for treatment of rejection. Pheresis consulted for PLEX therapy. Patient underwent treatment with SM 5mg/kg x 3 and PLEX x 3 without improvement in UOP or labwork. Also given lasix trial without improvement.   - Discharged on TTS HD, Started PD 09/2021. Still remains on PD.   - Was on prograf 4/4 and Cellcept 1000/1000. Off Cellcept by his general nephrologist shortly after stating PD last year.   - Now on prograf 2/2 and prednisone 5mg every other day at home (Pt used to take Prograf 2mg po BID, although his bottle  says 4 bid)    Immunosuppressants (From admission, onward)      Start     Stop Route Frequency Ordered    10/06/22 1800  tacrolimus capsule 1 mg         -- Oral 2 times daily 10/06/22 1744            Interval History:  Pt seen and examined at bedside. No AOE. S/p elective cerebral angiogram and web embolization. AAOx4. No acute complaints. Makes minimal urine at home. On lasix 80 po once daily at home. For discharge today     Past Medical and Surgical History: Mr. Sylvester has a past medical history of Diabetes, Diabetes mellitus, type 2, Gout, Hypertension, Hypothyroidism, Hypothyroidism, Kidney transplant recipient, Obesity, and Polycystic kidney disease.  He has a past surgical history that includes Kidney transplant (2015); Hernia repair; PD catheter; Ankle surgery; Peritoneal catheter insertion; and Lithotripsy (Left, 2017).    Past Social and Family History: Mr. Sylvester reports that he quit smoking about 14 years ago. His smoking use included cigarettes. He has never used smokeless tobacco. He reports that he does not currently use alcohol. He reports that he does not use drugs.His family history includes Diabetes in his mother; Hypertension in his mother and paternal aunt; Kidney disease in his father, paternal aunt, and sister.    Intake/Output - Last 3 Shifts         10/05 0700  10/06 0659 10/06 0700  10/07 0659 10/07 0700  10/08 0659    P.O.   250    I.V. (mL/kg)   0 (0)    IV Piggyback  550     Total Intake(mL/kg)  550 (5.7) 250 (2.6)    Net  +550 +250           Stool Occurrence  0 x              Review of Systems   Constitutional: Negative.    HENT: Negative.     Eyes: Negative.    Respiratory: Negative.     Cardiovascular: Negative.    Gastrointestinal: Negative.    Endocrine: Negative.    Genitourinary: Negative.    Musculoskeletal: Negative.    Skin: Negative.    Allergic/Immunologic: Negative.    Neurological: Negative.    Hematological: Negative.    Psychiatric/Behavioral: Negative.    "  Objective:     Vital Signs (Most Recent):  Temp: 97.7 °F (36.5 °C) (10/07/22 0708)  Pulse: 76 (10/07/22 0807)  Resp: 20 (10/07/22 0807)  BP: (!) 142/86 (10/07/22 0807)  SpO2: 98 % (10/07/22 0807)   Vital Signs (24h Range):  Temp:  [96.6 °F (35.9 °C)-97.9 °F (36.6 °C)] 97.7 °F (36.5 °C)  Pulse:  [57-85] 76  Resp:  [12-20] 20  SpO2:  [93 %-98 %] 98 %  BP: ()/(56-86) 142/86     Weight: 95.7 kg (211 lb)  Height: 5' 8" (172.7 cm)  Body mass index is 32.08 kg/m².    Physical Exam  Constitutional:       Appearance: Normal appearance. He is normal weight.   HENT:      Head: Normocephalic and atraumatic.      Nose: Nose normal.   Eyes:      Extraocular Movements: Extraocular movements intact.      Pupils: Pupils are equal, round, and reactive to light.   Cardiovascular:      Rate and Rhythm: Normal rate and regular rhythm.      Pulses: Normal pulses.      Heart sounds: Normal heart sounds.   Pulmonary:      Effort: Pulmonary effort is normal.   Abdominal:      General: Abdomen is flat.      Palpations: Abdomen is soft.   Musculoskeletal:         General: Normal range of motion.      Cervical back: Normal range of motion and neck supple.   Skin:     General: Skin is warm and dry.   Neurological:      General: No focal deficit present.      Mental Status: He is alert and oriented to person, place, and time.   Psychiatric:         Mood and Affect: Mood normal.         Behavior: Behavior normal.       Significant Labs:  CBC:   Recent Labs   Lab 10/06/22  1616 10/07/22  0324   WBC 9.38 12.07   RBC 3.44* 3.69*   HGB 10.5* 11.3*   HCT 31.9* 33.5*    313   MCV 93 91   MCH 30.5 30.6   MCHC 32.9 33.7     CMP:   Recent Labs   Lab 10/06/22  0856 10/06/22  1616 10/07/22  0324   GLU  --  99 105   CALCIUM  --  7.7* 8.2*   ALBUMIN  --  3.2* 3.3*   PROT  --  5.9* 6.5   NA  --  135* 136   K 3.7 4.2 5.0   CO2  --  16* 16*   CL  --  99 99   BUN  --  69* 83*   CREATININE  --  19.7* 21.0*   ALKPHOS  --  58 62   ALT  --  7* 9*   AST "  --  7* 9*       Diagnostics:  US - Kidney: Results for orders placed during the hospital encounter of 12/14/21    US Transplant Kidney With Doppler    Narrative  EXAMINATION:  US TRANSPLANT KIDNEY WITH DOPPLER    CLINICAL HISTORY:  Awaiting organ transplant status    TECHNIQUE:  Real-time ultrasound evaluation was performed over the patient's renal allograft.    COMPARISON:  Ultrasound transplant kidney with Doppler 04/07/2021    FINDINGS:  Patient is status post renal transplant around 2017 in the right lower quadrant.    The renal transplant measures 8.8 cm and demonstrates no focal parenchymal abnormality. No transplant hydronephrosis. No peritransplant fluid.    Renal arterial resistive indices are as follows: Interlobar 0.73, segmental Up 0.77, segmental Mid 0.78, segmental Low 0.74.  There is no evidence of a tardus parvus waveform. The maximum velocity in the main renal artery is 71 cm/sec, with renal artery to iliac artery ratio of 0.8.    The renal transplant venous system is unremarkable.    Right iliac artery demonstrates normal waveform with peak systolic velocity of 86 cm/sec.    Bladder is not well distended and poorly characterized on today's exam.    Impression  Satisfactory sonographic appearance of the renal transplant.    Electronically signed by resident: Mata Sandoval  Date:    12/14/2021  Time:    15:28    Electronically signed by: Ant Knight  Date:    12/14/2021  Time:    15:42    Assessment/Plan:     Long-term use of immunosuppressant medication  - PCKD s/p kidney transplant 2015, On PD for 4 years prior to his transplant,   - Received his transplant at [facility] transplant doctor there was Dr. Sheets, His nephrologist now is Dr Padilla  - Pt used to take Prograf 2mg po BID, although his bottle says 4 bid   - Reduce prograf to 1mg BID on discharge. Communicated  change to pt and pt does understand    - Close lab follow up arranged   - Resume prednisone 5mg every other  day on discharge     H/O kidney transplant  - PCKD s/p kidney transplant 2015, On PD for 4 years prior to his transplant,   - Received his transplant at [facility] transplant doctor there was Dr. Sheets, His nephrologist now is Dr Padilla  - Kidney bx 4/13/21 resulted: 30% IF. 10/28 gloms are sclerosed, +ACR (tublitis and interstitial lymphocytic infiltration) and ABMR (glomerulitis, microcirculatory infiltration, + C4d). Pt has ACR, AVR, and ABMR in the setting of significant fibrosis. DSA positive for class 1: A24 (2113), B7 (1898).  - Underwentfor rejection treatment 4/16/2021 w/ SM 5mg/kg x 3 and PLEX x 3   - On PD currently;y    - Reduce prograf 1mg bid on discharge   - Close lab follow up arranged   - Resume prednisone 5mg every other day on discharge     ESRD on peritoneal dialysis  Per [facility] service       PKD (polycystic kidney disease)  See above       Miguel Rivera MD  Kidney Transplant  [facility]

## 2022-10-07 NOTE — SUBJECTIVE & OBJECTIVE
Interval History: 10/7: PPD 1 s/p WEB for basilar apex aneurysm. NAEON. AFVSS. Exam stable. Pain controlled. Tolerating PO. Voiding.     Medications:  Continuous Infusions:  Scheduled Meds:   aspirin  81 mg Oral Daily    atorvastatin  10 mg Oral Daily    predniSONE  5 mg Oral Every other day    senna-docusate 8.6-50 mg  1 tablet Oral BID    sevelamer carbonate  3,200 mg Oral TID WM    tacrolimus  1 mg Oral BID     PRN Meds:acetaminophen, dextrose 10%, dextrose 10%, glucagon (human recombinant), hydrALAZINE, insulin aspart U-100, labetalol, sodium chloride 0.9%     Review of Systems  Objective:     Weight: 95.7 kg (211 lb)  Body mass index is 32.08 kg/m².  Vital Signs (Most Recent):  Temp: 97.7 °F (36.5 °C) (10/07/22 0708)  Pulse: 75 (10/07/22 0853)  Resp: 20 (10/07/22 0807)  BP: (!) 142/86 (10/07/22 0807)  SpO2: 97 % (10/07/22 0853)   Vital Signs (24h Range):  Temp:  [96.6 °F (35.9 °C)-97.9 °F (36.6 °C)] 97.7 °F (36.5 °C)  Pulse:  [57-85] 75  Resp:  [12-20] 20  SpO2:  [93 %-98 %] 97 %  BP: ()/(56-86) 142/86     Date 10/07/22 0700 - 10/08/22 0659   Shift 4635-2109 9963-6798 3824-2187 24 Hour Total   INTAKE   P.O. 250   250   I.V.(mL/kg) 0(0)   0(0)   Shift Total(mL/kg) 250(2.6)   250(2.6)   OUTPUT   Shift Total(mL/kg)       Weight (kg) 95.7 95.7 95.7 95.7              Resp Rate Total:  [12 br/min-19 br/min] 14 br/min         Hemodialysis Catheter 04/12/21 0949 right subclavian (Active)       Physical Exam    Neurosurgery Physical Exam    GENERAL: resting comfortably  HEENT: NCAT, PERRL, mucous membranes moist  NECK: supple, trachea midline  CV: normal capillary refill  PULM: aerating well, symmetric expansion, no distress  ABD: soft, NT, ND  EXT: no c/c/e    NEURO:    AAO x 3  CN II-XII grossly intact  Fc x 4 antigravity  SILT    No drift or dysmetria    Groin flat and soft      Significant Labs:  Recent Labs   Lab 10/06/22  0856 10/06/22  1616 10/07/22  0324   GLU  --  99 105   NA  --  135* 136   K 3.7 4.2 5.0    CL  --  99 99   CO2  --  16* 16*   BUN  --  69* 83*   CREATININE  --  19.7* 21.0*   CALCIUM  --  7.7* 8.2*   MG  --  1.9 1.9     Recent Labs   Lab 10/06/22  1616 10/07/22  0324   WBC 9.38 12.07   HGB 10.5* 11.3*   HCT 31.9* 33.5*    313     Recent Labs   Lab 10/07/22  0324   INR 1.0   APTT 25.9     Microbiology Results (last 7 days)       ** No results found for the last 168 hours. **          All pertinent labs from the last 24 hours have been reviewed.    Significant Diagnostics:  I have reviewed all pertinent imaging results/findings within the past 24 hours.  X-Ray Chest AP Single View    Result Date: 10/7/2022  No acute intrathoracic abnormality appreciated on this single hypoventilatory radiographic view of the chest. Electronically signed by: Hollie Sheriff MD Date:    10/07/2022 Time:    06:52

## 2022-10-07 NOTE — CONSULTS
Inpatient consult to Physical Medicine Rehab  Consult performed by: Aspen Lam NP  Consult ordered by: Criselda Dunlap PA-C    Consult received.  Reviewed patient history and current admission.  PM&R following. Will follow up with pt once medically stable and able to participate with therapies.     Aspen Lam NP  Physical Medicine & Rehabilitation   10/07/2022

## 2022-10-07 NOTE — ASSESSMENT & PLAN NOTE
History of, on PD nightly   - See h/o kidney transplant  - Nephrology following  - Continue home meds

## 2022-10-07 NOTE — CARE UPDATE
"Followed up on consult for nephrology placed at 1811 to restart PD an for nephrology to call "now".     Per primary team, no PD tonight, pt to be seen in AM.  Lab Results   Component Value Date    CREATININE 19.7 (H) 10/06/2022    BUN 69 (H) 10/06/2022     (L) 10/06/2022    K 4.2 10/06/2022    CL 99 10/06/2022    CO2 16 (L) 10/06/2022       Laila Justin M.D.   Nephrology  [facility]     "

## 2022-10-07 NOTE — HPI
46 y.o. 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 Direct admit. Now s/p elective cerebral angiogram and web embolization. In [facility] for close monitotiring. KTM marycie consulted for IS management.      Transplant hx:  - PCKD s/p kidney transplant 2015, On PD for 4 years prior to his transplant,   - Received his transplant at [facility] transplant doctor there was Dr. Sheets, His nephrologist now is Dr Padilla  - Transferred to [facility] 04/2021 from local ED after prograf and myfortic 2-3 weeks non-compliance (insurance ran out after loss of employment), lead to JUSTINE.  - Kidney bx 4/13/21 resulted: 30% IF. 10/28 gloms are sclerosed, +ACR (tublitis and interstitial lymphocytic infiltration) and ABMR (glomerulitis, microcirculatory infiltration, + C4d). Pt has ACR, AVR, and ABMR in the setting of significant fibrosis. DSA positive for class 1: A24 (2113), B7 (1898).  - Transferred to KTS service 4/16 for treatment of rejection. Pheresis consulted for PLEX therapy. Patient underwent treatment with SM 5mg/kg x 3 and PLEX x 3 without improvement in UOP or labwork. Also given lasix trial without improvement.   - Discharged on TTS HD, Started PD 09/2021. Still remains on PD.   - Was on prograf 4/4 and Cellcept 1000/1000. Off Cellcept by his general nephrologist shortly after stating PD last year.   - Now on prograf 4/4

## 2022-10-07 NOTE — PROGRESS NOTES
[facility] - Neuro Critical [facility]  Neurosurgery  Progress Note    Subjective:     History of Present Illness: Adilson Sylvester is a 46 y.o. male with medical history of HTN, DM, PCKD on PD with recent DSA work-up on unruptured basilar tip end wall aneurysm. Plans for characterization of intracranial aneurysm + WEB embolization.       Post-Op Info:  * No procedures listed *   1 Day Post-Op     Interval History: 10/7: PPD 1 s/p WEB for basilar apex aneurysm. NAEON. AFVSS. Exam stable. Pain controlled. Tolerating PO. Voiding.     Medications:  Continuous Infusions:  Scheduled Meds:   aspirin  81 mg Oral Daily    atorvastatin  10 mg Oral Daily    predniSONE  5 mg Oral Every other day    senna-docusate 8.6-50 mg  1 tablet Oral BID    sevelamer carbonate  3,200 mg Oral TID WM    tacrolimus  1 mg Oral BID     PRN Meds:acetaminophen, dextrose 10%, dextrose 10%, glucagon (human recombinant), hydrALAZINE, insulin aspart U-100, labetalol, sodium chloride 0.9%     Review of Systems  Objective:     Weight: 95.7 kg (211 lb)  Body mass index is 32.08 kg/m².  Vital Signs (Most Recent):  Temp: 97.7 °F (36.5 °C) (10/07/22 0708)  Pulse: 75 (10/07/22 0853)  Resp: 20 (10/07/22 0807)  BP: (!) 142/86 (10/07/22 0807)  SpO2: 97 % (10/07/22 0853)   Vital Signs (24h Range):  Temp:  [96.6 °F (35.9 °C)-97.9 °F (36.6 °C)] 97.7 °F (36.5 °C)  Pulse:  [57-85] 75  Resp:  [12-20] 20  SpO2:  [93 %-98 %] 97 %  BP: ()/(56-86) 142/86     Date 10/07/22 0700 - 10/08/22 0659   Shift 5093-7593 2568-2360 8266-5544 24 Hour Total   INTAKE   P.O. 250   250   I.V.(mL/kg) 0(0)   0(0)   Shift Total(mL/kg) 250(2.6)   250(2.6)   OUTPUT   Shift Total(mL/kg)       Weight (kg) 95.7 95.7 95.7 95.7              Resp Rate Total:  [12 br/min-19 br/min] 14 br/min         Hemodialysis Catheter 04/12/21 0949 right subclavian (Active)       Physical Exam    Neurosurgery Physical Exam    GENERAL: resting comfortably  HEENT: NCAT, PERRL, mucous membranes moist  NECK:  supple, trachea midline  CV: normal capillary refill  PULM: aerating well, symmetric expansion, no distress  ABD: soft, NT, ND  EXT: no c/c/e    NEURO:    AAO x 3  CN II-XII grossly intact  Fc x 4 antigravity  SILT    No drift or dysmetria    Groin flat and soft      Significant Labs:  Recent Labs   Lab 10/06/22  0856 10/06/22  1616 10/07/22  0324   GLU  --  99 105   NA  --  135* 136   K 3.7 4.2 5.0   CL  --  99 99   CO2  --  16* 16*   BUN  --  69* 83*   CREATININE  --  19.7* 21.0*   CALCIUM  --  7.7* 8.2*   MG  --  1.9 1.9     Recent Labs   Lab 10/06/22  1616 10/07/22  0324   WBC 9.38 12.07   HGB 10.5* 11.3*   HCT 31.9* 33.5*    313     Recent Labs   Lab 10/07/22  0324   INR 1.0   APTT 25.9     Microbiology Results (last 7 days)       ** No results found for the last 168 hours. **          All pertinent labs from the last 24 hours have been reviewed.    Significant Diagnostics:  I have reviewed all pertinent imaging results/findings within the past 24 hours.  X-Ray Chest AP Single View    Result Date: 10/7/2022  No acute intrathoracic abnormality appreciated on this single hypoventilatory radiographic view of the chest. Electronically signed by: Hollie Sheriff MD Date:    10/07/2022 Time:    06:52       Assessment/Plan:     * Basilar artery aneurysm  Adilson Sylvester is a 46 y.o. male with medical history of HTN, DM, PCKD on PD with recent DSA work-up on unruptured basilar tip end wall aneurysm. Now s/p characterization of intracranial aneurysm + WEB embolization.     --Admitted to [facility] with q1h neurochecks.   --All labs and diagnostics reviewed.   --Neurologically at baseline.   --Tolerating diet, voiding spontaneoulsy.   --Transplant nephro recs in.   --Discharge home on  and 2w outpatient f/u.   --Please page with questions.     Plan d/w Dr. Pastrana.     Dispo: home        Ervin Garcia MD  Neurosurgery  [facility] - Neuro Critical Care

## 2022-10-07 NOTE — CONSULTS
"  [facility] - Neuro Critical Care  Adult Nutrition  Consult Note    SUMMARY     Recommendations    1. Continue current renal/diabetic diet- encourage adequate PO intake.  2. Add Novasource Renal BID.   3. RD following.    Goals: Will meet % EEN/EPN by next RD f/u.  Nutrition Goal Status: new  Communication of RD Recs:  (POC)    Assessment and Plan    Nutrition Problem  Inadequate protein energy intake    Related to (etiology):   Decreased appetite    Signs and Symptoms (as evidenced by):   PO intake 25-50%    Interventions/Recommendations (treatment strategy):  Collaboration of nutrition care with other providers  Estrada Beisbol     Nutrition Diagnosis Status:   New     Reason for Assessment    Reason For Assessment: consult  Diagnosis:  (Basilar artery aneurysm)  Relevant Medical History: HTN, T2DM, h/o kidney tx (2015), PCKD on PD  Interdisciplinary Rounds: did not attend  General Information Comments: RD consulted to assess dietary needs. Spoke with pt at bedside. States appetite PTA good with 2-3 meals/day on general diet. States intake now 25-50% d/t decreased appetite. States # and that wt fluctuates depending on fluid accumulation. Noted wt on 6/23/22 was 223#. NFPE not warranted d/t appearing well-nourished. LBM 10.5.  Nutrition Discharge Planning: Renal/ diabetic diet with adequate PO intake    Nutrition Risk Screen    Nutrition Risk Screen: no indicators present    Nutrition/Diet History    Patient Reported Diet/Restrictions/Preferences: general  Spiritual, Cultural Beliefs, Religion Practices, Values that Affect Care: no  Food Allergies: NKFA  Factors Affecting Nutritional Intake: decreased appetite    Anthropometrics    Temp: 97.7 °F (36.5 °C)  Height Method: Stated  Height: 5' 8" (172.7 cm)  Height (inches): 68 in  Weight Method: Bed Scale  Weight: 95.7 kg (210 lb 15.7 oz)  Weight (lb): 210.98 lb  Ideal Body Weight (IBW), Male: 154 lb  % Ideal Body Weight, Male (lb): 137 %  BMI " (Calculated): 32.1  BMI Grade: 30 - 34.9- obesity - grade I     Lab/Procedures/Meds    Pertinent Labs Reviewed: reviewed  Pertinent Labs Comments: Albumin 3.3, Ca 8.2, AST 9, ALT 9, Creatinine 21, Phos 9.1, BUN 83, Hgb 11.3, Hct 33.5  Pertinent Medications Reviewed: reviewed  Pertinent Medications Comments: atorvastatin, senna-docusate, tacrolimus    Estimated/Assessed Needs    Weight Used For Calorie Calculations: 95.7 kg (210 lb 15.7 oz)  Energy Calorie Requirements (kcal): 2393 kcals  Energy Need Method: Kcal/kg (25 kcal/kg IBW (on PD))  Protein Requirements: 84 g (1.2 g/kg IBW (on PD))  Weight Used For Protein Calculations: 69.9 kg (154 lb)  Fluid Requirements (mL): 1 ml or fluid per MD  Estimated Fluid Requirement Method: RDA Method  RDA Method (mL): 2393     Nutrition Prescription Ordered    Current Diet Order: Renal/ diabetic    Evaluation of Received Nutrient/Fluid Intake    I/O: +800 ml since admit  Energy Calories Required: not meeting needs  Protein Required: not meeting needs  Fluid Required: not meeting needs  Tolerance: tolerating  % Intake of Estimated Energy Needs: 25 - 50 %  % Meal Intake: 25 - 50 %    Nutrition Risk    Level of Risk/Frequency of Follow-up:  (1 time/week)     Monitor and Evaluation    Food and Nutrient Intake: energy intake, food and beverage intake  Food and Nutrient Adminstration: diet order  Knowledge/Beliefs/Attitudes: food and nutrition knowledge/skill, beliefs and attitudes  Physical Activity and Function: nutrition-related ADLs and IADLs  Anthropometric Measurements: height/length, weight, weight change, body mass index  Biochemical Data, Medical Tests and Procedures: electrolyte and renal panel, gastrointestinal profile, glucose/endocrine profile, inflammatory profile, lipid profile  Nutrition-Focused Physical Findings: overall appearance     Nutrition Follow-Up    RD Follow-up?: Yes    Lulu Colon PL-LDN

## 2022-10-07 NOTE — PT/OT/SLP EVAL
"Physical Therapy Evaluation and Discharge Note    Patient Name:  Adilson Sylvester   MRN:  [id number]    Recommendations:     Discharge Recommendations:  home   Discharge Equipment Recommendations: none   Barriers to discharge: None    Assessment:     Adilson Sylvester is a 46 y.o. male admitted with a medical diagnosis of Basilar artery aneurysm. .  At this time, patient is functioning at their prior level of function and does not require further acute PT services.     Recent Surgery: * No procedures listed * 1 Day Post-Op    Plan:     During this hospitalization, patient does not require further acute PT services.  Please re-consult if situation changes.      Subjective     Chief Complaint: Basilar artery aneurysm   Patient/Family Comments/goals: "I feel pretty good, it's nice to be up and walking"  Pain/Comfort:  Pain Rating 1: 0/10  Pain Rating Post-Intervention 1: 0/10    Patients cultural, spiritual, Mandaeism conflicts given the current situation: no    Living Environment:  Pt lives with spouse and 2 children (ages 15 and 11) in a 2nd floor apt with 1 flight of steps to enter and B HRs. Pt does not have elevator access.   Prior to admission, patients level of function was (I) with functional mobility and ADLs using no DME.  Equipment used at home: none.  DME owned (not currently used): none.  Upon discharge, patient will have assistance from spouse and children.    Objective:     Communicated with RN prior to session.  Patient found HOB elevated with bed alarm, blood pressure cuff, telemetry, SCD, pulse ox (continuous) upon PT entry to room.    General Precautions: Standard, fall   Orthopedic Precautions:N/A   Braces: N/A   Respiratory Status: Room air    Exams:  Cognitive Exam:  Patient is oriented to Person, Place, Time, and Situation  Gross Motor Coordination:  WFL  Postural Exam:  Patient presented with the following abnormalities:    -       Rounded shoulders  Sensation:    -       Intact  light/touch B " LEs  RLE ROM: WNL  RLE Strength: WFL  LLE ROM: WNL  LLE Strength: WFL    Functional Mobility:  Bed Mobility:     Supine to Sit: independence  Transfers:     Sit to Stand:  independence with no AD  Bed to Chair: supervision with  no AD  using  Step Transfer  Gait:   Pt received gait training in hallway ~250' with supervision and no AD. Pt conversational throughout and had no LOB.  Stairs:    Pt ascended/descended 1 flight with No Assistive Device with bilateral handrails with Supervision    AM-PAC 6 CLICK MOBILITY  Total Score:24       Therapeutic Activities and Exercises:   Pt assisted with functional mobility as noted above.   Discussed PT POC and role of PT in acute care  Discussed stroke warning signs and symptoms, and to seek medical attention immediately if pt begins to have symptoms  Pt and spouse agreeable with d/c from acute PT services    AM-PAC 6 CLICK MOBILITY  Total Score:24     Patient left up in chair with all lines intact, call button in reach, chair alarm on, and RN notified.    GOALS:   Multidisciplinary Problems       Physical Therapy Goals       Not on file              Multidisciplinary Problems (Resolved)          Problem: Physical Therapy    Goal Priority Disciplines Outcome Goal Variances Interventions   Physical Therapy Goal   (Resolved)     PT, PT/OT Met     Description: PT eval complete 10/7, no goals established at this time. D/c from acute PT services                       History:     Past Medical History:   Diagnosis Date    Diabetes     Diabetes mellitus, type 2     Gout     Hypertension     Hypothyroidism     Hypothyroidism     Kidney transplant recipient     Obesity     Polycystic kidney disease        Past Surgical History:   Procedure Laterality Date    ANKLE SURGERY      HERNIA REPAIR      KIDNEY TRANSPLANT  2015    [employer]    LITHOTRIPSY Left 2017    naitive kidney     PD catheter      PERITONEAL CATHETER INSERTION         Time Tracking:     PT Received On: 10/07/22  PT  Start Time: 0921     PT Stop Time: 0940  PT Total Time (min): 19 min     Billable Minutes: Evaluation 10 and Therapeutic Activity 9      10/07/2022

## 2022-10-07 NOTE — HPI
Adilson Sylvester is a 46 y.o. male with medical history of HTN, DM, PCKD on PD with recent DSA work-up on unruptured basilar tip end wall aneurysm. Plans for characterization of intracranial aneurysm + WEB embolization.

## 2022-10-07 NOTE — ASSESSMENT & PLAN NOTE
History of  - Last A1c 4/2022 10.0  - Repeat A1c 5.4  - Diabetic diet   - SSI with accuchecks  - Hold home medications

## 2022-10-07 NOTE — HOSPITAL COURSE
10/7: PPD 1 s/p WEB for basilar apex aneurysm. NAEON. AFVSS. Exam stable. Pain controlled. Tolerating PO. Voiding.

## 2022-10-07 NOTE — HPI
Adilson Sylvester is a 46 y.o. male with medical history of HTN, DM, PCKD on CCPD who was admitted for basilar artery aneurysm, s/p angiogram and web embolization on 10/6.  He tolerated procedure well and is scheduled for discharge today.  Nephrology was consulted for ESRD management while IP.

## 2022-10-07 NOTE — ASSESSMENT & PLAN NOTE
Adilson Sylvester is a 46 y.o. male with medical history of HTN, DM, PCKD on PD with recent DSA work-up on unruptured basilar tip end wall aneurysm. Now s/p characterization of intracranial aneurysm + WEB embolization.     --Admitted to [facility] with q1h neurochecks.   --All labs and diagnostics reviewed.   --Neurologically at baseline.   --Tolerating diet, voiding spontaneoulsy.   --Transplant nephro recs in.   --Discharge home on  and 2w outpatient f/u.   --Please page with questions.     Plan d/w Dr. Pastrana.     Dispo: home

## 2022-10-07 NOTE — ASSESSMENT & PLAN NOTE
- PCKD s/p kidney transplant 2015, On PD for 4 years prior to his transplant,   - Received his transplant at [facility] transplant doctor there was Dr. Sheets, His nephrologist now is Dr Padilla  - Kidney bx 4/13/21 resulted: 30% IF. 10/28 gloms are sclerosed, +ACR (tublitis and interstitial lymphocytic infiltration) and ABMR (glomerulitis, microcirculatory infiltration, + C4d). Pt has ACR, AVR, and ABMR in the setting of significant fibrosis. DSA positive for class 1: A24 (2113), B7 (1898).  - Underwentfor rejection treatment 4/16/2021 w/ SM 5mg/kg x 3 and PLEX x 3   - On PD currently;y    - Resume prograf 4/4 on discharge   - Resume prednisone 5mg every other day on discharge   - Daily prograf levels inpt

## 2022-10-07 NOTE — CONSULTS
[facility] - Neuro Critical Care  Nephrology  Consult Note    Patient Name: Adilson Sylvester  MRN: [id number]  Admission Date: 10/6/2022  Hospital Length of Stay: 1 days  Attending Provider: Kyler Hopkins MD   Primary Care Physician: To Obtain Unable  Principal Problem:Basilar artery aneurysm    Inpatient consult to Nephrology  Consult performed by: Roland Sierra NP  Consult ordered by: Criselda Dunlap PA-C  Reason for consult: ESRD        Subjective:     HPI:  Adilson Sylvester is a 46 y.o. male with medical history of HTN, DM, PCKD on CCPD who was admitted for basilar artery aneurysm, s/p angiogram and web embolization on 10/6.  He tolerated procedure well and is scheduled for discharge today.  Nephrology was consulted for ESRD management while IP.      Past Medical History:   Diagnosis Date    Diabetes     Diabetes mellitus, type 2     Gout     Hypertension     Hypothyroidism     Hypothyroidism     Kidney transplant recipient     Obesity     Polycystic kidney disease        Past Surgical History:   Procedure Laterality Date    ANKLE SURGERY      HERNIA REPAIR      KIDNEY TRANSPLANT  2015    [employer]    LITHOTRIPSY Left 2017    naitive kidney     PD catheter      PERITONEAL CATHETER INSERTION         Review of patient's allergies indicates:  No Known Allergies  Current Facility-Administered Medications   Medication Frequency    acetaminophen tablet 650 mg Q6H PRN    aspirin chewable tablet 81 mg Daily    atorvastatin tablet 10 mg Daily    calcitRIOL capsule 0.25 mcg Daily    carvediloL tablet 25 mg BID    cinacalcet tablet 30 mg Daily with breakfast    dextrose 10% bolus 125 mL PRN    dextrose 10% bolus 250 mL PRN    glucagon (human recombinant) injection 1 mg PRN    hydrALAZINE injection 10 mg Q6H PRN    insulin aspart U-100 pen 1-10 Units QID (AC + HS) PRN    labetalol 20 mg/4 mL (5 mg/mL) IV syring Q6H PRN    NIFEdipine 24 hr tablet 30 mg Daily    predniSONE tablet 5 mg  Every other day    senna-docusate 8.6-50 mg per tablet 1 tablet BID    sevelamer carbonate tablet 3,200 mg TID WM    sodium chloride 0.9% flush 10 mL PRN    tacrolimus capsule 1 mg BID     Family History       Problem Relation (Age of Onset)    Diabetes Mother    Hypertension Mother, Paternal Aunt    Kidney disease Father, Sister, Paternal Aunt          Tobacco Use    Smoking status: Former     Types: Cigarettes     Quit date: 10/1/2008     Years since quittin.0    Smokeless tobacco: Never   Substance and Sexual Activity    Alcohol use: Not Currently     Comment: occasional    Drug use: Never    Sexual activity: Yes     Partners: Female     Review of Systems   Constitutional:  Negative for chills, fatigue and fever.   HENT:  Negative for trouble swallowing.    Eyes:  Negative for visual disturbance.   Respiratory:  Negative for shortness of breath.    Cardiovascular:  Negative for chest pain.   Gastrointestinal:  Negative for abdominal pain, nausea and vomiting.   Musculoskeletal:  Negative for neck pain.   Skin:  Negative for wound.   Neurological:  Negative for dizziness, seizures, facial asymmetry, speech difficulty, weakness, numbness and headaches.   Hematological:  Does not bruise/bleed easily.   Psychiatric/Behavioral:  Negative for agitation and confusion.    Objective:     Vital Signs (Most Recent):  Temp: 97.7 °F (36.5 °C) (10/07/22 0708)  Pulse: 80 (10/07/22 0908)  Resp: 20 (10/07/22 0908)  BP: (!) 162/90 (10/07/22 1101)  SpO2: 98 % (10/07/22 0908)  O2 Device (Oxygen Therapy): room air (10/07/22 0908)   Vital Signs (24h Range):  Temp:  [96.6 °F (35.9 °C)-97.9 °F (36.6 °C)] 97.7 °F (36.5 °C)  Pulse:  [57-85] 80  Resp:  [12-20] 20  SpO2:  [93 %-98 %] 98 %  BP: ()/(56-90) 162/90     Weight: 95.7 kg (210 lb 15.7 oz) (10/07/22 0933)  Body mass index is 32.08 kg/m².  Body surface area is 2.14 meters squared.    I/O last 3 completed shifts:  In: 550 [IV Piggyback:550]  Out: -     Physical  Exam  Vitals and nursing note reviewed.   Constitutional:       Appearance: Normal appearance. He is normal weight.   HENT:      Head: Normocephalic and atraumatic.      Nose: Nose normal.   Eyes:      Extraocular Movements: Extraocular movements intact.      Pupils: Pupils are equal, round, and reactive to light.   Cardiovascular:      Rate and Rhythm: Normal rate and regular rhythm.      Pulses: Normal pulses.      Heart sounds: Normal heart sounds.   Pulmonary:      Effort: Pulmonary effort is normal.   Abdominal:      General: Abdomen is flat. There is distension.      Palpations: Abdomen is soft.      Tenderness: There is no abdominal tenderness.      Comments: PD catheter    Musculoskeletal:         General: Normal range of motion.      Cervical back: Normal range of motion and neck supple.   Skin:     General: Skin is warm and dry.   Neurological:      General: No focal deficit present.      Mental Status: He is alert and oriented to person, place, and time.   Psychiatric:         Mood and Affect: Mood normal.         Behavior: Behavior normal.       Significant Labs:  CBC:   Recent Labs   Lab 10/07/22  0324   WBC 12.07   RBC 3.69*   HGB 11.3*   HCT 33.5*      MCV 91   MCH 30.6   MCHC 33.7     CMP:   Recent Labs   Lab 10/07/22  0324      CALCIUM 8.2*   ALBUMIN 3.3*   PROT 6.5      K 5.0   CO2 16*   CL 99   BUN 83*   CREATININE 21.0*   ALKPHOS 62   ALT 9*   AST 9*   BILITOT 0.6         Assessment/Plan:     ESRD on peritoneal dialysis  ESRD on CCPD  [facility]  Fill volume of 2.2L  4 cycles  9 hours  2L LF    Plan/recommendations:  -plans for discharge today, patient to resume his routine OP orders upon discharge  -if patient remains in-house, will perform his scheduled prescription as above.  -instructed to continue his home phos binders and enforce low phos diet  -can follow-up with his OP nephrologist PRN.      Roland Mckinley, NP  Nephrology  [facility] - [facility]

## 2022-10-07 NOTE — ASSESSMENT & PLAN NOTE
ESRD on CCPD  [facility]  Fill volume of 2.2L  4 cycles  9 hours  2L LF    Plan/recommendations:  -plans for discharge today, patient to resume his routine OP orders upon discharge  -if patient remains in-house, will perform his scheduled prescription as above.  -instructed to continue his home phos binders and enforce low phos diet  -can follow-up with his OP nephrologist PRN.

## 2022-10-07 NOTE — PLAN OF CARE
[facility] Care Plan    POC reviewed with Adilson Sylvester and family at 0300. Pt verbalized understanding. Questions and concerns addressed. No acute events overnight. Pt progressing toward goals. Will continue to monitor. See below and flowsheets for full assessment and VS info.     -HD consent signed. No peritoneal dialysis performed. Pt ordered to restart PD once home.   -Neuro exam stable and unchanged.    Is this a stroke patient? yes- Stroke booklet reviewed with patient and family, risk factors identified for patient and stroke booklet remains at bedside for ongoing education.     Neuro:  [place] Coma Scale  Best Eye Response: 4-->(E4) spontaneous  Best Motor Response: 6-->(M6) obeys commands  Best Verbal Response: 5-->(V5) oriented  Rod Coma Scale Score: 15  Assessment Qualifiers: patient not sedated/intubated  Pupil PERRLA: yes     24hr Temp:  [96.6 °F (35.9 °C)-97.9 °F (36.6 °C)]     CV:   Rhythm: normal sinus rhythm  BP goals:   SBP < 160  MAP > 65    Resp:   O2 Device (Oxygen Therapy): room air       Plan: N/A    GI/:     Diet/Nutrition Received: NPO  Last Bowel Movement: 10/05/22  Voiding Characteristics: patient on peritoneal dialysis    Intake/Output Summary (Last 24 hours) at 10/7/2022 0528  Last data filed at 10/6/2022 1510  Gross per 24 hour   Intake 550 ml   Output --   Net 550 ml     Unmeasured Output  Stool Occurrence: 0    Labs/Accuchecks:  Recent Labs   Lab 10/07/22  0324   WBC 12.07   RBC 3.69*   HGB 11.3*   HCT 33.5*         Recent Labs   Lab 10/07/22  0324      K 5.0   CO2 16*   CL 99   BUN 83*   CREATININE 21.0*   ALKPHOS 62   ALT 9*   AST 9*   BILITOT 0.6      Recent Labs   Lab 10/07/22  0324   INR 1.0   APTT 25.9    No results for input(s): CPK, CPKMB, TROPONINI, MB in the last 168 hours.    Electrolytes: N/A - electrolytes WDL  Accuchecks: Q6H    Gtts:      LDA/Wounds:  Lines/Drains/Airways       Central Venous Catheter Line  Duration                  Hemodialysis Catheter  04/12/21 0949 right subclavian 542 days              Peripheral Intravenous Line  Duration                  Peripheral IV - Single Lumen 10/06/22 1002 20 G Right Antecubital <1 day                  Wounds: Yes  Wound care consulted: No

## 2022-10-07 NOTE — SUBJECTIVE & OBJECTIVE
Interval History:  NAEON. ASA started. KTM & nephrology consulted for management while IP. Phos elevated this AM. Patient to dialyze at home tonight. Stable for discharge home with OP follow up.     Review of Systems   Constitutional:  Negative for chills, fatigue and fever.   HENT:  Negative for trouble swallowing.    Eyes:  Negative for visual disturbance.   Respiratory:  Negative for shortness of breath.    Cardiovascular:  Negative for chest pain.   Gastrointestinal:  Negative for abdominal pain, nausea and vomiting.   Musculoskeletal:  Negative for neck pain.   Skin:  Negative for wound.   Neurological:  Negative for dizziness, facial asymmetry, speech difficulty, weakness, numbness and headaches.   Psychiatric/Behavioral:  Negative for agitation and confusion.      Objective:     Vitals:  Temp: 97.7 °F (36.5 °C)  Pulse: 73  Rhythm: normal sinus rhythm, sinus bradycardia  BP: (!) 162/90  MAP (mmHg): 120  Resp: 20  SpO2: 98 %  O2 Device (Oxygen Therapy): room air    Temp  Min: 97 °F (36.1 °C)  Max: 97.9 °F (36.6 °C)  Pulse  Min: 57  Max: 85  BP  Min: 98/57  Max: 162/90  MAP (mmHg)  Min: 58  Max: 120  Resp  Min: 12  Max: 20  SpO2  Min: 93 %  Max: 98 %    10/06 0701 - 10/07 0700  In: 550   Out: -    Unmeasured Output  Stool Occurrence: 0       Physical Exam  Vitals and nursing note reviewed.   Constitutional:       General: He is not in acute distress.     Appearance: Normal appearance.      Comments: Well developed. Well nourished. Resting comfortably in bed.   HENT:      Head: Normocephalic and atraumatic.      Right Ear: External ear normal.      Left Ear: External ear normal.      Nose: Nose normal.      Mouth/Throat:      Mouth: Mucous membranes are moist.      Pharynx: Oropharynx is clear.   Eyes:      Extraocular Movements: Extraocular movements intact.      Pupils: Pupils are equal, round, and reactive to light.   Cardiovascular:      Rate and Rhythm: Normal rate and regular rhythm.   Pulmonary:       Effort: Pulmonary effort is normal. No respiratory distress.   Abdominal:      General: Abdomen is flat. There is no distension.      Palpations: Abdomen is soft.   Musculoskeletal:      Right lower leg: No edema.      Left lower leg: No edema.   Skin:     General: Skin is warm and dry.   Neurological:      Mental Status: He is alert.      Comments: E4V5M6  AA&Ox4. Speech fluent. Answers questions appropriately. Follows commands briskly.  PERRL. EOMI. CN II-XII grossly intact.  HAYLEY & AG. SILT in all 4 extremities. No pronator drift.      Gait & coordination exams deferred.    Medications:  ContinuousScheduledPRN  Today I personally reviewed pertinent medications, lines/drains/airways, imaging, cardiology results, laboratory results, notably:    Laboratory:  CBC:  Recent Labs   Lab 10/07/22  0324   WBC 12.07   RBC 3.69*   HGB 11.3*   HCT 33.5*      MCV 91   MCH 30.6   MCHC 33.7       CMP:  Recent Labs   Lab 10/07/22  0324   CALCIUM 8.2*   PROT 6.5      K 5.0   CO2 16*   CL 99   BUN 83*   CREATININE 21.0*   ALKPHOS 62   ALT 9*   AST 9*   BILITOT 0.6       Coagulation:  Recent Labs   Lab 10/07/22  0324   LABPROT 10.0   INR 1.0   APTT 25.9       Endocrine:  Recent Labs     10/07/22  0324   HGBA1C 5.4         Diet  [facility]; 2000 Calorie; Renal  Diet Adult Regular  Diet diabetic [facility]; 2000 Calorie; Renal  Diet Adult Regular

## 2022-10-07 NOTE — ASSESSMENT & PLAN NOTE
s/p kidney transplant in 2015, on prograf  - KTM following  - Prograf levels therapeutic  - Continue home prograf  - Avoid nephrotoxic medications and renally dose medications as able

## 2022-10-07 NOTE — TELEPHONE ENCOUNTER
----- Message from Ervin Garcia MD sent at 10/7/2022  9:48 AM CDT -----  Please have pt f/u in 2 weeks with Yaritza and in 3 months with Dr. Pastrana with MRA with contrast. Thank you.

## 2022-10-07 NOTE — ASSESSMENT & PLAN NOTE
46 y.o. 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 Now s/p elective cerebral angiogram with web embolization.   - Admit to [facility]  - q1 neuro checks, vitals, I/Os  - IR following  - Daily CBC, CMP, mag, phos  - SBP < 160  - PRN labetalol, hydralazine  - Start ASA   - PT/OT as appropriate  - VTE prophylaxis: mechanical, hold chemical in the acute setting   - Stable for discharge home with outpatient follow up

## 2022-10-07 NOTE — DISCHARGE INSTRUCTIONS
--Patient stable for discharge to home    --Please take prescriptions as detailed in medication list      -If you are prescribed Plavix or Brilinta DO NOT stop taking unless your Neurosurgeon specifically tells you, also DO NOT take nexium or protonix while on these medications      -If there are any issues getting your Plavix or Brilinta for any reason, call the Neurosurgery Clinic immediately    --All questions/concerns addressed and answered    --Please followup with neurosurgery clinic in 2 weeks; to be arranged by Neurosurgery Clinic     --Please call immediately for any new onset nausea/vomiting/fever/chills, numbness/tingling/weakness, groin swelling, or lower extremity color changes    Groin Care Instructions:  -If you have any dressings at discharge, please remove 24 hours after the surgery.  -You may shower daily but do not soak or submerge wound in water for 7 days  -Keep all groin site clean, dry, and open to air.  -Do not apply creams or ointments to the wound.  -Call Neurosurgery if the wound opens, drains, or becomes red, or groin become swollen.  -No lifting greater than 10 pounds   -No spreading of legs or stretching groin muscles for one week (no sexual activity) for 7 days  -No driving

## 2022-10-07 NOTE — PROGRESS NOTES
[facility]  Neurocritical Care  Progress Note    Admit Date: 10/6/2022  Service Date: 10/07/2022  Length of Stay: 1    Subjective:     Chief Complaint: Basilar artery aneurysm    History of Present Illness: Adilson Sylvester is a 46 y.o. male with past medical history of of HTN, DM, PCKD on PD (nightly) s/p kidney transplant (2015, on tacro) with recent DSA work-up on unruptured basilar tip end wall aneurysm. Now s/p elective cerebral angiogram and web embolization. The procedure was tolerated well and was without complications. Currently, the patient denies any complaints, including HA, weakness, numbness, tingling, and confusion. He reports no identifying, alleviating, or exacerbating factors. The patient will be admitted to [facility] for close monitoring and a higher level of care.       Hospital Course: 10/07/2022. NAEON. Patient stable for discharge home with outpatient follow up.       Interval History:  NAEON. ASA started. KTM & nephrology consulted for management while IP. Phos elevated this AM. Patient to dialyze at home tonight. Stable for discharge home with OP follow up.     Review of Systems   Constitutional:  Negative for chills, fatigue and fever.   HENT:  Negative for trouble swallowing.    Eyes:  Negative for visual disturbance.   Respiratory:  Negative for shortness of breath.    Cardiovascular:  Negative for chest pain.   Gastrointestinal:  Negative for abdominal pain, nausea and vomiting.   Musculoskeletal:  Negative for neck pain.   Skin:  Negative for wound.   Neurological:  Negative for dizziness, facial asymmetry, speech difficulty, weakness, numbness and headaches.   Psychiatric/Behavioral:  Negative for agitation and confusion.      Objective:     Vitals:  Temp: 97.7 °F (36.5 °C)  Pulse: 73  Rhythm: normal sinus rhythm, sinus bradycardia  BP: (!) 162/90  MAP (mmHg): 120  Resp: 20  SpO2: 98 %  O2 Device (Oxygen Therapy): room air    Temp  Min: 97 °F (36.1 °C)  Max: 97.9 °F  (36.6 °C)  Pulse  Min: 57  Max: 85  BP  Min: 98/57  Max: 162/90  MAP (mmHg)  Min: 58  Max: 120  Resp  Min: 12  Max: 20  SpO2  Min: 93 %  Max: 98 %    10/06 0701 - 10/07 0700  In: 550   Out: -    Unmeasured Output  Stool Occurrence: 0       Physical Exam  Vitals and nursing note reviewed.   Constitutional:       General: He is not in acute distress.     Appearance: Normal appearance.      Comments: Well developed. Well nourished. Resting comfortably in bed.   HENT:      Head: Normocephalic and atraumatic.      Right Ear: External ear normal.      Left Ear: External ear normal.      Nose: Nose normal.      Mouth/Throat:      Mouth: Mucous membranes are moist.      Pharynx: Oropharynx is clear.   Eyes:      Extraocular Movements: Extraocular movements intact.      Pupils: Pupils are equal, round, and reactive to light.   Cardiovascular:      Rate and Rhythm: Normal rate and regular rhythm.   Pulmonary:      Effort: Pulmonary effort is normal. No respiratory distress.   Abdominal:      General: Abdomen is flat. There is no distension.      Palpations: Abdomen is soft.   Musculoskeletal:      Right lower leg: No edema.      Left lower leg: No edema.   Skin:     General: Skin is warm and dry.   Neurological:      Mental Status: He is alert.      Comments: E4V5M6  AA&Ox4. Speech fluent. Answers questions appropriately. Follows commands briskly.  PERRL. EOMI. CN II-XII grossly intact.  HAYLEY & AG. SILT in all 4 extremities. No pronator drift.      Gait & coordination exams deferred.    Medications:  ContinuousScheduledPRN  Today I personally reviewed pertinent medications, lines/drains/airways, imaging, cardiology results, laboratory results, notably:    Laboratory:  CBC:  Recent Labs   Lab 10/07/22  0324   WBC 12.07   RBC 3.69*   HGB 11.3*   HCT 33.5*      MCV 91   MCH 30.6   MCHC 33.7       CMP:  Recent Labs   Lab 10/07/22  0324   CALCIUM 8.2*   PROT 6.5      K 5.0   CO2 16*   CL 99   BUN 83*   CREATININE  21.0*   ALKPHOS 62   ALT 9*   AST 9*   BILITOT 0.6       Coagulation:  Recent Labs   Lab 10/07/22  0324   LABPROT 10.0   INR 1.0   APTT 25.9       Endocrine:  Recent Labs     10/07/22  0324   HGBA1C 5.4         Diet  Diet diabetic [facility]; 2000 Calorie; Renal  Diet Adult Regular  Diet diabetic [facility]; 2000 Calorie; Renal  Diet Adult Regular    Assessment/Plan:     Neuro  * Basilar artery aneurysm  46 y.o. 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 Now s/p elective cerebral angiogram with web embolization.   - Admit to [facility]  - q1 neuro checks, vitals, I/Os  - IR following  - Daily CBC, CMP, mag, phos  - SBP < 160  - PRN labetalol, hydralazine  - Start ASA   - PT/OT as appropriate  - VTE prophylaxis: mechanical, hold chemical in the acute setting   - Stable for discharge home with outpatient follow up    Cardiac/Vascular  Renovascular hypertension  History of  - SBP < 160  - PRN labetalol, hydralazine  - Hold home medications    Renal/  ESRD on peritoneal dialysis  History of, on PD nightly   - See h/o kidney transplant  - Nephrology following  - Continue home meds    H/O kidney transplant  s/p kidney transplant in 2015, on prograf  - KTM following  - Prograf levels therapeutic  - Continue home prograf  - Avoid nephrotoxic medications and renally dose medications as able    Endocrine  Type 2 diabetes mellitus  History of  - Last A1c 4/2022 10.0  - Repeat A1c 5.4  - Diabetic diet   - SSI with accuchecks  - Hold home medications        The patient is being Prophylaxed for:  Venous Thromboembolism with: Mechanical  Stress Ulcer with: Not Applicable   Ventilator Pneumonia with: not applicable    Activity Orders          None        Prior     Level III    Criselda Dunlap PA-C  Neurocritical Care  [facility]

## 2022-10-10 ENCOUNTER — TELEPHONE (OUTPATIENT)
Dept: NEUROSURGERY | Facility: CLINIC | Age: 47
End: 2022-10-10
Payer: MEDICAID

## 2022-10-10 ENCOUNTER — PATIENT OUTREACH (OUTPATIENT)
Dept: ADMINISTRATIVE | Facility: CLINIC | Age: 47
End: 2022-10-10
Payer: MEDICAID

## 2022-10-10 NOTE — PROGRESS NOTES
C3 nurse spoke with Adilson Sylvester  for a TCC post hospital discharge follow up call. The patient has a scheduled HOSFU appointment with Francine Scales on 10/25/22 @ 1130. Patient stated that he does not currently have a PCP, advised to contact [facility] if he is interested in finding a new PCP. No Home NP coverage to patient's zipcode at this time.

## 2022-10-10 NOTE — TELEPHONE ENCOUNTER
Called pt for follow up. Pt stated he was doing well. First couple of days he experienced  blurred vision. By the weekend he was back to normal. He stated the puncture site to his leg is fine, has bruising but no drainage, swelling or redness. Confirmed appt with KAREN Scaels PA-C.

## 2022-10-13 ENCOUNTER — TELEPHONE (OUTPATIENT)
Dept: TRANSPLANT | Facility: CLINIC | Age: 47
End: 2022-10-13
Payer: MEDICAID

## 2022-10-13 NOTE — TELEPHONE ENCOUNTER
"----- Message from Maru Back MD sent at 10/11/2022 12:16 PM CDT -----  Regarding: RE: hospital discharge follow up  Talk to me about this pt tomorrow please   ----- Message -----  From: Claudio Bazan Jr., MD  Sent: 10/7/2022   5:22 PM CDT  To: Terrie Osbonr RN, Maru Back MD  Subject: RE: hospital discharge follow up                 Dr. Ervin Garcia says "at least until 3 month follow up".   T  ----- Message -----  From: Maru Back MD  Sent: 10/7/2022   2:51 PM CDT  To: Terrie Osborn RN, #  Subject: RE: hospital discharge follow up                 Hi Claudio,    Thank you for the update!  We plan to bring the case next week to committee.   he is on  mg daily now.  Do you know how long we need to continue ASA without any hold?      Thank you     ----- Message -----  From: Claudio Bazan Jr., MD  Sent: 10/7/2022   2:28 PM CDT  To: Terrie Osborn RN, Maru Back MD  Subject: hospital discharge follow up                     Hi,  Can you please update the checklist with his brain aneurysm now coiled? He also says he completed his colonoscopy at [facility] and should be close to completion of workup for retransplant.  Thanks,  T          "

## 2022-10-18 ENCOUNTER — TELEPHONE (OUTPATIENT)
Dept: TRANSPLANT | Facility: CLINIC | Age: 47
End: 2022-10-18
Payer: MEDICAID

## 2022-10-18 NOTE — TELEPHONE ENCOUNTER
----- Message from Izabel Zavala RN sent at 10/18/2022 11:39 AM CDT -----  Regarding: RE: neurosurgery clearance for kidney transplant candidate  Pt has a follow up next week with the PA.   ----- Message -----  From: Terrie Osborn RN  Sent: 10/18/2022   9:55 AM CDT  To: Maru Back MD, Ranjit Pastrana MD, #  Subject: neurosurgery clearance for kidney transplant#    Hi,   This patient is being considered for kidney transplantation and per protocol will need neurosurgery clearance regarding recent basilar artery aneurysm s/p coil embolization. If patient is cleared from your standpoint, will you please provide recommendations regarding if and when it is safe to hold aspirin considering future transplant.    Respectfully,  SOHAM GallowayN,RN

## 2022-10-21 ENCOUNTER — COMMITTEE REVIEW (OUTPATIENT)
Dept: TRANSPLANT | Facility: CLINIC | Age: 47
End: 2022-10-21
Payer: MEDICAID

## 2022-10-21 NOTE — COMMITTEE REVIEW
Native Organ Dx: Diabetes Mellitus - Type II      SELECTION COMMITTEE NOTE    Adilson Sylvester was presented at selection committee on 10/21/2022.  Patient met selection criteria for kidney transplant related to ESRD due to   Diabetes Mellitus - Type II.  No absolute contraindications to transplant at this time.  Patient will be placed on the cadaveric wait list pending final financial approval from insurance company.  Patient will return to clinic for routine appointment in 1 year(s). Patient does not meet criteria for High KDPI kidney offer. Patient meets HCV+ kidney offer. Patient does not meet criteria for dual/enbloc, due to guidelines. Patient needs neurosurgery clearance and colonoscopy.      Note written by WALTER Galloway,RN    ===============================================  I was present at the meeting and attest to the decision of the committee.    Rosemary Tabor, DO  Transplant Nephrology

## 2022-10-21 NOTE — LETTER
October 21, 2022    Estefanía Anders MD  [address] [address]  Phone: [phone]  Fax: [phone]     Dear Dr. Albarado:    Patient: Adilson Sylvester   MR Number: [id number]   YOB: 1975     Your patient, Adilson Sylvester, was recently discussed at the [facility] Selection Committee meeting on 10/21/2022. I am happy to inform you that Adilson has been approved for transplantation.  He has met selection criteria for a kidney transplant related to ESRD secondary to primary diagnosis of Diabetes Mellitus - Type II. Your patient will be placed on the cadaveric wait list pending final financial approval from insurance company.     We appreciate your confidence in allowing us to participate in your patients care.  If you have any questions or concerns, please do not hesitate to contact me.    Sincerely,      Veronica Nettles MD  Medical Director, Kidney & Kidney/Pancreas Transplantation    CC:  Adilson Sylvester (patient           [facility]

## 2022-10-24 ENCOUNTER — TELEPHONE (OUTPATIENT)
Dept: TRANSPLANT | Facility: CLINIC | Age: 47
End: 2022-10-24
Payer: MEDICAID

## 2022-10-24 DIAGNOSIS — Z76.82 ORGAN TRANSPLANT CANDIDATE: Primary | ICD-10-CM

## 2022-10-24 NOTE — TELEPHONE ENCOUNTER
Notified the patient of the committee's decision. Patient voiced understanding and all questions answered. Verified home address, nephrologist and updating D/u info. In the meantime pt agreed to have 2nd abo and hla drawn at [facility] 10/25 587. Requested [id number] from d/u

## 2022-10-26 ENCOUNTER — TELEPHONE (OUTPATIENT)
Dept: NEUROSURGERY | Facility: CLINIC | Age: 47
End: 2022-10-26
Payer: MEDICAID

## 2022-10-26 NOTE — TELEPHONE ENCOUNTER
----- Message from Arminda Burnett sent at 10/26/2022  2:06 PM CDT -----  SALTY CORMIER calling regarding Appointment Access  (message) for #2wk fu angio.  He stated he have transportation now.  call back [phone]

## 2022-10-27 ENCOUNTER — TELEPHONE (OUTPATIENT)
Dept: TRANSPLANT | Facility: CLINIC | Age: 47
End: 2022-10-27
Payer: MEDICAID

## 2022-10-27 NOTE — TELEPHONE ENCOUNTER
----- Message from Ranjit Pastrana MD sent at 10/26/2022  4:48 PM CDT -----  Regarding: RE: neurosurgery clearance for kidney transplant candidate  Hi , I would wait at least till he has a repeat contrasted MRA before we hold the aspirin.   Thanks so much,   [facility]  ----- Message -----  From: Terrie Osborn RN  Sent: 10/18/2022   9:55 AM CDT  To: Maru Back MD, Ranjit Pastrana MD, #  Subject: neurosurgery clearance for kidney transplant#    Hi,   This patient is being considered for kidney transplantation and per protocol will need neurosurgery clearance regarding recent basilar artery aneurysm s/p coil embolization. If patient is cleared from your standpoint, will you please provide recommendations regarding if and when it is safe to hold aspirin considering future transplant.    Respectfully,  WALTER Galloway,RN

## 2022-10-28 ENCOUNTER — TELEPHONE (OUTPATIENT)
Dept: TRANSPLANT | Facility: CLINIC | Age: 47
End: 2022-10-28
Payer: MEDICAID

## 2022-10-28 NOTE — TELEPHONE ENCOUNTER
----- Message from Izabel Zavala RN sent at 10/27/2022  1:44 PM CDT -----  Regarding: RE: neurosurgery clearance for kidney transplant candidate  Reagan 3 with follow up visit with Dr. Pastrana  ----- Message -----  From: Maru Back MD  Sent: 10/27/2022   1:39 PM CDT  To: Terrie Osborn RN, Ranjit Pastrana MD, #  Subject: RE: neurosurgery clearance for kidney transp#    Hello,    When is MRA going to be repeated? Thanks!  ----- Message -----  From: Ranjit Pastrana MD  Sent: 10/26/2022   4:49 PM CDT  To: Terrie Osborn RN, Maru Back MD, #  Subject: RE: neurosurgery clearance for kidney transp#    Hi , I would wait at least till he has a repeat contrasted MRA before we hold the aspirin.   Thanks so much,   vs  ----- Message -----  From: Terrie Osborn RN  Sent: 10/18/2022   9:55 AM CDT  To: Maru Back MD, Ranjit Pastrana MD, #  Subject: neurosurgery clearance for kidney transplant#    Hi,   This patient is being considered for kidney transplantation and per protocol will need neurosurgery clearance regarding recent basilar artery aneurysm s/p coil embolization. If patient is cleared from your standpoint, will you please provide recommendations regarding if and when it is safe to hold aspirin considering future transplant.    Respectfully,  WALTER Galloway,RN

## 2022-11-01 ENCOUNTER — LAB VISIT (OUTPATIENT)
Dept: LAB | Facility: HOSPITAL | Age: 47
End: 2022-11-01
Payer: MEDICAID

## 2022-11-01 ENCOUNTER — OFFICE VISIT (OUTPATIENT)
Dept: NEUROSURGERY | Facility: CLINIC | Age: 47
End: 2022-11-01
Payer: MEDICAID

## 2022-11-01 ENCOUNTER — TELEPHONE (OUTPATIENT)
Dept: NEUROSURGERY | Facility: CLINIC | Age: 47
End: 2022-11-01

## 2022-11-01 ENCOUNTER — TELEPHONE (OUTPATIENT)
Dept: TRANSPLANT | Facility: CLINIC | Age: 47
End: 2022-11-01
Payer: MEDICAID

## 2022-11-01 VITALS
HEART RATE: 77 BPM | DIASTOLIC BLOOD PRESSURE: 102 MMHG | SYSTOLIC BLOOD PRESSURE: 138 MMHG | WEIGHT: 210 LBS | BODY MASS INDEX: 31.83 KG/M2 | HEIGHT: 68 IN

## 2022-11-01 DIAGNOSIS — Z76.82 ORGAN TRANSPLANT CANDIDATE: ICD-10-CM

## 2022-11-01 DIAGNOSIS — I72.5 BASILAR ARTERY ANEURYSM: Primary | ICD-10-CM

## 2022-11-01 LAB — ABO + RH BLD: NORMAL

## 2022-11-01 PROCEDURE — 86901 BLOOD TYPING SEROLOGIC RH(D): CPT | Mod: TXP | Performed by: NURSE PRACTITIONER

## 2022-11-01 PROCEDURE — 99215 OFFICE O/P EST HI 40 MIN: CPT | Mod: S$PBB,TXP,, | Performed by: NEUROLOGICAL SURGERY

## 2022-11-01 PROCEDURE — 3044F PR MOST RECENT HEMOGLOBIN A1C LEVEL <7.0%: ICD-10-PCS | Mod: CPTII,TXP,, | Performed by: NEUROLOGICAL SURGERY

## 2022-11-01 PROCEDURE — 99215 PR OFFICE/OUTPT VISIT, EST, LEVL V, 40-54 MIN: ICD-10-PCS | Mod: S$PBB,TXP,, | Performed by: NEUROLOGICAL SURGERY

## 2022-11-01 PROCEDURE — 1160F RVW MEDS BY RX/DR IN RCRD: CPT | Mod: CPTII,TXP,, | Performed by: NEUROLOGICAL SURGERY

## 2022-11-01 PROCEDURE — 36415 COLL VENOUS BLD VENIPUNCTURE: CPT | Mod: TXP | Performed by: NURSE PRACTITIONER

## 2022-11-01 PROCEDURE — 1159F PR MEDICATION LIST DOCUMENTED IN MEDICAL RECORD: ICD-10-PCS | Mod: CPTII,TXP,, | Performed by: NEUROLOGICAL SURGERY

## 2022-11-01 PROCEDURE — 1160F PR REVIEW ALL MEDS BY PRESCRIBER/CLIN PHARMACIST DOCUMENTED: ICD-10-PCS | Mod: CPTII,TXP,, | Performed by: NEUROLOGICAL SURGERY

## 2022-11-01 PROCEDURE — 86832 HLA CLASS I HIGH DEFIN QUAL: CPT | Mod: PO,TXP | Performed by: NURSE PRACTITIONER

## 2022-11-01 PROCEDURE — 99999 PR PBB SHADOW E&M-EST. PATIENT-LVL IV: ICD-10-PCS | Mod: PBBFAC,TXP,, | Performed by: NEUROLOGICAL SURGERY

## 2022-11-01 PROCEDURE — 1159F MED LIST DOCD IN RCRD: CPT | Mod: CPTII,TXP,, | Performed by: NEUROLOGICAL SURGERY

## 2022-11-01 PROCEDURE — 3080F DIAST BP >= 90 MM HG: CPT | Mod: CPTII,TXP,, | Performed by: NEUROLOGICAL SURGERY

## 2022-11-01 PROCEDURE — 3044F HG A1C LEVEL LT 7.0%: CPT | Mod: CPTII,TXP,, | Performed by: NEUROLOGICAL SURGERY

## 2022-11-01 PROCEDURE — 3075F SYST BP GE 130 - 139MM HG: CPT | Mod: CPTII,TXP,, | Performed by: NEUROLOGICAL SURGERY

## 2022-11-01 PROCEDURE — 1111F DSCHRG MED/CURRENT MED MERGE: CPT | Mod: CPTII,TXP,, | Performed by: NEUROLOGICAL SURGERY

## 2022-11-01 PROCEDURE — 86833 HLA CLASS II HIGH DEFIN QUAL: CPT | Mod: PO,TXP | Performed by: NURSE PRACTITIONER

## 2022-11-01 PROCEDURE — 3075F PR MOST RECENT SYSTOLIC BLOOD PRESS GE 130-139MM HG: ICD-10-PCS | Mod: CPTII,TXP,, | Performed by: NEUROLOGICAL SURGERY

## 2022-11-01 PROCEDURE — 3080F PR MOST RECENT DIASTOLIC BLOOD PRESSURE >= 90 MM HG: ICD-10-PCS | Mod: CPTII,TXP,, | Performed by: NEUROLOGICAL SURGERY

## 2022-11-01 PROCEDURE — 1111F PR DISCHARGE MEDS RECONCILED W/ CURRENT OUTPATIENT MED LIST: ICD-10-PCS | Mod: CPTII,TXP,, | Performed by: NEUROLOGICAL SURGERY

## 2022-11-01 PROCEDURE — 99214 OFFICE O/P EST MOD 30 MIN: CPT | Mod: PBBFAC,TXP | Performed by: NEUROLOGICAL SURGERY

## 2022-11-01 PROCEDURE — 99999 PR PBB SHADOW E&M-EST. PATIENT-LVL IV: CPT | Mod: PBBFAC,TXP,, | Performed by: NEUROLOGICAL SURGERY

## 2022-11-01 RX ORDER — CINACALCET 30 MG/1
30 TABLET, FILM COATED ORAL DAILY
COMMUNITY
Start: 2022-10-30

## 2022-11-01 RX ORDER — CLOPIDOGREL BISULFATE 75 MG/1
75 TABLET ORAL DAILY
COMMUNITY
Start: 2022-10-30 | End: 2023-08-30

## 2022-11-01 NOTE — TELEPHONE ENCOUNTER
----- Message from Rukhsana Blair sent at 11/1/2022  9:45 AM CDT -----  Regarding: FW: speak to Luis Armando    ----- Message -----  From: Jasvir Cagle  Sent: 11/1/2022   9:35 AM CDT  To: [facility] Pre-Kidney Transplant Non-Clinical  Subject: speak to Marvetta                                Patient calling to speak to Luis Armando. Patient will be at the hospital in the next 30 minutes and will need order as discussed.      Call:  [phone] (Mobile

## 2022-11-01 NOTE — PROGRESS NOTES
Neurosurgery  Established Patient    SUBJECTIVE:     History of Present Illness:  Adilson Sylvester is a 46 y.o. 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 Patient denies any HA's, visual disturbance, history of seizures, strokes or TIAs. No focal weakness or numbness or other neurologic deficits. No known family h/o aneurysms, thinks mom's sister  suddenly in her 30's from something to do with the brain but unsure of exact cause. Reports he used to smoke cigarettes occassionally, quit about 15 years ago. Pt is from [facility], moved to [place] at age 20, now living in [place], [place] for past 2 years.         Interval history:  Patient most recently underwent a placement of intra saccular W EB device.  Patient denies any new headache, nausea, vomiting.  Patient notes he had 1-2 days of blurry vision when he went home after placement.  He notes that has subsided.  Patient notes the blurry vision was bilateral in nature, and is now completely resolved.     Review of patient's allergies indicates:  No Known Allergies    Current Outpatient Medications   Medication Sig Dispense Refill    aspirin 325 MG tablet Take 1 tablet (325 mg total) by mouth once daily. 360 tablet 0    atorvastatin (LIPITOR) 10 MG tablet Take by mouth once daily.      blood sugar diagnostic Strp Test 3 (three) times daily with meals. 100 each 3    blood-glucose meter Misc Use as instructed (Patient taking differently: Use as instructed) 1 each 0    calcitRIOL (ROCALTROL) 0.25 MCG Cap Take 0.25 mcg by mouth once daily.      carvediloL (COREG) 25 MG tablet Take 1 tablet (25 mg total) by mouth 2 (two) times daily. 60 tablet 11    cinacalcet (SENSIPAR) 30 MG  "Tab Take 30 mg by mouth once daily.      clopidogreL (PLAVIX) 75 mg tablet Take 75 mg by mouth once daily.      DIALYVITE [phone] mg-mg-mcg-mg Tab Take 1 tablet by mouth once daily.      ergocalciferol (ERGOCALCIFEROL) 50,000 unit Cap Take 50,000 Units by mouth once a week. Monday      furosemide (LASIX) 80 MG tablet Take 80 mg by mouth once daily.      hydrALAZINE (APRESOLINE) 100 MG tablet Take 1 tablet (100 mg total) by mouth every 8 (eight) hours. 90 tablet 11    insulin aspart U-100 (NOVOLOG) 100 unit/mL (3 mL) InPn pen Inject 1-5 Units into the skin 3 (three) times daily with meals. (Patient not taking: Reported on 10/10/2022) 15 mL 3    isoniazid (NYDRAZID) 300 MG Tab Take 1 tablet (300 mg total) by mouth once daily. (Patient taking differently: Take 300 mg by mouth every evening.) 30 tablet 8    lancets 33 gauge Misc Test 3 (three) times daily with meals. 100 each 3    NIFEdipine (ADALAT CC) 30 MG TbSR Take 30 mg by mouth every evening.      ondansetron (ZOFRAN) 4 MG tablet Take 1 tablet (4 mg total) by mouth every 8 (eight) hours as needed for Nausea. 20 tablet 0    oxyCODONE-acetaminophen (PERCOCET) 5-325 mg per tablet Take 1 tablet by mouth every 6 (six) hours as needed for Pain. (Patient not taking: Reported on 10/10/2022) 16 tablet 0    pen needle, diabetic 32 gauge x 5/32" Ndle 1 each by Misc.(Non-Drug; Combo Route) route 3 (three) times daily with meals. (Patient not taking: Reported on 10/10/2022) 100 each 2    predniSONE (DELTASONE) 5 MG tablet Take 1 tablet (5 mg total) by mouth every other day. 30 tablet 12    pyridoxine, vitamin B6, (VITAMIN B-6) 50 MG Tab Take 1 tablet (50 mg total) by mouth once daily. (Patient not taking: Reported on 10/10/2022) 90 tablet 3    senna-docusate 8.6-50 mg (SENNA WITH DOCUSATE SODIUM) 8.6-50 mg per tablet Take 2 tablets by mouth once daily. 20 tablet 0    sevelamer carbonate (RENVELA) 800 mg Tab Take 3 tablets (2,400 mg total) by mouth 3 (three) times daily " "with meals. (Patient taking differently: Take 3,200 mg by mouth 3 (three) times daily with meals.) 270 tablet 11    tacrolimus (PROGRAF) 1 MG Cap Take 1 capsule (1 mg total) by mouth every 12 (twelve) hours. 60 capsule 11     No current facility-administered medications for this visit.       Past Medical History:   Diagnosis Date    Diabetes     Diabetes mellitus, type 2     Gout     Hypertension     Hypothyroidism     Hypothyroidism     Kidney transplant recipient     Obesity     Polycystic kidney disease      Past Surgical History:   Procedure Laterality Date    ANKLE SURGERY      HERNIA REPAIR      KIDNEY TRANSPLANT      [facility]    LITHOTRIPSY Left 2017    naitive kidney     PD catheter      PERITONEAL CATHETER INSERTION       Family History       Problem Relation (Age of Onset)    Diabetes Mother    Hypertension Mother, Paternal Aunt    Kidney disease Father, Sister, Paternal Aunt          Social History     Socioeconomic History    Marital status:      Spouse name: Vianey Sylvester    Number of children: 2   Tobacco Use    Smoking status: Former     Types: Cigarettes     Quit date: 10/1/2008     Years since quittin.0    Smokeless tobacco: Never   Substance and Sexual Activity    Alcohol use: Not Currently     Comment: occasional    Drug use: Never    Sexual activity: Yes     Partners: Female   Social History Narrative    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    Neurosurgery Physical Exam  General: no acute distress  Head: Non-traumatic, normocephalic  Eyes: Pupils equal, EOMI  Neck: Supple, normal ROM, no tenderness to palpation  CVS: Normal rate and rhythm, distal pulses present  Pulm: Symmetric expansion, no respiratory distress  GI: Abdomen nondistended, nontender    MSK: Moves all " extremities without restriction, atraumatic  Skin: Dry, intact  Psych: Normal thought content and cognition    Neuro:  Alert, awake, oriented, to self, place, time  Language:  Speech is fluent, goal directed without any noted dysarthria or aphasia    Cranial nerves:    CNII-XII: Intact on fine exam,   Pupils equal round react to light,   Extraocular muscles are intact  V1 to V3 is intact to light touch,   no facial asymmetry,   Hearing is intact to finger rub and voice  tongue/uvula/palate midline,   shoulder shrug equal,     No pronator drift    Extremities:  Motor:  Upper Extremity    Deltoid Triceps Biceps Wrist  Extension Wrist  Flexion Interosseous   R 5/5 5/5 5/5 5/5 5/5 5/5   L 5/5 5/5 5/5 5/5 5/5 5/5       Thumb   Abduction Thumb  ADDuction Finger  Flexion Finger  Extension     R 5/5 5/5 5/5 5/5     L 5/5 5/5 5/5 5/5        Lower Extremity     Iliopsoas Quadriceps Hamstring Plantarflexion Dorsiflexion EHL   R 5/5 5/5 5/5 5/5 5/5 5/5   L 5/5 5/5 5/5 5/5 5/5 5/5       Reflexes:     DTR: 2+ biceps    2+ triceps   2+ brachioradialis   2+ patellar  2+ Achilles     Stock's: Negative     Babinski's: Negative     Clonus: Negative     Sensory:      Sensation intact to light touch, temperature sensation, vibration, pinprick     Coordination:      Coordination intact throughout, no dysmetria, normal rapid alternating movements, no dysdiadochokinesia  Cerebellar:  Normal finger-to-nose, normal heel-to-shin  Cervical spine:  No midline cervical tenderness, negative Lhermitte, negative Spurling  Thoracic spine:  No midline thoracic tenderness  Lumbar spine:  No midline lumbar tenderness     Diagnostic Results:  I personally reviewed patient's Diagnostic Imaging.  Cerebral angiogram with stable appearing post W EB placement basilar apex aneurysm.  No new imaging since.    ASSESSMENT/PLAN:     46-year-old man with PCKD, and 3 x 3 mm basilar apex aneurysm, s/p W EB placement.        No focal deficits on examination here in  clinic.  Status post W EB placement of basilar apex aneurysm.  Had long discussion with the patient.  Patient noted he feels he is at his neurologic baseline.  Recommend follow-up with MRA in 3 months.  Answered all patient's questions satisfaction.  Would recommend continuing plan for transplant per the transplant team.    Thank you so very much for allowing me to participate in the care of this patient.  Please feel free to call any questions, comments, or concerns..     Ranjit Pastrana MD,MSc  Department of Neurosurgery   Department of Radiology  Department of Neurology  [facility]    [facility]   [employer] [facility] [employer] [facility]        Total time spent in counseling and discussion about further management options including relevant lab work, treatment,  prognosis, medications and intended side effects was more than 60 minutes. More than 50 % of the time was spent in counseling and coordination of care.  I spent a total of 60 minutes on the day of the visit.This includes face to face time and non-face to face time preparing to see the patient (eg, review of tests), Obtaining and/or reviewing separately obtained history, Documenting clinical information in the electronic or other health record, Independently interpreting resultsand communicating results to the patient/family/caregiver, or Care coordination         Note dictated with voice recognition software, please excuse any grammatical errors.

## 2022-11-11 LAB — HPRA INTERPRETATION: NORMAL

## 2022-12-09 ENCOUNTER — TELEPHONE (OUTPATIENT)
Dept: TRANSPLANT | Facility: CLINIC | Age: 47
End: 2022-12-09
Payer: MEDICAID

## 2022-12-09 NOTE — TELEPHONE ENCOUNTER
Contacted the pt to inquire about colonoscopy. The pt stated he had the colonoscopy at [facility]. Requesting records. Once rcv'd will have neph review neuro sx notes to see if ok to list

## 2022-12-21 LAB
CLASS I ANTIBODIES - LUMINEX: NEGATIVE
CLASS II ANTIBODIES - LUMINEX: NEGATIVE
CPRA %: 0
SERUM COLLECTION DT - LUMINEX CLASS I: NORMAL
SERUM COLLECTION DT - LUMINEX CLASS II: NORMAL
SPCL1 TESTING DATE: NORMAL
SPCL2 TESTING DATE: NORMAL
SPCLU TESTING DATE: NORMAL

## 2023-01-09 ENCOUNTER — TELEPHONE (OUTPATIENT)
Dept: TRANSPLANT | Facility: CLINIC | Age: 48
End: 2023-01-09
Payer: MEDICAID

## 2023-01-09 NOTE — TELEPHONE ENCOUNTER
----- Message from Ranjit Pastrana MD sent at 10/26/2022  4:52 PM CDT -----  Regarding: RE: neurosurgery clearance for kidney transplant candidate  If its only holding aspirin for a few days, it could be at anytime.     Thanks, so much,     Timmy   ----- Message -----  From: Maru Back MD  Sent: 10/18/2022   2:23 PM CDT  To: Terrie Osborn RN, Ranjit Pastrana MD, #  Subject: RE: neurosurgery clearance for kidney transp#    Dr Garcia,    Thank you for taking care of the patient! We need 2 things from the next visit, 1:) if pt is cleared from kidney transplant surgery 2:) when  can ASA be held safely for few days if kidney transplant comes up.      Thank you  Maru    ----- Message -----  From: Terrie Osborn RN  Sent: 10/18/2022   1:06 PM CDT  To: Maru Back MD, Ranjit Pastrana MD, #  Subject: RE: neurosurgery clearance for kidney transp#    Hi,  Will the PA be able to answer this question and provide clearance so that he can be presented to the selection committee?   ----- Message -----  From: Izabel Zavala RN  Sent: 10/18/2022  11:39 AM CDT  To: Terrie Osborn RN  Subject: RE: neurosurgery clearance for kidney transp#    Pt has a follow up next week with the PA.   ----- Message -----  From: Terrie Osborn RN  Sent: 10/18/2022   9:55 AM CDT  To: Maru Back MD, Ranjit Pastrana MD, #  Subject: neurosurgery clearance for kidney transplant#    Hi,   This patient is being considered for kidney transplantation and per protocol will need neurosurgery clearance regarding recent basilar artery aneurysm s/p coil embolization. If patient is cleared from your standpoint, will you please provide recommendations regarding if and when it is safe to hold aspirin considering future transplant.    Respectfully,  WALTER Galloway,RN

## 2023-01-10 ENCOUNTER — TELEPHONE (OUTPATIENT)
Dept: TRANSPLANT | Facility: CLINIC | Age: 48
End: 2023-01-10
Payer: MEDICAID

## 2023-01-10 NOTE — TELEPHONE ENCOUNTER
----- Message from Rosemary Tabor DO sent at 1/9/2023 12:28 PM CST -----  Regarding: RE: approved pending colonoscopy and neurosx  Reviewed the neurosurgery note from 11/1/22 and he is okay to list    Rosemary Tabor   ----- Message -----  From: Terrie Osborn RN  Sent: 1/9/2023  11:03 AM CST  To: Rosemary Tabor DO  Subject: approved pending colonoscopy and neurosx         Hi Dr Tabor,  This pt was approved pending colonoscopy which is benign but I wanted you to review last neurosx ( for basilar tip aneurysm on MRA) note in Epic 11/1/22 and see if ok to list.     Respectfully,  Terrie

## 2023-01-20 ENCOUNTER — TELEPHONE (OUTPATIENT)
Dept: TRANSPLANT | Facility: CLINIC | Age: 48
End: 2023-01-20
Payer: MEDICAID

## 2023-01-20 DIAGNOSIS — Z76.82 ORGAN TRANSPLANT CANDIDATE: Primary | ICD-10-CM

## 2023-01-20 NOTE — LETTER
2023    Adilson Sylvester  [address]  [address] [address]  [place] [address]    Dear Adilson Sylvester:  MRN: [id number]    Congratulations! On 2023, you were placed on  the waiting list for a  donor transplant.    Your candidacy for kidney transplant is based on the following criteria: ESRD due to  Diabetes Mellitus - Type II.    Your transplant coordinator while on the waiting list is Wilbert Emmanuel RN. They can be reached at [phone] or [phone] with any questions.      What to do now?    Ask your living donors to call and begin testing  Give your donors our phone number, [phone]  Make sure your donors have your name and date of birth when they call  You will get transplanted much faster if you have a living donor    Have your blood sent to our Transplant Lab every month  If you are on dialysis - our Transplant Lab will work with your dialysis unit to send your blood every month  If you are not on dialysis   If you live near an [facility] lab, we will schedule you to have blood drawn every month  If you do not live near an [facility] lab, you will be sent blood kits in the mail. You will need to take a kit to your local lab or doctor to have your blood drawn every month and mail to the Transplant Lab.     Call us with ANY CHANGES  Phone numbers - we must be able to reach you anytime of the day or night when a kidney is available  Address  Insurance coverage  Dialysis unit or kidney doctor  Shay: if you have surgery, stay in the hospital, have to get blood, or have an infection    Review your Kidney/Kidney-Pancreas Transplant Guide   This will give you detailed information about what happens when  you are on the waiting list   you are called when a kidney is available    The [facility] has a transplant surgeon and physician available 365 days a year, 24 hours a day to coordinate organ acceptance, procurement, surgical placement and to address urgent  patient care issues.  You will be notified in writing of any changes to our Transplant Centers staffing plan that would impact your ability to receive a transplant.    Attached is a letter from the [employer] Network for Organ Sharing (UNOS). It describes the services and information offered to patients by UNOS and the Organ Procurement and Transplant Network. We look forward to working with you while on the waiting list.     Congratulations,    Your Transplant Partner  [facility]   [address] [place] [id number]  [phone]  Jr:Kenny  Cc: Dr. Estefanía Anders  [facility]                                                                                                                     The Organ Procurement and Transplantation Network   Toll-free patient services line: Your resource for organ transplant information     If you have a question regarding your own medical care, you always should call your transplant hospital first. However, for general organ transplant-related information, you can call the Organ Procurement and Transplantation Network (OPTN) toll-free patient services line at [phone].     Anyone, including potential transplant candidates, candidates, recipients, family members, friends, living donors, and donor family members, can call this number to:     Talk about organ donation, living donation, the transplant process, the donation process, and transplant policies.   Get a free patient information kit with helpful booklets, waiting list and transplant information, and a list of all transplant hospitals.   Ask questions about the OPTN website (https://optn.transplant.hrsa.gov/), the [employer] for Organ Sharings (UNOS) website (https://unos.org/), or the UNOS website for living donors and transplant recipients. (https://www.transplantliving.org/).   Learn how the OPTN can help you.   Talk about any concerns that you may  have with a transplant hospital.     The nations transplant system, the OPTN, is managed under federal contract by the [employer] for Organ Sharing (UNOS), which is a non-profit charitable organization. The OPTN helps create and define organ sharing policies that make the best use of donated organs. This process continuously evaluating new advances and discoveries so policies can be adapted to best serve patients waiting for transplants. To do so, the OPTN works closely with transplant professionals, transplant patients, transplant candidates, donor families, living donors, and the public. All transplant programs and organ procurement organizations throughout the country are OPTN members and are obligated to follow the policies the OPTN creates for allocating organs.     The OPTN also is responsible for:   Providing educational material for patients, the public, and professionals.   Raising awareness of the need for donated organs and tissue.   Coordinating organ procurement, matching, and placement.   Collecting information about every organ transplant and donation that occurs in the [place].     Remember, you should contact your transplant hospital directly if you have questions or concerns about your own medical care including medical records, work-up progress, and test results.     We are not your transplant hospital, and our staff will not be able to answer questions about your case, so please keep your transplant hospitals phone number handy.   However, while you research your transplant needs and learn as much as you can about transplantation and donation, we welcome your call to our toll-free patient services line at [phone].

## 2023-01-24 ENCOUNTER — TELEPHONE (OUTPATIENT)
Dept: TRANSPLANT | Facility: CLINIC | Age: 48
End: 2023-01-24
Payer: MEDICAID

## 2023-01-24 DIAGNOSIS — Z76.82 ORGAN TRANSPLANT CANDIDATE: Primary | ICD-10-CM

## 2023-01-25 ENCOUNTER — TELEPHONE (OUTPATIENT)
Dept: TRANSPLANT | Facility: CLINIC | Age: 48
End: 2023-01-25
Payer: MEDICAID

## 2023-01-25 DIAGNOSIS — Z76.82 ORGAN TRANSPLANT CANDIDATE: Primary | ICD-10-CM

## 2023-02-10 ENCOUNTER — TELEPHONE (OUTPATIENT)
Dept: TRANSPLANT | Facility: CLINIC | Age: 48
End: 2023-02-10
Payer: MEDICAID

## 2023-02-10 NOTE — TELEPHONE ENCOUNTER
MA notes per faxed adherence check form.     FOR THE PAST THREE MONTHS:    0-AMA's  0-No-shows    No concerns with care giving, transportation, or mental health    Brought over to clinic to be scanned in.    Jeaneth Shearer  Abdominal Transplant MA

## 2023-03-03 ENCOUNTER — TELEPHONE (OUTPATIENT)
Dept: TRANSPLANT | Facility: CLINIC | Age: 48
End: 2023-03-03
Payer: MEDICAID

## 2023-05-01 ENCOUNTER — TELEPHONE (OUTPATIENT)
Dept: TRANSPLANT | Facility: CLINIC | Age: 48
End: 2023-05-01
Payer: MEDICAID

## 2023-05-02 ENCOUNTER — TELEPHONE (OUTPATIENT)
Dept: TRANSPLANT | Facility: CLINIC | Age: 48
End: 2023-05-02
Payer: MEDICAID

## 2023-05-02 NOTE — TELEPHONE ENCOUNTER
MA notes per adherence form     FOR THE PAST THREE MONTHS:    0-AMA's  0-No-shows    No concerns with care giving, transportation, or mental health    Brought over to clinic to be scanned in.    Jeaneth Shearer  Abdominal Transplant MA    
Name band;

## 2023-05-23 ENCOUNTER — OFFICE VISIT (OUTPATIENT)
Dept: TRANSPLANT | Facility: CLINIC | Age: 48
End: 2023-05-23
Attending: NURSE PRACTITIONER
Payer: MEDICAID

## 2023-05-23 ENCOUNTER — HOSPITAL ENCOUNTER (OUTPATIENT)
Dept: CARDIOLOGY | Facility: HOSPITAL | Age: 48
Discharge: HOME | End: 2023-05-23
Attending: NURSE PRACTITIONER
Payer: MEDICAID

## 2023-05-23 ENCOUNTER — HOSPITAL ENCOUNTER (OUTPATIENT)
Dept: RADIOLOGY | Facility: HOSPITAL | Age: 48
Discharge: HOME | End: 2023-05-23
Attending: NURSE PRACTITIONER
Payer: MEDICAID

## 2023-05-23 VITALS
OXYGEN SATURATION: 98 % | BODY MASS INDEX: 29.4 KG/M2 | WEIGHT: 194 LBS | RESPIRATION RATE: 16 BRPM | TEMPERATURE: 97 F | SYSTOLIC BLOOD PRESSURE: 120 MMHG | HEIGHT: 68 IN | DIASTOLIC BLOOD PRESSURE: 77 MMHG | HEART RATE: 94 BPM

## 2023-05-23 VITALS
WEIGHT: 210 LBS | DIASTOLIC BLOOD PRESSURE: 84 MMHG | HEART RATE: 99 BPM | HEIGHT: 68 IN | SYSTOLIC BLOOD PRESSURE: 110 MMHG | BODY MASS INDEX: 31.83 KG/M2

## 2023-05-23 DIAGNOSIS — Z76.82 ORGAN TRANSPLANT CANDIDATE: ICD-10-CM

## 2023-05-23 DIAGNOSIS — I15.0 RENOVASCULAR HYPERTENSION: Chronic | ICD-10-CM

## 2023-05-23 DIAGNOSIS — N18.6 ESRD ON PERITONEAL DIALYSIS: Chronic | ICD-10-CM

## 2023-05-23 DIAGNOSIS — Z79.60 LONG-TERM USE OF IMMUNOSUPPRESSANT MEDICATION: ICD-10-CM

## 2023-05-23 DIAGNOSIS — Z99.2 ESRD ON PERITONEAL DIALYSIS: Chronic | ICD-10-CM

## 2023-05-23 DIAGNOSIS — D63.1 ANEMIA IN CHRONIC KIDNEY DISEASE, ON CHRONIC DIALYSIS: ICD-10-CM

## 2023-05-23 DIAGNOSIS — Z99.2 ANEMIA IN CHRONIC KIDNEY DISEASE, ON CHRONIC DIALYSIS: ICD-10-CM

## 2023-05-23 DIAGNOSIS — N25.81 SECONDARY HYPERPARATHYROIDISM OF RENAL ORIGIN: ICD-10-CM

## 2023-05-23 DIAGNOSIS — Z76.82 PATIENT ON WAITING LIST FOR KIDNEY TRANSPLANT: Primary | ICD-10-CM

## 2023-05-23 DIAGNOSIS — N18.6 ANEMIA IN CHRONIC KIDNEY DISEASE, ON CHRONIC DIALYSIS: ICD-10-CM

## 2023-05-23 DIAGNOSIS — Q61.3 PKD (POLYCYSTIC KIDNEY DISEASE): ICD-10-CM

## 2023-05-23 DIAGNOSIS — T86.12 FAILED KIDNEY TRANSPLANT: ICD-10-CM

## 2023-05-23 PROBLEM — E66.811 CLASS 1 OBESITY DUE TO EXCESS CALORIES WITH SERIOUS COMORBIDITY AND BODY MASS INDEX (BMI) OF 31.0 TO 31.9 IN ADULT: Chronic | Status: ACTIVE | Noted: 2021-12-14

## 2023-05-23 PROBLEM — N17.9 AKI (ACUTE KIDNEY INJURY): Status: RESOLVED | Noted: 2021-04-07 | Resolved: 2023-05-23

## 2023-05-23 PROBLEM — E66.09 CLASS 1 OBESITY DUE TO EXCESS CALORIES WITH SERIOUS COMORBIDITY AND BODY MASS INDEX (BMI) OF 31.0 TO 31.9 IN ADULT: Chronic | Status: ACTIVE | Noted: 2021-12-14

## 2023-05-23 LAB
ASCENDING AORTA: 2.87 CM
AV INDEX (PROSTH): 0.99
AV MEAN GRADIENT: 4 MMHG
AV PEAK GRADIENT: 7 MMHG
AV VALVE AREA: 3.81 CM2
AV VELOCITY RATIO: 0.98
BSA FOR ECHO PROCEDURE: 2.14 M2
CV ECHO LV RWT: 0.59 CM
DOP CALC AO PEAK VEL: 1.31 M/S
DOP CALC AO VTI: 21.87 CM
DOP CALC LVOT AREA: 3.8 CM2
DOP CALC LVOT DIAMETER: 2.21 CM
DOP CALC LVOT PEAK VEL: 1.29 M/S
DOP CALC LVOT STROKE VOLUME: 83.31 CM3
DOP CALCLVOT PEAK VEL VTI: 21.73 CM
E WAVE DECELERATION TIME: 266.27 MSEC
E/A RATIO: 1.02
E/E' RATIO: 7 M/S
ECHO LV POSTERIOR WALL: 1.24 CM (ref 0.6–1.1)
EJECTION FRACTION: 65 %
FRACTIONAL SHORTENING: 47 % (ref 28–44)
INTERVENTRICULAR SEPTUM: 1.32 CM (ref 0.6–1.1)
IVRT: 82.78 MSEC
LA MAJOR: 4.46 CM
LA MINOR: 4.61 CM
LA WIDTH: 3.71 CM
LEFT ATRIUM SIZE: 4.15 CM
LEFT ATRIUM VOLUME INDEX MOD: 16.5 ML/M2
LEFT ATRIUM VOLUME INDEX: 28.4 ML/M2
LEFT ATRIUM VOLUME MOD: 34.55 CM3
LEFT ATRIUM VOLUME: 59.33 CM3
LEFT INTERNAL DIMENSION IN SYSTOLE: 2.23 CM (ref 2.1–4)
LEFT VENTRICLE DIASTOLIC VOLUME INDEX: 25.72 ML/M2
LEFT VENTRICLE DIASTOLIC VOLUME: 53.76 ML
LEFT VENTRICLE MASS INDEX: 94 G/M2
LEFT VENTRICLE SYSTOLIC VOLUME INDEX: 8 ML/M2
LEFT VENTRICLE SYSTOLIC VOLUME: 16.67 ML
LEFT VENTRICULAR INTERNAL DIMENSION IN DIASTOLE: 4.2 CM (ref 3.5–6)
LEFT VENTRICULAR MASS: 195.98 G
LV LATERAL E/E' RATIO: 5.25 M/S
LV SEPTAL E/E' RATIO: 10.5 M/S
MV A" WAVE DURATION": 10.85 MSEC
MV PEAK A VEL: 0.62 M/S
MV PEAK E VEL: 0.63 M/S
MV STENOSIS PRESSURE HALF TIME: 77.22 MS
MV VALVE AREA P 1/2 METHOD: 2.85 CM2
PISA TR MAX VEL: 2.43 M/S
PULM VEIN S/D RATIO: 0.85
PV PEAK D VEL: 0.46 M/S
PV PEAK S VEL: 0.39 M/S
RA MAJOR: 5.05 CM
RA PRESSURE: 3 MMHG
RA WIDTH: 3.41 CM
RIGHT VENTRICULAR END-DIASTOLIC DIMENSION: 2.61 CM
SINUS: 3.34 CM
STJ: 2.86 CM
TDI LATERAL: 0.12 M/S
TDI SEPTAL: 0.06 M/S
TDI: 0.09 M/S
TR MAX PG: 24 MMHG
TRICUSPID ANNULAR PLANE SYSTOLIC EXCURSION: 1.63 CM
TV REST PULMONARY ARTERY PRESSURE: 27 MMHG

## 2023-05-23 PROCEDURE — 1160F PR REVIEW ALL MEDS BY PRESCRIBER/CLIN PHARMACIST DOCUMENTED: ICD-10-PCS | Mod: CPTII,TXP,, | Performed by: NURSE PRACTITIONER

## 2023-05-23 PROCEDURE — 1160F RVW MEDS BY RX/DR IN RCRD: CPT | Mod: CPTII,TXP,, | Performed by: NURSE PRACTITIONER

## 2023-05-23 PROCEDURE — 3008F BODY MASS INDEX DOCD: CPT | Mod: CPTII,TXP,, | Performed by: NURSE PRACTITIONER

## 2023-05-23 PROCEDURE — 1159F MED LIST DOCD IN RCRD: CPT | Mod: CPTII,TXP,, | Performed by: NURSE PRACTITIONER

## 2023-05-23 PROCEDURE — 76770 US EXAM ABDO BACK WALL COMP: CPT | Mod: TC,TXP

## 2023-05-23 PROCEDURE — 99215 OFFICE O/P EST HI 40 MIN: CPT | Mod: PBBFAC,25,TXP | Performed by: NURSE PRACTITIONER

## 2023-05-23 PROCEDURE — 99999 PR PBB SHADOW E&M-EST. PATIENT-LVL V: CPT | Mod: PBBFAC,TXP,, | Performed by: NURSE PRACTITIONER

## 2023-05-23 PROCEDURE — 3074F SYST BP LT 130 MM HG: CPT | Mod: CPTII,TXP,, | Performed by: NURSE PRACTITIONER

## 2023-05-23 PROCEDURE — 93306 TTE W/DOPPLER COMPLETE: CPT | Mod: TXP

## 2023-05-23 PROCEDURE — 99215 PR OFFICE/OUTPT VISIT, EST, LEVL V, 40-54 MIN: ICD-10-PCS | Mod: S$PBB,TXP,, | Performed by: NURSE PRACTITIONER

## 2023-05-23 PROCEDURE — 93306 TTE W/DOPPLER COMPLETE: CPT | Mod: 26,TXP,, | Performed by: INTERNAL MEDICINE

## 2023-05-23 PROCEDURE — 76770 US RETROPERITONEAL COMPLETE: ICD-10-PCS | Mod: 26,TXP,, | Performed by: STUDENT IN AN ORGANIZED HEALTH CARE EDUCATION/TRAINING PROGRAM

## 2023-05-23 PROCEDURE — 3008F PR BODY MASS INDEX (BMI) DOCUMENTED: ICD-10-PCS | Mod: CPTII,TXP,, | Performed by: NURSE PRACTITIONER

## 2023-05-23 PROCEDURE — 99215 OFFICE O/P EST HI 40 MIN: CPT | Mod: S$PBB,TXP,, | Performed by: NURSE PRACTITIONER

## 2023-05-23 PROCEDURE — 3074F PR MOST RECENT SYSTOLIC BLOOD PRESSURE < 130 MM HG: ICD-10-PCS | Mod: CPTII,TXP,, | Performed by: NURSE PRACTITIONER

## 2023-05-23 PROCEDURE — 3078F PR MOST RECENT DIASTOLIC BLOOD PRESSURE < 80 MM HG: ICD-10-PCS | Mod: CPTII,TXP,, | Performed by: NURSE PRACTITIONER

## 2023-05-23 PROCEDURE — 93306 ECHO (CUPID ONLY): ICD-10-PCS | Mod: 26,TXP,, | Performed by: INTERNAL MEDICINE

## 2023-05-23 PROCEDURE — 99999 PR PBB SHADOW E&M-EST. PATIENT-LVL V: ICD-10-PCS | Mod: PBBFAC,TXP,, | Performed by: NURSE PRACTITIONER

## 2023-05-23 PROCEDURE — 3078F DIAST BP <80 MM HG: CPT | Mod: CPTII,TXP,, | Performed by: NURSE PRACTITIONER

## 2023-05-23 PROCEDURE — 1159F PR MEDICATION LIST DOCUMENTED IN MEDICAL RECORD: ICD-10-PCS | Mod: CPTII,TXP,, | Performed by: NURSE PRACTITIONER

## 2023-05-23 PROCEDURE — 76770 US EXAM ABDO BACK WALL COMP: CPT | Mod: 26,TXP,, | Performed by: STUDENT IN AN ORGANIZED HEALTH CARE EDUCATION/TRAINING PROGRAM

## 2023-05-23 NOTE — PROGRESS NOTES
Kidney Transplant Recipient Reevalulation    Referring Physician: Estefanía Anders  Current Nephrologist: Estefanía Anders  Waitlist Status: active  Dialysis Start Date: 4/20/2021    Subjective:     CC:  Annual reassessment of kidney transplant candidacy.    HPI:  Mr. Sylvester is a 47 y.o. year old     or   White male with ESRD secondary to diabetic nephropathy and polycystic kidney disease.  He has been on the wait list for a kidney transplant at [facility] since 4/20/2021. Patient is currently on peritoneal dialysis started on 4/20/21. Patient is dialyzing on cyclic peritoneal dialysis.  Patient reports that he is tolerating dialysis well. . He has a PD catheter. Patient denies any recent hospitalizations or ED visits.    Previous Transplant: yes; organ: kidney, date: 7/9/2017, complications: [facility] Graf .  In PMH/Red C argueta--noted Pt lost insurance and did not take prograf for ~ 2 weeks in 4/2021  fx assessment    Remains very active, does construction working.  Does not appear frail.         LOV 12/14/21    Lab /diagnostic results txp wkup  reviewed    Due colonoscopy 10/2022    5/23/23 Renal US:   1. Complex cystic lesion within the right superior renal pole containing an echogenic nodular component without internal vascularity. Finding could represent a complicated hemorrhagic/proteinaceous cyst however cannot exclude neoplasm.  Recommend continued surveillance or further evaluation with contrast enhanced CT or MRI renal mass protocol.  2. Enlarged bilateral kidneys with innumerable simple and minimally complex cysts, in keeping with polycystic kidney disease.  3. Bilateral nonobstructing renal stones.  4. Small volume ascites.    12/14/21 Txp kidney US : Patient is status post renal transplant around 2017 in the right lower quadrant.The renal transplant measures 8.8 cm and demonstrates no focal parenchymal abnormality. No transplant hydronephrosis. No peritransplant  fluid.  The renal transplant venous system is unremarkable.    8/12/22 MRA Brain WO: No new aneurysm or major branch stenosis/occlusion is identified.  12/14/21 Iliac doppler: triphasic flow bilaterally  12/14/21 PXR: No significant vascular calcifications are seen  12/14/22 CXR: (-)  5/9/22 Colonoscopy: repeat 10 years    5/23/23 TTE   The estimated ejection fraction is 65%.  The left ventricle is normal in size with concentric remodeling and normal systolic function.  Normal left ventricular diastolic function.  Normal right ventricular size with normal right ventricular systolic function.  The estimated PA systolic pressure is 27 mmHg.  Normal central venous pressure (3 mmHg).    SPECT      4/26/22  Conclusion     Normal myocardial perfusion scan. There is no evidence of myocardial ischemia or infarction.    There is mild apical thinning which is a normal variant.    The gated perfusion images showed an ejection fraction of 52% at rest. The gated perfusion images showed an ejection fraction of 53% post stress. Normal ejection fraction is greater than 53%.    There is normal wall motion at rest and post stress.    LV cavity size is normal at rest and normal at stress.    The EKG portion of this study is negative for ischemia.    The patient reported chest pain during the stress test.    During stress, rare PVCs are noted.    There are no prior studies for comparison.         Review of Systems   Constitutional:  Positive for fatigue. Negative for activity change, appetite change, chills, fever and unexpected weight change.   HENT:  Negative for congestion, facial swelling, postnasal drip, rhinorrhea, sinus pressure, sore throat and trouble swallowing.    Eyes:  Negative for pain, redness and visual disturbance.   Respiratory:  Negative for cough, chest tightness, shortness of breath and wheezing.    Cardiovascular: Negative.  Negative for chest pain, palpitations and leg swelling.   Gastrointestinal:  Positive for  abdominal distention. Negative for abdominal pain, diarrhea, nausea and vomiting.   Genitourinary:  Positive for decreased urine volume. Negative for dysuria, flank pain and urgency.   Musculoskeletal:  Positive for arthralgias and back pain. Negative for gait problem, neck pain and neck stiffness.   Skin:  Negative for rash.   Allergic/Immunologic: Positive for immunocompromised state (Failed kidney remains on prograf and prednisone). Negative for environmental allergies and food allergies.   Neurological:  Negative for dizziness, weakness, light-headedness and headaches.   Hematological:  Bruises/bleeds easily (Plavix).   Psychiatric/Behavioral:  Negative for agitation and confusion. The patient is not nervous/anxious.      Objective:   body mass index is unknown because there is no height or weight on file.    Physical Exam  Vitals reviewed.   Constitutional:       Appearance: Normal appearance. He is well-developed.   HENT:      Head: Normocephalic.   Eyes:      Pupils: Pupils are equal, round, and reactive to light.   Cardiovascular:      Rate and Rhythm: Normal rate and regular rhythm.      Heart sounds: Normal heart sounds.   Pulmonary:      Effort: Pulmonary effort is normal.      Breath sounds: Normal breath sounds.   Abdominal:      General: Bowel sounds are normal. There is distension.      Palpations: Abdomen is soft.      Comments: PD catheter   Musculoskeletal:         General: Normal range of motion.      Cervical back: Normal range of motion and neck supple.   Skin:     General: Skin is warm and dry.   Neurological:      Mental Status: He is alert and oriented to person, place, and time.      Motor: No abnormal muscle tone.   Psychiatric:         Behavior: Behavior normal.       Labs:  Lab Results   Component Value Date    WBC 12.07 10/07/2022    HGB 11.3 (L) 10/07/2022    HCT 33.5 (L) 10/07/2022    LABPLAT 129 (L) 07/26/2021     10/07/2022    K 5.0 10/07/2022    CL 99 10/07/2022    CO2 16 (L)  10/07/2022    BUN 83 (H) 10/07/2022    CREATININE 21.0 (H) 10/07/2022    EGFRNORACEVR 2.5 (A) 10/07/2022    CALCIUM 8.2 (L) 10/07/2022    PHOS 9.1 (HH) 10/07/2022    MG 1.9 10/07/2022    ALBUMIN 3.3 (L) 10/07/2022    AST 9 (L) 10/07/2022    ALT 9 (L) 10/07/2022    UTPCR 1.21 (H) 04/07/2021    .9 (H) 12/14/2021    TACROLIMUS 8.6 10/07/2022       Lab Results   Component Value Date    BILIRUBINUA Negative 04/07/2021    PROTEINUA 2+ (A) 04/07/2021    NITRITE Negative 04/07/2021    RBCUA 2 04/07/2021    WBCUA 6 (H) 04/07/2021       No results found for: HLAABCTYPE    Lab Results   Component Value Date    CPRA 67 02/13/2023    GH5YNPZ A24,A23,B76,A1,B75,A80,B7,B81,B62,A11 02/13/2023    CIABCLM WEAK B13, B46 02/13/2023    CIIAB Negative 11/01/2022    ABCMT WEAK DRB5*01:01 02/13/2023       Labs were reviewed with the patient.    Pre-transplant Workup:   Reviewed with the patient.    Assessment:     1. Patient on waiting list for kidney transplant    2. Failed kidney transplant    3. Long-term use of immunosuppressant medication    4. Renovascular hypertension    5. PKD (polycystic kidney disease)    6. ESRD on peritoneal dialysis    7. Anemia in chronic kidney disease, on chronic dialysis    8. Secondary hyperparathyroidism of renal origin        Plan:   Urology referral, CT A/P per renal protocol   Complex cystic lesion within the right superior renal pole containing an echogenic nodular component without internal vascularity.      Transplant Candidacy:   Mr. Sylvester is a suitable kidney transplant candidate.  Meets center eligibility for accepting HCV+ donor offer - Yes.  Patient educated on HCV+ donors. Adilson is willing  to accept HCV+ donor offer -  Yes   Patient is a candidate for KDPI > 85 kidney donor offer - No d/t weight  He remains in overall stable health, and will remain active on the transplant list.    Patient advised that it is recommended that all transplant candidates, and their close contacts and  household members receive Covid vaccination.    Radha Kay NP       Follow-up:   In addition to the tests noted in the plan, Mr. Sylvester will continue to have reevaluation as per the standing pre-kidney transplant protocol:  Monthly blood for PRA  Annual return to clinic, except HIV positive, > 65 years of age, or pancreas transplant candidates who will be scheduled to see transplant every 6 months while in pre-transplant phase  Annual re-testing: CXR, EKG, yearly mammograms for women over 40 and PSA for males over 40, cardiology follow-up as recommended by initial cardiology pre-transplant evaluation  Renal ultrasound every 2 years  Baseline colonoscopy after age 50 and repeated as recommended    UNOS Patient Status  Functional Status: 60% - Requires occasional assistance but is able to care for needs  Physical Capacity: No Limitations

## 2023-05-23 NOTE — LETTER
May 25, 2023        Estefanía Anders  [address] [address]  Phone: [phone]  Fax: [phone]             [facility]  [address]  [facility] [address]  Phone: [phone]   Patient: Adilson Sylvester   MR Number: [id number]   YOB: 1975   Date of Visit: 5/23/2023       Dear Dr. Estefanía Anders    Thank you for referring Adilson Sylvester to me for evaluation. Attached you will find relevant portions of my assessment and plan of care.    If you have questions, please do not hesitate to call me. I look forward to following Adilson Slyvester along with you.    Sincerely,    Radha Kay, NP    Enclosure    If you would like to receive this communication electronically, please contact [email or web address] or [phone] to request [employer] Link access.    [employer] Link is a tool which provides read-only access to select patient information with whom you have a relationship. Its easy to use and provides real time access to review your patients record including encounter summaries, notes, results, and demographic information.    If you feel you have received this communication in error or would no longer like to receive these types of communications, please e-mail [email or web address]

## 2023-06-06 DIAGNOSIS — Z76.82 PATIENT ON WAITING LIST FOR KIDNEY TRANSPLANT: Primary | ICD-10-CM

## 2023-06-07 DIAGNOSIS — N28.9 KIDNEY LESION: Primary | ICD-10-CM

## 2023-06-19 NOTE — PROGRESS NOTES
Psychosocial Update     Met with patient in clinic for kidney txp listed update.      Patient has been listed for kidney transplant since 1/20/23.  At this time, patient presents as alert and oriented x 4, pleasant, good eye contact, recall good, concentration/judgement good, calm, communicative, cooperative, and asking and answering questions appropriately.  At this time, patient is accompanied by caregiver, wife, Vianey Sylvester.     Household/Family Systems      Patient resides with wife, step son and niece ages 16 and 12.     Support system includes:  Vianey Sylvester, 44 y/o wife, [phone], drives  Tona Jo, mother in law, [place], [place], [phone], drives       Dependents that require care: 15 y/o son and 13 y/o niece will be cared for by other family.     Patient's primary caregiver is Vianey Sylvester, phone number [phone]    Living donor:  wife is interested in donating.   Education provided     Lodging:  Post transplant, patient and caregiver plan to stay at lodging arranged by themselves.  Pt and caregiver informed that lodging assistance will be unavailable beginning 2023.  - information reviewed in depth.  Confirmed patient and patient's caregivers do have access to reliable transportation.     Cognitive Status/Learning      Patient reports reading ability as:  college.   Patient reports patient learns best by:  multisensory.     Needed: no.   Highest education level: college     Vocation/Disability   .  Working for Income: no  If no, reason not working: disability.  Pt has applied for SSD.  He has not worked since Nov 2020 due to ESRD.    Adherence     Pt states current and expected compliance with all healthcare recommendations.      Substance Use     Patient reports the following substance usage.    Tobacco: denies  Alcohol: denies  Illicit Drugs/Non-prescribed Medications: denies  Patient states clear understanding of the potential impact of substance use.  Substance  abstinence/cessation counseling, education and resources provided and reviewed.      Services Utilizing/ADLS     Infusion Service: patient utilizing? denies  Home Health: patient utilizing? denies  DME: PD equipment and supplies   Pulmonary/Cardiac Rehab:  deniex   Dialysis:  PD Pt states current and expected compliance with all healthcare recommendations, including PD.  ADLs:  Pt states ability to independently accomplish all adls.       Insurance    Payer/Plan Subscr  Sex Relation Sub. Ins. ID Effective Group Num   1. MEDICAID - HE* SALTY [employer]MIER 1975 Male Self [id number] 3/1/21 [id number]                                   [address] [address]       Patient reports patient is able to obtain and afford medications at this time. Pt has income through rental property.     Living Will/Healthcare Power of      Patient states patient does not have a LW and/or HCPA.   provided education regarding LW and HCPA and the completion of forms.     Coping/Mental Health    Mental health diagnosis: Pt denies mental health concerns.  Suicidal/Homicidal:  Pt denies current or past suicidal/homicidal ideation.    Home safety:  Pt denies any home safety concerns.    Coping  Past: Pt has coped with multiple surgeries including kidney txp in .  Present:  quan and family  At time of surgery:    quan and family  Post surgery:  quan and family        Additional Concerns     Patient is being followed for needs, education, resources, information, emotional support, supportive counseling.  providing ongoing psychosocial support, education, resources and d/c planning as needed.  SW remains available.  provided resources Patient denies additional needs and/or concerns at this time. Patient verbalizes understanding and agreement with information reviewed, social work availability, and how to access available resources as needed.    Suitability    Patient presents as low risk risk at this  time based upon has caregiver support, transportation and financial plans in place.    Recommendations:  Recommend fundraising to offset costs associated with transplant.  Pt and caregiver informed that they are responsible for transplant related lodging arrangements and expense.   Pt and caregiver informed that lodging assistancewill be unavailable beginning 2023. - information reviewed in depth.

## 2023-06-28 ENCOUNTER — OFFICE VISIT (OUTPATIENT)
Dept: UROLOGY | Facility: CLINIC | Age: 48
End: 2023-06-28
Payer: MEDICAID

## 2023-06-28 VITALS — HEART RATE: 85 BPM | SYSTOLIC BLOOD PRESSURE: 127 MMHG | DIASTOLIC BLOOD PRESSURE: 90 MMHG

## 2023-06-28 DIAGNOSIS — N28.9 KIDNEY LESION: ICD-10-CM

## 2023-06-28 LAB
ANION GAP SERPL CALC-SCNC: 6 MMOL/L (ref 3–11)
BUN SERPL-MCNC: 59 MG/DL (ref 7–18)
BUN/CREAT SERPL: 3.9 RATIO
CALCIUM SERPL-MCNC: 8.9 MG/DL (ref 8.5–10.1)
CHLORIDE SERPL-SCNC: 95 MMOL/L (ref 98–107)
CO2 SERPL-SCNC: 29 MMOL/L (ref 21–32)
CREAT SERPL-MCNC: 15.1 MG/DL (ref 0.7–1.3)
GFR ESTIMATION: 4
GLUCOSE SERPL-MCNC: 82 MG/DL (ref 74–106)
POTASSIUM SERPL-SCNC: 4.6 MMOL/L (ref 3.5–5.1)
SODIUM BLD-SCNC: 130 MMOL/L (ref 131–143)

## 2023-06-28 PROCEDURE — 36415 COLL VENOUS BLD VENIPUNCTURE: CPT | Mod: NTX,S$GLB,, | Performed by: NURSE PRACTITIONER

## 2023-06-28 PROCEDURE — 3080F PR MOST RECENT DIASTOLIC BLOOD PRESSURE >= 90 MM HG: ICD-10-PCS | Mod: CPTII,NTX,S$GLB, | Performed by: NURSE PRACTITIONER

## 2023-06-28 PROCEDURE — 1160F RVW MEDS BY RX/DR IN RCRD: CPT | Mod: CPTII,NTX,S$GLB, | Performed by: NURSE PRACTITIONER

## 2023-06-28 PROCEDURE — 99213 OFFICE O/P EST LOW 20 MIN: CPT | Mod: NTX,S$GLB,, | Performed by: NURSE PRACTITIONER

## 2023-06-28 PROCEDURE — 3080F DIAST BP >= 90 MM HG: CPT | Mod: CPTII,NTX,S$GLB, | Performed by: NURSE PRACTITIONER

## 2023-06-28 PROCEDURE — 36415 PR COLLECTION VENOUS BLOOD,VENIPUNCTURE: ICD-10-PCS | Mod: NTX,S$GLB,, | Performed by: NURSE PRACTITIONER

## 2023-06-28 PROCEDURE — 99213 PR OFFICE/OUTPT VISIT, EST, LEVL III, 20-29 MIN: ICD-10-PCS | Mod: NTX,S$GLB,, | Performed by: NURSE PRACTITIONER

## 2023-06-28 PROCEDURE — 1159F PR MEDICATION LIST DOCUMENTED IN MEDICAL RECORD: ICD-10-PCS | Mod: CPTII,NTX,S$GLB, | Performed by: NURSE PRACTITIONER

## 2023-06-28 PROCEDURE — 1160F PR REVIEW ALL MEDS BY PRESCRIBER/CLIN PHARMACIST DOCUMENTED: ICD-10-PCS | Mod: CPTII,NTX,S$GLB, | Performed by: NURSE PRACTITIONER

## 2023-06-28 PROCEDURE — 1159F MED LIST DOCD IN RCRD: CPT | Mod: CPTII,NTX,S$GLB, | Performed by: NURSE PRACTITIONER

## 2023-06-28 PROCEDURE — 3074F PR MOST RECENT SYSTOLIC BLOOD PRESSURE < 130 MM HG: ICD-10-PCS | Mod: CPTII,NTX,S$GLB, | Performed by: NURSE PRACTITIONER

## 2023-06-28 PROCEDURE — 3074F SYST BP LT 130 MM HG: CPT | Mod: CPTII,NTX,S$GLB, | Performed by: NURSE PRACTITIONER

## 2023-06-28 NOTE — PROGRESS NOTES
Subjective:       Patient ID: Adilson Sylvester is a 47 y.o. male.    Chief Complaint: kidney lesion      HPI: 47-year-old male, new to [facility], referred for renal lesion.    Patient has a history of kidney disease.  He is on dialysis.  He does dialysis nightly at home.    Patient had an ultrasound on 05/28/2023.  Ultrasound shows a complex cystic lesion within the right superior renal pole containing echogenic nodule component without internal vascularity.  Finding could represent a complicated hemorrhagic/proteinaceous cyst however can not exclude neoplasm.  Also showed enlarged bilateral kidneys with numerous simple and minimally complex cyst keeping with plica kidney disease.  Bilateral nonobstructing stones.    Patient denies any unexpected weight loss.  Denies any fever or body aches.       Past Medical History:   Past Medical History:   Diagnosis Date    Diabetes     Diabetes mellitus, type 2     Gout     Hypertension     Hypothyroidism     Hypothyroidism     Kidney transplant recipient     Obesity     Polycystic kidney disease        Past Surgical Historical:   Past Surgical History:   Procedure Laterality Date    ANKLE SURGERY      HERNIA REPAIR      KIDNEY TRANSPLANT  2015    [facility]    LITHOTRIPSY Left 2017    naitive kidney     PD catheter      PERITONEAL CATHETER INSERTION          Medications:   Medication List with Changes/Refills   Current Medications    ATORVASTATIN (LIPITOR) 10 MG TABLET    Take by mouth once daily.    BLOOD SUGAR DIAGNOSTIC STRP    Test 3 (three) times daily with meals.    BLOOD-GLUCOSE METER MISC    Use as instructed    CALCITRIOL (ROCALTROL) 0.25 MCG CAP    Take 0.25 mcg by mouth once daily.    CARVEDILOL (COREG) 25 MG TABLET    Take 1 tablet (25 mg total) by mouth 2 (two) times daily.    CINACALCET (SENSIPAR) 30 MG TAB    Take 30 mg by mouth once daily.    CLOPIDOGREL (PLAVIX) 75 MG TABLET    Take 75 mg by mouth once daily.    DIALYVITE [id number] MG-MG-MCG-MG  TAB    Take 1 tablet by mouth once daily.    ERGOCALCIFEROL (ERGOCALCIFEROL) 50,000 UNIT CAP    Take 50,000 Units by mouth once a week. Monday    FUROSEMIDE (LASIX) 80 MG TABLET    Take 80 mg by mouth once daily.    HYDRALAZINE (APRESOLINE) 100 MG TABLET    Take 1 tablet (100 mg total) by mouth every 8 (eight) hours.    ISONIAZID (NYDRAZID) 300 MG TAB    Take 1 tablet (300 mg total) by mouth once daily.    LANCETS 33 GAUGE MISC    Test 3 (three) times daily with meals.    NIFEDIPINE (ADALAT CC) 30 MG TBSR    Take 30 mg by mouth every evening.    PREDNISONE (DELTASONE) 5 MG TABLET    Take 1 tablet (5 mg total) by mouth every other day.    SENNA-DOCUSATE 8.6-50 MG (SENNA WITH DOCUSATE SODIUM) 8.6-50 MG PER TABLET    Take 2 tablets by mouth once daily.    SEVELAMER CARBONATE (RENVELA) 800 MG TAB    Take 3 tablets (2,400 mg total) by mouth 3 (three) times daily with meals.    TACROLIMUS (PROGRAF) 1 MG CAP    Take 1 capsule (1 mg total) by mouth every 12 (twelve) hours.        Past Social History:   Social History     Socioeconomic History    Marital status:      Spouse name: Vianey Sylvester    Number of children: 2   Tobacco Use    Smoking status: Former     Types: Cigarettes     Quit date: 10/1/2008     Years since quittin.7    Smokeless tobacco: Never   Substance and Sexual Activity    Alcohol use: Not Currently     Comment: occasional    Drug use: Never    Sexual activity: Yes     Partners: Female   Social History Narrative    Caregiver Wife Vianey       Allergies: Review of patient's allergies indicates:  No Known Allergies     Family History:   Family History   Problem Relation Age of Onset    Diabetes Mother     Hypertension Mother     Kidney disease Father     Kidney disease Sister     Kidney disease Paternal Aunt     Hypertension Paternal Aunt         Review of Systems:  Review of Systems   Constitutional:  Negative for activity change and appetite change.   HENT:  Negative for congestion and dental  problem.    Respiratory:  Negative for chest tightness and shortness of breath.    Cardiovascular:  Negative for chest pain.   Gastrointestinal:  Negative for abdominal distention and abdominal pain.   Genitourinary:  Negative for decreased urine volume, difficulty urinating, dysuria, enuresis, flank pain, frequency, genital sores, hematuria, penile discharge, penile pain, penile swelling, scrotal swelling, testicular pain and urgency.   Musculoskeletal:  Negative for back pain and neck pain.   Neurological:  Negative for dizziness.   Hematological:  Negative for adenopathy.   Psychiatric/Behavioral:  Negative for agitation, behavioral problems and confusion.      Physical Exam:  Physical Exam  Vitals and nursing note reviewed.   Constitutional:       Appearance: He is well-developed.   HENT:      Head: Normocephalic.   Cardiovascular:      Rate and Rhythm: Normal rate and regular rhythm.      Heart sounds: Normal heart sounds.   Pulmonary:      Effort: Pulmonary effort is normal.      Breath sounds: Normal breath sounds.   Abdominal:      General: Bowel sounds are normal.      Palpations: Abdomen is soft.   Skin:     General: Skin is warm and dry.   Neurological:      Mental Status: He is alert and oriented to person, place, and time.       Assessment/Plan:   Kidney lesion:  Patient has a complex cystic lesion to the right kidney which could be neoplasm.    Will order CT renal mass protocol.  Clearance from Nephrology for contrast.    Will check patient's BMP.      Follow-up to arrange pending CT.  Problem List Items Addressed This Visit    None  Visit Diagnoses       Kidney lesion        Relevant Orders    CT Abdomen Pelvis W Wo Contrast    Basic Metabolic Panel

## 2023-06-29 ENCOUNTER — TELEPHONE (OUTPATIENT)
Dept: UROLOGY | Facility: CLINIC | Age: 48
End: 2023-06-29
Payer: MEDICAID

## 2023-06-29 NOTE — TELEPHONE ENCOUNTER
06-29-23 @ 11:46    FAXED (with conf) clearance letter for CT with contrast to Dr. Esetfanía Albarado (nephrology).    (See letters)

## 2023-06-29 NOTE — TELEPHONE ENCOUNTER
06-29-23    Clearance received from Dr. Albarado (nephrology) for pt to have CT with contrast.  Orders/clearance faxed to CS.

## 2023-08-19 DIAGNOSIS — I72.5 ANEURYSM OF OTHER PRECEREBRAL ARTERIES: ICD-10-CM

## 2023-08-30 RX ORDER — CLOPIDOGREL BISULFATE 75 MG/1
75 TABLET ORAL
Qty: 30 TABLET | Refills: 11 | Status: SHIPPED | OUTPATIENT
Start: 2023-08-30

## 2023-09-21 ENCOUNTER — CLINICAL SUPPORT (OUTPATIENT)
Dept: TRANSPLANT | Facility: CLINIC | Age: 48
End: 2023-09-21
Payer: MEDICAID

## 2024-02-27 DIAGNOSIS — Z76.82 ORGAN TRANSPLANT CANDIDATE: Primary | ICD-10-CM

## 2024-02-27 NOTE — PROGRESS NOTES
YEARLY LIST MANAGEMENT NOTE    Adilson Sylvester's kidney transplant listing status reviewed.  Patient is due for follow-up appointments in May 2024.   Appointments will be scheduled per protocol.  HLA sample is past due at this time with last sample being received on 1/4/24.

## 2024-04-04 PROCEDURE — 86833 HLA CLASS II HIGH DEFIN QUAL: CPT | Performed by: NURSE PRACTITIONER

## 2024-04-04 PROCEDURE — 86977 RBC SERUM PRETX INCUBJ/INHIB: CPT | Mod: 59 | Performed by: NURSE PRACTITIONER

## 2024-04-04 PROCEDURE — 86832 HLA CLASS I HIGH DEFIN QUAL: CPT | Performed by: NURSE PRACTITIONER

## 2024-04-09 ENCOUNTER — LAB VISIT (OUTPATIENT)
Dept: LAB | Facility: HOSPITAL | Age: 49
End: 2024-04-09
Payer: MEDICAID

## 2024-04-09 DIAGNOSIS — Z76.82 ORGAN TRANSPLANT CANDIDATE: ICD-10-CM

## 2024-04-26 ENCOUNTER — TELEPHONE (OUTPATIENT)
Dept: TRANSPLANT | Facility: CLINIC | Age: 49
End: 2024-04-26
Payer: MEDICAID

## 2024-04-26 DIAGNOSIS — Z76.82 AWAITING ORGAN TRANSPLANT STATUS: Primary | ICD-10-CM

## 2024-04-26 NOTE — TELEPHONE ENCOUNTER
ON-CALL NOTE    UNOS# [id number]    Notified by Alejandro Hilliard, , that Adilson Sylvester is eligible for kidney offer.  Spoke with patient and identified no acute medical issues with telephone assessment. Protocol script read to patient regarding PHS risk criteria donor offer . Patient verbalized understanding, all questions answered, patient accepts organ offer. Notified by Alejandro Hilliard that virtual crossmatch was unable to be done. Current sample of blood is available from date April 4, 2024 for crossmatch.  Patient reports no sensitizing event since last blood sample for PRA received. Notified Lisa in HLA Lab to perform a prospective  crossmatch per guideline.    Patient was asked if they have had a positive COVID-19 test or if they have any signs or symptoms. Informed patient that they will be tested for COVID-19 upon arrival to the hospital, unless have a previous positive result. If tested and result is positive, the transplant will not be able to occur, they will be inactivated on the wait list for 21 days per protocol and required to quarantine.     Patient does PD and states will begin begin this am @ approximately 0330 am, patient instructed when session is complete to allow 200-300 cc to dwell in abdomen as well need to culture fluid if called in for transplant. Patient verbalized understanding. Patient instructed to continue normal routine and remain at home on standby as primary and await further updates.

## 2024-04-26 NOTE — ASSESSMENT & PLAN NOTE
- hx ESRD secondary to diabetic nephropathy and polycystic kidney disease s/p Ktx #1 7/9/17 that failed due to missing medications due to insurance issues in April 2021.   - wait list for a kidney transplant #2 at [facility] since 4/20/2021.  - currently on peritoneal dialysis started on 4/20/21; fluid studies to be sent pre op  - Pre op labs and diagnostics pending.   - anuric prior  - Thymo induction  - OR times TBD 4/27 with Dr. Pina as primary surgeon.

## 2024-04-26 NOTE — TELEPHONE ENCOUNTER
On call note  UNOS [id number]    I received a call from Christian Souza with instructions to admit patient for transplant after midnight. Donor OR set for 2000. Patient to be NPO after 9 pm.  I called patient and gave instructions for admit after MN to TSU. Patient will call me with he arrives at the hospital. He verbalized understanding of all instructions and has my contact information to call if he has any additional questions.  [place] reservation made CSN [phone] spoke with Paulina  Report called to [facility] call nurse Eva, Nursing Supervisor Ras notified.  Recipient OR time TBD.

## 2024-04-26 NOTE — HPI
"Mr. Sylvester is a 48 y.o. 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 He has been on the wait list for a kidney transplant #2 at [facility] since 4/20/2021. Patient is currently on peritoneal dialysis started on 4/20/21. Patient is dialyzing on cyclic peritoneal dialysis. Patient reports that he is tolerating dialysis well. He has a PD catheter. Patient denies any recent hospitalizations or ED visits. Pre op labs and diagnostics pending. Anuric. OR times TBD 4/27 with Dr. Pina as primary surgeon. He has been NPO since 2100 4/26.  "

## 2024-04-27 ENCOUNTER — ANESTHESIA (OUTPATIENT)
Dept: SURGERY | Facility: HOSPITAL | Age: 49
DRG: 652 | End: 2024-04-27
Payer: MEDICAID

## 2024-04-27 ENCOUNTER — HOSPITAL ENCOUNTER (INPATIENT)
Facility: HOSPITAL | Age: 49
LOS: 3 days | Discharge: HOME | DRG: 652 | End: 2024-04-30
Attending: SURGERY | Admitting: SURGERY
Payer: MEDICAID

## 2024-04-27 ENCOUNTER — ANESTHESIA EVENT (OUTPATIENT)
Dept: SURGERY | Facility: HOSPITAL | Age: 49
DRG: 652 | End: 2024-04-27
Payer: MEDICAID

## 2024-04-27 DIAGNOSIS — Z79.60 LONG-TERM USE OF IMMUNOSUPPRESSANT MEDICATION: ICD-10-CM

## 2024-04-27 DIAGNOSIS — Z94.0 S/P KIDNEY TRANSPLANT: Primary | ICD-10-CM

## 2024-04-27 DIAGNOSIS — Z79.4 TYPE 2 DIABETES MELLITUS WITH CHRONIC KIDNEY DISEASE ON CHRONIC DIALYSIS, WITH LONG-TERM CURRENT USE OF INSULIN: Chronic | ICD-10-CM

## 2024-04-27 DIAGNOSIS — Z99.2 ESRD ON PERITONEAL DIALYSIS: ICD-10-CM

## 2024-04-27 DIAGNOSIS — E11.22 TYPE 2 DIABETES MELLITUS WITH CHRONIC KIDNEY DISEASE ON CHRONIC DIALYSIS, WITH LONG-TERM CURRENT USE OF INSULIN: Chronic | ICD-10-CM

## 2024-04-27 DIAGNOSIS — Z29.89 PROPHYLACTIC IMMUNOTHERAPY: ICD-10-CM

## 2024-04-27 DIAGNOSIS — N18.6 TYPE 2 DIABETES MELLITUS WITH CHRONIC KIDNEY DISEASE ON CHRONIC DIALYSIS, WITH LONG-TERM CURRENT USE OF INSULIN: Chronic | ICD-10-CM

## 2024-04-27 DIAGNOSIS — Z01.818 PRE-TRANSPLANT EVALUATION FOR CHRONIC KIDNEY DISEASE: ICD-10-CM

## 2024-04-27 DIAGNOSIS — Z01.818 PRE-OP EVALUATION: ICD-10-CM

## 2024-04-27 DIAGNOSIS — N18.6 ESRD ON PERITONEAL DIALYSIS: ICD-10-CM

## 2024-04-27 DIAGNOSIS — Z99.2 TYPE 2 DIABETES MELLITUS WITH CHRONIC KIDNEY DISEASE ON CHRONIC DIALYSIS, WITH LONG-TERM CURRENT USE OF INSULIN: Chronic | ICD-10-CM

## 2024-04-27 DIAGNOSIS — T86.11 ACUTE REJECTION OF KIDNEY TRANSPLANT: ICD-10-CM

## 2024-04-27 DIAGNOSIS — Z91.89 AT RISK FOR OPPORTUNISTIC INFECTIONS: ICD-10-CM

## 2024-04-27 LAB
ABO + RH BLD: ABNORMAL
ALBUMIN SERPL BCP-MCNC: 2.5 G/DL (ref 3.5–5.2)
ALBUMIN SERPL BCP-MCNC: 2.6 G/DL (ref 3.5–5.2)
ALBUMIN SERPL BCP-MCNC: 3 G/DL (ref 3.5–5.2)
ALP SERPL-CCNC: 122 U/L (ref 55–135)
ALT SERPL W/O P-5'-P-CCNC: 12 U/L (ref 10–44)
ANION GAP SERPL CALC-SCNC: 15 MMOL/L (ref 8–16)
ANION GAP SERPL CALC-SCNC: 16 MMOL/L (ref 8–16)
ANION GAP SERPL CALC-SCNC: 18 MMOL/L (ref 8–16)
APPEARANCE FLD: CLEAR
APTT PPP: 27.6 SEC (ref 21–32)
AST SERPL-CCNC: 15 U/L (ref 10–40)
BASOPHILS # BLD AUTO: 0.1 K/UL (ref 0–0.2)
BASOPHILS NFR BLD: 1.6 % (ref 0–1.9)
BILIRUB SERPL-MCNC: 0.4 MG/DL (ref 0.1–1)
BLD GP AB SCN CELLS X3 SERPL QL: ABNORMAL
BLOOD GROUP ANTIBODIES SERPL: NORMAL
BODY FLD TYPE: NORMAL
BUN SERPL-MCNC: 70 MG/DL (ref 6–20)
BUN SERPL-MCNC: 74 MG/DL (ref 6–20)
BUN SERPL-MCNC: 78 MG/DL (ref 6–20)
CALCIUM SERPL-MCNC: 7.5 MG/DL (ref 8.7–10.5)
CALCIUM SERPL-MCNC: 7.6 MG/DL (ref 8.7–10.5)
CALCIUM SERPL-MCNC: 8.2 MG/DL (ref 8.7–10.5)
CHLORIDE SERPL-SCNC: 100 MMOL/L (ref 95–110)
CHLORIDE SERPL-SCNC: 101 MMOL/L (ref 95–110)
CHLORIDE SERPL-SCNC: 101 MMOL/L (ref 95–110)
CO2 SERPL-SCNC: 20 MMOL/L (ref 23–29)
CO2 SERPL-SCNC: 23 MMOL/L (ref 23–29)
CO2 SERPL-SCNC: 25 MMOL/L (ref 23–29)
COLOR FLD: COLORLESS
CREAT SERPL-MCNC: 16.5 MG/DL (ref 0.5–1.4)
CREAT SERPL-MCNC: 16.8 MG/DL (ref 0.5–1.4)
CREAT SERPL-MCNC: 18.3 MG/DL (ref 0.5–1.4)
DIFFERENTIAL METHOD BLD: ABNORMAL
EOSINOPHIL # BLD AUTO: 0.3 K/UL (ref 0–0.5)
EOSINOPHIL NFR BLD: 4.1 % (ref 0–8)
ERYTHROCYTE [DISTWIDTH] IN BLOOD BY AUTOMATED COUNT: 13.7 % (ref 11.5–14.5)
EST. GFR  (NO RACE VARIABLE): 2.9 ML/MIN/1.73 M^2
EST. GFR  (NO RACE VARIABLE): 3.2 ML/MIN/1.73 M^2
EST. GFR  (NO RACE VARIABLE): 3.3 ML/MIN/1.73 M^2
GLUCOSE SERPL-MCNC: 126 MG/DL (ref 70–110)
GLUCOSE SERPL-MCNC: 182 MG/DL (ref 70–110)
GLUCOSE SERPL-MCNC: 79 MG/DL (ref 70–110)
HBV CORE AB SERPL QL IA: NORMAL
HBV CORE IGM SERPL QL IA: NORMAL
HBV SURFACE AG SERPL QL IA: NORMAL
HCT VFR BLD AUTO: 35 % (ref 40–54)
HCT VFR BLD AUTO: 36 % (ref 40–54)
HCV AB SERPL QL IA: NORMAL
HGB BLD-MCNC: 11.8 G/DL (ref 14–18)
HIV 1+2 AB+HIV1 P24 AG SERPL QL IA: NORMAL
IMM GRANULOCYTES # BLD AUTO: 0.02 K/UL (ref 0–0.04)
IMM GRANULOCYTES NFR BLD AUTO: 0.3 % (ref 0–0.5)
INR PPP: 0.9 (ref 0.8–1.2)
LDH SERPL L TO P-CCNC: 173 U/L (ref 110–260)
LYMPHOCYTES # BLD AUTO: 1.4 K/UL (ref 1–4.8)
LYMPHOCYTES NFR BLD: 22.3 % (ref 18–48)
LYMPHOCYTES NFR FLD MANUAL: 3 %
MCH RBC QN AUTO: 30.6 PG (ref 27–31)
MCHC RBC AUTO-ENTMCNC: 32.8 G/DL (ref 32–36)
MCV RBC AUTO: 94 FL (ref 82–98)
MESOTHL CELL NFR FLD MANUAL: 5 %
MONOCYTES # BLD AUTO: 0.4 K/UL (ref 0.3–1)
MONOCYTES NFR BLD: 7 % (ref 4–15)
MONOS+MACROS NFR FLD MANUAL: 92 %
NEUTROPHILS # BLD AUTO: 4.1 K/UL (ref 1.8–7.7)
NEUTROPHILS NFR BLD: 64.7 % (ref 38–73)
NRBC BLD-RTO: 0 /100 WBC
OHS QRS DURATION: 100 MS
OHS QTC CALCULATION: 433 MS
PHOSPHATE SERPL-MCNC: 4.7 MG/DL (ref 2.7–4.5)
PHOSPHATE SERPL-MCNC: 5.5 MG/DL (ref 2.7–4.5)
PHOSPHATE SERPL-MCNC: 5.7 MG/DL (ref 2.7–4.5)
PLATELET # BLD AUTO: 287 K/UL (ref 150–450)
PMV BLD AUTO: 9.8 FL (ref 9.2–12.9)
POCT GLUCOSE: 125 MG/DL (ref 70–110)
POCT GLUCOSE: 154 MG/DL (ref 70–110)
POCT GLUCOSE: 193 MG/DL (ref 70–110)
POCT GLUCOSE: 81 MG/DL (ref 70–110)
POTASSIUM SERPL-SCNC: 4.5 MMOL/L (ref 3.5–5.1)
POTASSIUM SERPL-SCNC: 4.5 MMOL/L (ref 3.5–5.1)
POTASSIUM SERPL-SCNC: 5.1 MMOL/L (ref 3.5–5.1)
PROT SERPL-MCNC: 6.4 G/DL (ref 6–8.4)
PROTHROMBIN TIME: 9.9 SEC (ref 9–12.5)
RBC # BLD AUTO: 3.85 M/UL (ref 4.6–6.2)
SARS-COV-2 RDRP RESP QL NAA+PROBE: NEGATIVE
SODIUM SERPL-SCNC: 138 MMOL/L (ref 136–145)
SODIUM SERPL-SCNC: 140 MMOL/L (ref 136–145)
SODIUM SERPL-SCNC: 141 MMOL/L (ref 136–145)
SPECIMEN OUTDATE: ABNORMAL
WBC # BLD AUTO: 6.32 K/UL (ref 3.9–12.7)
WBC # FLD: 10 /CU MM

## 2024-04-27 PROCEDURE — 63600175 PHARM REV CODE 636 W HCPCS: Mod: NTX | Performed by: PHYSICIAN ASSISTANT

## 2024-04-27 PROCEDURE — 80053 COMPREHEN METABOLIC PANEL: CPT | Performed by: PHYSICIAN ASSISTANT

## 2024-04-27 PROCEDURE — 25000003 PHARM REV CODE 250: Performed by: NURSE ANESTHETIST, CERTIFIED REGISTERED

## 2024-04-27 PROCEDURE — 80069 RENAL FUNCTION PANEL: CPT | Performed by: STUDENT IN AN ORGANIZED HEALTH CARE EDUCATION/TRAINING PROGRAM

## 2024-04-27 PROCEDURE — 27201423 OPTIME MED/SURG SUP & DEVICES STERILE SUPPLY: Performed by: SURGERY

## 2024-04-27 PROCEDURE — 82962 GLUCOSE BLOOD TEST: CPT | Performed by: SURGERY

## 2024-04-27 PROCEDURE — 86706 HEP B SURFACE ANTIBODY: CPT | Performed by: PHYSICIAN ASSISTANT

## 2024-04-27 PROCEDURE — 63600175 PHARM REV CODE 636 W HCPCS: Performed by: STUDENT IN AN ORGANIZED HEALTH CARE EDUCATION/TRAINING PROGRAM

## 2024-04-27 PROCEDURE — 71000015 HC POSTOP RECOV 1ST HR: Performed by: SURGERY

## 2024-04-27 PROCEDURE — 63600175 PHARM REV CODE 636 W HCPCS: Performed by: SURGERY

## 2024-04-27 PROCEDURE — 87070 CULTURE OTHR SPECIMN AEROBIC: CPT | Performed by: PHYSICIAN ASSISTANT

## 2024-04-27 PROCEDURE — 27100025 HC TUBING, SET FLUID WARMER: Mod: NTX | Performed by: ANESTHESIOLOGY

## 2024-04-27 PROCEDURE — 80069 RENAL FUNCTION PANEL: CPT | Mod: 91 | Performed by: NURSE PRACTITIONER

## 2024-04-27 PROCEDURE — 81300002 HC KIDNEY TRANSPORT, GROUND 4-5 HOURS

## 2024-04-27 PROCEDURE — 86704 HEP B CORE ANTIBODY TOTAL: CPT | Performed by: PHYSICIAN ASSISTANT

## 2024-04-27 PROCEDURE — 93010 ELECTROCARDIOGRAM REPORT: CPT | Mod: ,,, | Performed by: INTERNAL MEDICINE

## 2024-04-27 PROCEDURE — S5010 5% DEXTROSE AND 0.45% SALINE: HCPCS | Performed by: STUDENT IN AN ORGANIZED HEALTH CARE EDUCATION/TRAINING PROGRAM

## 2024-04-27 PROCEDURE — 85730 THROMBOPLASTIN TIME PARTIAL: CPT | Performed by: PHYSICIAN ASSISTANT

## 2024-04-27 PROCEDURE — 86902 BLOOD TYPE ANTIGEN DONOR EA: CPT | Performed by: PHYSICIAN ASSISTANT

## 2024-04-27 PROCEDURE — 37000008 HC ANESTHESIA 1ST 15 MINUTES: Performed by: SURGERY

## 2024-04-27 PROCEDURE — 81200001 HC KIDNEY ACQUISITION - CADAVER

## 2024-04-27 PROCEDURE — 25000003 PHARM REV CODE 250: Performed by: STUDENT IN AN ORGANIZED HEALTH CARE EDUCATION/TRAINING PROGRAM

## 2024-04-27 PROCEDURE — 36000930 HC OR TIME LEV VII 1ST 15 MIN: Performed by: SURGERY

## 2024-04-27 PROCEDURE — 27200950 HC CAPD SUPPORT: Mod: NTX

## 2024-04-27 PROCEDURE — 86850 RBC ANTIBODY SCREEN: CPT | Performed by: PHYSICIAN ASSISTANT

## 2024-04-27 PROCEDURE — 49422 REMOVE TUNNELED IP CATH: CPT | Mod: 51,,, | Performed by: SURGERY

## 2024-04-27 PROCEDURE — C1751 CATH, INF, PER/CENT/MIDLINE: HCPCS | Mod: NTX | Performed by: ANESTHESIOLOGY

## 2024-04-27 PROCEDURE — 86870 RBC ANTIBODY IDENTIFICATION: CPT | Performed by: PHYSICIAN ASSISTANT

## 2024-04-27 PROCEDURE — 25000003 PHARM REV CODE 250: Mod: NTX | Performed by: PHYSICIAN ASSISTANT

## 2024-04-27 PROCEDURE — C2617 STENT, NON-COR, TEM W/O DEL: HCPCS | Performed by: SURGERY

## 2024-04-27 PROCEDURE — 87522 HEPATITIS C REVRS TRNSCRPJ: CPT | Performed by: PHYSICIAN ASSISTANT

## 2024-04-27 PROCEDURE — 86905 BLOOD TYPING RBC ANTIGENS: CPT | Performed by: PHYSICIAN ASSISTANT

## 2024-04-27 PROCEDURE — C1729 CATH, DRAINAGE: HCPCS | Performed by: SURGERY

## 2024-04-27 PROCEDURE — 87340 HEPATITIS B SURFACE AG IA: CPT | Performed by: PHYSICIAN ASSISTANT

## 2024-04-27 PROCEDURE — 63600175 PHARM REV CODE 636 W HCPCS: Performed by: NURSE ANESTHETIST, CERTIFIED REGISTERED

## 2024-04-27 PROCEDURE — 63600175 PHARM REV CODE 636 W HCPCS: Performed by: ANESTHESIOLOGY

## 2024-04-27 PROCEDURE — 27201037 HC PRESSURE MONITORING SET UP: Mod: NTX

## 2024-04-27 PROCEDURE — 99223 1ST HOSP IP/OBS HIGH 75: CPT | Mod: NTX,,, | Performed by: PHYSICIAN ASSISTANT

## 2024-04-27 PROCEDURE — 27000207 HC ISOLATION

## 2024-04-27 PROCEDURE — 36415 COLL VENOUS BLD VENIPUNCTURE: CPT | Performed by: PHYSICIAN ASSISTANT

## 2024-04-27 PROCEDURE — 85014 HEMATOCRIT: CPT | Performed by: STUDENT IN AN ORGANIZED HEALTH CARE EDUCATION/TRAINING PROGRAM

## 2024-04-27 PROCEDURE — 36556 INSERT NON-TUNNEL CV CATH: CPT | Mod: 59,,, | Performed by: ANESTHESIOLOGY

## 2024-04-27 PROCEDURE — 87205 SMEAR GRAM STAIN: CPT | Performed by: PHYSICIAN ASSISTANT

## 2024-04-27 PROCEDURE — 87075 CULTR BACTERIA EXCEPT BLOOD: CPT | Performed by: PHYSICIAN ASSISTANT

## 2024-04-27 PROCEDURE — 86705 HEP B CORE ANTIBODY IGM: CPT | Performed by: PHYSICIAN ASSISTANT

## 2024-04-27 PROCEDURE — 71000033 HC RECOVERY, INTIAL HOUR: Performed by: SURGERY

## 2024-04-27 PROCEDURE — 37000009 HC ANESTHESIA EA ADD 15 MINS: Performed by: SURGERY

## 2024-04-27 PROCEDURE — 83615 LACTATE (LD) (LDH) ENZYME: CPT | Performed by: PHYSICIAN ASSISTANT

## 2024-04-27 PROCEDURE — 50605 INSERT URETERAL SUPPORT: CPT | Mod: 51,LT,, | Performed by: SURGERY

## 2024-04-27 PROCEDURE — 71000016 HC POSTOP RECOV ADDL HR: Performed by: SURGERY

## 2024-04-27 PROCEDURE — 85025 COMPLETE CBC W/AUTO DIFF WBC: CPT | Performed by: PHYSICIAN ASSISTANT

## 2024-04-27 PROCEDURE — 93005 ELECTROCARDIOGRAM TRACING: CPT | Mod: NTX

## 2024-04-27 PROCEDURE — 85610 PROTHROMBIN TIME: CPT | Performed by: PHYSICIAN ASSISTANT

## 2024-04-27 PROCEDURE — 89051 BODY FLUID CELL COUNT: CPT | Performed by: PHYSICIAN ASSISTANT

## 2024-04-27 PROCEDURE — 86803 HEPATITIS C AB TEST: CPT | Performed by: PHYSICIAN ASSISTANT

## 2024-04-27 PROCEDURE — D9220A PRA ANESTHESIA: Mod: CRNA,,, | Performed by: NURSE ANESTHETIST, CERTIFIED REGISTERED

## 2024-04-27 PROCEDURE — 0TY10Z0 TRANSPLANTATION OF LEFT KIDNEY, ALLOGENEIC, OPEN APPROACH: ICD-10-PCS | Performed by: SURGERY

## 2024-04-27 PROCEDURE — 20600001 HC STEP DOWN PRIVATE ROOM

## 2024-04-27 PROCEDURE — D9220A PRA ANESTHESIA: Mod: ANES,,, | Performed by: ANESTHESIOLOGY

## 2024-04-27 PROCEDURE — 87389 HIV-1 AG W/HIV-1&-2 AB AG IA: CPT | Performed by: PHYSICIAN ASSISTANT

## 2024-04-27 PROCEDURE — 50360 RNL ALTRNSPLJ W/O RCP NFRCT: CPT | Mod: LT,,, | Performed by: SURGERY

## 2024-04-27 PROCEDURE — 84100 ASSAY OF PHOSPHORUS: CPT | Performed by: PHYSICIAN ASSISTANT

## 2024-04-27 PROCEDURE — U0002 COVID-19 LAB TEST NON-CDC: HCPCS | Performed by: PHYSICIAN ASSISTANT

## 2024-04-27 PROCEDURE — 36000931 HC OR TIME LEV VII EA ADD 15 MIN: Performed by: SURGERY

## 2024-04-27 DEVICE — STENT DOUBLE J 7FRX12CM
Type: IMPLANTABLE DEVICE | Site: URETER | Status: NON-FUNCTIONAL
Removed: 2024-05-16

## 2024-04-27 RX ORDER — PREDNISONE 5 MG/1
TABLET ORAL
Qty: 70 TABLET | Refills: 11 | Status: SHIPPED | OUTPATIENT
Start: 2024-04-27 | End: 2024-05-22 | Stop reason: SDUPTHER

## 2024-04-27 RX ORDER — DIPHENHYDRAMINE HCL 25 MG
50 CAPSULE ORAL ONCE AS NEEDED
Status: DISCONTINUED | OUTPATIENT
Start: 2024-04-27 | End: 2024-04-30 | Stop reason: HOSPADM

## 2024-04-27 RX ORDER — EPINEPHRINE 1 MG/ML
1 INJECTION, SOLUTION, CONCENTRATE INTRAVENOUS ONCE AS NEEDED
Status: DISCONTINUED | OUTPATIENT
Start: 2024-04-27 | End: 2024-04-30 | Stop reason: HOSPADM

## 2024-04-27 RX ORDER — DROPERIDOL 2.5 MG/ML
0.62 INJECTION, SOLUTION INTRAMUSCULAR; INTRAVENOUS ONCE AS NEEDED
Status: DISCONTINUED | OUTPATIENT
Start: 2024-04-27 | End: 2024-04-30 | Stop reason: HOSPADM

## 2024-04-27 RX ORDER — MUPIROCIN 20 MG/G
1 OINTMENT TOPICAL 2 TIMES DAILY
Status: DISCONTINUED | OUTPATIENT
Start: 2024-04-27 | End: 2024-04-30 | Stop reason: HOSPADM

## 2024-04-27 RX ORDER — IBUPROFEN 200 MG
16 TABLET ORAL
Status: DISCONTINUED | OUTPATIENT
Start: 2024-04-27 | End: 2024-04-30 | Stop reason: HOSPADM

## 2024-04-27 RX ORDER — CARVEDILOL 6.25 MG/1
6.25 TABLET ORAL 2 TIMES DAILY
Status: DISCONTINUED | OUTPATIENT
Start: 2024-04-27 | End: 2024-04-27

## 2024-04-27 RX ORDER — MYCOPHENOLATE MOFETIL 250 MG/1
1000 CAPSULE ORAL 2 TIMES DAILY
Qty: 240 CAPSULE | Refills: 11 | Status: SHIPPED | OUTPATIENT
Start: 2024-04-27 | End: 2024-05-22 | Stop reason: SDUPTHER

## 2024-04-27 RX ORDER — PHENYLEPHRINE HYDROCHLORIDE 10 MG/ML
INJECTION INTRAVENOUS CONTINUOUS PRN
Status: DISCONTINUED | OUTPATIENT
Start: 2024-04-27 | End: 2024-04-27

## 2024-04-27 RX ORDER — HALOPERIDOL 5 MG/ML
INJECTION INTRAMUSCULAR
Status: DISCONTINUED | OUTPATIENT
Start: 2024-04-27 | End: 2024-04-27

## 2024-04-27 RX ORDER — ONDANSETRON HYDROCHLORIDE 2 MG/ML
INJECTION, SOLUTION INTRAVENOUS
Status: DISCONTINUED | OUTPATIENT
Start: 2024-04-27 | End: 2024-04-27

## 2024-04-27 RX ORDER — HEPARIN SODIUM 5000 [USP'U]/ML
5000 INJECTION, SOLUTION INTRAVENOUS; SUBCUTANEOUS ONCE
Status: COMPLETED | OUTPATIENT
Start: 2024-04-27 | End: 2024-04-27

## 2024-04-27 RX ORDER — MANNITOL 250 MG/ML
INJECTION, SOLUTION INTRAVENOUS
Status: DISCONTINUED | OUTPATIENT
Start: 2024-04-27 | End: 2024-04-27

## 2024-04-27 RX ORDER — TRAMADOL HYDROCHLORIDE 50 MG/1
50 TABLET ORAL EVERY 6 HOURS PRN
Status: DISCONTINUED | OUTPATIENT
Start: 2024-04-27 | End: 2024-04-30 | Stop reason: HOSPADM

## 2024-04-27 RX ORDER — TACROLIMUS 1 MG/1
3 CAPSULE ORAL 2 TIMES DAILY
Status: DISCONTINUED | OUTPATIENT
Start: 2024-04-27 | End: 2024-04-28

## 2024-04-27 RX ORDER — VALGANCICLOVIR 450 MG/1
450 TABLET, FILM COATED ORAL EVERY MORNING
Status: DISCONTINUED | OUTPATIENT
Start: 2024-05-07 | End: 2024-04-30 | Stop reason: HOSPADM

## 2024-04-27 RX ORDER — MYCOPHENOLATE MOFETIL 250 MG/1
1000 CAPSULE ORAL 2 TIMES DAILY
Status: DISCONTINUED | OUTPATIENT
Start: 2024-04-27 | End: 2024-04-30 | Stop reason: HOSPADM

## 2024-04-27 RX ORDER — ONDANSETRON HYDROCHLORIDE 2 MG/ML
4 INJECTION, SOLUTION INTRAVENOUS ONCE AS NEEDED
Status: DISCONTINUED | OUTPATIENT
Start: 2024-04-27 | End: 2024-04-30 | Stop reason: HOSPADM

## 2024-04-27 RX ORDER — HYDROMORPHONE HYDROCHLORIDE 1 MG/ML
0.2 INJECTION, SOLUTION INTRAMUSCULAR; INTRAVENOUS; SUBCUTANEOUS EVERY 5 MIN PRN
Status: DISCONTINUED | OUTPATIENT
Start: 2024-04-27 | End: 2024-04-29

## 2024-04-27 RX ORDER — SULFAMETHOXAZOLE AND TRIMETHOPRIM 400; 80 MG/1; MG/1
1 TABLET ORAL EVERY MORNING
Status: DISCONTINUED | OUTPATIENT
Start: 2024-05-07 | End: 2024-04-30 | Stop reason: HOSPADM

## 2024-04-27 RX ORDER — DIPHENHYDRAMINE HYDROCHLORIDE 50 MG/ML
50 INJECTION INTRAMUSCULAR; INTRAVENOUS ONCE
Status: COMPLETED | OUTPATIENT
Start: 2024-04-27 | End: 2024-04-27

## 2024-04-27 RX ORDER — GLUCAGON 1 MG
1 KIT INJECTION CONTINUOUS PRN
Status: DISCONTINUED | OUTPATIENT
Start: 2024-04-27 | End: 2024-04-30 | Stop reason: HOSPADM

## 2024-04-27 RX ORDER — LIDOCAINE HYDROCHLORIDE 20 MG/ML
INJECTION INTRAVENOUS
Status: DISCONTINUED | OUTPATIENT
Start: 2024-04-27 | End: 2024-04-27

## 2024-04-27 RX ORDER — METHYLPREDNISOLONE SOD SUCC 125 MG
250 VIAL (EA) INJECTION ONCE
Status: COMPLETED | OUTPATIENT
Start: 2024-04-28 | End: 2024-04-28

## 2024-04-27 RX ORDER — DIPHENHYDRAMINE HCL 25 MG
25 CAPSULE ORAL
Status: DISCONTINUED | OUTPATIENT
Start: 2024-04-28 | End: 2024-04-29

## 2024-04-27 RX ORDER — HEPARIN SODIUM 1000 [USP'U]/ML
INJECTION, SOLUTION INTRAVENOUS; SUBCUTANEOUS
Status: DISCONTINUED | OUTPATIENT
Start: 2024-04-27 | End: 2024-04-27 | Stop reason: HOSPADM

## 2024-04-27 RX ORDER — FUROSEMIDE 10 MG/ML
INJECTION INTRAMUSCULAR; INTRAVENOUS
Status: DISCONTINUED | OUTPATIENT
Start: 2024-04-27 | End: 2024-04-27

## 2024-04-27 RX ORDER — SODIUM CHLORIDE 0.9 % (FLUSH) 0.9 %
10 SYRINGE (ML) INJECTION
Status: DISCONTINUED | OUTPATIENT
Start: 2024-04-27 | End: 2024-04-30 | Stop reason: HOSPADM

## 2024-04-27 RX ORDER — DOCUSATE SODIUM 100 MG/1
100 CAPSULE, LIQUID FILLED ORAL 3 TIMES DAILY
Status: DISCONTINUED | OUTPATIENT
Start: 2024-04-27 | End: 2024-04-30 | Stop reason: HOSPADM

## 2024-04-27 RX ORDER — ACETAMINOPHEN 650 MG/20.3ML
650 LIQUID ORAL ONCE
Status: COMPLETED | OUTPATIENT
Start: 2024-04-27 | End: 2024-04-27

## 2024-04-27 RX ORDER — DEXMEDETOMIDINE HYDROCHLORIDE 100 UG/ML
INJECTION, SOLUTION INTRAVENOUS
Status: DISCONTINUED | OUTPATIENT
Start: 2024-04-27 | End: 2024-04-27

## 2024-04-27 RX ORDER — ROCURONIUM BROMIDE 10 MG/ML
INJECTION, SOLUTION INTRAVENOUS
Status: DISCONTINUED | OUTPATIENT
Start: 2024-04-27 | End: 2024-04-27

## 2024-04-27 RX ORDER — HYDRALAZINE HYDROCHLORIDE 20 MG/ML
10 INJECTION INTRAMUSCULAR; INTRAVENOUS EVERY 8 HOURS PRN
Status: DISCONTINUED | OUTPATIENT
Start: 2024-04-27 | End: 2024-04-30 | Stop reason: HOSPADM

## 2024-04-27 RX ORDER — FAMOTIDINE 20 MG/1
20 TABLET, FILM COATED ORAL NIGHTLY
Status: DISCONTINUED | OUTPATIENT
Start: 2024-04-27 | End: 2024-04-27

## 2024-04-27 RX ORDER — METHYLPREDNISOLONE SOD SUCC 125 MG
125 VIAL (EA) INJECTION ONCE
Status: COMPLETED | OUTPATIENT
Start: 2024-04-29 | End: 2024-04-29

## 2024-04-27 RX ORDER — DEXTROSE MONOHYDRATE AND SODIUM CHLORIDE 5; .45 G/100ML; G/100ML
INJECTION, SOLUTION INTRAVENOUS CONTINUOUS
Status: DISCONTINUED | OUTPATIENT
Start: 2024-04-27 | End: 2024-04-28

## 2024-04-27 RX ORDER — MUPIROCIN 20 MG/G
OINTMENT TOPICAL
Status: DISCONTINUED | OUTPATIENT
Start: 2024-04-27 | End: 2024-04-30 | Stop reason: HOSPADM

## 2024-04-27 RX ORDER — VALGANCICLOVIR 450 MG/1
900 TABLET, FILM COATED ORAL DAILY
Qty: 60 TABLET | Refills: 2 | Status: SHIPPED | OUTPATIENT
Start: 2024-04-27 | End: 2024-04-30

## 2024-04-27 RX ORDER — METHYLPREDNISOLONE SODIUM SUCCINATE 1 G/16ML
INJECTION INTRAMUSCULAR; INTRAVENOUS
Status: DISCONTINUED | OUTPATIENT
Start: 2024-04-27 | End: 2024-04-27

## 2024-04-27 RX ORDER — BISACODYL 5 MG
10 TABLET, DELAYED RELEASE (ENTERIC COATED) ORAL NIGHTLY
Status: DISCONTINUED | OUTPATIENT
Start: 2024-04-27 | End: 2024-04-30 | Stop reason: HOSPADM

## 2024-04-27 RX ORDER — NIFEDIPINE 30 MG/1
30 TABLET, EXTENDED RELEASE ORAL DAILY
Status: DISCONTINUED | OUTPATIENT
Start: 2024-04-28 | End: 2024-04-30 | Stop reason: HOSPADM

## 2024-04-27 RX ORDER — PREDNISONE 20 MG/1
20 TABLET ORAL DAILY
Status: DISCONTINUED | OUTPATIENT
Start: 2024-04-30 | End: 2024-04-30 | Stop reason: HOSPADM

## 2024-04-27 RX ORDER — PANTOPRAZOLE SODIUM 40 MG/10ML
40 INJECTION, POWDER, LYOPHILIZED, FOR SOLUTION INTRAVENOUS DAILY
Status: DISCONTINUED | OUTPATIENT
Start: 2024-04-28 | End: 2024-04-28

## 2024-04-27 RX ORDER — KETAMINE HCL IN 0.9 % NACL 50 MG/5 ML
SYRINGE (ML) INTRAVENOUS
Status: DISCONTINUED | OUTPATIENT
Start: 2024-04-27 | End: 2024-04-27

## 2024-04-27 RX ORDER — IBUPROFEN 200 MG
24 TABLET ORAL
Status: DISCONTINUED | OUTPATIENT
Start: 2024-04-27 | End: 2024-04-30 | Stop reason: HOSPADM

## 2024-04-27 RX ORDER — ACETAMINOPHEN 325 MG/1
650 TABLET ORAL
Status: DISCONTINUED | OUTPATIENT
Start: 2024-04-28 | End: 2024-04-29

## 2024-04-27 RX ORDER — FENTANYL CITRATE 50 UG/ML
INJECTION, SOLUTION INTRAMUSCULAR; INTRAVENOUS
Status: DISCONTINUED | OUTPATIENT
Start: 2024-04-27 | End: 2024-04-27

## 2024-04-27 RX ORDER — OXYCODONE HYDROCHLORIDE 5 MG/1
5 TABLET ORAL EVERY 6 HOURS PRN
Status: DISCONTINUED | OUTPATIENT
Start: 2024-04-27 | End: 2024-04-30 | Stop reason: HOSPADM

## 2024-04-27 RX ORDER — CEFAZOLIN SODIUM 1 G/3ML
INJECTION, POWDER, FOR SOLUTION INTRAMUSCULAR; INTRAVENOUS
Status: DISCONTINUED | OUTPATIENT
Start: 2024-04-27 | End: 2024-04-27 | Stop reason: HOSPADM

## 2024-04-27 RX ORDER — HEPARIN SODIUM 5000 [USP'U]/ML
5000 INJECTION, SOLUTION INTRAVENOUS; SUBCUTANEOUS EVERY 8 HOURS
Status: DISCONTINUED | OUTPATIENT
Start: 2024-04-27 | End: 2024-04-30 | Stop reason: HOSPADM

## 2024-04-27 RX ORDER — NIFEDIPINE 30 MG/1
30 TABLET, EXTENDED RELEASE ORAL DAILY
Status: DISCONTINUED | OUTPATIENT
Start: 2024-04-28 | End: 2024-04-27

## 2024-04-27 RX ORDER — CEFAZOLIN SODIUM 1 G/3ML
INJECTION, POWDER, FOR SOLUTION INTRAMUSCULAR; INTRAVENOUS
Status: DISCONTINUED | OUTPATIENT
Start: 2024-04-27 | End: 2024-04-27

## 2024-04-27 RX ORDER — PREGABALIN 75 MG/1
75 CAPSULE ORAL
Status: DISCONTINUED | OUTPATIENT
Start: 2024-04-27 | End: 2024-04-30 | Stop reason: HOSPADM

## 2024-04-27 RX ORDER — BUPIVACAINE HYDROCHLORIDE 5 MG/ML
INJECTION, SOLUTION EPIDURAL; INTRACAUDAL
Status: DISCONTINUED | OUTPATIENT
Start: 2024-04-27 | End: 2024-04-27 | Stop reason: HOSPADM

## 2024-04-27 RX ORDER — TACROLIMUS 1 MG/1
6 CAPSULE ORAL EVERY 12 HOURS
Qty: 360 CAPSULE | Refills: 11
Start: 2024-04-27 | End: 2024-04-30

## 2024-04-27 RX ORDER — ACETAMINOPHEN 325 MG/1
650 TABLET ORAL EVERY 8 HOURS
Status: DISCONTINUED | OUTPATIENT
Start: 2024-04-27 | End: 2024-04-29

## 2024-04-27 RX ORDER — SULFAMETHOXAZOLE AND TRIMETHOPRIM 400; 80 MG/1; MG/1
1 TABLET ORAL DAILY
Qty: 30 TABLET | Refills: 5 | Status: SHIPPED | OUTPATIENT
Start: 2024-04-27 | End: 2024-04-30

## 2024-04-27 RX ORDER — SODIUM CHLORIDE 9 MG/ML
INJECTION, SOLUTION INTRAVENOUS CONTINUOUS
Status: DISCONTINUED | OUTPATIENT
Start: 2024-04-27 | End: 2024-04-28

## 2024-04-27 RX ORDER — ONDANSETRON HYDROCHLORIDE 2 MG/ML
4 INJECTION, SOLUTION INTRAVENOUS EVERY 6 HOURS PRN
Status: DISCONTINUED | OUTPATIENT
Start: 2024-04-27 | End: 2024-04-30 | Stop reason: HOSPADM

## 2024-04-27 RX ORDER — PROPOFOL 10 MG/ML
VIAL (ML) INTRAVENOUS
Status: DISCONTINUED | OUTPATIENT
Start: 2024-04-27 | End: 2024-04-27

## 2024-04-27 RX ORDER — INSULIN ASPART 100 [IU]/ML
0-5 INJECTION, SOLUTION INTRAVENOUS; SUBCUTANEOUS
Status: DISCONTINUED | OUTPATIENT
Start: 2024-04-27 | End: 2024-04-30 | Stop reason: HOSPADM

## 2024-04-27 RX ORDER — MIDAZOLAM HYDROCHLORIDE 1 MG/ML
INJECTION, SOLUTION INTRAMUSCULAR; INTRAVENOUS
Status: DISCONTINUED | OUTPATIENT
Start: 2024-04-27 | End: 2024-04-27

## 2024-04-27 RX ORDER — FAMOTIDINE 10 MG/ML
INJECTION INTRAVENOUS
Status: DISCONTINUED | OUTPATIENT
Start: 2024-04-27 | End: 2024-04-27

## 2024-04-27 RX ADMIN — CEFAZOLIN 2 G: 330 INJECTION, POWDER, FOR SOLUTION INTRAMUSCULAR; INTRAVENOUS at 08:04

## 2024-04-27 RX ADMIN — DEXTROSE AND SODIUM CHLORIDE: 5; 450 INJECTION, SOLUTION INTRAVENOUS at 12:04

## 2024-04-27 RX ADMIN — DIPHENHYDRAMINE HYDROCHLORIDE 50 MG: 50 INJECTION, SOLUTION INTRAMUSCULAR; INTRAVENOUS at 08:04

## 2024-04-27 RX ADMIN — DOCUSATE SODIUM 100 MG: 100 CAPSULE, LIQUID FILLED ORAL at 09:04

## 2024-04-27 RX ADMIN — OXYCODONE 5 MG: 5 TABLET ORAL at 12:04

## 2024-04-27 RX ADMIN — ROCURONIUM BROMIDE 50 MG: 10 INJECTION, SOLUTION INTRAVENOUS at 07:04

## 2024-04-27 RX ADMIN — SODIUM CHLORIDE: 9 INJECTION, SOLUTION INTRAVENOUS at 09:04

## 2024-04-27 RX ADMIN — MANNITOL 25 G: 12.5 INJECTION, SOLUTION INTRAVENOUS at 09:04

## 2024-04-27 RX ADMIN — ACETAMINOPHEN 650 MG: 650 SOLUTION ORAL at 08:04

## 2024-04-27 RX ADMIN — Medication 30 MG: at 08:04

## 2024-04-27 RX ADMIN — ACETAMINOPHEN 650 MG: 325 TABLET ORAL at 09:04

## 2024-04-27 RX ADMIN — SUGAMMADEX 360 MG: 100 INJECTION, SOLUTION INTRAVENOUS at 11:04

## 2024-04-27 RX ADMIN — TACROLIMUS 3 MG: 1 CAPSULE ORAL at 05:04

## 2024-04-27 RX ADMIN — HYDROMORPHONE HYDROCHLORIDE 0.2 MG: 1 INJECTION, SOLUTION INTRAMUSCULAR; INTRAVENOUS; SUBCUTANEOUS at 12:04

## 2024-04-27 RX ADMIN — LIDOCAINE HYDROCHLORIDE 75 MG: 20 INJECTION INTRAVENOUS at 07:04

## 2024-04-27 RX ADMIN — PHENYLEPHRINE HYDROCHLORIDE 0.25 MCG/KG/MIN: 10 INJECTION INTRAVENOUS at 08:04

## 2024-04-27 RX ADMIN — ONDANSETRON 4 MG: 2 INJECTION INTRAMUSCULAR; INTRAVENOUS at 11:04

## 2024-04-27 RX ADMIN — MUPIROCIN 1 G: 20 OINTMENT TOPICAL at 09:04

## 2024-04-27 RX ADMIN — FUROSEMIDE 100 MG: 10 INJECTION, SOLUTION INTRAMUSCULAR; INTRAVENOUS at 09:04

## 2024-04-27 RX ADMIN — CEFAZOLIN 2 G: 2 INJECTION, POWDER, FOR SOLUTION INTRAMUSCULAR; INTRAVENOUS at 12:04

## 2024-04-27 RX ADMIN — SODIUM CHLORIDE: 9 INJECTION, SOLUTION INTRAVENOUS at 01:04

## 2024-04-27 RX ADMIN — THERA TABS 1 TABLET: TAB at 12:04

## 2024-04-27 RX ADMIN — MYCOPHENOLATE MOFETIL 1000 MG: 250 CAPSULE ORAL at 09:04

## 2024-04-27 RX ADMIN — FENTANYL CITRATE 50 MCG: 50 INJECTION, SOLUTION INTRAMUSCULAR; INTRAVENOUS at 07:04

## 2024-04-27 RX ADMIN — SODIUM CHLORIDE, SODIUM ACETATE ANHYDROUS, SODIUM GLUCONATE, POTASSIUM CHLORIDE, AND MAGNESIUM CHLORIDE: 526; 222; 502; 37; 30 INJECTION, SOLUTION INTRAVENOUS at 07:04

## 2024-04-27 RX ADMIN — MUPIROCIN 1 G: 20 OINTMENT TOPICAL at 12:04

## 2024-04-27 RX ADMIN — ACETAMINOPHEN 650 MG: 325 TABLET ORAL at 01:04

## 2024-04-27 RX ADMIN — PROPOFOL 150 MG: 10 INJECTION, EMULSION INTRAVENOUS at 07:04

## 2024-04-27 RX ADMIN — HALOPERIDOL LACTATE 1 MG: 5 INJECTION, SOLUTION INTRAMUSCULAR at 07:04

## 2024-04-27 RX ADMIN — HEPARIN SODIUM 5000 UNITS: 5000 INJECTION INTRAVENOUS; SUBCUTANEOUS at 09:04

## 2024-04-27 RX ADMIN — ROCURONIUM BROMIDE 20 MG: 10 INJECTION, SOLUTION INTRAVENOUS at 08:04

## 2024-04-27 RX ADMIN — CEFAZOLIN 2 G: 2 INJECTION, POWDER, FOR SOLUTION INTRAMUSCULAR; INTRAVENOUS at 09:04

## 2024-04-27 RX ADMIN — FENTANYL CITRATE 50 MCG: 50 INJECTION, SOLUTION INTRAMUSCULAR; INTRAVENOUS at 08:04

## 2024-04-27 RX ADMIN — PROPOFOL 50 MG: 10 INJECTION, EMULSION INTRAVENOUS at 07:04

## 2024-04-27 RX ADMIN — FENTANYL CITRATE 50 MCG: 50 INJECTION, SOLUTION INTRAMUSCULAR; INTRAVENOUS at 09:04

## 2024-04-27 RX ADMIN — ANTI-THYMOCYTE GLOBULIN (RABBIT) 125 MG: 5 INJECTION, POWDER, LYOPHILIZED, FOR SOLUTION INTRAVENOUS at 08:04

## 2024-04-27 RX ADMIN — FAMOTIDINE 20 MG: 10 INJECTION, SOLUTION INTRAVENOUS at 09:04

## 2024-04-27 RX ADMIN — GLYCOPYRROLATE 0.2 MCG: 0.2 INJECTION, SOLUTION INTRAMUSCULAR; INTRAVENOUS at 09:04

## 2024-04-27 RX ADMIN — HEPARIN SODIUM 5000 UNITS: 5000 INJECTION INTRAVENOUS; SUBCUTANEOUS at 05:04

## 2024-04-27 RX ADMIN — ROCURONIUM BROMIDE 30 MG: 10 INJECTION, SOLUTION INTRAVENOUS at 09:04

## 2024-04-27 RX ADMIN — HEPARIN SODIUM 5000 UNITS: 5000 INJECTION INTRAVENOUS; SUBCUTANEOUS at 01:04

## 2024-04-27 RX ADMIN — METHYLPREDNISOLONE SODIUM SUCCINATE 500 MG: 1 INJECTION INTRAMUSCULAR; INTRAVENOUS at 07:04

## 2024-04-27 RX ADMIN — Medication 20 MG: at 09:04

## 2024-04-27 RX ADMIN — TACROLIMUS 3 MG: 1 CAPSULE ORAL at 12:04

## 2024-04-27 RX ADMIN — MIDAZOLAM HYDROCHLORIDE 2 MG: 2 INJECTION, SOLUTION INTRAMUSCULAR; INTRAVENOUS at 07:04

## 2024-04-27 RX ADMIN — BISACODYL 10 MG: 5 TABLET, COATED ORAL at 09:04

## 2024-04-27 RX ADMIN — DEXMEDETOMIDINE 12 MCG: 100 INJECTION, SOLUTION, CONCENTRATE INTRAVENOUS at 11:04

## 2024-04-27 RX ADMIN — MYCOPHENOLATE MOFETIL 1000 MG: 250 CAPSULE ORAL at 12:04

## 2024-04-27 NOTE — PROGRESS NOTES
TRANSPLANT NOTE:      ORGAN: LEFT KIDNEY      Disease Etiology: Diabetes Mellitus - Type II  Donor Type: Donation after Brain Death      [facility] High Risk: Yes      Donor CMV Status:   Positive  Donor HBcAB: Negative      Donor HCV Status: Negative      Peak cPRA % (within 1 year of transplant): 99      Adilson Sylvester is a 48 y.o. male s/p  Donation after Brain Death     kidney transplant on 4/27/2024 (Kidney) for Diabetes Mellitus - Type II.   This patient will receive 3 doses of Thymoglobulin for induction.  This patients maintenance immunosuppression will include a steroid taper per protocol to 5mg daily, Prograf, and Cellcept maintenance.  Opportunistic infection prophylaxis will include Valcyte for 3 months (CMV D + , R + ) and Bactrim for 6 months.  Patient is to begin self medications upon transfer to the [facility], and I plan to meet with this patient and his/her support person prior to discharge to review the medication section of the Kidney Transplant Education Manual.  I have reviewed the pre-op medications and have restarted those, as appropriate.

## 2024-04-27 NOTE — TRANSFER OF CARE
"Anesthesia Transfer of Care Note    Patient: Adilson Sylvester    Procedure(s) Performed: Procedure(s) (LRB):  TRANSPLANT, KIDNEY (N/A)  REMOVAL, CATHETER, DIALYSIS, PERITONEAL (N/A)    Patient location: PACU    Anesthesia Type: general    Transport from OR: Transported from OR on 6-10 L/min O2 by face mask with adequate spontaneous ventilation    Post pain: adequate analgesia    Post assessment: no apparent anesthetic complications    Post vital signs: stable    Level of consciousness: sedated    Nausea/Vomiting: no nausea/vomiting    Complications: none    Transfer of care protocol was followed      Last vitals: Visit Vitals  BP (!) 145/84 (BP Location: Left arm, Patient Position: Sitting)   Pulse (!) 59   Temp 36.6 °C (97.9 °F) (Oral)   Resp 18   Ht 5' 8" (1.727 m)   Wt 92.4 kg (203 lb 9.5 oz)   SpO2 98%   BMI 30.96 kg/m²     "

## 2024-04-27 NOTE — ANESTHESIA PROCEDURE NOTES
Intubation    Date/Time: 4/27/2024 7:31 AM    Performed by: Francine Mcdermott CRNA  Authorized by: Gilsno Diop MD    Intubation:     Induction:  Intravenous    Intubated:  Postinduction    Mask Ventilation:  Easy with oral airway    Attempts:  1    Attempted By:  CRNA    Method of Intubation:  Video laryngoscopy    Blade:  Corbett 3    Laryngeal View Grade: Grade III - only epiglottis visible      Difficult Airway Encountered?: No      Complications:  None    Airway Device:  Oral endotracheal tube    Airway Device Size:  7.5    Style/Cuff Inflation:  Cuffed    Inflation Amount (mL):  7    Tube secured:  23    Placement Verified By:  Capnometry    Complicating Factors:  Anterior larynx and obesity    Findings Post-Intubation:  BS equal bilateral and atraumatic/condition of teeth unchanged

## 2024-04-27 NOTE — SUBJECTIVE & OBJECTIVE
"  Subjective:     Chief Complaint/Reason for Admission: Kidney transplant    History of Present Illness:  Mr. Sylvester is a 48 y.o. 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 He has been on the wait list for a kidney transplant #2 at [facility] since 4/20/2021. Patient is currently on peritoneal dialysis started on 4/20/21. Patient is dialyzing on cyclic peritoneal dialysis. Patient reports that he is tolerating dialysis well. He has a PD catheter. Patient denies any recent hospitalizations or ED visits. Pre op labs and diagnostics pending. Anuric. OR times TBD 4/27 with Dr. Pina as primary surgeon. He has been NPO since 2100 4/26.    Dialysis History: Mr. De Anda with ESRD, requiring chronic dialysis who is on peritoneal dialysis started on 2021. Patient is dialyzing on cyclic peritoneal dialysis.  Patient reports that he is tolerating dialysis well.   Date of Last Dialysis: 4/26/24    Native urine output per day: Anuric    Previous Transplant: yes; organ: kidney, date: 2017, complications: failed.    Current Facility-Administered Medications   Medication Dose Route Frequency Provider Last Rate Last Admin    acetaminophen oral solution 650 mg  650 mg Per NG tube Once Keira Jurado PA-C        antithymocyte globulin (rabbit) 125 mg, hydrocortisone sodium succinate (SOLU-CORTEF) 20 mg in sodium chloride 0.9% 500 mL (FOR PERIPHERAL LINE ADMINISTRATION ONLY)  1.5 mg/kg (Adjusted) Intravenous Once Keira Jurado PA-C        ceFAZolin 2 g in dextrose 5 % in water (D5W) 50 mL IVPB (MB+)  2 g Intravenous On Call Procedure Adrien" EMILY Crockett        diphenhydrAMINE injection 50 mg  50 mg Intravenous Once Keira Jurado PA-C        heparin (porcine) injection 5,000 Units  5,000 Units Subcutaneous Once Keira Jurado PA-C        methylPREDNISolone sodium succinate (SOLU-MEDROL) 500 mg in dextrose 5 % (D5W) 100 mL IVPB  500 mg Intravenous Once Keira Jurado PA-C        mupirocin 2 % ointment   Nasal On Call Procedure Keira Jurado PA-C        pregabalin capsule 75 mg  75 mg Oral On Call Procedure Keira Jurado PA-C        sodium chloride 0.9% flush 10 mL  10 mL Intravenous PRN Keira Jurado PA-C           Review of patient's allergies indicates:  No Known Allergies    Past Medical History:   Diagnosis Date    Diabetes     Diabetes mellitus, type 2     Gout     Hypertension     Hypothyroidism     Hypothyroidism     Kidney transplant recipient     Obesity     Polycystic kidney disease      Past Surgical History:   Procedure Laterality Date    ANKLE SURGERY      HERNIA REPAIR      KIDNEY TRANSPLANT  2015    [place] [facility]    LITHOTRIPSY Left 2017    naitive kidney     PD catheter      PERITONEAL CATHETER INSERTION       Family History       Problem Relation (Age of Onset)    Diabetes Mother    Hypertension Mother, Paternal Aunt    Kidney disease Father, Sister, Paternal Aunt          Tobacco Use    Smoking status: Former     Current packs/day: 0.00     Types: Cigarettes     Quit date: 10/1/2008     Years since quitting: 15.5    Smokeless tobacco: Never   Substance and Sexual Activity    Alcohol use: Not Currently     Comment: occasional    Drug use: Never    Sexual activity: Yes     Partners: Female        Review of Systems   Constitutional:  Negative for activity change, appetite change, chills, diaphoresis and fever.   HENT:  Negative for congestion, sinus pressure, sinus pain, sore throat and trouble swallowing.    Eyes:  Negative for visual disturbance.   Respiratory:  Negative for chest tightness,  "shortness of breath and stridor.    Cardiovascular:  Negative for chest pain, palpitations and leg swelling.   Gastrointestinal:  Negative for abdominal distention, abdominal pain, constipation, diarrhea, nausea and vomiting.   Genitourinary:  Positive for decreased urine volume (anuric). Negative for difficulty urinating, dysuria and flank pain.   Musculoskeletal:  Negative for neck pain and neck stiffness.   Skin:  Negative for color change and rash.   Allergic/Immunologic: Positive for immunocompromised state.   Neurological:  Negative for dizziness, tremors, syncope, light-headedness and headaches.   Psychiatric/Behavioral:  Negative for agitation, confusion, decreased concentration and hallucinations. The patient is not nervous/anxious.      Objective:     Vital Signs (Most Recent):  Temp: 98.3 °F (36.8 °C) (04/27/24 0017)  Pulse: 77 (04/27/24 0017)  Resp: 16 (04/27/24 0017)  BP: 120/80 (04/27/24 0017)  SpO2: 98 % (04/27/24 0017)  Height: 5' 8" (172.7 cm)  Weight: 92.4 kg (203 lb 9.5 oz)  Body mass index is 30.96 kg/m².      Physical Exam  Vitals and nursing note reviewed.   Constitutional:       General: He is not in acute distress.     Appearance: He is not diaphoretic.   HENT:      Head: Normocephalic and atraumatic.   Eyes:      General: No scleral icterus.        Right eye: No discharge.         Left eye: No discharge.   Cardiovascular:      Rate and Rhythm: Normal rate and regular rhythm.      Heart sounds: Normal heart sounds.   Pulmonary:      Effort: Pulmonary effort is normal.      Breath sounds: Normal breath sounds. No wheezing or rales.   Abdominal:      General: Bowel sounds are normal. There is no distension.      Palpations: Abdomen is soft.      Tenderness: There is no abdominal tenderness. There is no guarding.      Comments: PD cath in place   Musculoskeletal:      Cervical back: Normal range of motion and neck supple.   Skin:     General: Skin is warm and dry.      Capillary Refill: " Capillary refill takes less than 2 seconds.   Neurological:      Mental Status: He is alert and oriented to person, place, and time.      Cranial Nerves: No cranial nerve deficit.   Psychiatric:         Thought Content: Thought content normal.         Judgment: Judgment normal.          Laboratory: pending    Diagnostic Results:  pending

## 2024-04-27 NOTE — PLAN OF CARE
Plan of care reviewed with the patient upon admission. Pt admitted for kidney transplant. Transplant workup completed with exception of blood and surgical consents. Pt has PD cath, sample sent by dialysis RN. Fall precautions maintained. He is up independently without difficulty. Pt remained free from falls and injury this shift. Bed locked in lowest position, side rails up x2, call light within reach. Instructed pt to call for assistance as needed. Pt verbalized understanding. Vitals stable. Pt afebrile overnight. No acute issues overnight. Will continue to monitor. Wife remains at the bedside.

## 2024-04-27 NOTE — SIGNIFICANT EVENT
Pre-operative Discussion Note  Kidney Transplant Surgery    This discussion occurred at [facility] in the presence of orion nayak, at 6:40 am, 27 April, 2024    Adilson Sylvester is a 48 y.o. male with ESRD, requiring chronic dialysis admitted for kidney transplant.  I discussed the planned procedure in detail, including expected hospital course and outcomes, benefits, risks, and potential complications.  Complications discussed included death, graft failure, bleeding, infection, vascular thrombosis, and rejection.  I discussed the risks of anesthesia, as well as the potential need for re-operation.  The possibility of other complications not specifically mentioned was also discussed.  Also, I discussed the need for lifelong immunosuppression and the possibility of serious complications from immunosuppressive drugs.    The discussion included the risks that the patient will incur if he elects to not have the proposed procedure.    Relevant donor-specific risk factors were disclosed and discussed with the patient, including:   PHS: I discussed the use of organs from donors with [facility] risk criteria, including the testing protocols utilized, as well as data from the literature regarding the likelihood of transmission of hepatitis or HIV. The patient that he is willing to consider such grafts.    Specific [facility] donor risk criteria for the organ donor include:  Man who has had sex with another man  Unknown medical or social history      Ptaient understood he may require a neprectomy for space and we would be removing his PD catheter.    The patient was SARS-CoV-2 /COVID-19 tested with negative results.    COVID-19: I discussed the possibility of COVID-19 transmission with the patient. Although SIS testing is available for the virus using a technique which in theory should be very accurate, there is no data yet regarding the likelihood of a  false-negative test leading to virus transmission. Based on accuracy of testing for other  viruses, it is expected that this risk is extremely small.     I also discussed that transplant immunosuppression will increase susceptibility to COVID-19 and other viruses, and that although we use stringent precautions to protect patients from infection, it is possible for a transplant recipient to contract this infection. If COVID-19 infection should occur, it would be a serious matter with a significant risk of death.    All questions were answered.  The patient and available family members voice understanding and agree to proceed with the transplant.    UNOS Patient Status  Note on scores:  ICU = 10 = total assistance  TSU = 20-30 = partial assistance  Outpatient admitted for transplant requiring medical care in last year = 40-50 = partial assistance  Scores 60 or higher indicate no assistance, meaning no need for medical care in last year. This would be very unusual for a transplant candidate.    UNOS Patient Status  Functional Status: 50% - Requires considerable assistance and frequent medical care  Physical Capacity: No Limitations

## 2024-04-27 NOTE — ANESTHESIA PREPROCEDURE EVALUATION
[facility]  Anesthesia Pre-Operative Evaluation     Patient Name: Adilson Sylvester  YOB: 1975  MRN: [id number]  [facility]: [id number]       Admit Date: 4/27/2024   [place] [facility]  Hospital Day: 1  Date of Procedure: 4/27/2024  Anesthesia: General Procedure: Procedure(s) (LRB):  TRANSPLANT, KIDNEY (N/A)  Pre-Operative Diagnosis: ESRD on peritoneal dialysis [N18.6, Z99.2]  Proceduralist:Surgeons and Role:     * Armond Pina MD - Primary  Code Status: Full Code   Advanced Directive: <no information>  Isolation Precautions: Enhanced Respiratory  Capacity: Full capacity       SUBJECTIVE:     Pre-operative evaluation for Procedure(s) (LRB):  TRANSPLANT, KIDNEY (N/A)  04/27/2024  Hospital LOS: 0 days  ICU LOS: Patient does not have an ICU stay during this admission.    Adilson Sylvester is a 48 y.o. 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 Paper consent in chart.     Patient now presents for the above procedure(s).    TTE:  Results for orders placed or performed during the hospital encounter of 05/23/23   Echo Saline Bubble? No   Result Value Ref Range    BSA 2.14 m2    TDI SEPTAL 0.06 m/s    LV LATERAL E/E' RATIO 5.25 m/s    LV SEPTAL E/E' RATIO 10.50 m/s    LA WIDTH 3.71 cm    TDI LATERAL 0.12 m/s    LVIDd 4.20 3.5 - 6.0 cm    IVS 1.32 (A) 0.6 - 1.1 cm    Posterior Wall 1.24 (A) 0.6 - 1.1 cm    LVIDs 2.23 2.1 - 4.0 cm    FS 47 28 - 44 %    LA volume 59.33 cm3    Sinus 3.34 cm    STJ 2.86 cm    Ascending aorta 2.87 cm    LV mass 195.98 g    LA size 4.15 cm    RVDD 2.61 cm    TAPSE 1.63 cm    Left Ventricle Relative Wall Thickness 0.59 cm    AV mean gradient 4 mmHg    AV valve area 3.81 cm2    AV Velocity Ratio 0.98     AV index (prosthetic) 0.99     MV valve area p 1/2 method 2.85 cm2    E/A ratio 1.02     Mean e' 0.09 m/s    E wave deceleration time 266.27 msec  "   IVRT 82.78 msec    MV "A" wave duration 10.85 msec    Pulm vein S/D ratio 0.85     LVOT diameter 2.21 cm    LVOT area 3.8 cm2    LVOT peak carter 1.29 m/s    LVOT peak VTI 21.73 cm    Ao peak carter 1.31 m/s    Ao VTI 21.87 cm    LVOT stroke volume 83.31 cm3    AV peak gradient 7 mmHg    E/E' ratio 7.00 m/s    MV Peak E Carter 0.63 m/s    TR Max Carter 2.43 m/s    MV stenosis pressure 1/2 time 77.22 ms    MV Peak A Carter 0.62 m/s    PV Peak S Carter 0.39 m/s    PV Peak D Carter 0.46 m/s    LV Systolic Volume 16.67 mL    LV Systolic Volume Index 8.0 mL/m2    LV Diastolic Volume 53.76 mL    LV Diastolic Volume Index 25.72 mL/m2    LA Volume Index 28.4 mL/m2    LV Mass Index 94 g/m2    RA Major Axis 5.05 cm    Left Atrium Minor Axis 4.61 cm    Left Atrium Major Axis 4.46 cm    Triscuspid Valve Regurgitation Peak Gradient 24 mmHg    LA Volume Index (Mod) 16.5 mL/m2    LA volume (mod) 34.55 cm3    RA Width 3.41 cm    Right Atrial Pressure (from IVC) 3 mmHg    EF 65 %    TV resting pulmonary artery pressure 27 mmHg    Narrative    · The estimated ejection fraction is 65%.  · The left ventricle is normal in size with concentric remodeling and   normal systolic function.  · Normal left ventricular diastolic function.  · Normal right ventricular size with normal right ventricular systolic   function.  · The estimated PA systolic pressure is 27 mmHg.  · Normal central venous pressure (3 mmHg).        No results found for this or any previous visit.  ABIGAIL:  No results found for this or any previous visit.  Stress Test:  Results for orders placed during the hospital encounter of 04/26/22    Lexiscan Card Interp Cupid Stress test with myocardial perfusion    Interpretation Summary    Normal myocardial perfusion scan. There is no evidence of myocardial ischemia or infarction.    There is mild apical thinning which is a normal variant.    The gated perfusion images showed an ejection fraction of 52% at rest. The gated perfusion images showed an " "ejection fraction of 53% post stress. Normal ejection fraction is greater than 53%.    There is normal wall motion at rest and post stress.    LV cavity size is normal at rest and normal at stress.    The EKG portion of this study is negative for ischemia.    The patient reported chest pain during the stress test.    During stress, rare PVCs are noted.    There are no prior studies for comparison.     Cardiac Catheterization:  No results found for this or any previous visit.     PFT:  No results found for: "FEV1", "FVC", "WAS2IOD", "TLC", "DLCO"     NPO Status:     LDA: None documented.       Hemodialysis Catheter 04/12/21 0949 right subclavian (Active)   Number of days: 1110            Peripheral IV - Single Lumen 04/27/24 0107 18 G Right Forearm (Active)   Site Assessment Clean;Dry;Intact;No redness;No swelling 04/27/24 0101   Extremity Assessment Distal to IV No abnormal discoloration;No redness;No swelling;No warmth 04/27/24 0101   Line Status Blood return noted;Flushed;Saline locked 04/27/24 0101   Dressing Status Clean;Dry;Intact 04/27/24 0101   Dressing Intervention First dressing 04/27/24 0101   Dressing Change Due 05/01/24 04/27/24 0101   Site Change Due 05/01/24 04/27/24 0101   Reason Not Rotated Not due 04/27/24 0101   Number of days: 0       Vent/Oxygen: None documented           Drips: None documented.  Current Facility-Administered Medications   Medication Dose Route Frequency Last Rate Last Admin       Previous Airway:    Induction:  Intravenous    Intubated:  Postinduction    Mask Ventilation:  Easy with oral airway    Attempts:  1    Attempted By:  CRNA    Method of Intubation:  Video laryngoscopy    Blade:  Tonia 3    Laryngeal View Grade: Grade I - full view of cords      Laryngeal View Grade comment:  Large floppy epiglottis    Difficult Airway Encountered?: No      Complications:  None    Airway Device:  Oral endotracheal tube    Airway Device Size:  7.5    Style/Cuff Inflation:  Cuffed " (inflated to minimal occlusive pressure)    Tube secured:  22    Secured at:  The lips    Placement Verified By:  Capnometry    Complicating Factors:  None    Findings Post-Intubation:  BS equal bilateral and atraumatic/condition of teeth unchanged          Allergies:  Review of patient's allergies indicates:  Not on File    Medications:     Current Outpatient Medications   Medication Instructions    atorvastatin (LIPITOR) 10 MG tablet Oral, Daily    blood sugar diagnostic Strp Test 3 (three) times daily with meals.    blood-glucose meter Misc Use as instructed    calcitRIOL (ROCALTROL) 0.25 mcg, Oral, Daily    carvediloL (COREG) 25 mg, Oral, 2 times daily    cinacalcet (SENSIPAR) 30 mg, Oral, Daily    clopidogreL (PLAVIX) 75 mg, Oral    DIALYVITE [phone] mg-mg-mcg-mg Tab 1 tablet, Oral, Daily    ergocalciferol (ERGOCALCIFEROL) 50,000 Units, Oral, Weekly, Monday    furosemide (LASIX) 80 mg, Oral, Daily    hydrALAZINE (APRESOLINE) 100 mg, Oral, Every 8 hours    isoniazid (NYDRAZID) 300 mg, Oral, Daily    lancets 33 gauge Misc Test 3 (three) times daily with meals.    NIFEdipine (ADALAT CC) 30 mg, Oral, Nightly    predniSONE (DELTASONE) 5 mg, Oral, Every other day    senna-docusate 8.6-50 mg (SENNA WITH DOCUSATE SODIUM) 8.6-50 mg per tablet 2 tablets, Oral, Daily    sevelamer carbonate (RENVELA) 2,400 mg, Oral, 3 times daily with meals    tacrolimus (PROGRAF) 1 mg, Oral, Every 12 hours     Inpatient Medications:  Current Facility-Administered Medications   Medication Dose Route Frequency    acetaminophen  650 mg Per NG tube Once    antithymocyte globulin (rabbit) 125 mg, hydrocortisone sodium succinate (SOLU-CORTEF) 20 mg in sodium chloride 0.9% 500 mL (FOR PERIPHERAL LINE ADMINISTRATION ONLY)  1.5 mg/kg (Adjusted) Intravenous Once    diphenhydrAMINE  50 mg Intravenous Once    heparin (porcine)  5,000 Units Subcutaneous Once    methylPREDNISolone sodium succinate injection  500 mg Intravenous Once     Antibiotics  (From admission, onward)      Start     Stop Route Frequency Ordered    04/27/24 0025  mupirocin 2 % ointment         -- Nasl On Call Procedure 04/27/24 0025 04/27/24 0025  ceFAZolin 2 g in dextrose 5 % in water (D5W) 50 mL IVPB (MB+)  (Med - Antibiotics - No PCN allergy or mild to moderate PCN allergy)         -- IV On Call Procedure 04/27/24 0025          VTE Risk Mitigation (From admission, onward)           Ordered     heparin (porcine) injection 5,000 Units  Once         04/27/24 0025     antithymocyte globulin (rabbit) 125 mg, hydrocortisone sodium succinate (SOLU-CORTEF) 20 mg in sodium chloride 0.9% 500 mL (FOR PERIPHERAL LINE ADMINISTRATION ONLY)  Once         04/27/24 0025     Place SCD stockings and BOO hose  Until discontinued         04/27/24 0025     IP VTE HIGH RISK PATIENT  Once         04/27/24 0025                    History:     Active Hospital Problems    Diagnosis  POA    *ESRD on peritoneal dialysis [N18.6, Z99.2]  Not Applicable     Chronic    Type 2 diabetes mellitus [E11.9]  Yes     Chronic    Long-term use of immunosuppressant medication [Z79.60]  Not Applicable      Resolved Hospital Problems   No resolved problems to display.     Medical History:  Past Medical History:   Diagnosis Date    Diabetes     Diabetes mellitus, type 2     Gout     Hypertension     Hypothyroidism     Hypothyroidism     Kidney transplant recipient     Obesity     Polycystic kidney disease      Surgical History:    has a past surgical history that includes Kidney transplant (2015); Hernia repair; PD catheter; Ankle surgery; Peritoneal catheter insertion; and Lithotripsy (Left, 2017).   Social History:    reports being sexually active and has had partner(s) who are female.  reports that he quit smoking about 15 years ago. His smoking use included cigarettes. He has never used smokeless tobacco. He reports that he does not currently use alcohol. He reports that he does not use drugs.    OBJECTIVE:   Vital Signs  "Range (Last 24H):  Temp:  [36.8 °C (98.3 °F)]   Pulse:  [77]   Resp:  [16]   BP: (120)/(80)   SpO2:  [98 %]   Lab Results   Component Value Date    WBC 6.32 04/27/2024    HGB 11.8 (L) 04/27/2024    HCT 36.0 (L) 04/27/2024     04/27/2024     04/27/2024    K 4.5 04/27/2024     04/27/2024    CREATININE 18.3 (H) 04/27/2024    BUN 70 (H) 04/27/2024    CO2 25 04/27/2024    GLU 79 04/27/2024    CALCIUM 8.2 (L) 04/27/2024    MG 1.9 10/07/2022    PHOS 5.5 (H) 04/27/2024    ALKPHOS 122 04/27/2024    ALT 12 04/27/2024    AST 15 04/27/2024    ALBUMIN 3.0 (L) 04/27/2024    INR 0.9 04/27/2024    APTT 27.6 04/27/2024    HGBA1C 5.4 10/07/2022     Recent Labs     04/27/24  0111   WBC 6.32   HGB 11.8*   HCT 36.0*         K 4.5   CREATININE 18.3*   GLU 79   INR 0.9     No results for input(s): "PH", "PCO2", "PO2", "HCO3", "POCSATURATED", "BE" in the last 72 hours.   No LMP for male patient.    Please see Results Review for additional labs & imaging.     EKG:   Results for orders placed or performed during the hospital encounter of 10/06/22   EKG, 12 - Lead    Collection Time: 10/06/22  4:38 PM    Narrative    Test Reason : I72.5,    Vent. Rate : 056 BPM     Atrial Rate : 056 BPM     P-R Int : 158 ms          QRS Dur : 108 ms      QT Int : 494 ms       P-R-T Axes : 043 -22 087 degrees     QTc Int : 476 ms    Sinus bradycardia  Low voltage QRS  Cannot rule out Inferior infarct ,age undetermined  Abnormal ECG  When compared with ECG of 26-APR-2022 08:10,  The axis Shifted left  Confirmed by Zeke JAUREGUI MD ([id number]) on 10/6/2022 5:53:56 PM    Referred By: EMILIANA KEY           Confirmed By:Zeke JAUREGUI MD       ECHO:  See subjective, if available.    ASSESSMENT/PLAN:         Pre-op Assessment    I have reviewed the Patient Summary Reports.     I have reviewed the Nursing Notes. I have reviewed the NPO Status.   I have reviewed the Medications.     Review of Systems  Anesthesia Hx:             Denies Family " Hx of Anesthesia complications.    Denies Personal Hx of Anesthesia complications.                    Cardiovascular:     Hypertension                                        Renal/:  Chronic Renal Disease                Endocrine:  Diabetes Hypothyroidism              Physical Exam  General: Well nourished, Cooperative, Alert and Oriented    Airway:  Mallampati: II   Tongue: Normal    Dental:  Intact        Anesthesia Plan  Type of Anesthesia, risks & benefits discussed:    Anesthesia Type: Gen ETT  Intra-op Monitoring Plan: Standard ASA Monitors and Art Line  Post Op Pain Control Plan: multimodal analgesia and IV/PO Opioids PRN  Induction:  IV  Airway Plan: Direct and Video  Informed Consent: Informed consent signed with the Patient and all parties understand the risks and agree with anesthesia plan.  All questions answered. Patient consented to blood products? Yes  ASA Score: 3  Day of Surgery Review of History & Physical: H&P Update referred to the surgeon/provider.    Ready For Surgery From Anesthesia Perspective.     .

## 2024-04-27 NOTE — H&P
"[facility] - Transplant Stepdown  Kidney Transplant  H&P      Subjective:     Chief Complaint/Reason for Admission: Kidney transplant    History of Present Illness:  Mr. Sylvester is a 48 y.o. 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 He has been on the wait list for a kidney transplant #2 at [facility] since 4/20/2021. Patient is currently on peritoneal dialysis started on 4/20/21. Patient is dialyzing on cyclic peritoneal dialysis. Patient reports that he is tolerating dialysis well. He has a PD catheter. Patient denies any recent hospitalizations or ED visits. Pre op labs and diagnostics pending. Anuric. OR times TBD 4/27 with Dr. Pina as primary surgeon. He has been NPO since 2100 4/26.    Dialysis History: Mr. De Anda with ESRD, requiring chronic dialysis who is on peritoneal dialysis started on 2021. Patient is dialyzing on cyclic peritoneal dialysis.  Patient reports that he is tolerating dialysis well.   Date of Last Dialysis: 4/26/24    Native urine output per day: Anuric    Previous Transplant: yes; organ: kidney, date: 2017, complications: failed.    Current Facility-Administered Medications   Medication Dose Route Frequency Provider Last Rate Last Admin    acetaminophen oral solution 650 mg  650 mg Per NG tube Once Keira Jurado PA-C        antithymocyte globulin (rabbit) 125 mg, hydrocortisone sodium succinate (SOLU-CORTEF) 20 mg in sodium chloride 0.9% 500 mL (FOR PERIPHERAL LINE ADMINISTRATION ONLY)  1.5 mg/kg (Adjusted) Intravenous Once Keira Jurado PA-C        ceFAZolin 2 g in dextrose 5 % in water (D5W) 50 mL " IVPB (MB+)  2 g Intravenous On Call Procedure Keira Jurado PA-C        diphenhydrAMINE injection 50 mg  50 mg Intravenous Once Keira Jurado PA-C        heparin (porcine) injection 5,000 Units  5,000 Units Subcutaneous Once Keira Jurado PA-C        methylPREDNISolone sodium succinate (SOLU-MEDROL) 500 mg in dextrose 5 % (D5W) 100 mL IVPB  500 mg Intravenous Once Keira Jurado PA-C        mupirocin 2 % ointment   Nasal On Call Procedure Keira Jurado PA-C        pregabalin capsule 75 mg  75 mg Oral On Call Procedure Keira Jurado PA-C        sodium chloride 0.9% flush 10 mL  10 mL Intravenous PRN Keira Jurado PA-C           Review of patient's allergies indicates:  No Known Allergies    Past Medical History:   Diagnosis Date    Diabetes     Diabetes mellitus, type 2     Gout     Hypertension     Hypothyroidism     Hypothyroidism     Kidney transplant recipient     Obesity     Polycystic kidney disease      Past Surgical History:   Procedure Laterality Date    ANKLE SURGERY      HERNIA REPAIR      KIDNEY TRANSPLANT  2015    [place] [facility]    LITHOTRIPSY Left 2017    naitive kidney     PD catheter      PERITONEAL CATHETER INSERTION       Family History       Problem Relation (Age of Onset)    Diabetes Mother    Hypertension Mother, Paternal Aunt    Kidney disease Father, Sister, Paternal Aunt          Tobacco Use    Smoking status: Former     Current packs/day: 0.00     Types: Cigarettes     Quit date: 10/1/2008     Years since quitting: 15.5    Smokeless tobacco: Never   Substance and Sexual Activity    Alcohol use: Not Currently     Comment: occasional    Drug use: Never    Sexual activity: Yes     Partners: Female        Review of Systems   Constitutional:  Negative for activity change, appetite change, chills, diaphoresis and fever.   HENT:  Negative for congestion, sinus pressure, sinus pain, sore throat and trouble swallowing.    Eyes:  Negative for visual  "disturbance.   Respiratory:  Negative for chest tightness, shortness of breath and stridor.    Cardiovascular:  Negative for chest pain, palpitations and leg swelling.   Gastrointestinal:  Negative for abdominal distention, abdominal pain, constipation, diarrhea, nausea and vomiting.   Genitourinary:  Positive for decreased urine volume (anuric). Negative for difficulty urinating, dysuria and flank pain.   Musculoskeletal:  Negative for neck pain and neck stiffness.   Skin:  Negative for color change and rash.   Allergic/Immunologic: Positive for immunocompromised state.   Neurological:  Negative for dizziness, tremors, syncope, light-headedness and headaches.   Psychiatric/Behavioral:  Negative for agitation, confusion, decreased concentration and hallucinations. The patient is not nervous/anxious.      Objective:     Vital Signs (Most Recent):  Temp: 98.3 °F (36.8 °C) (04/27/24 0017)  Pulse: 77 (04/27/24 0017)  Resp: 16 (04/27/24 0017)  BP: 120/80 (04/27/24 0017)  SpO2: 98 % (04/27/24 0017)  Height: 5' 8" (172.7 cm)  Weight: 92.4 kg (203 lb 9.5 oz)  Body mass index is 30.96 kg/m².      Physical Exam  Vitals and nursing note reviewed.   Constitutional:       General: He is not in acute distress.     Appearance: He is not diaphoretic.   HENT:      Head: Normocephalic and atraumatic.   Eyes:      General: No scleral icterus.        Right eye: No discharge.         Left eye: No discharge.   Cardiovascular:      Rate and Rhythm: Normal rate and regular rhythm.      Heart sounds: Normal heart sounds.   Pulmonary:      Effort: Pulmonary effort is normal.      Breath sounds: Normal breath sounds. No wheezing or rales.   Abdominal:      General: Bowel sounds are normal. There is no distension.      Palpations: Abdomen is soft.      Tenderness: There is no abdominal tenderness. There is no guarding.      Comments: PD cath in place   Musculoskeletal:      Cervical back: Normal range of motion and neck supple.   Skin:     " General: Skin is warm and dry.      Capillary Refill: Capillary refill takes less than 2 seconds.   Neurological:      Mental Status: He is alert and oriented to person, place, and time.      Cranial Nerves: No cranial nerve deficit.   Psychiatric:         Thought Content: Thought content normal.         Judgment: Judgment normal.          Laboratory: pending    Diagnostic Results:  pending  Assessment/Plan:     Renal/  * ESRD on peritoneal dialysis  - hx ESRD secondary to diabetic nephropathy and polycystic kidney disease s/p Ktx #1 7/9/17 that failed due to missing medications due to insurance issues in April 2021.   - wait list for a kidney transplant #2 at [facility] since 4/20/2021.  - currently on peritoneal dialysis started on 4/20/21; fluid studies to be sent pre op  - Pre op labs and diagnostics pending.   - anuric prior  - Thymo induction  - OR times TBD 4/27 with Dr. Pina as primary surgeon.     Endocrine  Type 2 diabetes mellitus  - Endocrine consult post op      Palliative Care  Long-term use of immunosuppressant medication  - IS per protocol          The patient presents for kidney transplant.  There are no apparent contraindications to proceeding with the planned transplant.  The patient understands that the transplant could potentially be cancelled pending detailed assessment of the donor organ.  He will receive Thymoglobulin induction.  A complete discussion of the transplant procedure, including risks, complications, and alternatives, as well as any donor-specific risk factors requiring specific disclosure, will be carried out by the responsible staff surgeon prior to the procedure.         Medical decision making for this encounter includes review of pertinent labs and diagnostic studies, assessment and planning, discussions with consulting providers, discussion with patient/family, and participation in multidisciplinary rounds. Time spent caring for patient: 60 minutes    Keira Jurado  EMILY  Kidney Transplant  [facility] - Transplant Stepdown

## 2024-04-27 NOTE — OP NOTE
Operative Report    Date of Procedure: 4/27/2024  Date of Transplant (UNOS): 4/27/24    Surgeons:  Surgeons and Role:     * Armond Pina MD - Primary     * Beckie Rahman MD - Fellow    First Assistant Attestation:  The erica functioned as first assitant.    Pre-operative Diagnosis: ESRD, requiring chronic dialysis secondary to Diabetes Mellitus - Type II  Post-operative Diagnosis: Same    Procedure(s) Performed:   Back Table Preparation of Left Kidney     Donation after Brain Death Kidney transplant  3. PD cannula removal    Anesthesia: General endotracheal    Preamble  Indications and Patient Counseling: The patient is a 48 y.o. year-old male with end-stage kidney disease secondary to Diabetes Mellitus - Type II who has been evaluated for a kidney transplant.  The procedure was thoroughly discussed with the patient, including potential risks, complications, and alternatives.  Specific complications mentioned included death, graft non-function, bleeding, infection, and rejection, as well as the possibility of other complications not specifically mentioned.    Donor Risk Factors: Prior to the operation, the patient was advised of any donor-specific risk factors requiring specific disclosure.  Factors in this case included PHS risk criteria .      Specific PHS donor risk criteria for the organ donor include:  Man who has had sex with another man  Unknown medical or social history    All questions were answered, the patient voiced appropriate understanding, and he agreed to proceed with the planned procedure.    ABO Confirmation: Immediately following arrival of the donor organ and prior to implantation, a formal ABO confirmation was done according to hospital and UNOS policies.  I confirmed the UNOS ID number ([id number]) of the donor organ and the donor and recipient ABO types, directly verifying these data by comparison with the UNOS Match Run report ([id number]).  This confirmation was personally done by an attending  surgeon and circulating nurse, and is officially documented elsewhere.    Time-Out: A complete time out was carried out prior to incision, with confirmation of patient identity, correct procedure, correct operative site, appropriate antibiotic prophylaxis, review of any known allergies, and presence of all needed equipment.    Procedure in Detail  Prior to starting the operation, the left kidney  was prepared on the back table. Arterial anatomy was double. Venous anatomy was single. Ureteral anatomy was single. Back table vascular reconstruction was not required  .  Unneeded fat was removed from the kidney, the vessels were cleaned of adherent tissue and tested for leaks, and the kidney was maintained at ice temperature in organ preservation solution until it was brought to the operative field.     The patient was brought into the operating room and placed in a supine position on the OR table.  After the induction of general endotracheal anesthesia, lines were placed by the anesthesiologist.  The urinary bladder was catheterized and irrigated with antibiotic solution.  There was no tension on the axillae and all pressure points were padded.  Sequential compression boots were used as were Mustapha Huggers.  The abdomen was prepped and draped in the usual sterile fashion.  Skin was incised over the left with a knife and deepened with electrocautery.  The peritoneum and its contents were swept medially, exposing the left external iliac artery and the left external iliac vein.  The Bookwalter retractor was used to provide exposure.  Overlying lymphatics were ligated or cauterized and the vessels were dissected free for a length compatible with anastomosis.  The kidney was brought to the OR table at 4/27/2024  9:07 AM.  Venous control was obtained with a vascular clamp.  A venotomy was made, the vein irrigated, and an end renal to left external iliac vein anastomosis was created with 5-0 polypropylene.  Arterial control was  obtained with a vascular clamp.  Arteriotomy was made, the artery irrigated, and an end renal to left external iliac artery anastomosis was created with 5-0 polypropylene.  The kidney was unclamped and reperfused at 4/27/2024  9:41 AM.  Reperfusion quality was good. Intraoperative urine production was observed.  After hemostasis was obtained, a Lich uretero-neocystostomy was created.  The bladder was filled and identified, opened, and the anastomosis created using 6-0 PDS.  The bladder muscle was closed over the distal ureter to create an antireflux tunnel.  A ureteral stent was used.  With the kidney well perfused and sitting appropriately without tension on the anastomoses, viscera were replaced in their usual position.  The wound was closed after a final check for hemostasis.        The PD catheter was removed in the sual fashion by coring around both cuffs and removing the complete catheter. The fascia was closed with a 0 vicryl UR 6 needle suture. Hemostasis was obtained with cautery. Wounds were closed with staples after 35 cc of .5% marcaine was placed      Overall, the graft quality was assessed to be good. At the end of the case the needle, sponge and instrument counts were all correct.  Sterile dressings were applied and the patient was brought to the recovery room/ICU in good condition.    Estimated Blood Loss: 150 mL  Fluids Administered:   Crystalloid (mL) 3,400      Drains: 19f Moises drains x 1  Specimens: none    Findings:    Organ Transplanted: Left Kidney    Arterial Anatomy: double  Number of Arteries: 2  Configuration of Multiple Arteries: common patch   Venous Anatomy: single  Number of Veins: 1  Ureteral Anatomy: single  Number of Ureters: 1  Reperfusion Quality: good  Overall Graft Quality: good  Intraoperative Urine Production: yes  Reynolds: not to be removed before  5  days - no urine output for years  Ureteral Stent: Yes    Ischemic Times:   Anastomosis (warm ischemia) time: 34 minutes   Cold  ischemia time: 634 minutes  Total ischemia time: 668 minutes    Donor Data:  UNOS ID: [id number]    UNOS Match Run: [id number]    Donor Type: Donation after Brain Death    Donor CMV Status: Positive    Donor HBcAB: Negative    Donor HCV Status: Negative

## 2024-04-27 NOTE — NURSING TRANSFER
Nursing Transfer Note      4/27/2024   2:25 PM    Reason patient is being transferred: admit    Transfer To: [facility]    Transfer via bed    Transfer with 3l to O2    Transported by PCT    Order for Tele Monitor? No    Additional Lines: Reynolds Catheter, o2    4eyes on Skin: yes    Medicines sent: na    Any special needs or follow-up needed: na    Patient belongings transferred with patient: No    Chart send with patient: Yes    Notified: spouse

## 2024-04-27 NOTE — ANESTHESIA PROCEDURE NOTES
Central Line    Diagnosis: esrd  Patient location during procedure: done in OR    Staffing  Authorizing Provider: Gilson Diop MD  Performing Provider: Gilson Diop MD    Staffing  Performed: anesthesiologist   Anesthesiologist: Gilson Diop MD  Performed by: Gilson Diop MD  Authorized by: Gilson Diop MD    Anesthesiologist was present at the time of the procedure.  Indication   Indication: hemodialysis     Anesthesia   general anesthesia    Central Line   Skin Prep: skin prepped with ChloraPrep, skin prep agent completely dried prior to procedure  Sterile Barriers Followed: Yes    All five maximal barriers used- gloves, gown, cap, mask, and large sterile sheet    hand hygiene performed prior to central venous catheter insertion  Location: right internal jugular.   Catheter type: triple lumen  Catheter Size: 14 Fr  Inserted Catheter Length: 15 cm  Ultrasound: vascular probe with ultrasound   Vessel Caliber: medium, patent  Vascular Doppler:  not done, compressibility normal  Needle advanced into vessel with real time Ultrasound guidance.  Guidewire confirmed in vessel.  sterile gel and probe cover used in ultrasound-guided central venous catheter insertion   Manometry: Venous cannualation confirmed by visual estimation of blood vessel pressure using manometry.  Insertion Attempts: 2   Securement:line sutured, chlorhexidine patch, sterile dressing applied and blood return through all ports    Post-Procedure    Adverse Events:none      Guidewire

## 2024-04-27 NOTE — NURSING
Patient returned to the unit from PACU. Patient is very drowsy at this time. Frequent VS in progress. LLQ incision and old PD cath site with surgical dressings in place. LLQ MOISES to bulb suction with bloody output noted. Reynolds catheter with pink tinged urine noted. I=O in progress. Thymo infusion. Sidney hose and CDS in place. Reminded the patient and his wife to call for assistance.

## 2024-04-27 NOTE — NURSING
Nurses Note -- 4 Eyes      4/27/2024   6:45 AM      Skin assessed during: Admit      [x] No Altered Skin Integrity Present    []Prevention Measures Documented      [] Yes- Altered Skin Integrity Present or Discovered   [] LDA Added if Not in Epic (Describe Wound)   [] New Altered Skin Integrity was Present on Admit and Documented in LDA   [] Wound Image Taken    Wound Care Consulted? No    Attending Nurse:  Chester Tavarez RN/Staff Member:   Aspen UNDERWOOD

## 2024-04-28 PROBLEM — Z99.2 ESRD ON PERITONEAL DIALYSIS: Chronic | Status: RESOLVED | Noted: 2021-12-14 | Resolved: 2024-04-28

## 2024-04-28 PROBLEM — Z01.818 PRE-TRANSPLANT EVALUATION FOR CHRONIC KIDNEY DISEASE: Status: RESOLVED | Noted: 2021-12-14 | Resolved: 2024-04-28

## 2024-04-28 PROBLEM — Z76.82 PATIENT ON WAITING LIST FOR KIDNEY TRANSPLANT: Status: RESOLVED | Noted: 2023-05-23 | Resolved: 2024-04-28

## 2024-04-28 PROBLEM — N18.6 ESRD ON PERITONEAL DIALYSIS: Chronic | Status: RESOLVED | Noted: 2021-12-14 | Resolved: 2024-04-28

## 2024-04-28 PROBLEM — Z91.89 AT RISK FOR OPPORTUNISTIC INFECTIONS: Status: ACTIVE | Noted: 2024-04-28

## 2024-04-28 PROBLEM — D62 ACUTE BLOOD LOSS ANEMIA: Status: ACTIVE | Noted: 2024-04-28

## 2024-04-28 LAB
ALBUMIN SERPL BCP-MCNC: 2.6 G/DL (ref 3.5–5.2)
ALBUMIN SERPL BCP-MCNC: 2.8 G/DL (ref 3.5–5.2)
ANION GAP SERPL CALC-SCNC: 16 MMOL/L (ref 8–16)
ANION GAP SERPL CALC-SCNC: 18 MMOL/L (ref 8–16)
BASOPHILS # BLD AUTO: 0.02 K/UL (ref 0–0.2)
BASOPHILS NFR BLD: 0.1 % (ref 0–1.9)
BUN SERPL-MCNC: 77 MG/DL (ref 6–20)
BUN SERPL-MCNC: 84 MG/DL (ref 6–20)
CALCIUM SERPL-MCNC: 7.9 MG/DL (ref 8.7–10.5)
CALCIUM SERPL-MCNC: 8.5 MG/DL (ref 8.7–10.5)
CHLORIDE SERPL-SCNC: 100 MMOL/L (ref 95–110)
CHLORIDE SERPL-SCNC: 98 MMOL/L (ref 95–110)
CO2 SERPL-SCNC: 18 MMOL/L (ref 23–29)
CO2 SERPL-SCNC: 21 MMOL/L (ref 23–29)
CREAT SERPL-MCNC: 14.9 MG/DL (ref 0.5–1.4)
CREAT SERPL-MCNC: 15.8 MG/DL (ref 0.5–1.4)
DIFFERENTIAL METHOD BLD: ABNORMAL
EOSINOPHIL # BLD AUTO: 0 K/UL (ref 0–0.5)
EOSINOPHIL NFR BLD: 0 % (ref 0–8)
ERYTHROCYTE [DISTWIDTH] IN BLOOD BY AUTOMATED COUNT: 13.5 % (ref 11.5–14.5)
EST. GFR  (NO RACE VARIABLE): 3.4 ML/MIN/1.73 M^2
EST. GFR  (NO RACE VARIABLE): 3.7 ML/MIN/1.73 M^2
GLUCOSE SERPL-MCNC: 129 MG/DL (ref 70–110)
GLUCOSE SERPL-MCNC: 152 MG/DL (ref 70–110)
HCT VFR BLD AUTO: 35.4 % (ref 40–54)
HGB BLD-MCNC: 11.5 G/DL (ref 14–18)
IMM GRANULOCYTES # BLD AUTO: 0.06 K/UL (ref 0–0.04)
IMM GRANULOCYTES NFR BLD AUTO: 0.3 % (ref 0–0.5)
LYMPHOCYTES # BLD AUTO: 0.1 K/UL (ref 1–4.8)
LYMPHOCYTES NFR BLD: 0.3 % (ref 18–48)
MAGNESIUM SERPL-MCNC: 2.2 MG/DL (ref 1.6–2.6)
MCH RBC QN AUTO: 29.9 PG (ref 27–31)
MCHC RBC AUTO-ENTMCNC: 32.5 G/DL (ref 32–36)
MCV RBC AUTO: 92 FL (ref 82–98)
MONOCYTES # BLD AUTO: 0.4 K/UL (ref 0.3–1)
MONOCYTES NFR BLD: 2.1 % (ref 4–15)
NEUTROPHILS # BLD AUTO: 16.9 K/UL (ref 1.8–7.7)
NEUTROPHILS NFR BLD: 97.2 % (ref 38–73)
NRBC BLD-RTO: 0 /100 WBC
PHOSPHATE SERPL-MCNC: 4.9 MG/DL (ref 2.7–4.5)
PHOSPHATE SERPL-MCNC: 5.9 MG/DL (ref 2.7–4.5)
PLATELET # BLD AUTO: 253 K/UL (ref 150–450)
PMV BLD AUTO: 10.2 FL (ref 9.2–12.9)
POCT GLUCOSE: 138 MG/DL (ref 70–110)
POCT GLUCOSE: 169 MG/DL (ref 70–110)
POCT GLUCOSE: 212 MG/DL (ref 70–110)
POTASSIUM SERPL-SCNC: 4.9 MMOL/L (ref 3.5–5.1)
POTASSIUM SERPL-SCNC: 5.1 MMOL/L (ref 3.5–5.1)
RBC # BLD AUTO: 3.84 M/UL (ref 4.6–6.2)
SODIUM SERPL-SCNC: 134 MMOL/L (ref 136–145)
SODIUM SERPL-SCNC: 137 MMOL/L (ref 136–145)
TACROLIMUS BLD-MCNC: 12 NG/ML (ref 5–15)
WBC # BLD AUTO: 17.41 K/UL (ref 3.9–12.7)

## 2024-04-28 PROCEDURE — C9113 INJ PANTOPRAZOLE SODIUM, VIA: HCPCS | Performed by: STUDENT IN AN ORGANIZED HEALTH CARE EDUCATION/TRAINING PROGRAM

## 2024-04-28 PROCEDURE — 25000003 PHARM REV CODE 250: Performed by: STUDENT IN AN ORGANIZED HEALTH CARE EDUCATION/TRAINING PROGRAM

## 2024-04-28 PROCEDURE — 80069 RENAL FUNCTION PANEL: CPT | Performed by: STUDENT IN AN ORGANIZED HEALTH CARE EDUCATION/TRAINING PROGRAM

## 2024-04-28 PROCEDURE — S5010 5% DEXTROSE AND 0.45% SALINE: HCPCS | Performed by: STUDENT IN AN ORGANIZED HEALTH CARE EDUCATION/TRAINING PROGRAM

## 2024-04-28 PROCEDURE — 25000003 PHARM REV CODE 250: Performed by: NURSE PRACTITIONER

## 2024-04-28 PROCEDURE — 99233 SBSQ HOSP IP/OBS HIGH 50: CPT | Mod: 24,,, | Performed by: NURSE PRACTITIONER

## 2024-04-28 PROCEDURE — 85025 COMPLETE CBC W/AUTO DIFF WBC: CPT | Performed by: STUDENT IN AN ORGANIZED HEALTH CARE EDUCATION/TRAINING PROGRAM

## 2024-04-28 PROCEDURE — 27000207 HC ISOLATION

## 2024-04-28 PROCEDURE — 20600001 HC STEP DOWN PRIVATE ROOM

## 2024-04-28 PROCEDURE — 83735 ASSAY OF MAGNESIUM: CPT | Performed by: STUDENT IN AN ORGANIZED HEALTH CARE EDUCATION/TRAINING PROGRAM

## 2024-04-28 PROCEDURE — 63600175 PHARM REV CODE 636 W HCPCS: Performed by: NURSE PRACTITIONER

## 2024-04-28 PROCEDURE — 80197 ASSAY OF TACROLIMUS: CPT | Performed by: STUDENT IN AN ORGANIZED HEALTH CARE EDUCATION/TRAINING PROGRAM

## 2024-04-28 PROCEDURE — 80069 RENAL FUNCTION PANEL: CPT | Mod: 91 | Performed by: PHYSICIAN ASSISTANT

## 2024-04-28 PROCEDURE — 63600175 PHARM REV CODE 636 W HCPCS: Performed by: STUDENT IN AN ORGANIZED HEALTH CARE EDUCATION/TRAINING PROGRAM

## 2024-04-28 RX ORDER — SODIUM CHLORIDE 9 MG/ML
INJECTION, SOLUTION INTRAVENOUS CONTINUOUS
Status: DISCONTINUED | OUTPATIENT
Start: 2024-04-28 | End: 2024-04-28

## 2024-04-28 RX ORDER — FUROSEMIDE 10 MG/ML
60 INJECTION INTRAMUSCULAR; INTRAVENOUS ONCE
Status: COMPLETED | OUTPATIENT
Start: 2024-04-28 | End: 2024-04-28

## 2024-04-28 RX ORDER — TAMSULOSIN HYDROCHLORIDE 0.4 MG/1
0.4 CAPSULE ORAL DAILY
Status: DISCONTINUED | OUTPATIENT
Start: 2024-04-28 | End: 2024-04-30 | Stop reason: HOSPADM

## 2024-04-28 RX ORDER — PANTOPRAZOLE SODIUM 40 MG/1
40 TABLET, DELAYED RELEASE ORAL DAILY
Status: DISCONTINUED | OUTPATIENT
Start: 2024-04-29 | End: 2024-04-30 | Stop reason: HOSPADM

## 2024-04-28 RX ADMIN — TAMSULOSIN HYDROCHLORIDE 0.4 MG: 0.4 CAPSULE ORAL at 10:04

## 2024-04-28 RX ADMIN — HEPARIN SODIUM 5000 UNITS: 5000 INJECTION INTRAVENOUS; SUBCUTANEOUS at 09:04

## 2024-04-28 RX ADMIN — HEPARIN SODIUM 5000 UNITS: 5000 INJECTION INTRAVENOUS; SUBCUTANEOUS at 01:04

## 2024-04-28 RX ADMIN — HEPARIN SODIUM 5000 UNITS: 5000 INJECTION INTRAVENOUS; SUBCUTANEOUS at 05:04

## 2024-04-28 RX ADMIN — ACETAMINOPHEN 650 MG: 325 TABLET ORAL at 09:04

## 2024-04-28 RX ADMIN — NIFEDIPINE 30 MG: 30 TABLET, FILM COATED, EXTENDED RELEASE ORAL at 08:04

## 2024-04-28 RX ADMIN — TRAMADOL HYDROCHLORIDE 50 MG: 50 TABLET, COATED ORAL at 09:04

## 2024-04-28 RX ADMIN — DIPHENHYDRAMINE HYDROCHLORIDE 25 MG: 25 CAPSULE ORAL at 01:04

## 2024-04-28 RX ADMIN — DOCUSATE SODIUM 100 MG: 100 CAPSULE, LIQUID FILLED ORAL at 09:04

## 2024-04-28 RX ADMIN — MYCOPHENOLATE MOFETIL 1000 MG: 250 CAPSULE ORAL at 09:04

## 2024-04-28 RX ADMIN — METHYLPREDNISOLONE SODIUM SUCCINATE 250 MG: 125 INJECTION, POWDER, FOR SOLUTION INTRAMUSCULAR; INTRAVENOUS at 01:04

## 2024-04-28 RX ADMIN — THERA TABS 1 TABLET: TAB at 08:04

## 2024-04-28 RX ADMIN — ACETAMINOPHEN 650 MG: 325 TABLET ORAL at 05:04

## 2024-04-28 RX ADMIN — TACROLIMUS 3 MG: 1 CAPSULE ORAL at 08:04

## 2024-04-28 RX ADMIN — INSULIN ASPART 1 UNITS: 100 INJECTION, SOLUTION INTRAVENOUS; SUBCUTANEOUS at 10:04

## 2024-04-28 RX ADMIN — BISACODYL 10 MG: 5 TABLET, COATED ORAL at 09:04

## 2024-04-28 RX ADMIN — ACETAMINOPHEN 650 MG: 325 TABLET ORAL at 01:04

## 2024-04-28 RX ADMIN — DEXTROSE AND SODIUM CHLORIDE: 5; 450 INJECTION, SOLUTION INTRAVENOUS at 06:04

## 2024-04-28 RX ADMIN — MUPIROCIN 1 G: 20 OINTMENT TOPICAL at 08:04

## 2024-04-28 RX ADMIN — MUPIROCIN 1 G: 20 OINTMENT TOPICAL at 09:04

## 2024-04-28 RX ADMIN — FUROSEMIDE 60 MG: 10 INJECTION, SOLUTION INTRAMUSCULAR; INTRAVENOUS at 11:04

## 2024-04-28 RX ADMIN — DOCUSATE SODIUM 100 MG: 100 CAPSULE, LIQUID FILLED ORAL at 01:04

## 2024-04-28 RX ADMIN — MYCOPHENOLATE MOFETIL 1000 MG: 250 CAPSULE ORAL at 08:04

## 2024-04-28 RX ADMIN — PANTOPRAZOLE SODIUM 40 MG: 40 INJECTION, POWDER, FOR SOLUTION INTRAVENOUS at 08:04

## 2024-04-28 RX ADMIN — DOCUSATE SODIUM 100 MG: 100 CAPSULE, LIQUID FILLED ORAL at 08:04

## 2024-04-28 NOTE — PROGRESS NOTES
"[facility] - [facility]  Kidney Transplant  Progress Note      Reason for Follow-up: Reassessment of Kidney Transplant - 4/27/2024  (#1) recipient and management of immunosuppression.    ORGAN: LEFT KIDNEY      Donor Type: Donation after Brain Death      Donor CMV Status: Positive  Donor HBcAB:Negative  Donor HCV Status:Negative  Donor HBV SIS:   Donor HCV SIS: Negative      Subjective:     Chief Complaint/Reason for Admission: Kidney transplant    History of Present Illness:  Mr. Sylvester is a 48 y.o. 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 He has been on the wait list for a kidney transplant #2 at [facility] since 4/20/2021. Patient is currently on peritoneal dialysis started on 4/20/21. Patient is dialyzing on cyclic peritoneal dialysis. Patient reports that he is tolerating dialysis well. He has a PD catheter. Patient denies any recent hospitalizations or ED visits. Pre op labs and diagnostics pending. Anuric. OR times TBD 4/27 with Dr. Pina as primary surgeon. He has been NPO since 2100 4/26.    Dialysis History: Mr. De Anda with ESRD, requiring chronic dialysis who is on peritoneal dialysis started on 2021. Patient is dialyzing on cyclic peritoneal dialysis.  Patient reports that he is tolerating dialysis well.   Date of Last Dialysis: 4/26/24    Native urine output per day: Anuric    Previous Transplant: yes; organ: kidney, date: 2017, complications: failed.    Hospital course: Mr Sylvester is 49 y/o M s/p KTxp for ESRD 2/2 DM II/PCKD w Dr Pina(CMV +/+, CIT 10h 34m). Surgery w/o complication, intra op urine noted, 5 day " murcia(small, anuric bladder), drain placed to be removed after murcia removed. Pt placed on Protonix IV in OR given what appeared to be coffee grand output in OGT. H/H stable.     Interval History: no acute events overnight. Pt made 2,785 of urine past 24 hours. He is clearing. K+ 5.1. CBC stable, wbc elevated as expected w steroids. Plt count wnl. Pt has notable upper extremity edema and LLE 1+ edema. Pt drinking well. Tolerating diet, no n/v. PPI changed to PO.  IV fluids d/c'd. Cont to monitor urine output and labs every 4 hours. Flomax started 4/28 per Dr Pina given bladder thickened and out of concern for retention when murcia is removed. Surgical dressing removed, PD sites w staples, all CDI.  Drain with 250 clear serosang drainage.  VSS. Plan to repeat labs this afternoon.   Current Facility-Administered Medications   Medication Dose Route Frequency Provider Last Rate Last Admin    acetaminophen tablet 650 mg  650 mg Oral Q8H Beckie Rahman MD   650 mg at 04/28/24 0504    acetaminophen tablet 650 mg  650 mg Oral Q24H Beckie Rahman MD   650 mg at 04/28/24 1333    antithymocyte globulin (rabbit) 125 mg, hydrocortisone sodium succinate (SOLU-CORTEF) 20 mg in sodium chloride 0.9% 500 mL (FOR PERIPHERAL LINE ADMINISTRATION ONLY)  1.5 mg/kg (Adjusted) Intravenous Daily Beckie Rahman MD        bisacodyL EC tablet 10 mg  10 mg Oral QHS Beckie Rahman MD   10 mg at 04/27/24 2103    ceFAZolin 2 g in dextrose 5 % in water (D5W) 50 mL IVPB (MB+)  2 g Intravenous On Call Procedure Keira Jurado PA-C        dextrose 10% bolus 125 mL 125 mL  12.5 g Intravenous PRN Beckie Rahman MD        dextrose 10% bolus 250 mL 250 mL  25 g Intravenous PRN Beckie Rahman MD        diphenhydrAMINE capsule 25 mg  25 mg Oral Q24H Beckie Rahman MD   25 mg at 04/28/24 1333    diphenhydrAMINE capsule 50 mg  50 mg Oral Once PRN Beckie Rahman MD        docusate sodium capsule 100 mg  100 mg Oral TID Beckie Rahman MD   100 mg at 04/28/24 0819    droPERidol  injection 0.625 mg  0.625 mg Intravenous Once PRN Gilson Diop MD        EPINEPHrine (PF) injection 1 mg  1 mg Subcutaneous Once PRN Beckie Rahman MD        glucagon (human recombinant) injection 1 mg  1 mg Intramuscular Continuous PRN Beckie Rahman MD        glucose chewable tablet 16 g  16 g Oral PRN Beckie Rahman MD        glucose chewable tablet 16 g  16 g Oral PRN Beckie Rahman MD        glucose chewable tablet 24 g  24 g Oral PRN Beckie Rahman MD        heparin (porcine) injection 5,000 Units  5,000 Units Subcutaneous Q8H Beckie Rahman MD   5,000 Units at 04/28/24 0504    hydrALAZINE injection 10 mg  10 mg Intravenous Q8H PRN Melissa Allred FNP        hydrocortisone sodium succinate injection 100 mg  100 mg Intravenous Once PRN Beckie Rahman MD        HYDROmorphone injection 0.2 mg  0.2 mg Intravenous Q5 Min PRN Gilson Diop MD        HYDROmorphone injection 0.2 mg  0.2 mg Intravenous Q5 Min PRN Gilson Diop MD   0.2 mg at 04/27/24 1246    insulin aspart U-100 pen 0-5 Units  0-5 Units Subcutaneous QID (AC + HS) PRN Beckie Rahman MD        methylPREDNISolone sodium succinate (SOLU-MEDROL) 500 mg in dextrose 5 % (D5W) 100 mL IVPB  500 mg Intravenous Once Keira Jurado PA-C        [START ON 4/29/2024] methylPREDNISolone sodium succinate injection 125 mg  125 mg Intravenous Once Beckie Rahman MD        multivitamin tablet  1 tablet Oral Daily Beckie Rahman MD   1 tablet at 04/28/24 0819    mupirocin 2 % ointment 1 g  1 g Nasal BID Beckie Rahman MD   1 g at 04/28/24 0822    mupirocin 2 % ointment   Nasal On Call Procedure Keira Jurado PA-C        mycophenolate capsule 1,000 mg  1,000 mg Oral BID Beckie Rahman MD   1,000 mg at 04/28/24 0819    NIFEdipine 24 hr tablet 30 mg  30 mg Oral Daily Melissa Allred FNP   30 mg at 04/28/24 0819    ondansetron injection 4 mg  4 mg Intravenous Once PRN Gilson Diop MD        ondansetron injection 4 mg  4 mg Intravenous Q6H PRN Ramhan,  MD Beckie        oxyCODONE immediate release tablet 5 mg  5 mg Oral Q6H PRN Beckie Rahman MD   5 mg at 04/27/24 1240    [START ON 4/29/2024] pantoprazole EC tablet 40 mg  40 mg Oral Daily Melissa Wright NP        [START ON 4/30/2024] predniSONE tablet 20 mg  20 mg Oral Daily Beckie Rahman MD        pregabalin capsule 75 mg  75 mg Oral On Call Procedure Keira Jurado PA-C        sodium chloride 0.9% flush 10 mL  10 mL Intravenous PRN Keira Jurado PA-C        sodium chloride 0.9% flush 10 mL  10 mL Intravenous PRN Beckie Rahman MD        [START ON 5/7/2024] sulfamethoxazole-trimethoprim 400-80mg per tablet 1 tablet  1 tablet Oral Daily AM Beckie Rahman MD        tamsulosin 24 hr capsule 0.4 mg  0.4 mg Oral Daily Melissa Wright NP   0.4 mg at 04/28/24 1001    traMADoL tablet 50 mg  50 mg Oral Q6H PRN Beckie Rahman MD        [START ON 5/7/2024] valGANciclovir tablet 450 mg  450 mg Oral Daily AM Beckie Rahman MD           Review of patient's allergies indicates:  No Known Allergies    Past Medical History:   Diagnosis Date    Diabetes     Diabetes mellitus, type 2     Gout     Hypertension     Hypothyroidism     Hypothyroidism     Kidney transplant recipient     Obesity     Polycystic kidney disease      Past Surgical History:   Procedure Laterality Date    ANKLE SURGERY      HERNIA REPAIR      KIDNEY TRANSPLANT  2015    [facility]    LITHOTRIPSY Left 2017    naitive kidney     PD catheter      PERITONEAL CATHETER INSERTION       Family History       Problem Relation (Age of Onset)    Diabetes Mother    Hypertension Mother, Paternal Aunt    Kidney disease Father, Sister, Paternal Aunt          Tobacco Use    Smoking status: Former     Current packs/day: 0.00     Types: Cigarettes     Quit date: 10/1/2008     Years since quitting: 15.5    Smokeless tobacco: Never   Substance and Sexual Activity    Alcohol use: Not Currently     Comment: occasional    Drug use: Never    Sexual activity: Yes     Partners:  "Female        Review of Systems   Constitutional:  Positive for activity change. Negative for appetite change, chills, diaphoresis and fever.   HENT:  Negative for congestion, sinus pressure, sinus pain, sore throat and trouble swallowing.    Eyes:  Negative for visual disturbance.   Respiratory:  Negative for chest tightness, shortness of breath and stridor.    Cardiovascular:  Negative for chest pain, palpitations and leg swelling.   Gastrointestinal:  Negative for abdominal distention, abdominal pain, constipation, diarrhea, nausea and vomiting.   Genitourinary:  Negative for decreased urine volume, difficulty urinating, dysuria and flank pain.   Musculoskeletal:  Negative for neck pain and neck stiffness.   Skin:  Positive for wound. Negative for color change and rash.   Allergic/Immunologic: Positive for immunocompromised state.   Neurological:  Negative for dizziness, tremors, syncope, light-headedness and headaches.   Psychiatric/Behavioral:  Negative for agitation, confusion, decreased concentration and hallucinations. The patient is not nervous/anxious.      Objective:     Vital Signs (Most Recent):  Temp: 98 °F (36.7 °C) (04/28/24 1203)  Pulse: 78 (04/28/24 1203)  Resp: 18 (04/28/24 1203)  BP: (!) 159/99 (04/28/24 1203)  SpO2: 99 % (04/28/24 1203)  Height: 5' 8" (172.7 cm)  Weight: 92.4 kg (203 lb 9.5 oz)  Body mass index is 30.96 kg/m².      Physical Exam  Vitals and nursing note reviewed.   Constitutional:       General: He is not in acute distress.     Appearance: Normal appearance. He is not diaphoretic.   HENT:      Head: Normocephalic and atraumatic.   Eyes:      General: No scleral icterus.        Right eye: No discharge.         Left eye: No discharge.   Cardiovascular:      Rate and Rhythm: Normal rate and regular rhythm.      Heart sounds: Normal heart sounds.   Pulmonary:      Effort: Pulmonary effort is normal.      Breath sounds: Normal breath sounds. No wheezing or rales.   Abdominal:      " General: Bowel sounds are normal. There is no distension.      Palpations: Abdomen is soft.      Tenderness: There is no abdominal tenderness. There is no guarding.      Comments: LLQ incision and PD sites closed w staples CDI   Musculoskeletal:         General: Swelling (2+ BUE) present.      Cervical back: Normal range of motion and neck supple.      Left lower leg: Edema (1+) present.   Skin:     General: Skin is warm and dry.      Capillary Refill: Capillary refill takes less than 2 seconds.   Neurological:      General: No focal deficit present.      Mental Status: He is alert and oriented to person, place, and time.      Cranial Nerves: No cranial nerve deficit.   Psychiatric:         Mood and Affect: Mood normal.         Thought Content: Thought content normal.         Judgment: Judgment normal.          Laboratory:   Lab Results   Component Value Date    WBC 17.41 (H) 04/28/2024    HGB 11.5 (L) 04/28/2024    HCT 35.4 (L) 04/28/2024    MCV 92 04/28/2024     04/28/2024       CMP  Sodium   Date Value Ref Range Status   04/28/2024 137 136 - 145 mmol/L Final     Potassium   Date Value Ref Range Status   04/28/2024 5.1 3.5 - 5.1 mmol/L Final     Chloride   Date Value Ref Range Status   04/28/2024 100 95 - 110 mmol/L Final     CO2   Date Value Ref Range Status   04/28/2024 21 (L) 23 - 29 mmol/L Final     Glucose   Date Value Ref Range Status   04/28/2024 129 (H) 70 - 110 mg/dL Final     BUN   Date Value Ref Range Status   04/28/2024 77 (H) 6 - 20 mg/dL Final     Creatinine   Date Value Ref Range Status   04/28/2024 15.8 (H) 0.5 - 1.4 mg/dL Final     Calcium   Date Value Ref Range Status   04/28/2024 7.9 (L) 8.7 - 10.5 mg/dL Final     Total Protein   Date Value Ref Range Status   04/27/2024 6.4 6.0 - 8.4 g/dL Final     Albumin   Date Value Ref Range Status   04/28/2024 2.6 (L) 3.5 - 5.2 g/dL Final     Total Bilirubin   Date Value Ref Range Status   04/27/2024 0.4 0.1 - 1.0 mg/dL Final     Comment:     For  "infants and newborns, interpretation of results should be based  on gestational age, weight and in agreement with clinical  observations.    Premature Infant recommended reference ranges:  Up to 24 hours.............<8.0 mg/dL  Up to 48 hours............<12.0 mg/dL  3-5 days..................<15.0 mg/dL  6-29 days.................<15.0 mg/dL       Alkaline Phosphatase   Date Value Ref Range Status   04/27/2024 122 55 - 135 U/L Final     AST   Date Value Ref Range Status   04/27/2024 15 10 - 40 U/L Final     ALT   Date Value Ref Range Status   04/27/2024 12 10 - 44 U/L Final     Anion Gap   Date Value Ref Range Status   04/28/2024 16 8 - 16 mmol/L Final     eGFR   Date Value Ref Range Status   04/28/2024 3.4 (A) >60 mL/min/1.73 m^2 Final       Diagnostic Results:  Reviewed   Assessment/Plan:     * S/P kidney transplant  -S/p 4/27 DBD kidney transplant and PD catheter removal with Yovani CIT 10 hours 40 mins  -2 renal arteries kept on single patch.   -5d murcia due to small bladder/prolonged anuria; MOISES drain to stay in until after murcia is removed  - anticipate urinary retention, Flomax started 4/28/24  -IV PPI started postop due to "coffee ground output" noted in OGT intra-op  - CBC remains stable, tolerating diet, no n/v, PPI changed to PO        Acute blood loss anemia  - H/H stable  - no overt signs of bleed  - continue to monitor with daily CBC        At risk for opportunistic infections  -  Opportunistic infection prophylaxis will include Valcyte for 3 months (CMV D + , R + ) and Bactrim for 6 months. continue prograf    Prophylactic immunotherapy  -  This patient will receive 3 doses of Thymoglobulin for induction  -  Maintenance immunosuppression will include a steroid taper per protocol to 5mg daily, Prograf, and Cellcept maintenance.   - continue to monitor for toxic side effects and check daily levels. Will adjust for therapeutic dose        Long-term use of immunosuppressant medication  - see prophylactic " immunotherapy      Type 2 diabetes mellitus  - Endocrine following appreciate rec's           Discharge Planning:  Discussed plan of care.  No plan for discharge today.    Medical decision making for this encounter includes review of pertinent labs and diagnostic studies, assessment and planning, discussions with consulting providers, discussion with patient/family, and participation in multidisciplinary rounds. Time spent caring for patient: 90 minutes    Melissa Wright NP  Kidney Transplant  [facility] - Trans[facility]down

## 2024-04-28 NOTE — PLAN OF CARE
Patient is AAOx2. LLQ incision and old PD cath site ORION with staples. LLQ MOISES to bulb suction with ss output noted. Reynolds catheter CDI. IVF stopped and patient received lasix 60mg IVP. Patient has periorbital edema and +2 BLE edema. 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 Patient started started on Flomax this morning. Patient has been sitting up in the chair for most of the day. Monitoring the patient's blood sugar AC&HS. Reminded the patient to call for assistance.

## 2024-04-28 NOTE — ANESTHESIA POSTPROCEDURE EVALUATION
Anesthesia Post Evaluation    Patient: Adilson Sylvester    Procedure(s) Performed: Procedure(s) (LRB):  TRANSPLANT, KIDNEY (N/A)  REMOVAL, CATHETER, DIALYSIS, PERITONEAL (N/A)    Final Anesthesia Type: general      Patient location during evaluation: PACU  Patient participation: Yes- Able to Participate  Level of consciousness: awake  Post-procedure vital signs: reviewed and stable  Pain management: adequate  Airway patency: patent    PONV status at discharge: No PONV  Anesthetic complications: no      Cardiovascular status: blood pressure returned to baseline  Respiratory status: unassisted  Hydration status: euvolemic  Follow-up not needed.              Vitals Value Taken Time   /84 04/28/24 0801   Temp 36.8 °C (98.3 °F) 04/28/24 0744   Pulse 86 04/28/24 1000   Resp 18 04/28/24 0744   SpO2 98 % 04/28/24 1000   Vitals shown include unfiled device data.      Event Time   Out of Recovery 12:15:00         Pain/Donny Score: Pain Rating Prior to Med Admin: 0 (4/28/2024  5:04 AM)  Pain Rating Post Med Admin: 0 (4/28/2024  6:04 AM)  Donny Score: 9 (4/27/2024  1:45 PM)

## 2024-04-28 NOTE — ASSESSMENT & PLAN NOTE
-  Opportunistic infection prophylaxis will include Valcyte for 3 months (CMV D + , R + ) and Bactrim for 6 months. continue prograf

## 2024-04-28 NOTE — PLAN OF CARE
Plan of care reviewed with the patient upon admission. S/p kidney transplant. LLQ incision with dressing CDI. PD cath removed. Reynolds in place, light pink urine. 1385cc this shift. IVF infusing to make I=O. L MOISES x1. Pain managed well with tylenol. Pt up to the side of the bed several times. Fall precautions maintained. He is up with stand by assist. Pt remained free from falls and injury this shift. Bed locked in lowest position, side rails up x2, call light within reach. Instructed pt to call for assistance as needed. Pt verbalized understanding. Vitals stable. Pt afebrile overnight. No acute issues overnight. Will continue to monitor. Wife remains at the bedside.

## 2024-04-28 NOTE — HOSPITAL COURSE
Mr Sylvester is 47 y/o M s/p KTxp for ESRD 2/2 DM II/PCKD w Dr Pina(CMV +/+, CIT 10h 34m). Surgery w/o complication, intra op urine noted, 5 day murcia(small, anuric bladder), drain placed to be removed after murcia removed. Pt placed on Protonix IV in OR given what appeared to be coffee grand output in OGT. H/H stable.     Interval History: no acute events overnight. Pt making excellent UOP, Cr continues to slowly decrease. Pt feeling well. He is tolerating diet, passing flatus, no BM yet. CBC stable, wbc trending down. Plt count wnl. Pt has notable upper extremity edema and LLE 1+ edema, improved w Lasix given yesterday. Plan for Lasix 60mg IV today. Flomax started 4/28 per Dr Pina given bladder thickened and out of concern for retention when murcia is removed. Surgical dressing removed, PD sites w staples, all CDI.  Drain with 110 thin, serosang drainage. Pt up in room and independent. VSS. Monitor.

## 2024-04-28 NOTE — PLAN OF CARE
Recommendations    1. Recommend Renal/ 2000 Calorie diabetic Diet. Encourage adequate PO intake.   2. Recommend Novasource Renal BID for additional calories/PRO.   3. Monitor I/O's, weight and labs.   4. Recommend Phosphate binder with all meals/snacks.   5. Continue renal vitamin daily.   6. RD following.    Goals: Meet % EEN/EPN by follow-up date  Nutrition Goal Status: new  Communication of RD Recs: other (comment) (POC)

## 2024-04-28 NOTE — ASSESSMENT & PLAN NOTE
-  This patient will receive 3 doses of Thymoglobulin for induction  -  Maintenance immunosuppression will include a steroid taper per protocol to 5mg daily, Prograf, and Cellcept maintenance.   - continue to monitor for toxic side effects and check daily levels. Will adjust for therapeutic dose

## 2024-04-28 NOTE — CONSULTS
[facility] - Transplant Stepdown  Adult Nutrition  Consult Note Post Op Kidney Transplant    SUMMARY     Recommendations    1. Recommend Renal/ 2000 Calorie diabetic Diet. Encourage adequate PO intake.   2. Recommend Novasource Renal BID for additional calories/PRO.   3. Monitor I/O's, weight and labs.   4. Recommend Phosphate binder with all meals/snacks.   5. Continue renal vitamin daily.   6. RD following.    Goals: Meet % EEN/EPN by follow-up date  Nutrition Goal Status: new  Communication of RD Recs: other (comment) (POC)    Assessment and Plan    Nutrition Problem  Food- and nutrition-related knowledge deficit    Related to (etiology):   Lack of prior nutrition-related education    Signs and Symptoms (as evidenced by):    No prior knowledge of need for food-and nutrition-related recommendations    Interventions/Recommendations (treatment strategy):  Collaboration of nutrition care with other providers  Nutrition education    Nutrition Diagnosis Status:   New        Reason for Assessment    Reason For Assessment: consult  Diagnosis:  (ESRD on peritoneal dialysis)  Relevant Medical History: HTN, DM2, hypothyoidism, gout, obesity  Interdisciplinary Rounds: did not attend  General Information Comments: RD consulted for post-op kidney tx. RD spoke with patient and SO at bedside, reports good appetite consuming % at meals. Denies difficulty chewing/swallowing. No c/o N/VD, endorses constipation. Nutrition education provided. Food safety/drug interactions emphasized. General healthy/low salt diet recommended. Education material left.  No other needs identified. Caregiver present. Patient appears nourished, no physical indicators of malnutrition present.  Nutrition Discharge Planning: Pending Clinical course, Low Sodium/Food Safety education provided 4/28    Nutrition Risk Screen    Nutrition Risk Screen: no indicators present    Nutrition/Diet History    Patient Reported Diet/Restrictions/Preferences:  "general  Typical Food/Fluid Intake: 3 meals/day  Spiritual, Cultural Beliefs, Nondenominational Practices, Values that Affect Care: no  Food Allergies: NKFA  Factors Affecting Nutritional Intake: None identified at this time    Anthropometrics    Temp: 98 °F (36.7 °C)  Height: 5' 8" (172.7 cm)  Height (inches): 68 in  Weight Method: Standard Scale  Weight: 92.4 kg (203 lb 9.5 oz)  Weight (lb): 203.6 lb  Ideal Body Weight (IBW), Male: 154 lb  % Ideal Body Weight, Male (lb): 132.21 %  BMI (Calculated): 31       Lab/Procedures/Meds    Pertinent Labs Reviewed: reviewed  Pertinent Labs Comments: CO2 21, BUN 77, Cr 15.8, GFR 3.4, Lgu 129, Ca 7.9, P 4.9  Pertinent Medications Reviewed: reviewed  Pertinent Medications Comments: tylenol, bisacodyl, docusate sodium, heparin, methyleprednisolone, MVI, mycophenolate, prednisone, pantoprazole, NaCl, dextrose, tacrolimus      Estimated/Assessed Needs    Weight Used For Calorie Calculations: 92.1 kg (203 lb)  Energy Calorie Requirements (kcal): 2302 kcal (25 kcal/kg)  Energy Need Method: Kcal/kg  Protein Requirements: 84 g (1.2 g/kg)  Weight Used For Protein Calculations: 69.9 kg (154 lb) (IBW)        RDA Method (mL): 2302  CHO Requirement: 288 g      Nutrition Prescription Ordered    Current Diet Order: Renal    Evaluation of Received Nutrient/Fluid Intake    I/O: +2.16 L  Energy Calories Required: meeting needs  Protein Required: meeting needs  Comments: LBM: 4/26  Tolerance: tolerating  % Intake of Estimated Energy Needs: 75 - 100 %  % Meal Intake: 75 - 100 %    Nutrition Risk    Level of Risk/Frequency of Follow-up: low - moderate (1-2x/ week)       Monitor and Evaluation    Food and Nutrient Intake: energy intake, food and beverage intake  Food and Nutrient Adminstration: diet order  Physical Activity and Function: nutrition-related ADLs and IADLs, factors affecting access to physical activity  Anthropometric Measurements: weight, body mass index, weight change  Biochemical Data, " Medical Tests and Procedures: electrolyte and renal panel, gastrointestinal profile, glucose/endocrine profile, inflammatory profile, lipid profile  Nutrition-Focused Physical Findings: extremities, muscles and bones, overall appearance, head and eyes, skin       Nutrition Follow-Up    RD Follow-up?: Yes    Antonette Cruz MS, RD, LDN

## 2024-04-28 NOTE — ASSESSMENT & PLAN NOTE
"-S/p 4/27 DBD kidney transplant and PD catheter removal with Yovani CIT 10 hours 40 mins  -2 renal arteries kept on single patch.   -5d murcia due to small bladder/prolonged anuria; MOISES drain to stay in until after murcia is removed  - anticipate urinary retention, Flomax started 4/28/24  -IV PPI started postop due to "coffee ground output" noted in OGT intra-op  - CBC remains stable, tolerating diet, no n/v, PPI changed to PO      "

## 2024-04-28 NOTE — SUBJECTIVE & OBJECTIVE
"  Subjective:     Chief Complaint/Reason for Admission: Kidney transplant    History of Present Illness:  Mr. Sylvester is a 48 y.o. 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 He has been on the wait list for a kidney transplant #2 at [facility] since 4/20/2021. Patient is currently on peritoneal dialysis started on 4/20/21. Patient is dialyzing on cyclic peritoneal dialysis. Patient reports that he is tolerating dialysis well. He has a PD catheter. Patient denies any recent hospitalizations or ED visits. Pre op labs and diagnostics pending. Anuric. OR times TBD 4/27 with Dr. Pina as primary surgeon. He has been NPO since 2100 4/26.    Dialysis History: Mr. De Anda with ESRD, requiring chronic dialysis who is on peritoneal dialysis started on 2021. Patient is dialyzing on cyclic peritoneal dialysis.  Patient reports that he is tolerating dialysis well.   Date of Last Dialysis: 4/26/24    Native urine output per day: Anuric    Previous Transplant: yes; organ: kidney, date: 2017, complications: failed.    Current Facility-Administered Medications   Medication Dose Route Frequency Provider Last Rate Last Admin    acetaminophen tablet 650 mg  650 mg Oral Q8H Beckie Rahman MD   650 mg at 04/28/24 0504    acetaminophen tablet 650 mg  650 mg Oral Q24H Beckie Rahman MD   650 mg at 04/28/24 1333    antithymocyte globulin (rabbit) 125 mg, hydrocortisone sodium succinate (SOLU-CORTEF) 20 mg in sodium chloride 0.9% 500 mL (FOR PERIPHERAL LINE ADMINISTRATION ONLY)  1.5 mg/kg (Adjusted) Intravenous Daily Beckie Rahman MD        bisacodyL EC tablet 10 mg  10 mg " Oral QHS Beckie Rahman MD   10 mg at 04/27/24 2103    ceFAZolin 2 g in dextrose 5 % in water (D5W) 50 mL IVPB (MB+)  2 g Intravenous On Call Procedure Keira Jurado PA-C        dextrose 10% bolus 125 mL 125 mL  12.5 g Intravenous PRN Beckie Rahman MD        dextrose 10% bolus 250 mL 250 mL  25 g Intravenous PRN Beckie Rahman MD        diphenhydrAMINE capsule 25 mg  25 mg Oral Q24H Beckie Rahman MD   25 mg at 04/28/24 1333    diphenhydrAMINE capsule 50 mg  50 mg Oral Once PRN Beckie Rahman MD        docusate sodium capsule 100 mg  100 mg Oral TID Beckie Rahman MD   100 mg at 04/28/24 0819    droPERidol injection 0.625 mg  0.625 mg Intravenous Once PRN Gilson Diop MD        EPINEPHrine (PF) injection 1 mg  1 mg Subcutaneous Once PRN Beckie Rahman MD        glucagon (human recombinant) injection 1 mg  1 mg Intramuscular Continuous PRN Beckie Rahman MD        glucose chewable tablet 16 g  16 g Oral PRN Beckie Rahman MD        glucose chewable tablet 16 g  16 g Oral PRN Beckie Rahman MD        glucose chewable tablet 24 g  24 g Oral PRN Beckie Rahman MD        heparin (porcine) injection 5,000 Units  5,000 Units Subcutaneous Q8H Beckie Rahman MD   5,000 Units at 04/28/24 0504    hydrALAZINE injection 10 mg  10 mg Intravenous Q8H PRN Melissa Allred FNP        hydrocortisone sodium succinate injection 100 mg  100 mg Intravenous Once PRN Beckie Rahman MD        HYDROmorphone injection 0.2 mg  0.2 mg Intravenous Q5 Min PRN Gilson Diop MD        HYDROmorphone injection 0.2 mg  0.2 mg Intravenous Q5 Min PRN Gilson Diop MD   0.2 mg at 04/27/24 1246    insulin aspart U-100 pen 0-5 Units  0-5 Units Subcutaneous QID (AC + HS) PRN Beckie Rahman MD        methylPREDNISolone sodium succinate (SOLU-MEDROL) 500 mg in dextrose 5 % (D5W) 100 mL IVPB  500 mg Intravenous Once Keira Jurado PA-C        [START ON 4/29/2024] methylPREDNISolone sodium succinate injection 125 mg  125 mg Intravenous Once Beckie Rahman MD         multivitamin tablet  1 tablet Oral Daily Beckie Rahman MD   1 tablet at 04/28/24 0819    mupirocin 2 % ointment 1 g  1 g Nasal BID Beckie Rahman MD   1 g at 04/28/24 0822    mupirocin 2 % ointment   Nasal On Call Procedure Keira Jurado PA-C        mycophenolate capsule 1,000 mg  1,000 mg Oral BID Beckie Rahman MD   1,000 mg at 04/28/24 0819    NIFEdipine 24 hr tablet 30 mg  30 mg Oral Daily Melissa Allred FNP   30 mg at 04/28/24 0819    ondansetron injection 4 mg  4 mg Intravenous Once PRN Gilson Diop MD        ondansetron injection 4 mg  4 mg Intravenous Q6H PRN Beckie Rahman MD        oxyCODONE immediate release tablet 5 mg  5 mg Oral Q6H PRN Beckie Rahman MD   5 mg at 04/27/24 1240    [START ON 4/29/2024] pantoprazole EC tablet 40 mg  40 mg Oral Daily Melissa Wright NP        [START ON 4/30/2024] predniSONE tablet 20 mg  20 mg Oral Daily Beckie Rahman MD        pregabalin capsule 75 mg  75 mg Oral On Call Procedure Keira Jurado PA-C        sodium chloride 0.9% flush 10 mL  10 mL Intravenous PRN Keira Jurado PA-C        sodium chloride 0.9% flush 10 mL  10 mL Intravenous PRN Beckie Rahman MD        [START ON 5/7/2024] sulfamethoxazole-trimethoprim 400-80mg per tablet 1 tablet  1 tablet Oral Daily AM Beckie Rahman MD        tamsulosin 24 hr capsule 0.4 mg  0.4 mg Oral Daily Melissa Wright NP   0.4 mg at 04/28/24 1001    traMADoL tablet 50 mg  50 mg Oral Q6H PRN Beckie Rahman MD        [START ON 5/7/2024] valGANciclovir tablet 450 mg  450 mg Oral Daily AM Beckie Rahman MD           Review of patient's allergies indicates:  No Known Allergies    Past Medical History:   Diagnosis Date    Diabetes     Diabetes mellitus, type 2     Gout     Hypertension     Hypothyroidism     Hypothyroidism     Kidney transplant recipient     Obesity     Polycystic kidney disease      Past Surgical History:   Procedure Laterality Date    ANKLE SURGERY      HERNIA REPAIR      KIDNEY TRANSPLANT  2015     "[facility] [employer]    LITHOTRIPSY Left 2017    naitive kidney     PD catheter      PERITONEAL CATHETER INSERTION       Family History       Problem Relation (Age of Onset)    Diabetes Mother    Hypertension Mother, Paternal Aunt    Kidney disease Father, Sister, Paternal Aunt          Tobacco Use    Smoking status: Former     Current packs/day: 0.00     Types: Cigarettes     Quit date: 10/1/2008     Years since quitting: 15.5    Smokeless tobacco: Never   Substance and Sexual Activity    Alcohol use: Not Currently     Comment: occasional    Drug use: Never    Sexual activity: Yes     Partners: Female        Review of Systems   Constitutional:  Positive for activity change. Negative for appetite change, chills, diaphoresis and fever.   HENT:  Negative for congestion, sinus pressure, sinus pain, sore throat and trouble swallowing.    Eyes:  Negative for visual disturbance.   Respiratory:  Negative for chest tightness, shortness of breath and stridor.    Cardiovascular:  Negative for chest pain, palpitations and leg swelling.   Gastrointestinal:  Negative for abdominal distention, abdominal pain, constipation, diarrhea, nausea and vomiting.   Genitourinary:  Negative for decreased urine volume, difficulty urinating, dysuria and flank pain.   Musculoskeletal:  Negative for neck pain and neck stiffness.   Skin:  Positive for wound. Negative for color change and rash.   Allergic/Immunologic: Positive for immunocompromised state.   Neurological:  Negative for dizziness, tremors, syncope, light-headedness and headaches.   Psychiatric/Behavioral:  Negative for agitation, confusion, decreased concentration and hallucinations. The patient is not nervous/anxious.      Objective:     Vital Signs (Most Recent):  Temp: 98 °F (36.7 °C) (04/28/24 1203)  Pulse: 78 (04/28/24 1203)  Resp: 18 (04/28/24 1203)  BP: (!) 159/99 (04/28/24 1203)  SpO2: 99 % (04/28/24 1203)  Height: 5' 8" (172.7 cm)  Weight: 92.4 kg (203 lb 9.5 oz)  Body mass " index is 30.96 kg/m².      Physical Exam  Vitals and nursing note reviewed.   Constitutional:       General: He is not in acute distress.     Appearance: Normal appearance. He is not diaphoretic.   HENT:      Head: Normocephalic and atraumatic.   Eyes:      General: No scleral icterus.        Right eye: No discharge.         Left eye: No discharge.   Cardiovascular:      Rate and Rhythm: Normal rate and regular rhythm.      Heart sounds: Normal heart sounds.   Pulmonary:      Effort: Pulmonary effort is normal.      Breath sounds: Normal breath sounds. No wheezing or rales.   Abdominal:      General: Bowel sounds are normal. There is no distension.      Palpations: Abdomen is soft.      Tenderness: There is no abdominal tenderness. There is no guarding.      Comments: PD cath in place   Musculoskeletal:         General: Swelling (2+ BUE) present.      Cervical back: Normal range of motion and neck supple.      Left lower leg: Edema (1+) present.   Skin:     General: Skin is warm and dry.      Capillary Refill: Capillary refill takes less than 2 seconds.   Neurological:      General: No focal deficit present.      Mental Status: He is alert and oriented to person, place, and time.      Cranial Nerves: No cranial nerve deficit.   Psychiatric:         Mood and Affect: Mood normal.         Thought Content: Thought content normal.         Judgment: Judgment normal.          Laboratory:   Lab Results   Component Value Date    WBC 17.41 (H) 04/28/2024    HGB 11.5 (L) 04/28/2024    HCT 35.4 (L) 04/28/2024    MCV 92 04/28/2024     04/28/2024       CMP  Sodium   Date Value Ref Range Status   04/28/2024 137 136 - 145 mmol/L Final     Potassium   Date Value Ref Range Status   04/28/2024 5.1 3.5 - 5.1 mmol/L Final     Chloride   Date Value Ref Range Status   04/28/2024 100 95 - 110 mmol/L Final     CO2   Date Value Ref Range Status   04/28/2024 21 (L) 23 - 29 mmol/L Final     Glucose   Date Value Ref Range Status    04/28/2024 129 (H) 70 - 110 mg/dL Final     BUN   Date Value Ref Range Status   04/28/2024 77 (H) 6 - 20 mg/dL Final     Creatinine   Date Value Ref Range Status   04/28/2024 15.8 (H) 0.5 - 1.4 mg/dL Final     Calcium   Date Value Ref Range Status   04/28/2024 7.9 (L) 8.7 - 10.5 mg/dL Final     Total Protein   Date Value Ref Range Status   04/27/2024 6.4 6.0 - 8.4 g/dL Final     Albumin   Date Value Ref Range Status   04/28/2024 2.6 (L) 3.5 - 5.2 g/dL Final     Total Bilirubin   Date Value Ref Range Status   04/27/2024 0.4 0.1 - 1.0 mg/dL Final     Comment:     For infants and newborns, interpretation of results should be based  on gestational age, weight and in agreement with clinical  observations.    Premature Infant recommended reference ranges:  Up to 24 hours.............<8.0 mg/dL  Up to 48 hours............<12.0 mg/dL  3-5 days..................<15.0 mg/dL  6-29 days.................<15.0 mg/dL       Alkaline Phosphatase   Date Value Ref Range Status   04/27/2024 122 55 - 135 U/L Final     AST   Date Value Ref Range Status   04/27/2024 15 10 - 40 U/L Final     ALT   Date Value Ref Range Status   04/27/2024 12 10 - 44 U/L Final     Anion Gap   Date Value Ref Range Status   04/28/2024 16 8 - 16 mmol/L Final     eGFR   Date Value Ref Range Status   04/28/2024 3.4 (A) >60 mL/min/1.73 m^2 Final       Diagnostic Results:  Reviewed

## 2024-04-29 LAB
ALBUMIN SERPL BCP-MCNC: 2.6 G/DL (ref 3.5–5.2)
ANION GAP SERPL CALC-SCNC: 14 MMOL/L (ref 8–16)
BASOPHILS # BLD AUTO: 0.02 K/UL (ref 0–0.2)
BASOPHILS NFR BLD: 0.1 % (ref 0–1.9)
BUN SERPL-MCNC: 90 MG/DL (ref 6–20)
CALCIUM SERPL-MCNC: 8.4 MG/DL (ref 8.7–10.5)
CHLORIDE SERPL-SCNC: 98 MMOL/L (ref 95–110)
CO2 SERPL-SCNC: 21 MMOL/L (ref 23–29)
CREAT SERPL-MCNC: 13.6 MG/DL (ref 0.5–1.4)
DIFFERENTIAL METHOD BLD: ABNORMAL
EOSINOPHIL # BLD AUTO: 0 K/UL (ref 0–0.5)
EOSINOPHIL NFR BLD: 0 % (ref 0–8)
ERYTHROCYTE [DISTWIDTH] IN BLOOD BY AUTOMATED COUNT: 13.2 % (ref 11.5–14.5)
EST. GFR  (NO RACE VARIABLE): 4.1 ML/MIN/1.73 M^2
GLUCOSE SERPL-MCNC: 130 MG/DL (ref 70–110)
HBV SURFACE AB SER QL IA: POSITIVE
HBV SURFACE AB SERPL IA-ACNC: 43 MIU/ML
HCT VFR BLD AUTO: 34.5 % (ref 40–54)
HCV RNA SERPL QL NAA+PROBE: NOT DETECTED
HCV RNA SPEC NAA+PROBE-ACNC: NOT DETECTED IU/ML
HGB BLD-MCNC: 11.8 G/DL (ref 14–18)
IMM GRANULOCYTES # BLD AUTO: 0.05 K/UL (ref 0–0.04)
IMM GRANULOCYTES NFR BLD AUTO: 0.4 % (ref 0–0.5)
LYMPHOCYTES # BLD AUTO: 0.1 K/UL (ref 1–4.8)
LYMPHOCYTES NFR BLD: 0.5 % (ref 18–48)
MAGNESIUM SERPL-MCNC: 2.3 MG/DL (ref 1.6–2.6)
MCH RBC QN AUTO: 31.2 PG (ref 27–31)
MCHC RBC AUTO-ENTMCNC: 34.2 G/DL (ref 32–36)
MCV RBC AUTO: 91 FL (ref 82–98)
MONOCYTES # BLD AUTO: 0.4 K/UL (ref 0.3–1)
MONOCYTES NFR BLD: 3 % (ref 4–15)
NEUTROPHILS # BLD AUTO: 13.2 K/UL (ref 1.8–7.7)
NEUTROPHILS NFR BLD: 96 % (ref 38–73)
NRBC BLD-RTO: 0 /100 WBC
PATHOLOGIST INTERPRETATION AB/XM: NORMAL
PHOSPHATE SERPL-MCNC: 6.4 MG/DL (ref 2.7–4.5)
PLATELET # BLD AUTO: 208 K/UL (ref 150–450)
PMV BLD AUTO: 10.5 FL (ref 9.2–12.9)
POCT GLUCOSE: 141 MG/DL (ref 70–110)
POCT GLUCOSE: 163 MG/DL (ref 70–110)
POCT GLUCOSE: 175 MG/DL (ref 70–110)
POCT GLUCOSE: 180 MG/DL (ref 70–110)
POTASSIUM SERPL-SCNC: 4.5 MMOL/L (ref 3.5–5.1)
RBC # BLD AUTO: 3.78 M/UL (ref 4.6–6.2)
SODIUM SERPL-SCNC: 133 MMOL/L (ref 136–145)
TACROLIMUS BLD-MCNC: 8 NG/ML (ref 5–15)
WBC # BLD AUTO: 13.7 K/UL (ref 3.9–12.7)

## 2024-04-29 PROCEDURE — 25000003 PHARM REV CODE 250: Performed by: STUDENT IN AN ORGANIZED HEALTH CARE EDUCATION/TRAINING PROGRAM

## 2024-04-29 PROCEDURE — 99233 SBSQ HOSP IP/OBS HIGH 50: CPT | Mod: 24,,, | Performed by: NURSE PRACTITIONER

## 2024-04-29 PROCEDURE — 86077 PHYS BLOOD BANK SERV XMATCH: CPT | Mod: ,,, | Performed by: PATHOLOGY

## 2024-04-29 PROCEDURE — 27000207 HC ISOLATION

## 2024-04-29 PROCEDURE — 20600001 HC STEP DOWN PRIVATE ROOM

## 2024-04-29 PROCEDURE — 63600175 PHARM REV CODE 636 W HCPCS: Performed by: STUDENT IN AN ORGANIZED HEALTH CARE EDUCATION/TRAINING PROGRAM

## 2024-04-29 PROCEDURE — 83735 ASSAY OF MAGNESIUM: CPT | Performed by: STUDENT IN AN ORGANIZED HEALTH CARE EDUCATION/TRAINING PROGRAM

## 2024-04-29 PROCEDURE — 80069 RENAL FUNCTION PANEL: CPT | Performed by: STUDENT IN AN ORGANIZED HEALTH CARE EDUCATION/TRAINING PROGRAM

## 2024-04-29 PROCEDURE — 63600175 PHARM REV CODE 636 W HCPCS: Performed by: NURSE PRACTITIONER

## 2024-04-29 PROCEDURE — 85025 COMPLETE CBC W/AUTO DIFF WBC: CPT | Performed by: STUDENT IN AN ORGANIZED HEALTH CARE EDUCATION/TRAINING PROGRAM

## 2024-04-29 PROCEDURE — 80197 ASSAY OF TACROLIMUS: CPT | Performed by: STUDENT IN AN ORGANIZED HEALTH CARE EDUCATION/TRAINING PROGRAM

## 2024-04-29 PROCEDURE — 25000003 PHARM REV CODE 250: Performed by: NURSE PRACTITIONER

## 2024-04-29 RX ORDER — BISACODYL 5 MG
5 TABLET, DELAYED RELEASE (ENTERIC COATED) ORAL DAILY PRN
COMMUNITY
Start: 2024-04-29 | End: 2024-05-13

## 2024-04-29 RX ORDER — PANTOPRAZOLE SODIUM 40 MG/1
40 TABLET, DELAYED RELEASE ORAL DAILY
Qty: 30 TABLET | Refills: 0 | Status: SHIPPED | OUTPATIENT
Start: 2024-04-30 | End: 2024-05-22 | Stop reason: SDUPTHER

## 2024-04-29 RX ORDER — TACROLIMUS 1 MG/1
2 CAPSULE ORAL 2 TIMES DAILY
Status: DISCONTINUED | OUTPATIENT
Start: 2024-04-29 | End: 2024-04-30 | Stop reason: HOSPADM

## 2024-04-29 RX ORDER — TAMSULOSIN HYDROCHLORIDE 0.4 MG/1
0.4 CAPSULE ORAL DAILY
Qty: 30 CAPSULE | Refills: 11 | Status: SHIPPED | OUTPATIENT
Start: 2024-04-30 | End: 2024-05-22 | Stop reason: SDUPTHER

## 2024-04-29 RX ORDER — FUROSEMIDE 10 MG/ML
60 INJECTION INTRAMUSCULAR; INTRAVENOUS ONCE
Status: COMPLETED | OUTPATIENT
Start: 2024-04-29 | End: 2024-04-29

## 2024-04-29 RX ORDER — DOCUSATE SODIUM 100 MG/1
100 CAPSULE, LIQUID FILLED ORAL 2 TIMES DAILY PRN
COMMUNITY
Start: 2024-04-29 | End: 2024-05-13

## 2024-04-29 RX ORDER — SODIUM BICARBONATE 650 MG/1
650 TABLET ORAL 2 TIMES DAILY
Status: DISCONTINUED | OUTPATIENT
Start: 2024-04-29 | End: 2024-04-30 | Stop reason: HOSPADM

## 2024-04-29 RX ORDER — ACETAMINOPHEN 325 MG/1
650 TABLET ORAL ONCE
Status: COMPLETED | OUTPATIENT
Start: 2024-04-29 | End: 2024-04-29

## 2024-04-29 RX ORDER — DIPHENHYDRAMINE HCL 25 MG
25 CAPSULE ORAL ONCE
Status: COMPLETED | OUTPATIENT
Start: 2024-04-29 | End: 2024-04-29

## 2024-04-29 RX ADMIN — TRAMADOL HYDROCHLORIDE 50 MG: 50 TABLET, COATED ORAL at 09:04

## 2024-04-29 RX ADMIN — DIPHENHYDRAMINE HYDROCHLORIDE 25 MG: 25 CAPSULE ORAL at 02:04

## 2024-04-29 RX ADMIN — HEPARIN SODIUM 5000 UNITS: 5000 INJECTION INTRAVENOUS; SUBCUTANEOUS at 06:04

## 2024-04-29 RX ADMIN — MUPIROCIN 1 G: 20 OINTMENT TOPICAL at 08:04

## 2024-04-29 RX ADMIN — MYCOPHENOLATE MOFETIL 1000 MG: 250 CAPSULE ORAL at 09:04

## 2024-04-29 RX ADMIN — NIFEDIPINE 30 MG: 30 TABLET, FILM COATED, EXTENDED RELEASE ORAL at 08:04

## 2024-04-29 RX ADMIN — ACETAMINOPHEN 650 MG: 325 TABLET ORAL at 02:04

## 2024-04-29 RX ADMIN — DOCUSATE SODIUM 100 MG: 100 CAPSULE, LIQUID FILLED ORAL at 02:04

## 2024-04-29 RX ADMIN — TAMSULOSIN HYDROCHLORIDE 0.4 MG: 0.4 CAPSULE ORAL at 08:04

## 2024-04-29 RX ADMIN — ACETAMINOPHEN 650 MG: 325 TABLET ORAL at 05:04

## 2024-04-29 RX ADMIN — HEPARIN SODIUM 5000 UNITS: 5000 INJECTION INTRAVENOUS; SUBCUTANEOUS at 02:04

## 2024-04-29 RX ADMIN — SODIUM BICARBONATE 650 MG: 650 TABLET ORAL at 10:04

## 2024-04-29 RX ADMIN — HEPARIN SODIUM 5000 UNITS: 5000 INJECTION INTRAVENOUS; SUBCUTANEOUS at 09:04

## 2024-04-29 RX ADMIN — PANTOPRAZOLE SODIUM 40 MG: 40 TABLET, DELAYED RELEASE ORAL at 08:04

## 2024-04-29 RX ADMIN — TACROLIMUS 2 MG: 1 CAPSULE ORAL at 10:04

## 2024-04-29 RX ADMIN — FUROSEMIDE 60 MG: 10 INJECTION, SOLUTION INTRAMUSCULAR; INTRAVENOUS at 10:04

## 2024-04-29 RX ADMIN — MUPIROCIN 1 G: 20 OINTMENT TOPICAL at 09:04

## 2024-04-29 RX ADMIN — SODIUM BICARBONATE 650 MG: 650 TABLET ORAL at 09:04

## 2024-04-29 RX ADMIN — THERA TABS 1 TABLET: TAB at 08:04

## 2024-04-29 RX ADMIN — TACROLIMUS 2 MG: 1 CAPSULE ORAL at 05:04

## 2024-04-29 RX ADMIN — METHYLPREDNISOLONE SODIUM SUCCINATE 125 MG: 125 INJECTION, POWDER, FOR SOLUTION INTRAMUSCULAR; INTRAVENOUS at 02:04

## 2024-04-29 RX ADMIN — MYCOPHENOLATE MOFETIL 1000 MG: 250 CAPSULE ORAL at 08:04

## 2024-04-29 RX ADMIN — DOCUSATE SODIUM 100 MG: 100 CAPSULE, LIQUID FILLED ORAL at 09:04

## 2024-04-29 RX ADMIN — DOCUSATE SODIUM 100 MG: 100 CAPSULE, LIQUID FILLED ORAL at 08:04

## 2024-04-29 RX ADMIN — BISACODYL 10 MG: 5 TABLET, COATED ORAL at 09:04

## 2024-04-29 NOTE — PROGRESS NOTES
Admit Note     Met with patient and wife to assess needs. Patient is a 48 y.o.  male, admitted for for kidney transplant.      End stage renal disease [N18.6]  ESRD on peritoneal dialysis [N18.6, Z99.2]      Patient admitted from home on 4/27/2024 .  At this time, patient presents as alert and oriented x 4, pleasant, good eye contact, well groomed, recall good, concentration/judgement good, average intelligence, calm, communicative, cooperative, and asking and answering questions appropriately.  At this time, patients caregiver presents as alert and oriented x 4, pleasant, good eye contact, well groomed, recall good, concentration/judgement good, average intelligence, calm, communicative, cooperative, and asking and answering questions appropriately.    Household/Family Systems     Patient resides with patient's wife, son, and niece , at [address] [place] [address].  Support system includes his wife Vianey Sylvester ([phone]), and his mother in law Tona Jo ([phone]).   Patient reports his children will be cared for by family while he and his wife are in [place].      Patients primary caregiver is Vianey Sylvester, patients wife, phone number [phone].  Confirmed patients contact information is [phone] (home);   Telephone Information:   Mobile [phone]   .    During admission, patient's caregiver plans to stay in patient's room.  Confirmed patient and patients caregivers do have access to reliable transportation.    Cognitive Status/Learning     Patient reports reading ability as college and states patient does not have difficulty with N/A.  Patient reports patient learns best by multisensory learning.   Needed: No.   Highest education level: Attended College/Technical School    Vocation/Disability   Working for Income: yes, patient reports he is working at this time.       Adherence     Patient reports a high level of adherence to  patients health care regimen.  Adherence counseling and education provided. Patient verbalizes understanding.    Substance Use    Patient reports the following substance usage.    Tobacco: none, patient denies any use.  Alcohol: none, patient denies any use.  Illicit Drugs/Non-prescribed Medications: none, patient denies any use.  Patient states clear understanding of the potential impact of substance use.  Substance abstinence/cessation counseling, education and resources provided and reviewed.     Services Utilizing/ADLS    Infusion Service: Prior to admission, patient utilizing? no  Home Health: Prior to admission, patient utilizing? no  DME: Prior to admission, no  Pulmonary/Cardiac Rehab: Prior to admission, no  Dialysis:  Prior to admission, yes , Patient reports he was on PD  Transplant Specialty Pharmacy:  Prior to admission, no; patient provides permission to release necessary information to specialty pharmacy for medications post-tranplant. Resources provided and patient is choosing [facility] pharmacy at this time.    Prior to admission, patient reports patient was independent with ADLS and was driving.  Patient reports patient is not able to care for self at this time due to compromised medical condition (as documented in medical record) and physical weakness..  Patient indicates a willingness to care for self once medically cleared to do so.    Insurance/Medications    Insured by   Payer/Plan Subscr  Sex Relation Sub. Ins. ID Effective Group Num   1. MEDICAID - HE* [place] 1975 Male Self [id number]* 3/1/21 [id number]                                   [address] [address]      Primary Insurance (for UNOS reporting): Public Insurance - Medicaid  Secondary Insurance (for UNOS reporting): None    Patient reports patient is able to obtain and afford medications at this time and at time of discharge.    Living Will/Healthcare Power of     Patient states patient does not have a LW and/or HCPA.    provided education regarding LW and HCPA and the completion of forms.    Coping/Mental Health    Patient is coping adequately with the aid of  family members and friends.  Patient denies mental health difficulties. Patient and caregiver denied needing or wanting resources or referrals at this time. Patient agrees to contact transplant team with needs, questions, or concerns as they arise.     Discharge Planning    At time of discharge, patient plans to return to [facility] apartments under the care of Vianey Sylvester.  Patients wife will transport patient.  Per rounds today, expected discharge date has not been medically determined at this time. Patient and patients caregiver  verbalize understanding and are involved in treatment planning and discharge process.    Additional Concerns    Patient's caretaker denies additional needs and/or concerns at this time.  providing ongoing psychosocial support, education, resources and d/c planning as needed.  SW remains available.  remains available. Patient's caregiver verbalizes understanding and agreement with information reviewed,  availability and how to access available resources as needed. Patient denies additional needs and/or concerns at this time. Patient verbalizes understanding and agreement with information reviewed, social work availability, and how to access available resources as needed.

## 2024-04-29 NOTE — ASSESSMENT & PLAN NOTE
"-S/p 4/27 DBD kidney transplant and PD catheter removal with Yovani CIT 10 hours 40 mins  -2 renal arteries kept on single patch.   -5d murcia due to small bladder/prolonged anuria; MOISES drain to stay in until after murcia is removed  - anticipate urinary retention, Flomax started 4/28/24  -IV PPI started postop due to "coffee ground output" noted in OGT intra-op  - CBC remains stable, tolerating diet, no n/v, PPI changed to PO  - renal allograft clearing, making good UOP, working on diuresis    "

## 2024-04-29 NOTE — PLAN OF CARE
Patient is AAOx3. LLQ incision and old PD cath site ORION with staples. LLQ MOISES to bulb suction with ss output noted. Reynolds catheter CDI. Patient received another dose of Lasix 60mg IVP today. Patient's wife is doing well with self meds. Patient has been sitting up in the chair for most of the day and walking in the halls. Patient reports he is passing gas but no BM yet. Patient is receiving Thymo3/3 today. Monitoring the patient's blood sugar AC&HS. Reminded the patient to call for assistance.

## 2024-04-29 NOTE — NURSING
Right IJ trialysis removed per orders pressure held and dressing applied. Informed the patient that he must lay flat for 30 minutes.

## 2024-04-29 NOTE — PLAN OF CARE
Recommendations    1. Renal/ 2000 Calorie diabetic Diet. Encourage adequate PO intake.   2. Novasource Renal BID for additional calories/PRO.   3. Monitor I/O's, weight and labs.   4. Recommend Phosphate binder with all meals/snacks.  5. Continue renal vitamin daily.   6. RD following.    Goals: Meet % EEN/EPN by follow-up date  Nutrition Goal Status: progressing towards goal  Communication of RD Recs: other (comment) (POC)

## 2024-04-29 NOTE — PROGRESS NOTES
"[facility] - [facility] Stepdown  Kidney Transplant  Progress Note      Reason for Follow-up: Reassessment of Kidney Transplant - 4/27/2024  (#1) recipient and management of immunosuppression.    ORGAN: LEFT KIDNEY      Donor Type: Donation after Brain Death      Donor CMV Status: Positive  Donor HBcAB:Negative  Donor HCV Status:Negative  Donor HBV SIS:   Donor HCV SIS: Negative      Subjective:     Chief Complaint/Reason for Admission: Kidney transplant    History of Present Illness:  Mr. Sylvester is a 48 y.o. 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 He has been on the wait list for a kidney transplant #2 at [facility] since 4/20/2021. Patient is currently on peritoneal dialysis started on 4/20/21. Patient is dialyzing on cyclic peritoneal dialysis. Patient reports that he is tolerating dialysis well. He has a PD catheter. Patient denies any recent hospitalizations or ED visits. Pre op labs and diagnostics pending. Anuric. OR times TBD 4/27 with Dr. Pina as primary surgeon. He has been NPO since 2100 4/26.    Dialysis History: Mr. De Anda with ESRD, requiring chronic dialysis who is on peritoneal dialysis started on 2021. Patient is dialyzing on cyclic peritoneal dialysis.  Patient reports that he is tolerating dialysis well.   Date of Last Dialysis: 4/26/24    Native urine output per day: Anuric    Previous Transplant: yes; organ: kidney, date: 2017, complications: failed.    Hospital course: Mr Sylvester is 47 y/o M s/p KTxp for ESRD 2/2 DM II/PCKD w Dr Pina(CMV +/+, CIT 10h 34m). Surgery w/o complication, intra op urine noted, 5 day " murcia(small, anuric bladder), drain placed to be removed after murcia removed. Pt placed on Protonix IV in OR given what appeared to be coffee grand output in OGT. H/H stable.     Interval History: no acute events overnight. Pt making excellent UOP, Cr continues to slowly decrease. Pt feeling well. He is tolerating diet, passing flatus, no BM yet. CBC stable, wbc trending down. Plt count wnl. Pt has notable upper extremity edema and LLE 1+ edema, improved w Lasix given yesterday. Plan for Lasix 60mg IV today. Flomax started 4/28 per Dr Pina given bladder thickened and out of concern for retention when murcia is removed. Surgical dressing removed, PD sites w staples, all CDI.  Drain with 110 thin, serosang drainage. Pt up in room and independent. VSS. Monitor.     Current Facility-Administered Medications   Medication Dose Route Frequency Provider Last Rate Last Admin    antithymocyte globulin (rabbit) 125 mg, hydrocortisone sodium succinate (SOLU-CORTEF) 20 mg in sodium chloride 0.9% 500 mL (FOR PERIPHERAL LINE ADMINISTRATION ONLY)  1.5 mg/kg (Adjusted) Intravenous Daily Beckie Rahman MD   Stopped at 04/28/24 2127    bisacodyL EC tablet 10 mg  10 mg Oral QHS Beckie Rahman MD   10 mg at 04/28/24 2116    ceFAZolin 2 g in dextrose 5 % in water (D5W) 50 mL IVPB (MB+)  2 g Intravenous On Call Procedure Keira Jurado PA-C        dextrose 10% bolus 125 mL 125 mL  12.5 g Intravenous PRN Beckie Rahman MD        dextrose 10% bolus 250 mL 250 mL  25 g Intravenous PRN Beckie Rahman MD        diphenhydrAMINE capsule 50 mg  50 mg Oral Once PRN Beckie Rahman MD        docusate sodium capsule 100 mg  100 mg Oral TID Beckie Rahman MD   100 mg at 04/29/24 0830    droPERidol injection 0.625 mg  0.625 mg Intravenous Once PRN Gilson Diop MD        EPINEPHrine (PF) injection 1 mg  1 mg Subcutaneous Once PRN Beckie Rahman MD        furosemide injection 60 mg  60 mg Intravenous Once Melissa Wright NP        glucagon (human  recombinant) injection 1 mg  1 mg Intramuscular Continuous PRN Beckie Rahman MD        glucose chewable tablet 16 g  16 g Oral PRN Beckie Rahman MD        glucose chewable tablet 16 g  16 g Oral PRN Beckie Rahman MD        glucose chewable tablet 24 g  24 g Oral PRN Beckie Rahman MD        heparin (porcine) injection 5,000 Units  5,000 Units Subcutaneous Q8H Beckie Rahman MD   5,000 Units at 04/29/24 0600    hydrALAZINE injection 10 mg  10 mg Intravenous Q8H PRN Melissa Allred FNP        hydrocortisone sodium succinate injection 100 mg  100 mg Intravenous Once PRN Beckie Rahman MD        insulin aspart U-100 pen 0-5 Units  0-5 Units Subcutaneous QID (AC + HS) PRN Beckie Rahman MD   1 Units at 04/28/24 2227    methylPREDNISolone sodium succinate (SOLU-MEDROL) 500 mg in dextrose 5 % (D5W) 100 mL IVPB  500 mg Intravenous Once Keira Jurado PA-C        methylPREDNISolone sodium succinate injection 125 mg  125 mg Intravenous Once Beckie Rahman MD        multivitamin tablet  1 tablet Oral Daily Beckie Rahman MD   1 tablet at 04/29/24 0832    mupirocin 2 % ointment 1 g  1 g Nasal BID Beckie Rahman MD   1 g at 04/29/24 0829    mupirocin 2 % ointment   Nasal On Call Procedure Keira Jurado PA-C        mycophenolate capsule 1,000 mg  1,000 mg Oral BID Beckie Rahman MD   1,000 mg at 04/29/24 0829    NIFEdipine 24 hr tablet 30 mg  30 mg Oral Daily Melissa Allred FNP   30 mg at 04/29/24 0829    ondansetron injection 4 mg  4 mg Intravenous Once PRN Gilson Diop MD        ondansetron injection 4 mg  4 mg Intravenous Q6H PRN Beckie Rahman MD        oxyCODONE immediate release tablet 5 mg  5 mg Oral Q6H PRN Beckie Rahman MD   5 mg at 04/27/24 1240    pantoprazole EC tablet 40 mg  40 mg Oral Daily Melissa Wright NP   40 mg at 04/29/24 0831    [START ON 4/30/2024] predniSONE tablet 20 mg  20 mg Oral Daily Beckie Rahman MD        pregabalin capsule 75 mg  75 mg Oral On Call Procedure Keira Jurado PA-C        sodium  bicarbonate tablet 650 mg  650 mg Oral BID Melissa Wright NP        sodium chloride 0.9% flush 10 mL  10 mL Intravenous PRN Keira Jurado PA-C        sodium chloride 0.9% flush 10 mL  10 mL Intravenous PRN Beckie Rahman MD        [START ON 5/7/2024] sulfamethoxazole-trimethoprim 400-80mg per tablet 1 tablet  1 tablet Oral Daily AM Beckie Rahman MD        tacrolimus capsule 2 mg  2 mg Oral BID Melissa Wright NP        tamsulosin 24 hr capsule 0.4 mg  0.4 mg Oral Daily Melissa Wright NP   0.4 mg at 04/29/24 0829    traMADoL tablet 50 mg  50 mg Oral Q6H PRN Beckie Rahman MD   50 mg at 04/28/24 2109    [START ON 5/7/2024] valGANciclovir tablet 450 mg  450 mg Oral Daily AM Beckie Rahman MD           Review of patient's allergies indicates:  No Known Allergies    Past Medical History:   Diagnosis Date    Diabetes     Diabetes mellitus, type 2     Gout     Hyperkalemia     Hypertension     Hypothyroidism     Hypothyroidism     Kidney transplant recipient     Obesity     Patient on waiting list for kidney transplant 05/23/2023    Polycystic kidney disease     Pre-transplant evaluation for chronic kidney disease 12/14/2021     Past Surgical History:   Procedure Laterality Date    ANKLE SURGERY      HERNIA REPAIR      KIDNEY TRANSPLANT  2015    [facility]    KIDNEY TRANSPLANT N/A 4/27/2024    Procedure: TRANSPLANT, KIDNEY;  Surgeon: Armond Pina MD;  Location: [facility] OR [address] [place] [address];  Service: Transplant;  Laterality: N/A;    LITHOTRIPSY Left 2017    naitive kidney     PD catheter      PERITONEAL CATHETER INSERTION      PERITONEAL CATHETER REMOVAL N/A 4/27/2024    Procedure: REMOVAL, CATHETER, DIALYSIS, PERITONEAL;  Surgeon: Armond Pina MD;  Location: [facility] OR [address] [place] [address];  Service: Transplant;  Laterality: N/A;     Family History       Problem Relation (Age of Onset)    Diabetes Mother    Hypertension Mother, Paternal Aunt    Kidney disease Father, Sister, Paternal Aunt          Tobacco Use    Smoking status:  "Former     Current packs/day: 0.00     Types: Cigarettes     Quit date: 10/1/2008     Years since quitting: 15.5    Smokeless tobacco: Never   Substance and Sexual Activity    Alcohol use: Not Currently     Comment: occasional    Drug use: Never    Sexual activity: Yes     Partners: Female        Review of Systems   Constitutional:  Positive for activity change. Negative for appetite change, chills, diaphoresis and fever.   HENT:  Negative for congestion, sinus pressure, sinus pain, sore throat and trouble swallowing.    Eyes:  Negative for visual disturbance.   Respiratory:  Negative for chest tightness, shortness of breath and stridor.    Cardiovascular:  Negative for chest pain, palpitations and leg swelling.   Gastrointestinal:  Negative for abdominal distention, abdominal pain, constipation, diarrhea, nausea and vomiting.   Genitourinary:  Negative for decreased urine volume, difficulty urinating, dysuria and flank pain.   Musculoskeletal:  Negative for neck pain and neck stiffness.   Skin:  Positive for wound. Negative for color change and rash.   Allergic/Immunologic: Positive for immunocompromised state.   Neurological:  Negative for dizziness, tremors, syncope, light-headedness and headaches.   Psychiatric/Behavioral:  Negative for agitation, confusion, decreased concentration and hallucinations. The patient is not nervous/anxious.      Objective:     Vital Signs (Most Recent):  Temp: 98.4 °F (36.9 °C) (04/29/24 0732)  Pulse: 105 (04/29/24 0732)  Resp: 16 (04/29/24 0732)  BP: (!) 141/92 (04/29/24 0732)  SpO2: 98 % (04/29/24 0732)  Height: 5' 8" (172.7 cm)  Weight: 92.4 kg (203 lb 9.5 oz)  Body mass index is 30.96 kg/m².      Physical Exam  Vitals and nursing note reviewed.   Constitutional:       General: He is not in acute distress.     Appearance: Normal appearance. He is not diaphoretic.   HENT:      Head: Normocephalic and atraumatic.   Eyes:      General: No scleral icterus.        Right eye: No " discharge.         Left eye: No discharge.   Cardiovascular:      Rate and Rhythm: Normal rate and regular rhythm.      Heart sounds: Normal heart sounds.   Pulmonary:      Effort: Pulmonary effort is normal.      Breath sounds: Normal breath sounds. No wheezing or rales.   Abdominal:      General: Bowel sounds are normal. There is no distension.      Palpations: Abdomen is soft.      Tenderness: There is no abdominal tenderness. There is no guarding.      Comments: PD cath in place   Musculoskeletal:         General: Swelling (1+ BUE) present.      Cervical back: Normal range of motion and neck supple.      Left lower leg: Edema (trace) present.   Skin:     General: Skin is warm and dry.      Capillary Refill: Capillary refill takes less than 2 seconds.   Neurological:      General: No focal deficit present.      Mental Status: He is alert and oriented to person, place, and time.      Cranial Nerves: No cranial nerve deficit.   Psychiatric:         Mood and Affect: Mood normal.         Behavior: Behavior normal.         Thought Content: Thought content normal.         Judgment: Judgment normal.          Laboratory:   Lab Results   Component Value Date    WBC 13.70 (H) 04/29/2024    HGB 11.8 (L) 04/29/2024    HCT 34.5 (L) 04/29/2024    MCV 91 04/29/2024     04/29/2024       CMP  Sodium   Date Value Ref Range Status   04/29/2024 133 (L) 136 - 145 mmol/L Final     Potassium   Date Value Ref Range Status   04/29/2024 4.5 3.5 - 5.1 mmol/L Final     Chloride   Date Value Ref Range Status   04/29/2024 98 95 - 110 mmol/L Final     CO2   Date Value Ref Range Status   04/29/2024 21 (L) 23 - 29 mmol/L Final     Glucose   Date Value Ref Range Status   04/29/2024 130 (H) 70 - 110 mg/dL Final     BUN   Date Value Ref Range Status   04/29/2024 90 (H) 6 - 20 mg/dL Final     Creatinine   Date Value Ref Range Status   04/29/2024 13.6 (H) 0.5 - 1.4 mg/dL Final     Calcium   Date Value Ref Range Status   04/29/2024 8.4 (L) 8.7  "- 10.5 mg/dL Final     Total Protein   Date Value Ref Range Status   04/27/2024 6.4 6.0 - 8.4 g/dL Final     Albumin   Date Value Ref Range Status   04/29/2024 2.6 (L) 3.5 - 5.2 g/dL Final     Total Bilirubin   Date Value Ref Range Status   04/27/2024 0.4 0.1 - 1.0 mg/dL Final     Comment:     For infants and newborns, interpretation of results should be based  on gestational age, weight and in agreement with clinical  observations.    Premature Infant recommended reference ranges:  Up to 24 hours.............<8.0 mg/dL  Up to 48 hours............<12.0 mg/dL  3-5 days..................<15.0 mg/dL  6-29 days.................<15.0 mg/dL       Alkaline Phosphatase   Date Value Ref Range Status   04/27/2024 122 55 - 135 U/L Final     AST   Date Value Ref Range Status   04/27/2024 15 10 - 40 U/L Final     ALT   Date Value Ref Range Status   04/27/2024 12 10 - 44 U/L Final     Anion Gap   Date Value Ref Range Status   04/29/2024 14 8 - 16 mmol/L Final     eGFR   Date Value Ref Range Status   04/29/2024 4.1 (A) >60 mL/min/1.73 m^2 Final       Diagnostic Results:  Reviewed   Assessment/Plan:     * S/P kidney transplant  -S/p 4/27 DBD kidney transplant and PD catheter removal with Yovani CIT 10 hours 40 mins  -2 renal arteries kept on single patch.   -5d murcia due to small bladder/prolonged anuria; MOISES drain to stay in until after murcia is removed  - anticipate urinary retention, Flomax started 4/28/24  -IV PPI started postop due to "coffee ground output" noted in OGT intra-op  - CBC remains stable, tolerating diet, no n/v, PPI changed to PO  - renal allograft clearing, making good UOP, working on diuresis        Acute blood loss anemia  - H/H stable  - no overt signs of bleed  - continue to monitor with daily CBC        At risk for opportunistic infections  -  Opportunistic infection prophylaxis will include Valcyte for 3 months (CMV D + , R + ) and Bactrim for 6 months. continue prograf    Prophylactic immunotherapy  -  " This patient will receive 3 doses of Thymoglobulin for induction  -  Maintenance immunosuppression will include a steroid taper per protocol to 5mg daily, Prograf, and Cellcept maintenance.   - continue to monitor for toxic side effects and check daily levels. Will adjust for therapeutic dose        Long-term use of immunosuppressant medication  - see prophylactic immunotherapy      Type 2 diabetes mellitus  - Endocrine following appreciate rec's           Discharge Planning:  Discussed plan of care.  No plan for discharge today.    Medical decision making for this encounter includes review of pertinent labs and diagnostic studies, assessment and planning, discussions with consulting providers, discussion with patient/family, and participation in multidisciplinary rounds. Time spent caring for patient: 90 minutes    Melissa Wright NP  Kidney Transplant  [facility] - Transplant Stepdown

## 2024-04-29 NOTE — DISCHARGE SUMMARY
"[facility] - [facility]  Kidney Transplant  Discharge Summary    Patient Name: Adilson Sylvester  MRN: [id number]  Admission Date: 4/27/2024  Hospital Length of Stay: 2 days  Discharge Date and Time:  04/30/2024 11:02 AM  Attending Physician: Andrea Duckworth Jr., *   Discharging Provider: Melissa Wright NP  Primary Care Provider: Unable, To Obtain    HPI:   Mr. Sylvester is a 48 y.o. 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 He has been on the wait list for a kidney transplant #2 at [facility] since 4/20/2021. Patient is currently on peritoneal dialysis started on 4/20/21. Patient is dialyzing on cyclic peritoneal dialysis. Patient reports that he is tolerating dialysis well. He has a PD catheter. Patient denies any recent hospitalizations or ED visits. Pre op labs and diagnostics pending. Anuric. OR times TBD 4/27 with Dr. Pina as primary surgeon. He has been NPO since 2100 4/26.    Procedure(s) (LRB):  TRANSPLANT, KIDNEY (N/A)  REMOVAL, CATHETER, DIALYSIS, PERITONEAL (N/A)     Hospital Course:    Mr Sylvester is 49 y/o M s/p KTxp for ESRD 2/2 DM II/PCKD w Dr Pina(CMV +/+, CIT 10h 34m). Surgery w/o complication, intra op urine noted, 5 day murcia(small, anuric bladder), drain in place to be removed after murcia removed. Pt placed on Protonix IV in OR given what appeared to be coffee ground output in OGT. Hgb/Hct stable. Pt tolerating diet and denies n/v, PPI changed to PO. Pt making excellent UOP and Cr continues to decrease. Pt feeling well and has progressed well.  WBC has trended down nicely and Plt count is stable. He was " diuresed for upper > lower extremity edema as needed w good response. Pt was started on Flomax 4/28 per Dr Pina given bladder thickened and out of concern for retention when murcia is removed. Pt had BM 4/30 and began having suprapubic pain afterwards. Ditropan started but pain relieved w deflating balloon and adjusting murcia securing device on leg. RLQ surgical site w staples CDI. Drain with 50cc thin, serosang drainage. Pt up in room and independent. All VSS.     On day of discharge, Cr down to 10.5, K+ 5.1. Excellent UOP. Giving Lasix 40 mg PO x1 and discharging him on Lasix 40 mg PO daily x3 days (pt may need more diuretics- to be assessed outpt in clinic). Instructed pt to weight himself daily and record all intake/output. Discharge instructions reviewed by Pharmacist, Nursing and Transplant coordinator.  Patient and CG verbalize understanding and are in agreement with discharge from hospital today.  Patient is medically stable for discharge.  Patient will f/u w labs in am and per transplant coordinator.  Next visit w surgeon next week.        Goals of Care Treatment Preferences:  Code Status: Full Code      Final Active Diagnoses:    Diagnosis Date Noted POA    PRINCIPAL PROBLEM:  S/P kidney transplant [Z94.0] 04/07/2021 Not Applicable    At risk for opportunistic infections [Z91.89] 04/28/2024 No    Acute blood loss anemia [D62] 04/28/2024 No    Prophylactic immunotherapy [Z29.89] 04/16/2021 Not Applicable    Type 2 diabetes mellitus [E11.9] 04/07/2021 Yes     Chronic    Long-term use of immunosuppressant medication [Z79.60] 04/07/2021 Not Applicable      Problems Resolved During this Admission:    Diagnosis Date Noted Date Resolved POA    ESRD on peritoneal dialysis [N18.6, Z99.2] 12/14/2021 04/28/2024 Not Applicable     Chronic       Treatments: surgery: see hospital course    Consults (From admission, onward)          Status Ordering Provider     Inpatient consult to Registered Dietitian/Nutritionist   "Once        Provider:  (Not yet assigned)    Completed JAMESON AVILA     Inpatient consult to Transplant Nephrology ([facility])  Once        Provider:  (Not yet assigned)    Acknowledged JAMESON AVILA            Pending Diagnostic Studies:       Procedure Component Value Units Date/Time    Hepatitis B Surface Antibody, Qual/Quant [[id number]] Collected: 04/27/24 0111    Order Status: Sent Lab Status: In process Updated: 04/27/24 0121    Specimen: Blood           Significant Diagnostic Studies: Labs: CMP   Recent Labs   Lab 04/28/24  0508 04/28/24  1808 04/29/24  0543    134* 133*   K 5.1 4.9 4.5    98 98   CO2 21* 18* 21*   * 152* 130*   BUN 77* 84* 90*   CREATININE 15.8* 14.9* 13.6*   CALCIUM 7.9* 8.5* 8.4*   ALBUMIN 2.6* 2.8* 2.6*   ANIONGAP 16 18* 14     CBC   Recent Labs   Lab 04/28/24  0508 04/29/24  0543   WBC 17.41* 13.70*   HGB 11.5* 11.8*   HCT 35.4* 34.5*    208     INR   Lab Results   Component Value Date    INR 0.9 04/27/2024    INR 1.0 10/07/2022    INR 0.9 09/21/2022     and All labs within the past 24 hours have been reviewed    Microbiology: Blood Culture   Lab Results   Component Value Date    LABBLOO No growth after 5 days. 04/07/2021    and Urine Culture  No results found for: "LABURIN"  Radiology: X-Ray: CXR: X-Ray Chest 1 View (CXR):   Results for orders placed or performed during the hospital encounter of 04/27/24   X-Ray Chest 1 View    Narrative    EXAMINATION:  XR CHEST 1 VIEW    CLINICAL HISTORY:  pre op;    TECHNIQUE:  Single frontal view of the chest was performed.    COMPARISON:  Chest radiograph 10/06/2022    FINDINGS:  Mediastinal structures midline.  Cardiac silhouette prominent in similar to prior although may be magnified by portable technique.    Lungs clear without consolidation.  No pleural fluid or pneumothorax.    No significant osseous abnormality.      Impression    No acute cardiopulmonary finding.    Electronically signed by resident: Finn" Joaquin  Date:    04/27/2024  Time:    01:24    Electronically signed by: Stevenson Encarnacion MD  Date:    04/27/2024  Time:    01:27     Ultrasound: NA    Discharged Condition: fair    Disposition: to local address accompanied by CG    Follow Up: see hospital course    Patient Instructions:   No discharge procedures on file.    Medications:  Reconciled Home Medications:      Medication List        START taking these medications      bisacodyL 5 mg EC tablet  Commonly known as: DULCOLAX  Take 1 tablet (5 mg total) by mouth daily as needed for Constipation.     docusate sodium 100 MG capsule  Commonly known as: COLACE  Take 1 capsule (100 mg total) by mouth 2 (two) times daily as needed for Constipation.     multivitamin Tab  Take 1 tablet by mouth once daily.     mycophenolate 250 mg Cap  Commonly known as: CELLCEPT  Take 4 capsules (1,000 mg total) by mouth 2 (two) times daily.     NIFEdipine 30 MG (OSM) 24 hr tablet  Commonly known as: PROCARDIA-XL  Take 1 tablet (30 mg total) by mouth once daily. HOLD SBP <120  Start taking on: May 1, 2024  Replaces: NIFEdipine 30 MG Tbsr     oxybutynin 5 MG Tab  Commonly known as: DITROPAN  Take 1 tablet (5 mg total) by mouth 3 (three) times daily as needed (bladder spasms).     oxyCODONE 5 MG immediate release tablet  Commonly known as: ROXICODONE  Take 1 tablet (5 mg total) by mouth every 8 (eight) hours as needed for Pain.     pantoprazole 40 MG tablet  Commonly known as: PROTONIX  Take 1 tablet (40 mg total) by mouth once daily.     sodium bicarbonate 650 MG tablet  Take 1 tablet (650 mg total) by mouth 2 (two) times daily.     sulfamethoxazole-trimethoprim 400-80mg 400-80 mg per tablet  Commonly known as: BACTRIM,SEPTRA  Take 1 tablet by mouth every Mon, Wed, Fri. STOP: 10/27/24  Start taking on: May 1, 2024     tamsulosin 0.4 mg Cap  Commonly known as: FLOMAX  Take 1 capsule (0.4 mg total) by mouth once daily.     valGANciclovir 450 mg Tab  Commonly known as: VALCYTE  Take 1  tablet (450 mg total) by mouth every Mon, Wed, Fri. STOP: 7/27/24  Start taking on: May 1, 2024            CHANGE how you take these medications      furosemide 40 MG tablet  Commonly known as: LASIX  Take 1 tablet (40 mg total) by mouth once daily.  Start taking on: May 1, 2024  What changed:   medication strength  how much to take     predniSONE 5 MG tablet  Commonly known as: DELTASONE  Take by mouth daily: 4/30 to 5/6 20 mg, 5/7 to 5/13 15 mg, 5/14 to 5/20 10 mg, 5/21 5 mg daily. DO NOT STOP.  What changed:   how much to take  how to take this  when to take this  additional instructions     tacrolimus 1 MG Cap  Commonly known as: PROGRAF  Take 2 capsules (2 mg total) by mouth every 12 (twelve) hours.  What changed: how much to take            STOP taking these medications      atorvastatin 10 MG tablet  Commonly known as: LIPITOR     calcitRIOL 0.25 MCG Cap  Commonly known as: ROCALTROL     carvediloL 25 MG tablet  Commonly known as: COREG     cinacalcet 30 MG Tab  Commonly known as: SENSIPAR     clopidogreL 75 mg tablet  Commonly known as: PLAVIX     DIALYVITE [phone] mg-mg-mcg-mg Tab  Generic drug: B complex 11-folic-C-biot-zinc     ergocalciferol 50,000 unit Cap  Commonly known as: ERGOCALCIFEROL     hydrALAZINE 100 MG tablet  Commonly known as: APRESOLINE     isoniazid 300 MG Tab  Commonly known as: NYDRAZID     NIFEdipine 30 MG Tbsr  Commonly known as: ADALAT CC  Replaced by: NIFEdipine 30 MG (OSM) 24 hr tablet     sevelamer carbonate 800 mg Tab  Commonly known as: RENVELA     STOOL SOFTENER-LAXATIVE 8.6-50 mg per tablet  Generic drug: senna-docusate 8.6-50 mg     TRUE METRIX GLUCOSE METER Misc  Generic drug: blood-glucose meter     TRUE METRIX GLUCOSE TEST STRIP Strp  Generic drug: blood sugar diagnostic     TRUEPLUS LANCETS 33 gauge Misc  Generic drug: lancets            Time spent caring for patient (Greater than 1/2 spent in direct face-to-face contact): > 30 minutes    Melissa Wright NP  Kidney  Transplant  [facility]

## 2024-04-29 NOTE — PROGRESS NOTES
EDUCATION NOTE:    Met with Adilson Sylvester and his caregivers to provide teaching re: immunosuppressant medications.  Reviewed medication section of the Kidney Transplant Education book that was provided.  Emphasized the importance of compliance, role of the blue medication card, concerns for drug interactions, and process of obtaining refills.  Counseled regarding Prograf, Cellcept , prednisone, including directions for use, monitoring, how to handle missed doses, and side effects.  Patient and caregiver verbalized understanding and had the opportunity to ask questions.

## 2024-04-29 NOTE — PROGRESS NOTES
[facility] - Transplant Stepdown  Adult Nutrition  Progress Note    SUMMARY       Recommendations    1. Renal/ 2000 Calorie diabetic Diet. Encourage adequate PO intake.   2. Novasource Renal BID for additional calories/PRO.   3. Monitor I/O's, weight and labs.   4. Recommend Phosphate binder with all meals/snacks.  5. Continue renal vitamin daily.   6. RD following.    Goals: Meet % EEN/EPN by follow-up date  Nutrition Goal Status: progressing towards goal  Communication of RD Recs: other (comment) (POC)    Assessment and Plan    Nutrition Problem  Food- and nutrition-related knowledge deficit     Related to (etiology):   Lack of prior nutrition-related education     Signs and Symptoms (as evidenced by):   No prior knowledge of need for food-and nutrition-related recommendations     Interventions/Recommendations (treatment strategy):  Collaboration of nutrition care with other providers  Nutrition education (4/28)     Nutrition Diagnosis Status:   Resolved    Reason for Assessment    Reason For Assessment: RD follow-up  Diagnosis:  (ESRD on peritoneal dialysis)  Relevant Medical History: HTN, DM2, hypothyoidism, gout, obesity  Interdisciplinary Rounds: did not attend  General Information Comments: RD f/u, pt endorses good appetite w/ % IN of meals. Denies N/V/D/C. Last BM prior to surgery. No questions about diet education yesterday. RD following.  Nutrition Discharge Planning: Pending Clinical course, Low Sodium/Food Safety education provided 4/28    Nutrition Related Social Determinants of Health: SDOH: None Identified      Nutrition Risk Screen    Nutrition Risk Screen: no indicators present    Nutrition/Diet History    Patient Reported Diet/Restrictions/Preferences: general  Typical Food/Fluid Intake: 3 meals/day  Spiritual, Cultural Beliefs, Adventism Practices, Values that Affect Care: no  Food Allergies: NKFA  Factors Affecting Nutritional Intake: None identified at this  "time    Anthropometrics    Temp: 98.2 °F (36.8 °C)  Height: 5' 8" (172.7 cm)  Height (inches): 68 in  Weight Method: Standard Scale  Weight: 92.4 kg (203 lb 9.5 oz)  Weight (lb): 203.6 lb  Ideal Body Weight (IBW), Male: 154 lb  % Ideal Body Weight, Male (lb): 132.21 %  BMI (Calculated): 31       Lab/Procedures/Meds    Pertinent Labs Reviewed: reviewed  Pertinent Labs Comments: H/h: 11.8/34.5, mch: 31.2, na: 133, BUN 90, Glu: 130, creatinine: 13.6, GFR: 4.1, phos: 6.4  Pertinent Medications Reviewed: reviewed  Pertinent Medications Comments: Furosemide, heparin, mvi, pantoprazole, prednisone, tacrolimus; PRN: insulin, glucose      Estimated/Assessed Needs    Weight Used For Calorie Calculations: 92.1 kg (203 lb)  Energy Calorie Requirements (kcal): 2302 kcal (25 kcal/kg)  Energy Need Method: Kcal/kg  Protein Requirements: 84 g (1.2 g/kg)  Weight Used For Protein Calculations: 69.9 kg (154 lb) (IBW)        RDA Method (mL): 2302  CHO Requirement: 288 g      Nutrition Prescription Ordered    Current Diet Order: Renal    Evaluation of Received Nutrient/Fluid Intake    I/O: +3.3 L since admit  Energy Calories Required: meeting needs  Protein Required: meeting needs  Comments: LBM 4/26  Tolerance: tolerating  % Intake of Estimated Energy Needs: 75 - 100 %  % Meal Intake: 75 - 100 %    Nutrition Risk    Level of Risk/Frequency of Follow-up: low - moderate (1-2x/ week)     Monitor and Evaluation    Food and Nutrient Intake: energy intake, food and beverage intake  Food and Nutrient Adminstration: diet order  Physical Activity and Function: nutrition-related ADLs and IADLs, factors affecting access to physical activity  Anthropometric Measurements: weight, body mass index, weight change  Biochemical Data, Medical Tests and Procedures: electrolyte and renal panel, gastrointestinal profile, glucose/endocrine profile, inflammatory profile, lipid profile  Nutrition-Focused Physical Findings: extremities, muscles and bones, " overall appearance, head and eyes, skin     Nutrition Follow-Up    RD Follow-up?: Yes    Isela Morris RD, LDN

## 2024-04-29 NOTE — ASSESSMENT & PLAN NOTE
"-S/p 4/27 DBD kidney transplant and PD catheter removal with Yovani CIT 10 hours 40 mins  -2 renal arteries kept on single patch.   -5d murcia due to small bladder/prolonged anuria; MOISES drain to stay in until after murcia is removed  - anticipate urinary retention, Flomax started 4/28/24  -IV PPI started postop due to "coffee ground output" noted in OGT intra-op  - CBC remains stable, tolerating diet, no n/v, PPI changed to PO  - starting clear POD#3, working on mobility    "

## 2024-04-29 NOTE — SUBJECTIVE & OBJECTIVE
"  Subjective:     Chief Complaint/Reason for Admission: Kidney transplant    History of Present Illness:  Mr. Sylvester is a 48 y.o. 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 He has been on the wait list for a kidney transplant #2 at [facility] since 4/20/2021. Patient is currently on peritoneal dialysis started on 4/20/21. Patient is dialyzing on cyclic peritoneal dialysis. Patient reports that he is tolerating dialysis well. He has a PD catheter. Patient denies any recent hospitalizations or ED visits. Pre op labs and diagnostics pending. Anuric. OR times TBD 4/27 with Dr. Pina as primary surgeon. He has been NPO since 2100 4/26.    Dialysis History: Mr. De Anda with ESRD, requiring chronic dialysis who is on peritoneal dialysis started on 2021. Patient is dialyzing on cyclic peritoneal dialysis.  Patient reports that he is tolerating dialysis well.   Date of Last Dialysis: 4/26/24    Native urine output per day: Anuric    Previous Transplant: yes; organ: kidney, date: 2017, complications: failed.    Current Facility-Administered Medications   Medication Dose Route Frequency Provider Last Rate Last Admin    antithymocyte globulin (rabbit) 125 mg, hydrocortisone sodium succinate (SOLU-CORTEF) 20 mg in sodium chloride 0.9% 500 mL (FOR PERIPHERAL LINE ADMINISTRATION ONLY)  1.5 mg/kg (Adjusted) Intravenous Daily Beckie Rahman MD   Stopped at 04/28/24 2127    bisacodyL EC tablet 10 mg  10 mg Oral QHS Beckie Rahman MD   10 mg at 04/28/24 2116    ceFAZolin 2 g in dextrose 5 % in water (D5W) 50 mL IVPB (MB+)  2 g Intravenous On Call Procedure " Keira Jurado PA-C        dextrose 10% bolus 125 mL 125 mL  12.5 g Intravenous PRN Beckie Rahman MD        dextrose 10% bolus 250 mL 250 mL  25 g Intravenous PRN Beckie Rahman MD        diphenhydrAMINE capsule 50 mg  50 mg Oral Once PRN Beckie Rahman MD        docusate sodium capsule 100 mg  100 mg Oral TID Beckie Rahman MD   100 mg at 04/29/24 0830    droPERidol injection 0.625 mg  0.625 mg Intravenous Once PRN Gilson Diop MD        EPINEPHrine (PF) injection 1 mg  1 mg Subcutaneous Once PRN Beckie Rahman MD        furosemide injection 60 mg  60 mg Intravenous Once Melissa Wright NP        glucagon (human recombinant) injection 1 mg  1 mg Intramuscular Continuous PRN Beckie Rahman MD        glucose chewable tablet 16 g  16 g Oral PRN Beckie Rahman MD        glucose chewable tablet 16 g  16 g Oral PRN Beckie Rahman MD        glucose chewable tablet 24 g  24 g Oral PRN Beckie Rahman MD        heparin (porcine) injection 5,000 Units  5,000 Units Subcutaneous Q8H Beckie Rahman MD   5,000 Units at 04/29/24 0600    hydrALAZINE injection 10 mg  10 mg Intravenous Q8H PRN Melissa Allred FNP        hydrocortisone sodium succinate injection 100 mg  100 mg Intravenous Once PRN Beckie Rahman MD        insulin aspart U-100 pen 0-5 Units  0-5 Units Subcutaneous QID (AC + HS) PRN Beckie Rahman MD   1 Units at 04/28/24 2227    methylPREDNISolone sodium succinate (SOLU-MEDROL) 500 mg in dextrose 5 % (D5W) 100 mL IVPB  500 mg Intravenous Once Keira Jurado PA-C        methylPREDNISolone sodium succinate injection 125 mg  125 mg Intravenous Once Beckie Rahman MD        multivitamin tablet  1 tablet Oral Daily Beckie Rahman MD   1 tablet at 04/29/24 0832    mupirocin 2 % ointment 1 g  1 g Nasal BID Beckie Rahman MD   1 g at 04/29/24 0829    mupirocin 2 % ointment   Nasal On Call Procedure Messonnier, Keira, PA-C        mycophenolate capsule 1,000 mg  1,000 mg Oral BID Beckie Rahman MD   1,000 mg at 04/29/24 0829    NIFEdipine 24  hr tablet 30 mg  30 mg Oral Daily Melissa Allred FNP   30 mg at 04/29/24 0829    ondansetron injection 4 mg  4 mg Intravenous Once PRN Gilson Diop MD        ondansetron injection 4 mg  4 mg Intravenous Q6H PRN Beckie Rahman MD        oxyCODONE immediate release tablet 5 mg  5 mg Oral Q6H PRN Beckie Rahman MD   5 mg at 04/27/24 1240    pantoprazole EC tablet 40 mg  40 mg Oral Daily Melissa Wright, NP   40 mg at 04/29/24 0831    [START ON 4/30/2024] predniSONE tablet 20 mg  20 mg Oral Daily Beckie Rahman MD        pregabalin capsule 75 mg  75 mg Oral On Call Procedure Keira Jurado PA-C        sodium bicarbonate tablet 650 mg  650 mg Oral BID Melissa Wright NP        sodium chloride 0.9% flush 10 mL  10 mL Intravenous PRN Keira Jurado PA-C        sodium chloride 0.9% flush 10 mL  10 mL Intravenous PRN Beckie Rahman MD        [START ON 5/7/2024] sulfamethoxazole-trimethoprim 400-80mg per tablet 1 tablet  1 tablet Oral Daily AM Beckie Rahman MD        tacrolimus capsule 2 mg  2 mg Oral BID Melissa Wright NP        tamsulosin 24 hr capsule 0.4 mg  0.4 mg Oral Daily Melissa Wright, NP   0.4 mg at 04/29/24 0829    traMADoL tablet 50 mg  50 mg Oral Q6H PRN Beckie Rahman MD   50 mg at 04/28/24 2109    [START ON 5/7/2024] valGANciclovir tablet 450 mg  450 mg Oral Daily AM Beckie Rahman MD           Review of patient's allergies indicates:  No Known Allergies    Past Medical History:   Diagnosis Date    Diabetes     Diabetes mellitus, type 2     Gout     Hyperkalemia     Hypertension     Hypothyroidism     Hypothyroidism     Kidney transplant recipient     Obesity     Patient on waiting list for kidney transplant 05/23/2023    Polycystic kidney disease     Pre-transplant evaluation for chronic kidney disease 12/14/2021     Past Surgical History:   Procedure Laterality Date    ANKLE SURGERY      HERNIA REPAIR      KIDNEY TRANSPLANT  2015    [facility]    KIDNEY TRANSPLANT N/A 4/27/2024     Procedure: TRANSPLANT, KIDNEY;  Surgeon: Armond Pina MD;  [facility];  Service: Transplant;  Laterality: N/A;    LITHOTRIPSY Left 2017    naitive kidney     PD catheter      PERITONEAL CATHETER INSERTION      PERITONEAL CATHETER REMOVAL N/A 4/27/2024    Procedure: REMOVAL, CATHETER, DIALYSIS, PERITONEAL;  Surgeon: Armond Pina MD;  [facility];  Service: Transplant;  Laterality: N/A;     Family History       Problem Relation (Age of Onset)    Diabetes Mother    Hypertension Mother, Paternal Aunt    Kidney disease Father, Sister, Paternal Aunt          Tobacco Use    Smoking status: Former     Current packs/day: 0.00     Types: Cigarettes     Quit date: 10/1/2008     Years since quitting: 15.5    Smokeless tobacco: Never   Substance and Sexual Activity    Alcohol use: Not Currently     Comment: occasional    Drug use: Never    Sexual activity: Yes     Partners: Female        Review of Systems   Constitutional:  Positive for activity change. Negative for appetite change, chills, diaphoresis and fever.   HENT:  Negative for congestion, sinus pressure, sinus pain, sore throat and trouble swallowing.    Eyes:  Negative for visual disturbance.   Respiratory:  Negative for chest tightness, shortness of breath and stridor.    Cardiovascular:  Negative for chest pain, palpitations and leg swelling.   Gastrointestinal:  Negative for abdominal distention, abdominal pain, constipation, diarrhea, nausea and vomiting.   Genitourinary:  Negative for decreased urine volume, difficulty urinating, dysuria and flank pain.   Musculoskeletal:  Negative for neck pain and neck stiffness.   Skin:  Positive for wound. Negative for color change and rash.   Allergic/Immunologic: Positive for immunocompromised state.   Neurological:  Negative for dizziness, tremors, syncope, light-headedness and headaches.   Psychiatric/Behavioral:  Negative for agitation, confusion, decreased concentration and  "hallucinations. The patient is not nervous/anxious.      Objective:     Vital Signs (Most Recent):  Temp: 98.4 °F (36.9 °C) (04/29/24 0732)  Pulse: 105 (04/29/24 0732)  Resp: 16 (04/29/24 0732)  BP: (!) 141/92 (04/29/24 0732)  SpO2: 98 % (04/29/24 0732)  Height: 5' 8" (172.7 cm)  Weight: 92.4 kg (203 lb 9.5 oz)  Body mass index is 30.96 kg/m².      Physical Exam  Vitals and nursing note reviewed.   Constitutional:       General: He is not in acute distress.     Appearance: Normal appearance. He is not diaphoretic.   HENT:      Head: Normocephalic and atraumatic.   Eyes:      General: No scleral icterus.        Right eye: No discharge.         Left eye: No discharge.   Cardiovascular:      Rate and Rhythm: Normal rate and regular rhythm.      Heart sounds: Normal heart sounds.   Pulmonary:      Effort: Pulmonary effort is normal.      Breath sounds: Normal breath sounds. No wheezing or rales.   Abdominal:      General: Bowel sounds are normal. There is no distension.      Palpations: Abdomen is soft.      Tenderness: There is no abdominal tenderness. There is no guarding.      Comments: PD cath in place   Musculoskeletal:         General: Swelling (1+ BUE) present.      Cervical back: Normal range of motion and neck supple.      Left lower leg: Edema (trace) present.   Skin:     General: Skin is warm and dry.      Capillary Refill: Capillary refill takes less than 2 seconds.   Neurological:      General: No focal deficit present.      Mental Status: He is alert and oriented to person, place, and time.      Cranial Nerves: No cranial nerve deficit.   Psychiatric:         Mood and Affect: Mood normal.         Behavior: Behavior normal.         Thought Content: Thought content normal.         Judgment: Judgment normal.          Laboratory:   Lab Results   Component Value Date    WBC 13.70 (H) 04/29/2024    HGB 11.8 (L) 04/29/2024    HCT 34.5 (L) 04/29/2024    MCV 91 04/29/2024     04/29/2024       CMP  Sodium "   Date Value Ref Range Status   04/29/2024 133 (L) 136 - 145 mmol/L Final     Potassium   Date Value Ref Range Status   04/29/2024 4.5 3.5 - 5.1 mmol/L Final     Chloride   Date Value Ref Range Status   04/29/2024 98 95 - 110 mmol/L Final     CO2   Date Value Ref Range Status   04/29/2024 21 (L) 23 - 29 mmol/L Final     Glucose   Date Value Ref Range Status   04/29/2024 130 (H) 70 - 110 mg/dL Final     BUN   Date Value Ref Range Status   04/29/2024 90 (H) 6 - 20 mg/dL Final     Creatinine   Date Value Ref Range Status   04/29/2024 13.6 (H) 0.5 - 1.4 mg/dL Final     Calcium   Date Value Ref Range Status   04/29/2024 8.4 (L) 8.7 - 10.5 mg/dL Final     Total Protein   Date Value Ref Range Status   04/27/2024 6.4 6.0 - 8.4 g/dL Final     Albumin   Date Value Ref Range Status   04/29/2024 2.6 (L) 3.5 - 5.2 g/dL Final     Total Bilirubin   Date Value Ref Range Status   04/27/2024 0.4 0.1 - 1.0 mg/dL Final     Comment:     For infants and newborns, interpretation of results should be based  on gestational age, weight and in agreement with clinical  observations.    Premature Infant recommended reference ranges:  Up to 24 hours.............<8.0 mg/dL  Up to 48 hours............<12.0 mg/dL  3-5 days..................<15.0 mg/dL  6-29 days.................<15.0 mg/dL       Alkaline Phosphatase   Date Value Ref Range Status   04/27/2024 122 55 - 135 U/L Final     AST   Date Value Ref Range Status   04/27/2024 15 10 - 40 U/L Final     ALT   Date Value Ref Range Status   04/27/2024 12 10 - 44 U/L Final     Anion Gap   Date Value Ref Range Status   04/29/2024 14 8 - 16 mmol/L Final     eGFR   Date Value Ref Range Status   04/29/2024 4.1 (A) >60 mL/min/1.73 m^2 Final       Diagnostic Results:  Reviewed

## 2024-04-30 VITALS
DIASTOLIC BLOOD PRESSURE: 96 MMHG | BODY MASS INDEX: 31.66 KG/M2 | HEIGHT: 68 IN | TEMPERATURE: 98 F | SYSTOLIC BLOOD PRESSURE: 159 MMHG | HEART RATE: 107 BPM | OXYGEN SATURATION: 96 % | WEIGHT: 208.88 LBS | RESPIRATION RATE: 14 BRPM

## 2024-04-30 DIAGNOSIS — Z94.0 KIDNEY REPLACED BY TRANSPLANT: Primary | ICD-10-CM

## 2024-04-30 LAB
ALBUMIN SERPL BCP-MCNC: 2.9 G/DL (ref 3.5–5.2)
ANION GAP SERPL CALC-SCNC: 14 MMOL/L (ref 8–16)
BASOPHILS # BLD AUTO: 0 K/UL (ref 0–0.2)
BASOPHILS NFR BLD: 0 % (ref 0–1.9)
BUN SERPL-MCNC: 93 MG/DL (ref 6–20)
CALCIUM SERPL-MCNC: 8.8 MG/DL (ref 8.7–10.5)
CHLORIDE SERPL-SCNC: 98 MMOL/L (ref 95–110)
CO2 SERPL-SCNC: 23 MMOL/L (ref 23–29)
CREAT SERPL-MCNC: 10.5 MG/DL (ref 0.5–1.4)
DIFFERENTIAL METHOD BLD: ABNORMAL
EOSINOPHIL # BLD AUTO: 0 K/UL (ref 0–0.5)
EOSINOPHIL NFR BLD: 0 % (ref 0–8)
ERYTHROCYTE [DISTWIDTH] IN BLOOD BY AUTOMATED COUNT: 13.2 % (ref 11.5–14.5)
EST. GFR  (NO RACE VARIABLE): 5.6 ML/MIN/1.73 M^2
GLUCOSE SERPL-MCNC: 114 MG/DL (ref 70–110)
HCT VFR BLD AUTO: 38.7 % (ref 40–54)
HGB BLD-MCNC: 12.8 G/DL (ref 14–18)
HPRA INTERPRETATION: NORMAL
IMM GRANULOCYTES # BLD AUTO: 0.03 K/UL (ref 0–0.04)
IMM GRANULOCYTES NFR BLD AUTO: 0.4 % (ref 0–0.5)
LYMPHOCYTES # BLD AUTO: 0.1 K/UL (ref 1–4.8)
LYMPHOCYTES NFR BLD: 1 % (ref 18–48)
MAGNESIUM SERPL-MCNC: 2.2 MG/DL (ref 1.6–2.6)
MCH RBC QN AUTO: 30.5 PG (ref 27–31)
MCHC RBC AUTO-ENTMCNC: 33.1 G/DL (ref 32–36)
MCV RBC AUTO: 92 FL (ref 82–98)
MONOCYTES # BLD AUTO: 0.4 K/UL (ref 0.3–1)
MONOCYTES NFR BLD: 5.7 % (ref 4–15)
NEUTROPHILS # BLD AUTO: 6.6 K/UL (ref 1.8–7.7)
NEUTROPHILS NFR BLD: 92.9 % (ref 38–73)
NRBC BLD-RTO: 0 /100 WBC
PHOSPHATE SERPL-MCNC: 5.4 MG/DL (ref 2.7–4.5)
PLATELET # BLD AUTO: 208 K/UL (ref 150–450)
PMV BLD AUTO: 10.6 FL (ref 9.2–12.9)
POCT GLUCOSE: 115 MG/DL (ref 70–110)
POCT GLUCOSE: 165 MG/DL (ref 70–110)
POTASSIUM SERPL-SCNC: 5.1 MMOL/L (ref 3.5–5.1)
RBC # BLD AUTO: 4.2 M/UL (ref 4.6–6.2)
SODIUM SERPL-SCNC: 135 MMOL/L (ref 136–145)
TACROLIMUS BLD-MCNC: 7.7 NG/ML (ref 5–15)
WBC # BLD AUTO: 7.15 K/UL (ref 3.9–12.7)

## 2024-04-30 PROCEDURE — 25000003 PHARM REV CODE 250: Performed by: NURSE PRACTITIONER

## 2024-04-30 PROCEDURE — 83735 ASSAY OF MAGNESIUM: CPT | Performed by: STUDENT IN AN ORGANIZED HEALTH CARE EDUCATION/TRAINING PROGRAM

## 2024-04-30 PROCEDURE — 99239 HOSP IP/OBS DSCHRG MGMT >30: CPT | Mod: 24,,, | Performed by: NURSE PRACTITIONER

## 2024-04-30 PROCEDURE — 36415 COLL VENOUS BLD VENIPUNCTURE: CPT | Performed by: STUDENT IN AN ORGANIZED HEALTH CARE EDUCATION/TRAINING PROGRAM

## 2024-04-30 PROCEDURE — 80069 RENAL FUNCTION PANEL: CPT | Performed by: STUDENT IN AN ORGANIZED HEALTH CARE EDUCATION/TRAINING PROGRAM

## 2024-04-30 PROCEDURE — 63600175 PHARM REV CODE 636 W HCPCS: Performed by: STUDENT IN AN ORGANIZED HEALTH CARE EDUCATION/TRAINING PROGRAM

## 2024-04-30 PROCEDURE — 80197 ASSAY OF TACROLIMUS: CPT | Performed by: STUDENT IN AN ORGANIZED HEALTH CARE EDUCATION/TRAINING PROGRAM

## 2024-04-30 PROCEDURE — 85025 COMPLETE CBC W/AUTO DIFF WBC: CPT | Performed by: STUDENT IN AN ORGANIZED HEALTH CARE EDUCATION/TRAINING PROGRAM

## 2024-04-30 PROCEDURE — 25000003 PHARM REV CODE 250: Performed by: STUDENT IN AN ORGANIZED HEALTH CARE EDUCATION/TRAINING PROGRAM

## 2024-04-30 PROCEDURE — 63600175 PHARM REV CODE 636 W HCPCS: Performed by: NURSE PRACTITIONER

## 2024-04-30 RX ORDER — VALGANCICLOVIR 450 MG/1
450 TABLET, FILM COATED ORAL
Qty: 12 TABLET | Refills: 2 | Status: SHIPPED | OUTPATIENT
Start: 2024-05-01 | End: 2024-05-30

## 2024-04-30 RX ORDER — TACROLIMUS 1 MG/1
2 CAPSULE ORAL EVERY 12 HOURS
Qty: 120 CAPSULE | Refills: 11
Start: 2024-04-30 | End: 2024-04-30

## 2024-04-30 RX ORDER — OXYBUTYNIN CHLORIDE 5 MG/1
5 TABLET ORAL 3 TIMES DAILY PRN
Qty: 90 TABLET | Refills: 11 | Status: SHIPPED | OUTPATIENT
Start: 2024-04-30 | End: 2024-05-13

## 2024-04-30 RX ORDER — OXYBUTYNIN CHLORIDE 5 MG/1
5 TABLET ORAL 3 TIMES DAILY
Status: DISCONTINUED | OUTPATIENT
Start: 2024-04-30 | End: 2024-04-30

## 2024-04-30 RX ORDER — TACROLIMUS 1 MG/1
2 CAPSULE ORAL EVERY 12 HOURS
Qty: 120 CAPSULE | Refills: 11 | Status: SHIPPED | OUTPATIENT
Start: 2024-04-30 | End: 2024-05-01 | Stop reason: DRUGHIGH

## 2024-04-30 RX ORDER — OXYBUTYNIN CHLORIDE 5 MG/1
5 TABLET ORAL 3 TIMES DAILY
Status: DISCONTINUED | OUTPATIENT
Start: 2024-04-30 | End: 2024-04-30 | Stop reason: HOSPADM

## 2024-04-30 RX ORDER — SULFAMETHOXAZOLE AND TRIMETHOPRIM 400; 80 MG/1; MG/1
1 TABLET ORAL
Qty: 12 TABLET | Refills: 5 | Status: SHIPPED | OUTPATIENT
Start: 2024-05-01 | End: 2024-05-09

## 2024-04-30 RX ORDER — SODIUM BICARBONATE 650 MG/1
650 TABLET ORAL 2 TIMES DAILY
Qty: 60 TABLET | Refills: 11 | Status: SHIPPED | OUTPATIENT
Start: 2024-04-30 | End: 2024-05-13 | Stop reason: DRUGHIGH

## 2024-04-30 RX ORDER — FUROSEMIDE 40 MG/1
40 TABLET ORAL DAILY
Qty: 3 TABLET | Refills: 0 | Status: SHIPPED | OUTPATIENT
Start: 2024-05-01

## 2024-04-30 RX ORDER — FUROSEMIDE 40 MG/1
40 TABLET ORAL ONCE
Status: COMPLETED | OUTPATIENT
Start: 2024-04-30 | End: 2024-04-30

## 2024-04-30 RX ORDER — OXYCODONE HYDROCHLORIDE 5 MG/1
5 TABLET ORAL EVERY 8 HOURS PRN
Qty: 15 TABLET | Refills: 0 | Status: SHIPPED | OUTPATIENT
Start: 2024-04-30 | End: 2024-05-13

## 2024-04-30 RX ORDER — NIFEDIPINE 30 MG/1
30 TABLET, EXTENDED RELEASE ORAL DAILY
Qty: 30 TABLET | Refills: 11 | Status: SHIPPED | OUTPATIENT
Start: 2024-05-01 | End: 2024-05-22 | Stop reason: SDUPTHER

## 2024-04-30 RX ADMIN — FUROSEMIDE 40 MG: 40 TABLET ORAL at 11:04

## 2024-04-30 RX ADMIN — PANTOPRAZOLE SODIUM 40 MG: 40 TABLET, DELAYED RELEASE ORAL at 08:04

## 2024-04-30 RX ADMIN — SODIUM BICARBONATE 650 MG: 650 TABLET ORAL at 08:04

## 2024-04-30 RX ADMIN — TACROLIMUS 2 MG: 1 CAPSULE ORAL at 08:04

## 2024-04-30 RX ADMIN — HEPARIN SODIUM 5000 UNITS: 5000 INJECTION INTRAVENOUS; SUBCUTANEOUS at 05:04

## 2024-04-30 RX ADMIN — NIFEDIPINE 30 MG: 30 TABLET, FILM COATED, EXTENDED RELEASE ORAL at 08:04

## 2024-04-30 RX ADMIN — TAMSULOSIN HYDROCHLORIDE 0.4 MG: 0.4 CAPSULE ORAL at 08:04

## 2024-04-30 RX ADMIN — OXYBUTYNIN CHLORIDE 5 MG: 5 TABLET ORAL at 01:04

## 2024-04-30 RX ADMIN — MYCOPHENOLATE MOFETIL 1000 MG: 250 CAPSULE ORAL at 08:04

## 2024-04-30 RX ADMIN — MUPIROCIN 1 G: 20 OINTMENT TOPICAL at 09:04

## 2024-04-30 RX ADMIN — THERA TABS 1 TABLET: TAB at 08:04

## 2024-04-30 RX ADMIN — DOCUSATE SODIUM 100 MG: 100 CAPSULE, LIQUID FILLED ORAL at 08:04

## 2024-04-30 RX ADMIN — DOCUSATE SODIUM 100 MG: 100 CAPSULE, LIQUID FILLED ORAL at 02:04

## 2024-04-30 RX ADMIN — OXYCODONE 5 MG: 5 TABLET ORAL at 10:04

## 2024-04-30 RX ADMIN — PREDNISONE 20 MG: 20 TABLET ORAL at 08:04

## 2024-04-30 NOTE — PROGRESS NOTES
Patient admitted for Kidney transplant.Transplant coordinator met with the patient on rounds to introduce self and explain the coordinator role. The post-transplant teaching book was given. Transplant Coordinator explained that she will follow the patient while in the hospital and assist with discharge.       ESRD 2/2 retransplant  SCD, KDPI 39%,  PHS increased risk  Thymo induction  CMV ++

## 2024-04-30 NOTE — PROGRESS NOTES
Transplant Teaching Book given to patient, Adilson Sylvester, on 4/29/2024  During the course of the hospital stay the patient received information regarding kidney transplant. Teaching and instruction were completed.  Areas that were discussed included: how to contact the Transplant Team, the importance of measuring intake of fluids and urine output, and monitoring vital signs such as blood pressure, temperature, and daily weights.  Parameters for which to report abnormal findings were given.  Appointment were provided along with the rational for the importance of lab work and clinic visits.  A written medication list was provided.  The importance of immunosuppressive medications, their common side effects, and treatment to prevent or minimize side effects has been reviewed.  Signs and symptoms of rejection and infection along with various treatments were reviewed.  The need to avoid infection was discussed.  Wound care and special consideration regarding activities of daily living were explained.  Written and verbal teaching of the above information was given.     Discussed with the patient and caregiver the importance of maintaining COVID-19 precautions; wearing a mask, good handwashing, and social distancing.  Also, to report any signs or symptoms (fever, difficulty breathing, loss of taste/smell, etc.), suspected exposure, or COVID testing, immediately to the transplant program.     Education was provided to the patient and his wife

## 2024-04-30 NOTE — PROGRESS NOTES
DISCHARGE NOTE:    Adilson Sylvester is a 48 y.o. male s/p LEFT KIDNEY     Donation after Brain Death     transplant on 4/27/2024 (Kidney) for ESKD secondary to Diabetes Mellitus - Type II.  (Previous kidney transplant in 2017 that failed in 2021).     Past Medical History:   Diagnosis Date    Diabetes     Diabetes mellitus, type 2     Gout     Hyperkalemia     Hypertension     Hypothyroidism     Hypothyroidism     Kidney transplant recipient     Obesity     Patient on waiting list for kidney transplant 05/23/2023    Polycystic kidney disease     Pre-transplant evaluation for chronic kidney disease 12/14/2021     Hospital Course: Operative course complicated by coffee ground output in OGT - placed on pantoprazole IV in OR. Otherwise, patient progressing well post-transplant. Kidney function continues to improve. Will discharge with lasix 40 mg QD x3 days following discharging d/t edema. Tamsulosin initiated d/t concern for retention with murcia removal. Potassium 5.1 on day of discharge - patient will be starting Bactrim M,W,F. Monitor K levels and adjust PJP ppx to alternative agent if hyperkalemic.     To note, patient reports no longer taking clopidogrel therapy (discontinued from previous prescribing provider).     Allergies: NKDA    Patient Pharmacy: [facility]    Discharge Medications:     Medication List        START taking these medications      bisacodyL 5 mg EC tablet  Commonly known as: DULCOLAX  Take 1 tablet (5 mg total) by mouth daily as needed for Constipation.     docusate sodium 100 MG capsule  Commonly known as: COLACE  Take 1 capsule (100 mg total) by mouth 2 (two) times daily as needed for Constipation.     multivitamin Tab  Take 1 tablet by mouth once daily.     mycophenolate 250 mg Cap  Commonly known as: CELLCEPT  Take 4 capsules (1,000 mg total) by mouth 2 (two) times daily.     NIFEdipine 30 MG (OSM) 24 hr tablet  Commonly known as: PROCARDIA-XL  Take 1 tablet (30 mg total) by mouth once daily.  HOLD SBP <120  Start taking on: May 1, 2024  Replaces: NIFEdipine 30 MG Tbsr     oxyCODONE 5 MG immediate release tablet  Commonly known as: ROXICODONE  Take 1 tablet (5 mg total) by mouth every 8 (eight) hours as needed for Pain.     pantoprazole 40 MG tablet  Commonly known as: PROTONIX  Take 1 tablet (40 mg total) by mouth once daily.     sodium bicarbonate 650 MG tablet  Take 1 tablet (650 mg total) by mouth 2 (two) times daily.     sulfamethoxazole-trimethoprim 400-80mg 400-80 mg per tablet  Commonly known as: BACTRIM,SEPTRA  Take 1 tablet by mouth every Mon, Wed, Fri. STOP: 10/27/24  Start taking on: May 1, 2024     tamsulosin 0.4 mg Cap  Commonly known as: FLOMAX  Take 1 capsule (0.4 mg total) by mouth once daily.     valGANciclovir 450 mg Tab  Commonly known as: VALCYTE  Take 1 tablet (450 mg total) by mouth every Mon, Wed, Fri. STOP: 7/27/24  Start taking on: May 1, 2024            CHANGE how you take these medications      furosemide 40 MG tablet  Commonly known as: LASIX  Take 1 tablet (40 mg total) by mouth once daily.  Start taking on: May 1, 2024  What changed:   medication strength  how much to take     predniSONE 5 MG tablet  Commonly known as: DELTASONE  Take by mouth daily: 4/30 to 5/6 20 mg, 5/7 to 5/13 15 mg, 5/14 to 5/20 10 mg, 5/21 5 mg daily. DO NOT STOP.  What changed:   how much to take  how to take this  when to take this  additional instructions     tacrolimus 1 MG Cap  Commonly known as: PROGRAF  Take 2 capsules (2 mg total) by mouth every 12 (twelve) hours.  What changed: how much to take            STOP taking these medications      atorvastatin 10 MG tablet  Commonly known as: LIPITOR     calcitRIOL 0.25 MCG Cap  Commonly known as: ROCALTROL     carvediloL 25 MG tablet  Commonly known as: COREG     cinacalcet 30 MG Tab  Commonly known as: SENSIPAR     clopidogreL 75 mg tablet  Commonly known as: PLAVIX     DIALYVITE 1-291-915-50 mg-mg-mcg-mg Tab  Generic drug: B complex  11-folic-C-biot-zinc     ergocalciferol 50,000 unit Cap  Commonly known as: ERGOCALCIFEROL     hydrALAZINE 100 MG tablet  Commonly known as: APRESOLINE     isoniazid 300 MG Tab  Commonly known as: NYDRAZID     NIFEdipine 30 MG Tbsr  Commonly known as: ADALAT CC  Replaced by: NIFEdipine 30 MG (OSM) 24 hr tablet     sevelamer carbonate 800 mg Tab  Commonly known as: RENVELA     STOOL SOFTENER-LAXATIVE 8.6-50 mg per tablet  Generic drug: senna-docusate 8.6-50 mg     TRUE METRIX GLUCOSE METER Misc  Generic drug: blood-glucose meter     TRUE METRIX GLUCOSE TEST STRIP Strp  Generic drug: blood sugar diagnostic     TRUEPLUS LANCETS 33 gauge Misc  Generic drug: lancets               Where to Get Your Medications        These medications were sent to [facility]  [address] [place] [id number]      Hours: Always Open Phone: [phone]   furosemide 40 MG tablet  mycophenolate 250 mg Cap  NIFEdipine 30 MG (OSM) 24 hr tablet  oxyCODONE 5 MG immediate release tablet  pantoprazole 40 MG tablet  predniSONE 5 MG tablet  sodium bicarbonate 650 MG tablet  sulfamethoxazole-trimethoprim 400-80mg 400-80 mg per tablet  tacrolimus 1 MG Cap  tamsulosin 0.4 mg Cap  valGANciclovir 450 mg Tab       You can get these medications from any pharmacy    You don't need a prescription for these medications  bisacodyL 5 mg EC tablet  docusate sodium 100 MG capsule  multivitamin Tab        Pharmacy Interventions/Recommendations:  1) Transplant Immunosuppression: Induction thymo, and maintenance tac, mmf, and pred per taper    2) Opportunistic Infection prophylaxis:   Bactrim 1 SS M,W,F (EOT 10/27/24)  Valcyte 450 mg M,W,F (EOT 7/2/7/24)    3) Osteoporosis Prevention measures (liver txp): n/a    4) Patient Counseling/Education: Demonstrated the use of the BP cuff, thermometer.    5) Follow-Up/Discharge Needs:    Adjust OI ppx frequency, as indicated, as renal function continues to improve  Re-check Vit D level and PTH  outpatient (last levels from 2021)  Patient has home supply of cinacalcet  Potassium electrolyte monitoring  K 5.1 on discharge - Bactrim to start for OI ppx  If hyperkalemic, consider alternative agent (Atovaquone or Dapsone)  G6PD qualitative testing from 2021 showed normal activity - would recommend quantitative testing    6) Patient Assistance Information: m/a    7) The following medications have been placed on HOLD and should be restarted in the outpatient setting (when appropriate): PTH management (has home cinacalet)    Adilson and his caregiver verbalized their understanding and had the opportunity to ask questions.

## 2024-04-30 NOTE — PLAN OF CARE
AAOx4. VSS. Patient up OOB to bathroom and chair independently. Patient ambulating in hallways independently w/ wife at side. Cr 10.5 (decreased from 13.6 prior) on AM labs. PRN oxy 5mg given x1 this shift for c/o pain. 40mg lasix given PO x1 this shift. Murcia in place to gravity w/ yellow urine noted - see flowsheet for exact UOP amount. PO ditropan started TID for c/o bladder spasms. BM x1 this shift. LLQ and old PD cath incisions - ORION w/ staples intact - cleaned w/ betadine. LLQ MOISES drain in place w/ SS drainage noted - leaking at site - covered w/ guaze/tape and changed PRN - see flowsheet for drain output amount. Non-skid socks on. Bed in low position. Call light within reach. Wife at bedside. Patient to discharge this evening to [facility] w/ MOISES drain and murcia remaining in place.

## 2024-04-30 NOTE — PROGRESS NOTES
Transplant Social Work Discharge Note:    Pt will discharge to [facility] under the care of Vianey Sylvester, patient's wife, phone number [phone].     Patient reports readiness to discharge at this time. Patient's wife will check into [facility] today at 3pm (fee is $0). Per rounds this morning patient does not require referrals from this  at time of discharge. Patient and caregiver denied needing additional resources or referrals at this time. Patient agrees to contact transplant team with needs, questions, or concerns as they arise.     Pt aware of, involved in, and coping well with this discharge plan. Pt did not have any concerns with the discharge plan at this time. SW remains available at [phone].    Robina Tao LMSW

## 2024-04-30 NOTE — PROGRESS NOTES
Transplant Coordinator  4/27-4/30/24      Pt admitted for a cadaveric kidney transplant. KDPI 39%, Thymo induction, CMV++  Kidney Txp #2.    Cr slowly trending down, good UOP. Cr 18.2--->10.5    LLQ incision ORION with staples  5 day murcia in place, to be removed Thursday 5/2/24. MOISES drain remains in place    Labs 5/1 24 and RTC to meet with coordinator/pharmD

## 2024-04-30 NOTE — PROGRESS NOTES
Patient discharged from \[facility]\"" [id number] to [facility] w/ wife. Patient left unit via wheelchair per transport staff. Mask on patient. PIV removed prior to discharge. Murcia remains in place - switched to leg bag. LLQ MOISES drain remains in place Education provided to patient and wife on how to empty and record output of MOISES drain and murcia. Supplies gathered and sent home w/ patient. AVS provided to and reviewed w/ patient and wife - verbalized understanding. No questions asked.

## 2024-04-30 NOTE — PLAN OF CARE
Pt AAOx4. NSR on tele. VSS. SpO2 >95% on RA, use of IS encouraged. LLQ incision ORION with staples, painted with betadine, MOISES site covered with gauze, CDI---MOISES with serosanguineous output--see flowsheet. Reynolds intact draining yellow urine, pt with no BM--see flowsheet for I/O. Thymo #3 completed. BG monitored AC/HS, no indication for SSI. Tramadol administered once PRN for pain with full relief. Wife at bedside pulling self meds 100%. Pt up and ambulatory independently, non-skid socks in place, remains free from falls/injury. Bed in lowest locked position, call light within reach, POC ongoing.

## 2024-05-01 ENCOUNTER — CLINICAL SUPPORT (OUTPATIENT)
Dept: TRANSPLANT | Facility: CLINIC | Age: 49
DRG: 652 | End: 2024-05-01
Payer: MEDICAID

## 2024-05-01 ENCOUNTER — PATIENT MESSAGE (OUTPATIENT)
Dept: TRANSPLANT | Facility: CLINIC | Age: 49
End: 2024-05-01

## 2024-05-01 VITALS
DIASTOLIC BLOOD PRESSURE: 92 MMHG | HEIGHT: 68 IN | HEART RATE: 96 BPM | TEMPERATURE: 97 F | WEIGHT: 206.13 LBS | RESPIRATION RATE: 16 BRPM | OXYGEN SATURATION: 100 % | BODY MASS INDEX: 31.24 KG/M2 | SYSTOLIC BLOOD PRESSURE: 147 MMHG

## 2024-05-01 DIAGNOSIS — Z94.0 KIDNEY REPLACED BY TRANSPLANT: Primary | ICD-10-CM

## 2024-05-01 DIAGNOSIS — Z01.818 PRE-TRANSPLANT EVALUATION FOR CHRONIC KIDNEY DISEASE: ICD-10-CM

## 2024-05-01 LAB
BACTERIA FLD AEROBE CULT: NO GROWTH
GRAM STN SPEC: NORMAL
GRAM STN SPEC: NORMAL

## 2024-05-01 PROCEDURE — 99213 OFFICE O/P EST LOW 20 MIN: CPT | Mod: PBBFAC

## 2024-05-01 PROCEDURE — 99999 PR PBB SHADOW E&M-EST. PATIENT-LVL III: CPT | Mod: PBBFAC,,,

## 2024-05-01 RX ORDER — TACROLIMUS 1 MG/1
3 CAPSULE ORAL EVERY 12 HOURS
Qty: 120 CAPSULE | Refills: 11 | Status: SHIPPED | OUTPATIENT
Start: 2024-05-01 | End: 2024-05-03 | Stop reason: DRUGHIGH

## 2024-05-01 RX ORDER — CALCITRIOL 0.5 UG/1
1 CAPSULE ORAL DAILY
Qty: 60 CAPSULE | Refills: 5 | Status: SHIPPED | OUTPATIENT
Start: 2024-05-01 | End: 2024-05-22 | Stop reason: SDUPTHER

## 2024-05-01 RX ORDER — ERGOCALCIFEROL 1.25 MG/1
50000 CAPSULE ORAL
Qty: 4 CAPSULE | Refills: 11 | Status: SHIPPED | OUTPATIENT
Start: 2024-05-01 | End: 2024-05-22 | Stop reason: SDUPTHER

## 2024-05-01 NOTE — TELEPHONE ENCOUNTER
Pt verified 12 hr level. Prograf increased to 3/3. Pt verbalized understanding    ----- Message from Maru Back MD sent at 5/1/2024 11:08 AM CDT -----  Tac to 3 mg BID if it is a true level.

## 2024-05-01 NOTE — PROGRESS NOTES
"1ST NURSING VISIT POST DISCHARGE NOTE    1st RN appointment with Adilson Sylvester post discharge 4/30/2024 s/p kidney transplant 4/27/24.  Patient's spouse accompanied him.  Patient reports none.  Patient says that he that he is sleeping well.  Incision intact with staples.  Patient has MOISES drain.  Patient that he is able to explain daily incision care and showering instructions.  Reviewed I&O monitoring, measuring, and recording, and the need for hydration (i.e., at least 2 liters of water daily with minimal caffeine and no grapefruit products).  Medication list and rationale were reviewed.  Patient did bring blue medication card and medication bottles for review.  Patient reports that he has stopped Dulcolax and has not continued Colace.  Patient has had a bowel movement.  Patient expressed understanding of daily care including BID VS, medications, and I&O documentation.  Patient made aware of today's creatinine level: 7.7.  Patient aware that coordinator will review today's labs with a transplant physician and call the patient with any dose changes indicated.  Next lab appointment scheduled for 5/3/2024.  First post-operative transplant team appointment with labs scheduled for 5/13/2024.    Using the Kidney Transplant Patient Reference Manual, the patient submitted his open book "Self-assessment of Kidney Transplant Patient Knowledge" test, which was completed in the transplant clinic this morning before 1st nursing visit.  This test includes questions regarding critical dose medications commonly used after kidney transplant, medication dosing and side effects, importance of timed lab draws, important signs and symptoms to report 24/7 immediately post-transplant as well as how to contact the transplant team 24/7.    Test Score: 23/25    After completing the test, the patient was given a copy of the Self-assessment Answer Key to reinforce accurate learning of test content.  Patient expressed his understanding of the " value of the information included in the self-assessment test.    CCMS technique reviewed with pt. Pt verbalized understanding

## 2024-05-01 NOTE — TELEPHONE ENCOUNTER
Start calcitriol 1 mcg daily for 6 months. Check PTH early August 2024.   Start Ergocalciferol 59581 units weekly for 12 months. Check Vitamin D level early November 2024.    ----- Message from Maru Back MD sent at 5/1/2024  9:25 AM CDT -----  Start calcitriol 1 mcg daily X 6 months. Check PTH in 3 months   Ergo 71161 units weekly X 12 months . Check vitamin D in 6 months

## 2024-05-02 ENCOUNTER — HOSPITAL ENCOUNTER (OUTPATIENT)
Dept: RADIOLOGY | Facility: HOSPITAL | Age: 49
Discharge: HOME | End: 2024-05-02
Attending: TRANSPLANT SURGERY
Payer: MEDICAID

## 2024-05-02 ENCOUNTER — CLINICAL SUPPORT (OUTPATIENT)
Dept: TRANSPLANT | Facility: CLINIC | Age: 49
End: 2024-05-02
Payer: MEDICAID

## 2024-05-02 DIAGNOSIS — Z94.0 KIDNEY REPLACED BY TRANSPLANT: ICD-10-CM

## 2024-05-02 DIAGNOSIS — Z94.0 KIDNEY REPLACED BY TRANSPLANT: Primary | ICD-10-CM

## 2024-05-02 PROCEDURE — 93970 EXTREMITY STUDY: CPT | Mod: TC

## 2024-05-02 PROCEDURE — 76776 US EXAM K TRANSPL W/DOPPLER: CPT | Mod: TC

## 2024-05-02 PROCEDURE — 76776 US EXAM K TRANSPL W/DOPPLER: CPT | Mod: 26,,, | Performed by: RADIOLOGY

## 2024-05-02 PROCEDURE — 93970 EXTREMITY STUDY: CPT | Mod: 26,,, | Performed by: RADIOLOGY

## 2024-05-02 NOTE — PROGRESS NOTES
Catheter removed per MD order.   Catheter tip intact. Patient tolerated well.   Patient instructed to contact coordinator if he has not urinated within 6-8 hours.   If unable to contact coordinator go to ER.   Patient verbalized understanding.           Update:   Patient urinated 50 mL prior to leaving clinic.

## 2024-05-03 ENCOUNTER — LAB VISIT (OUTPATIENT)
Dept: LAB | Facility: HOSPITAL | Age: 49
End: 2024-05-03
Attending: INTERNAL MEDICINE
Payer: MEDICAID

## 2024-05-03 DIAGNOSIS — Z94.0 KIDNEY REPLACED BY TRANSPLANT: ICD-10-CM

## 2024-05-03 LAB
ALBUMIN SERPL BCP-MCNC: 2.8 G/DL (ref 3.5–5.2)
ANION GAP SERPL CALC-SCNC: 9 MMOL/L (ref 8–16)
BASOPHILS # BLD AUTO: 0.05 K/UL (ref 0–0.2)
BASOPHILS NFR BLD: 0.6 % (ref 0–1.9)
BUN SERPL-MCNC: 76 MG/DL (ref 6–20)
CALCIUM SERPL-MCNC: 9.1 MG/DL (ref 8.7–10.5)
CHLORIDE SERPL-SCNC: 103 MMOL/L (ref 95–110)
CO2 SERPL-SCNC: 26 MMOL/L (ref 23–29)
CREAT SERPL-MCNC: 4.4 MG/DL (ref 0.5–1.4)
DIFFERENTIAL METHOD BLD: ABNORMAL
EOSINOPHIL # BLD AUTO: 0.3 K/UL (ref 0–0.5)
EOSINOPHIL NFR BLD: 3.9 % (ref 0–8)
ERYTHROCYTE [DISTWIDTH] IN BLOOD BY AUTOMATED COUNT: 13 % (ref 11.5–14.5)
EST. GFR  (NO RACE VARIABLE): 15.9 ML/MIN/1.73 M^2
GLUCOSE SERPL-MCNC: 83 MG/DL (ref 70–110)
HCT VFR BLD AUTO: 36.6 % (ref 40–54)
HGB BLD-MCNC: 12 G/DL (ref 14–18)
IMM GRANULOCYTES # BLD AUTO: 0.31 K/UL (ref 0–0.04)
IMM GRANULOCYTES NFR BLD AUTO: 3.8 % (ref 0–0.5)
LYMPHOCYTES # BLD AUTO: 0.2 K/UL (ref 1–4.8)
LYMPHOCYTES NFR BLD: 2.5 % (ref 18–48)
MAGNESIUM SERPL-MCNC: 2 MG/DL (ref 1.6–2.6)
MCH RBC QN AUTO: 30.3 PG (ref 27–31)
MCHC RBC AUTO-ENTMCNC: 32.8 G/DL (ref 32–36)
MCV RBC AUTO: 92 FL (ref 82–98)
MONOCYTES # BLD AUTO: 0.7 K/UL (ref 0.3–1)
MONOCYTES NFR BLD: 8 % (ref 4–15)
NEUTROPHILS # BLD AUTO: 6.6 K/UL (ref 1.8–7.7)
NEUTROPHILS NFR BLD: 81.2 % (ref 38–73)
NRBC BLD-RTO: 0 /100 WBC
PHOSPHATE SERPL-MCNC: 3.7 MG/DL (ref 2.7–4.5)
PLATELET # BLD AUTO: 251 K/UL (ref 150–450)
PMV BLD AUTO: 10.6 FL (ref 9.2–12.9)
POTASSIUM SERPL-SCNC: 5 MMOL/L (ref 3.5–5.1)
RBC # BLD AUTO: 3.96 M/UL (ref 4.6–6.2)
SODIUM SERPL-SCNC: 138 MMOL/L (ref 136–145)
TACROLIMUS BLD-MCNC: 4.8 NG/ML (ref 5–15)
WBC # BLD AUTO: 8.13 K/UL (ref 3.9–12.7)

## 2024-05-03 PROCEDURE — 36415 COLL VENOUS BLD VENIPUNCTURE: CPT | Performed by: INTERNAL MEDICINE

## 2024-05-03 PROCEDURE — 85025 COMPLETE CBC W/AUTO DIFF WBC: CPT | Performed by: INTERNAL MEDICINE

## 2024-05-03 PROCEDURE — 80069 RENAL FUNCTION PANEL: CPT | Performed by: INTERNAL MEDICINE

## 2024-05-03 PROCEDURE — 83735 ASSAY OF MAGNESIUM: CPT | Performed by: INTERNAL MEDICINE

## 2024-05-03 PROCEDURE — 80197 ASSAY OF TACROLIMUS: CPT | Performed by: INTERNAL MEDICINE

## 2024-05-03 RX ORDER — TACROLIMUS 1 MG/1
4 CAPSULE ORAL EVERY 12 HOURS
Qty: 120 CAPSULE | Refills: 11 | Status: SHIPPED | OUTPATIENT
Start: 2024-05-03 | End: 2024-05-11 | Stop reason: SDUPTHER

## 2024-05-03 NOTE — TELEPHONE ENCOUNTER
Pt notified to increase Prograf to 4/4. Recheck labs Monday.     ----- Message from Maru Back MD sent at 5/3/2024 11:19 AM CDT -----  Tac to 4 mg BID and check tac level on Monday

## 2024-05-06 ENCOUNTER — LAB VISIT (OUTPATIENT)
Dept: LAB | Facility: HOSPITAL | Age: 49
End: 2024-05-06
Attending: INTERNAL MEDICINE
Payer: MEDICAID

## 2024-05-06 DIAGNOSIS — Z94.0 KIDNEY REPLACED BY TRANSPLANT: ICD-10-CM

## 2024-05-06 LAB
ALBUMIN SERPL BCP-MCNC: 2.9 G/DL (ref 3.5–5.2)
ANION GAP SERPL CALC-SCNC: 7 MMOL/L (ref 8–16)
ANISOCYTOSIS BLD QL SMEAR: SLIGHT
BACTERIA SPEC ANAEROBE CULT: NORMAL
BASOPHILS # BLD AUTO: ABNORMAL K/UL (ref 0–0.2)
BASOPHILS NFR BLD: 0 % (ref 0–1.9)
BUN SERPL-MCNC: 57 MG/DL (ref 6–20)
CALCIUM SERPL-MCNC: 9.3 MG/DL (ref 8.7–10.5)
CHLORIDE SERPL-SCNC: 110 MMOL/L (ref 95–110)
CO2 SERPL-SCNC: 23 MMOL/L (ref 23–29)
CREAT SERPL-MCNC: 2.5 MG/DL (ref 0.5–1.4)
DIFFERENTIAL METHOD BLD: ABNORMAL
EOSINOPHIL # BLD AUTO: ABNORMAL K/UL (ref 0–0.5)
EOSINOPHIL NFR BLD: 0 % (ref 0–8)
ERYTHROCYTE [DISTWIDTH] IN BLOOD BY AUTOMATED COUNT: 13.2 % (ref 11.5–14.5)
EST. GFR  (NO RACE VARIABLE): 31.3 ML/MIN/1.73 M^2
GLUCOSE SERPL-MCNC: 86 MG/DL (ref 70–110)
HCT VFR BLD AUTO: 35.2 % (ref 40–54)
HGB BLD-MCNC: 11.3 G/DL (ref 14–18)
HYPOCHROMIA BLD QL SMEAR: ABNORMAL
IMM GRANULOCYTES # BLD AUTO: ABNORMAL K/UL (ref 0–0.04)
IMM GRANULOCYTES NFR BLD AUTO: ABNORMAL % (ref 0–0.5)
LYMPHOCYTES # BLD AUTO: ABNORMAL K/UL (ref 1–4.8)
LYMPHOCYTES NFR BLD: 3 % (ref 18–48)
MAGNESIUM SERPL-MCNC: 1.6 MG/DL (ref 1.6–2.6)
MCH RBC QN AUTO: 30.5 PG (ref 27–31)
MCHC RBC AUTO-ENTMCNC: 32.1 G/DL (ref 32–36)
MCV RBC AUTO: 95 FL (ref 82–98)
MONOCYTES # BLD AUTO: ABNORMAL K/UL (ref 0.3–1)
MONOCYTES NFR BLD: 4 % (ref 4–15)
NEUTROPHILS NFR BLD: 93 % (ref 38–73)
NRBC BLD-RTO: 0 /100 WBC
OVALOCYTES BLD QL SMEAR: ABNORMAL
PHOSPHATE SERPL-MCNC: 3.1 MG/DL (ref 2.7–4.5)
PLATELET # BLD AUTO: 365 K/UL (ref 150–450)
PMV BLD AUTO: 10.3 FL (ref 9.2–12.9)
POIKILOCYTOSIS BLD QL SMEAR: SLIGHT
POLYCHROMASIA BLD QL SMEAR: ABNORMAL
POTASSIUM SERPL-SCNC: 5 MMOL/L (ref 3.5–5.1)
RBC # BLD AUTO: 3.71 M/UL (ref 4.6–6.2)
SODIUM SERPL-SCNC: 140 MMOL/L (ref 136–145)
SPHEROCYTES BLD QL SMEAR: ABNORMAL
TACROLIMUS BLD-MCNC: 8.1 NG/ML (ref 5–15)
WBC # BLD AUTO: 9.68 K/UL (ref 3.9–12.7)

## 2024-05-06 PROCEDURE — 80069 RENAL FUNCTION PANEL: CPT | Performed by: INTERNAL MEDICINE

## 2024-05-06 PROCEDURE — 80197 ASSAY OF TACROLIMUS: CPT | Performed by: INTERNAL MEDICINE

## 2024-05-06 PROCEDURE — 85007 BL SMEAR W/DIFF WBC COUNT: CPT | Performed by: INTERNAL MEDICINE

## 2024-05-06 PROCEDURE — 36415 COLL VENOUS BLD VENIPUNCTURE: CPT | Performed by: INTERNAL MEDICINE

## 2024-05-06 PROCEDURE — 85027 COMPLETE CBC AUTOMATED: CPT | Performed by: INTERNAL MEDICINE

## 2024-05-06 PROCEDURE — 83735 ASSAY OF MAGNESIUM: CPT | Performed by: INTERNAL MEDICINE

## 2024-05-06 NOTE — PROGRESS NOTES
"   Kidney Post-Transplant Assessment    Referring Physician: Estefanía Anders  Current Nephrologist: Estefanía Anders    ORGAN: LEFT KIDNEY  Donor Type: donation after brain death  PHS Increased Risk: yes  Cold Ischemia: 634 mins  Induction Medications:      Subjective:     CC:  Reassessment of renal allograft function and management of chronic immunosuppression.    HPI:  Mr. Sylvester is a 48 y.o. year old     or   White male who received a donation after brain death kidney transplant #2 on 4/27/24. His most recent creatinine is 1.7. He takes mycophenolate mofetil, prednisone, and tacrolimus for maintenance immunosuppression. His post transplant course has been uncomplicated to date.     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 He restarted dialysis/ PD  on 4/20/21.    Interval HX:  fx assessment   Intake 3L + water daily   UOP-slightly more than intake   Peripheral edema-no --has not needed Lasix   Weight--has lost weight since txp   Appetite-denies N/V/D; tolerating IS meds well   LLQ Wound-staples      BP  BP Readings from Last 3 Encounters:   05/13/24 135/74   05/08/24 129/75   05/01/24 (!) 147/92       Lab /diagnostic results reviewed with patient today.   All questions answered     Past Medical History:   Diagnosis Date    Diabetes     Diabetes mellitus, type 2     Gout     Hyperkalemia     Hypertension     Hypothyroidism     Hypothyroidism     Kidney transplant recipient     Obesity     Patient on waiting list for kidney transplant 05/23/2023    Polycystic kidney disease     Pre-transplant " "evaluation for chronic kidney disease 12/14/2021       Review of Systems   Constitutional:  Negative for activity change, appetite change, chills, fatigue, fever and unexpected weight change.   HENT:  Negative for congestion, facial swelling, postnasal drip, rhinorrhea, sinus pressure, sore throat and trouble swallowing.    Eyes:  Negative for pain, redness and visual disturbance.   Respiratory:  Negative for cough, chest tightness, shortness of breath and wheezing.    Cardiovascular: Negative.  Negative for chest pain, palpitations and leg swelling.   Gastrointestinal:  Negative for abdominal pain, diarrhea, nausea and vomiting.   Genitourinary:  Negative for dysuria, flank pain and urgency.   Musculoskeletal:  Negative for gait problem, neck pain and neck stiffness.   Skin:  Positive for wound. Negative for rash.   Allergic/Immunologic: Positive for immunocompromised state. Negative for environmental allergies and food allergies.   Neurological:  Negative for dizziness, weakness, light-headedness and headaches.   Psychiatric/Behavioral:  Negative for agitation and confusion. The patient is not nervous/anxious.        Objective:   Blood pressure 135/74, pulse 74, temperature 97.7 °F (36.5 °C), temperature source Tympanic, resp. rate 18, height 5' 8" (1.727 m), weight 89 kg (196 lb 3.4 oz), SpO2 100%.body mass index is 29.83 kg/m².    Physical Exam  Constitutional:       Appearance: Normal appearance. He is well-developed.   HENT:      Head: Normocephalic.      Nose: Nose normal.   Eyes:      Conjunctiva/sclera: Conjunctivae normal.      Pupils: Pupils are equal, round, and reactive to light.   Cardiovascular:      Rate and Rhythm: Normal rate and regular rhythm.      Heart sounds: Normal heart sounds.   Pulmonary:      Effort: Pulmonary effort is normal.      Breath sounds: Normal breath sounds.   Abdominal:      General: Bowel sounds are normal.      Palpations: Abdomen is soft. There is no hepatomegaly or " "splenomegaly.      Comments: Staples intact, no erythema, edema or drainage, incison line well approximated   No bruit noted over the allograft      Musculoskeletal:      Cervical back: Normal range of motion and neck supple.   Skin:     General: Skin is warm and dry.   Neurological:      Mental Status: He is alert and oriented to person, place, and time.   Psychiatric:         Behavior: Behavior normal.         Labs:  Lab Results   Component Value Date    WBC 11.09 05/13/2024    HGB 10.6 (L) 05/13/2024    HCT 32.6 (L) 05/13/2024    LABPLAT 129 (L) 07/26/2021     05/13/2024    K 4.7 05/13/2024     (H) 05/13/2024    CO2 18 (L) 05/13/2024    BUN 27 (H) 05/13/2024    CREATININE 1.7 (H) 05/13/2024    EGFRNORACEVR 49.7 (A) 05/13/2024    CALCIUM 9.2 05/13/2024    PHOS 2.1 (L) 05/13/2024    MG 1.3 (L) 05/13/2024    ALBUMIN 3.0 (L) 05/13/2024    AST 15 04/27/2024    ALT 12 04/27/2024    UTPCR 0.13 05/13/2024    PTH 1,232.4 (H) 05/01/2024    TACROLIMUS 7.8 05/13/2024       No results found for: "EXTANC", "EXTWBC", "EXTSEGS", "EXTPLATELETS", "EXTHEMOGLOBI", "EXTHEMATOCRI", "EXTCREATININ", "EXTSODIUM", "EXTPOTASSIUM", "EXTBUN", "EXTCO2", "EXTCALCIUM", "EXTPHOSPHORU", "EXTGLUCOSE", "EXTALBUMIN", "EXTAST", "EXTALT", "EXTBILITOTAL", "EXTLIPASE", "EXTAMYLASE"    No results found for: "EXTCYCLOSLVL", "EXTSIROLIMUS", "EXTTACROLVL", "EXTPROTCRE", "EXTPTHINTACT", "EXTPROTEINUA", "EXTWBCUA", "EXTRBCUA"    Labs were reviewed with the patient    Assessment:     1. S/P kidney transplant    2. Long-term use of immunosuppressant medication    3. Type 2 DM with CKD stage 3 and hypertension    4. Immunodeficiency due to long term immunosuppressive drug therapy    5. PKD (polycystic kidney disease)    6. Anemia in stage 3a chronic kidney disease    7. Secondary hyperparathyroidism of renal origin    8. Overweight with body mass index (BMI) of 29 to 29.9 in adult        Plan:    Per Dr Pittman:   Increase sodium bicarb 1300 mg " BID  Cont lokelma 5 gms daily     1) s/p living related kidney transplant              - Graft function    -FK Trough 7.8   - cont on FK 4/4   - cont on Cellcept 1000 mg BID   -prednisone  taper   -   Atovaquone 1500 mg Qd for PCP prophylaxis    -  Valcyte 450 mg MWF for CMV prophylaxis  -Will continue to monitor for drug toxicities     Lab Results   Component Value Date    CREATININE 1.7 (H) 05/13/2024          Latest Reference Range & Units POD 16,  Kidney-Post 1 Year  05/13/24 07:29   eGFR >60 mL/min/1.73 m^2 49.7 !   !: Data is abnormal        2)  HTN: advise low salt diet and home BP monitoring  - Cont  nifedipine and  flomax   BP Readings from Last 3 Encounters:   05/13/24 135/74   05/08/24 129/75   05/01/24 (!) 147/92         type II DM:   -  MED REGIME   Mgmt deferred to endocrinology    Lab Results   Component Value Date    HGBA1C 5.4 10/07/2022         3) Hypophosphatemia, Hypomagnesemia, Secondary hyperparathyroidism:  - no intervention required ,will continue to monitor/ guidelines                -continue calcitriol, ERGO     Lab Results   Component Value Date    PTH 1,232.4 (H) 05/01/2024    CALCIUM 9.2 05/13/2024    PHOS 2.1 (L) 05/13/2024       Latest Reference Range & Units POD 16,  Kidney-Post 1 Year  05/13/24 07:29   Magnesium  1.6 - 2.6 mg/dL 1.3 (L)   (L): Data is abnormally low        4) Metabolic acidosis/Electrolyte balance:  - no intervention required ,will continue to monitor/ guidelines    -  increase  sodium bicarb 1300 mg BID . Start lokelma 5 gm QD  Lab Results   Component Value Date     05/13/2024    K 4.7 05/13/2024     (H) 05/13/2024    CO2 18 (L) 05/13/2024         5) Anemia/Cytopenias:   will monitor as per our guidelines. Medicine list reviewed including potential causes of drug-induced cytopenias                - no intervention required ,will continue to monitor/ guidelines      Lab Results   Component Value Date    WBC 11.09 05/13/2024    HGB 10.6 (L) 05/13/2024     HCT 32.6 (L) 05/13/2024    MCV 94 05/13/2024     05/13/2024           6) Proteinuria: continue p/c ratio as per guidelines   Protein Creatinine Ratios:    Creatinine, Urine   Date Value Ref Range Status   05/13/2024 101.0 23.0 - 375.0 mg/dL Final   04/07/2021 68.0 23.0 - 375.0 mg/dL Final     Comment:     The random urine reference ranges provided were established   for 24 hour urine collections.  No reference ranges exist for  random urine specimens.  Correlate clinically.       Protein, Urine Random   Date Value Ref Range Status   05/13/2024 13 0 - 15 mg/dL Final   04/07/2021 82 (H) 0 - 15 mg/dL Final     Comment:     The random urine reference ranges provided were established   for 24 hour urine collections.  No reference ranges exist for  random urine specimens.  Correlate clinically.       Prot/Creat Ratio, Urine   Date Value Ref Range Status   05/13/2024 0.13 0.00 - 0.20 Final   04/07/2021 1.21 (H) 0.00 - 0.20 Final        .     7) BK virus infection screening:  will continue to monitor/ guidelines       8) Weight education: provided during the clinic visit   Body mass index is 29.83 kg/m².        Follow-up:   Clinic: return to transplant clinic weekly for the first month after transplant; every 2 weeks during months 2-3; then at 6-, 9-, 12-, 18-, 24-, and 36- months post-transplant to reassess for complications from immunosuppression toxicity and monitor for rejection.  Annually thereafter.    Labs: since patient remains at high risk for rejection and drug-related complications that warrant close monitoring, labs will be ordered as follows: continue twice weekly CBC, renal panel, and drug level for first month; then same labs once weekly through 3rd month post-transplant.  Urine for UA and protein/creatinine ratio monthly.  Serum BK - PCR at 1-, 3-, 6-, 9-, 12-, 18-, 24-, 36-, 48-, and 60 months post-transplant.  Hepatic panel at 1-, 2-, 3-, 6-, 9-, 12-, 18-, 24-, and 36- months  post-transplant.    Radha Kay NP       Education:   Material provided to the patient.  Patient reminded to call with any health changes since these can be early signs of significant complications.  Also, I advised the patient to be sure any new medications or changes of old medications are discussed with either a pharmacist or physician knowledgeable with transplant to avoid rejection/drug toxicity related to significant drug interactions.    Patient advised that it is recommended that all transplanted patients, and their close contacts and household members receive Covid vaccination.

## 2024-05-08 ENCOUNTER — OFFICE VISIT (OUTPATIENT)
Dept: TRANSPLANT | Facility: CLINIC | Age: 49
End: 2024-05-08
Payer: MEDICAID

## 2024-05-08 VITALS
HEIGHT: 68 IN | DIASTOLIC BLOOD PRESSURE: 75 MMHG | SYSTOLIC BLOOD PRESSURE: 129 MMHG | RESPIRATION RATE: 18 BRPM | HEART RATE: 81 BPM | OXYGEN SATURATION: 99 % | WEIGHT: 196.19 LBS | BODY MASS INDEX: 29.73 KG/M2 | TEMPERATURE: 97 F

## 2024-05-08 DIAGNOSIS — Z94.0 S/P KIDNEY TRANSPLANT: Primary | ICD-10-CM

## 2024-05-08 PROBLEM — E66.811 CLASS 1 OBESITY DUE TO EXCESS CALORIES WITH SERIOUS COMORBIDITY AND BODY MASS INDEX (BMI) OF 31.0 TO 31.9 IN ADULT: Chronic | Status: RESOLVED | Noted: 2021-12-14 | Resolved: 2024-05-08

## 2024-05-08 PROBLEM — T38.0X5S ADVERSE EFFECT OF ADRENAL CORTICAL STEROIDS, SEQUELA: Status: RESOLVED | Noted: 2021-04-16 | Resolved: 2024-05-08

## 2024-05-08 PROBLEM — E11.22 TYPE 2 DM WITH CKD STAGE 3 AND HYPERTENSION: Status: ACTIVE | Noted: 2021-04-07

## 2024-05-08 PROBLEM — N18.30 BENIGN HYPERTENSION WITH CKD (CHRONIC KIDNEY DISEASE) STAGE III: Status: ACTIVE | Noted: 2021-04-07

## 2024-05-08 PROBLEM — I12.9 BENIGN HYPERTENSION WITH CKD (CHRONIC KIDNEY DISEASE) STAGE III: Status: ACTIVE | Noted: 2021-04-07

## 2024-05-08 PROBLEM — E66.09 CLASS 1 OBESITY DUE TO EXCESS CALORIES WITH SERIOUS COMORBIDITY AND BODY MASS INDEX (BMI) OF 31.0 TO 31.9 IN ADULT: Chronic | Status: RESOLVED | Noted: 2021-12-14 | Resolved: 2024-05-08

## 2024-05-08 PROBLEM — T86.12 FAILED KIDNEY TRANSPLANT: Status: RESOLVED | Noted: 2021-12-14 | Resolved: 2024-05-08

## 2024-05-08 PROBLEM — E66.3 OVERWEIGHT WITH BODY MASS INDEX (BMI) OF 29 TO 29.9 IN ADULT: Status: ACTIVE | Noted: 2024-05-08

## 2024-05-08 PROBLEM — T86.11 ACUTE REJECTION OF KIDNEY TRANSPLANT: Status: RESOLVED | Noted: 2021-04-15 | Resolved: 2024-05-08

## 2024-05-08 PROCEDURE — 99999 PR PBB SHADOW E&M-EST. PATIENT-LVL IV: CPT | Mod: PBBFAC,,, | Performed by: TRANSPLANT SURGERY

## 2024-05-08 PROCEDURE — 99024 POSTOP FOLLOW-UP VISIT: CPT | Mod: ,,, | Performed by: TRANSPLANT SURGERY

## 2024-05-08 PROCEDURE — 1160F RVW MEDS BY RX/DR IN RCRD: CPT | Mod: CPTII,,, | Performed by: TRANSPLANT SURGERY

## 2024-05-08 PROCEDURE — 3078F DIAST BP <80 MM HG: CPT | Mod: CPTII,,, | Performed by: TRANSPLANT SURGERY

## 2024-05-08 PROCEDURE — 99214 OFFICE O/P EST MOD 30 MIN: CPT | Mod: PBBFAC | Performed by: TRANSPLANT SURGERY

## 2024-05-08 PROCEDURE — 1159F MED LIST DOCD IN RCRD: CPT | Mod: CPTII,,, | Performed by: TRANSPLANT SURGERY

## 2024-05-08 PROCEDURE — 3074F SYST BP LT 130 MM HG: CPT | Mod: CPTII,,, | Performed by: TRANSPLANT SURGERY

## 2024-05-09 ENCOUNTER — LAB VISIT (OUTPATIENT)
Dept: LAB | Facility: HOSPITAL | Age: 49
End: 2024-05-09
Attending: INTERNAL MEDICINE
Payer: MEDICAID

## 2024-05-09 ENCOUNTER — PATIENT MESSAGE (OUTPATIENT)
Dept: TRANSPLANT | Facility: CLINIC | Age: 49
End: 2024-05-09
Payer: MEDICAID

## 2024-05-09 DIAGNOSIS — Z94.0 KIDNEY REPLACED BY TRANSPLANT: ICD-10-CM

## 2024-05-09 DIAGNOSIS — E87.5 HYPERKALEMIA: Primary | ICD-10-CM

## 2024-05-09 LAB
ALBUMIN SERPL BCP-MCNC: 3 G/DL (ref 3.5–5.2)
ANION GAP SERPL CALC-SCNC: 4 MMOL/L (ref 8–16)
BASOPHILS # BLD AUTO: 0.08 K/UL (ref 0–0.2)
BASOPHILS NFR BLD: 0.6 % (ref 0–1.9)
BUN SERPL-MCNC: 46 MG/DL (ref 6–20)
CALCIUM SERPL-MCNC: 9.4 MG/DL (ref 8.7–10.5)
CHLORIDE SERPL-SCNC: 112 MMOL/L (ref 95–110)
CO2 SERPL-SCNC: 22 MMOL/L (ref 23–29)
CREAT SERPL-MCNC: 2.3 MG/DL (ref 0.5–1.4)
DIFFERENTIAL METHOD BLD: ABNORMAL
EOSINOPHIL # BLD AUTO: 0.2 K/UL (ref 0–0.5)
EOSINOPHIL NFR BLD: 1.7 % (ref 0–8)
ERYTHROCYTE [DISTWIDTH] IN BLOOD BY AUTOMATED COUNT: 13.3 % (ref 11.5–14.5)
EST. GFR  (NO RACE VARIABLE): 34.6 ML/MIN/1.73 M^2
GLUCOSE SERPL-MCNC: 83 MG/DL (ref 70–110)
HCT VFR BLD AUTO: 34.6 % (ref 40–54)
HGB BLD-MCNC: 11.1 G/DL (ref 14–18)
IMM GRANULOCYTES # BLD AUTO: 0.31 K/UL (ref 0–0.04)
IMM GRANULOCYTES NFR BLD AUTO: 2.3 % (ref 0–0.5)
LYMPHOCYTES # BLD AUTO: 0.5 K/UL (ref 1–4.8)
LYMPHOCYTES NFR BLD: 3.6 % (ref 18–48)
MAGNESIUM SERPL-MCNC: 1.5 MG/DL (ref 1.6–2.6)
MCH RBC QN AUTO: 30.5 PG (ref 27–31)
MCHC RBC AUTO-ENTMCNC: 32.1 G/DL (ref 32–36)
MCV RBC AUTO: 95 FL (ref 82–98)
MONOCYTES # BLD AUTO: 0.5 K/UL (ref 0.3–1)
MONOCYTES NFR BLD: 3.9 % (ref 4–15)
NEUTROPHILS # BLD AUTO: 11.6 K/UL (ref 1.8–7.7)
NEUTROPHILS NFR BLD: 87.9 % (ref 38–73)
NRBC BLD-RTO: 0 /100 WBC
PHOSPHATE SERPL-MCNC: 2.4 MG/DL (ref 2.7–4.5)
PLATELET # BLD AUTO: 403 K/UL (ref 150–450)
PMV BLD AUTO: 10.4 FL (ref 9.2–12.9)
POTASSIUM SERPL-SCNC: 5.6 MMOL/L (ref 3.5–5.1)
RBC # BLD AUTO: 3.64 M/UL (ref 4.6–6.2)
SODIUM SERPL-SCNC: 138 MMOL/L (ref 136–145)
TACROLIMUS BLD-MCNC: 10.9 NG/ML (ref 5–15)
WBC # BLD AUTO: 13.25 K/UL (ref 3.9–12.7)

## 2024-05-09 PROCEDURE — 85025 COMPLETE CBC W/AUTO DIFF WBC: CPT | Performed by: INTERNAL MEDICINE

## 2024-05-09 PROCEDURE — 83735 ASSAY OF MAGNESIUM: CPT | Performed by: INTERNAL MEDICINE

## 2024-05-09 PROCEDURE — 80197 ASSAY OF TACROLIMUS: CPT | Performed by: INTERNAL MEDICINE

## 2024-05-09 PROCEDURE — 36415 COLL VENOUS BLD VENIPUNCTURE: CPT | Performed by: INTERNAL MEDICINE

## 2024-05-09 PROCEDURE — 80069 RENAL FUNCTION PANEL: CPT | Performed by: INTERNAL MEDICINE

## 2024-05-09 RX ORDER — ATOVAQUONE 750 MG/5ML
1500 SUSPENSION ORAL DAILY
Qty: 300 ML | Refills: 5 | Status: SHIPPED | OUTPATIENT
Start: 2024-05-09 | End: 2024-05-22 | Stop reason: SDUPTHER

## 2024-05-09 RX ORDER — SODIUM POLYSTYRENE SULFONATE 4.1 MEQ/G
30 POWDER, FOR SUSPENSION ORAL; RECTAL DAILY
Qty: 90 G | Refills: 0 | Status: SHIPPED | OUTPATIENT
Start: 2024-05-09 | End: 2024-05-11 | Stop reason: SDUPTHER

## 2024-05-09 NOTE — PROGRESS NOTES
High K:  kayexalate 30 gram daily x3 doses . Low K diet , more water intake   Check K on Saturday.  Stop bactrim and start mepron 1500 mg daily

## 2024-05-09 NOTE — TELEPHONE ENCOUNTER
Pt notified to stop bactrim, start mepron 1500 mg daily (end date 10/27/24).  Start kayexalate 30 grams daily for 3 days.  Follow low potassium diet, increase water intake.   Recheck K on Saturday 5/11.   Verbalized understanding of the above instructions.    ----- Message from Maru Back MD sent at 5/9/2024 12:01 PM CDT -----  High K:  kayexalate 30 gram daily x3 doses . Low K diet , more water intake   Check K on Saturday.  Stop bactrim and start mepron 1500 mg daily

## 2024-05-10 LAB
CLASS I ANTIBODIES - LUMINEX: NORMAL
CLASS I ANTIBODY COMMENTS - LUMINEX: NORMAL
CLASS II ANTIBODY COMMENTS - LUMINEX: NORMAL
CPRA %: 91
SERUM COLLECTION DT - LUMINEX CLASS I: NORMAL
SERUM COLLECTION DT - LUMINEX CLASS II: NORMAL
SPCL1 TESTING DATE: NORMAL
SPCL2 TESTING DATE: NORMAL
SPLUA TESTING DATE: NORMAL

## 2024-05-11 ENCOUNTER — LAB VISIT (OUTPATIENT)
Dept: LAB | Facility: HOSPITAL | Age: 49
End: 2024-05-11
Attending: INTERNAL MEDICINE
Payer: MEDICAID

## 2024-05-11 DIAGNOSIS — Z94.0 KIDNEY REPLACED BY TRANSPLANT: ICD-10-CM

## 2024-05-11 DIAGNOSIS — E87.5 HYPERKALEMIA: ICD-10-CM

## 2024-05-11 LAB — POTASSIUM SERPL-SCNC: 5.3 MMOL/L (ref 3.5–5.1)

## 2024-05-11 PROCEDURE — 84132 ASSAY OF SERUM POTASSIUM: CPT | Performed by: INTERNAL MEDICINE

## 2024-05-11 PROCEDURE — 36415 COLL VENOUS BLD VENIPUNCTURE: CPT | Performed by: INTERNAL MEDICINE

## 2024-05-11 RX ORDER — SODIUM POLYSTYRENE SULFONATE 4.1 MEQ/G
30 POWDER, FOR SUSPENSION ORAL; RECTAL DAILY
Qty: 60 G | Refills: 0 | Status: SHIPPED | OUTPATIENT
Start: 2024-05-11 | End: 2024-05-14

## 2024-05-11 RX ORDER — TACROLIMUS 1 MG/1
4 CAPSULE ORAL EVERY 12 HOURS
Qty: 120 CAPSULE | Refills: 11 | Status: SHIPPED | OUTPATIENT
Start: 2024-05-11 | End: 2024-05-22 | Stop reason: SDUPTHER

## 2024-05-11 NOTE — TELEPHONE ENCOUNTER
I called patient and reviewed tran potassium foods. Patient stated that he was eating a banana for breakfast and pistachios for snack. He has reviewed the high potassium foods and will watch his diet. Patient instructed to drink more water.  Patient asked for a refill on his FK since his dose was increased to 4/4 he will run out in a couple of days and I explained the Kayexalate 30 mg daily for 2 days with repeat labs on Monday. Patient verbalized understanding of all informrion discussed.  Prescriptions routed to Dr. Back to approved and note routed to post transplant coordinator for follow-up.       ----- Message from Maru Back MD sent at 5/11/2024  9:08 AM CDT -----  Low K diet . More water intake   Kayexalate 30 gram daily X 2 days   Check lab on Monday    Rio Whiting MD;  Location: [facility] OR [address] [place] [address];  Puma Santiago II, MD;  [facility];  

## 2024-05-11 NOTE — PROGRESS NOTES
Low K diet . More water intake   Kayexalate 30 gram daily X 2 days   Check lab on Monday  w/ history of 3 vessel CABG  -no evidence of ACS at this time  -holding BB, ACEI  -c/w 81mg ASA Patient developed hypernatremia, likely in the setting of diuresis with low PO intake.  -DC D5 1/2NS  -Start D5 @ 70cc/hr  -trend BMP, monitor lungs for fluid overload

## 2024-05-13 ENCOUNTER — OFFICE VISIT (OUTPATIENT)
Dept: TRANSPLANT | Facility: CLINIC | Age: 49
End: 2024-05-13
Payer: MEDICAID

## 2024-05-13 ENCOUNTER — LAB VISIT (OUTPATIENT)
Dept: LAB | Facility: HOSPITAL | Age: 49
End: 2024-05-13
Attending: INTERNAL MEDICINE
Payer: MEDICAID

## 2024-05-13 VITALS
HEIGHT: 68 IN | RESPIRATION RATE: 18 BRPM | BODY MASS INDEX: 29.73 KG/M2 | WEIGHT: 196.19 LBS | OXYGEN SATURATION: 100 % | TEMPERATURE: 98 F | SYSTOLIC BLOOD PRESSURE: 135 MMHG | DIASTOLIC BLOOD PRESSURE: 74 MMHG | HEART RATE: 74 BPM

## 2024-05-13 DIAGNOSIS — N25.81 SECONDARY HYPERPARATHYROIDISM OF RENAL ORIGIN: ICD-10-CM

## 2024-05-13 DIAGNOSIS — Z94.0 KIDNEY REPLACED BY TRANSPLANT: ICD-10-CM

## 2024-05-13 DIAGNOSIS — Z94.0 S/P KIDNEY TRANSPLANT: Primary | ICD-10-CM

## 2024-05-13 DIAGNOSIS — D84.821 IMMUNODEFICIENCY DUE TO LONG TERM IMMUNOSUPPRESSIVE DRUG THERAPY: ICD-10-CM

## 2024-05-13 DIAGNOSIS — D63.1 ANEMIA IN STAGE 3A CHRONIC KIDNEY DISEASE: ICD-10-CM

## 2024-05-13 DIAGNOSIS — I12.9 TYPE 2 DM WITH CKD STAGE 3 AND HYPERTENSION: ICD-10-CM

## 2024-05-13 DIAGNOSIS — N18.31 ANEMIA IN STAGE 3A CHRONIC KIDNEY DISEASE: ICD-10-CM

## 2024-05-13 DIAGNOSIS — E87.5 HYPERKALEMIA: Primary | ICD-10-CM

## 2024-05-13 DIAGNOSIS — Q61.3 PKD (POLYCYSTIC KIDNEY DISEASE): ICD-10-CM

## 2024-05-13 DIAGNOSIS — E66.3 OVERWEIGHT WITH BODY MASS INDEX (BMI) OF 29 TO 29.9 IN ADULT: ICD-10-CM

## 2024-05-13 DIAGNOSIS — N18.30 TYPE 2 DM WITH CKD STAGE 3 AND HYPERTENSION: ICD-10-CM

## 2024-05-13 DIAGNOSIS — E11.22 TYPE 2 DM WITH CKD STAGE 3 AND HYPERTENSION: ICD-10-CM

## 2024-05-13 DIAGNOSIS — T45.1X5A IMMUNODEFICIENCY DUE TO LONG TERM IMMUNOSUPPRESSIVE DRUG THERAPY: ICD-10-CM

## 2024-05-13 DIAGNOSIS — Z94.0 S/P KIDNEY TRANSPLANT: ICD-10-CM

## 2024-05-13 DIAGNOSIS — Z79.899 IMMUNODEFICIENCY DUE TO LONG TERM IMMUNOSUPPRESSIVE DRUG THERAPY: ICD-10-CM

## 2024-05-13 DIAGNOSIS — Z79.60 LONG-TERM USE OF IMMUNOSUPPRESSANT MEDICATION: ICD-10-CM

## 2024-05-13 LAB
ALBUMIN SERPL BCP-MCNC: 3 G/DL (ref 3.5–5.2)
ANION GAP SERPL CALC-SCNC: 7 MMOL/L (ref 8–16)
BASOPHILS # BLD AUTO: 0.14 K/UL (ref 0–0.2)
BASOPHILS NFR BLD: 1.3 % (ref 0–1.9)
BUN SERPL-MCNC: 27 MG/DL (ref 6–20)
CALCIUM SERPL-MCNC: 9.2 MG/DL (ref 8.7–10.5)
CHLORIDE SERPL-SCNC: 114 MMOL/L (ref 95–110)
CO2 SERPL-SCNC: 18 MMOL/L (ref 23–29)
CREAT SERPL-MCNC: 1.7 MG/DL (ref 0.5–1.4)
DIFFERENTIAL METHOD BLD: ABNORMAL
EOSINOPHIL # BLD AUTO: 0.2 K/UL (ref 0–0.5)
EOSINOPHIL NFR BLD: 1.9 % (ref 0–8)
ERYTHROCYTE [DISTWIDTH] IN BLOOD BY AUTOMATED COUNT: 13.6 % (ref 11.5–14.5)
EST. GFR  (NO RACE VARIABLE): 49.7 ML/MIN/1.73 M^2
GLUCOSE SERPL-MCNC: 94 MG/DL (ref 70–110)
HCT VFR BLD AUTO: 32.6 % (ref 40–54)
HGB BLD-MCNC: 10.6 G/DL (ref 14–18)
IMM GRANULOCYTES # BLD AUTO: 0.15 K/UL (ref 0–0.04)
IMM GRANULOCYTES NFR BLD AUTO: 1.4 % (ref 0–0.5)
LYMPHOCYTES # BLD AUTO: 0.5 K/UL (ref 1–4.8)
LYMPHOCYTES NFR BLD: 4.1 % (ref 18–48)
MAGNESIUM SERPL-MCNC: 1.3 MG/DL (ref 1.6–2.6)
MCH RBC QN AUTO: 30.5 PG (ref 27–31)
MCHC RBC AUTO-ENTMCNC: 32.5 G/DL (ref 32–36)
MCV RBC AUTO: 94 FL (ref 82–98)
MONOCYTES # BLD AUTO: 0.5 K/UL (ref 0.3–1)
MONOCYTES NFR BLD: 4.3 % (ref 4–15)
NEUTROPHILS # BLD AUTO: 9.7 K/UL (ref 1.8–7.7)
NEUTROPHILS NFR BLD: 87 % (ref 38–73)
NRBC BLD-RTO: 0 /100 WBC
PHOSPHATE SERPL-MCNC: 2.1 MG/DL (ref 2.7–4.5)
PLATELET # BLD AUTO: 331 K/UL (ref 150–450)
PMV BLD AUTO: 10.1 FL (ref 9.2–12.9)
POTASSIUM SERPL-SCNC: 4.7 MMOL/L (ref 3.5–5.1)
RBC # BLD AUTO: 3.47 M/UL (ref 4.6–6.2)
SODIUM SERPL-SCNC: 139 MMOL/L (ref 136–145)
TACROLIMUS BLD-MCNC: 7.8 NG/ML (ref 5–15)
WBC # BLD AUTO: 11.09 K/UL (ref 3.9–12.7)

## 2024-05-13 PROCEDURE — 1160F RVW MEDS BY RX/DR IN RCRD: CPT | Mod: CPTII,,, | Performed by: NURSE PRACTITIONER

## 2024-05-13 PROCEDURE — 99215 OFFICE O/P EST HI 40 MIN: CPT | Mod: S$PBB,,, | Performed by: NURSE PRACTITIONER

## 2024-05-13 PROCEDURE — 3078F DIAST BP <80 MM HG: CPT | Mod: CPTII,,, | Performed by: NURSE PRACTITIONER

## 2024-05-13 PROCEDURE — 99999 PR PBB SHADOW E&M-EST. PATIENT-LVL V: CPT | Mod: PBBFAC,,, | Performed by: NURSE PRACTITIONER

## 2024-05-13 PROCEDURE — 85025 COMPLETE CBC W/AUTO DIFF WBC: CPT | Performed by: INTERNAL MEDICINE

## 2024-05-13 PROCEDURE — 3008F BODY MASS INDEX DOCD: CPT | Mod: CPTII,,, | Performed by: NURSE PRACTITIONER

## 2024-05-13 PROCEDURE — 80197 ASSAY OF TACROLIMUS: CPT | Performed by: INTERNAL MEDICINE

## 2024-05-13 PROCEDURE — 1111F DSCHRG MED/CURRENT MED MERGE: CPT | Mod: CPTII,,, | Performed by: NURSE PRACTITIONER

## 2024-05-13 PROCEDURE — 83735 ASSAY OF MAGNESIUM: CPT | Performed by: INTERNAL MEDICINE

## 2024-05-13 PROCEDURE — 80069 RENAL FUNCTION PANEL: CPT | Performed by: INTERNAL MEDICINE

## 2024-05-13 PROCEDURE — 1159F MED LIST DOCD IN RCRD: CPT | Mod: CPTII,,, | Performed by: NURSE PRACTITIONER

## 2024-05-13 PROCEDURE — 99215 OFFICE O/P EST HI 40 MIN: CPT | Mod: PBBFAC | Performed by: NURSE PRACTITIONER

## 2024-05-13 PROCEDURE — 3075F SYST BP GE 130 - 139MM HG: CPT | Mod: CPTII,,, | Performed by: NURSE PRACTITIONER

## 2024-05-13 PROCEDURE — 36415 COLL VENOUS BLD VENIPUNCTURE: CPT | Performed by: INTERNAL MEDICINE

## 2024-05-13 PROCEDURE — 3066F NEPHROPATHY DOC TX: CPT | Mod: CPTII,,, | Performed by: NURSE PRACTITIONER

## 2024-05-13 RX ORDER — SODIUM BICARBONATE 650 MG/1
1300 TABLET ORAL 2 TIMES DAILY
Qty: 120 TABLET | Refills: 11 | Status: SHIPPED | OUTPATIENT
Start: 2024-05-13 | End: 2024-05-22 | Stop reason: SDUPTHER

## 2024-05-13 NOTE — TELEPHONE ENCOUNTER
Pt notified to increase sodium bicarb to 1300 mg twice a day. Start lokelma 5 grams daily.       ----- Message from Maru Back MD sent at 5/13/2024  8:16 AM CDT -----  NaHCO3 1300 mg BID   Start lokelma 5 gram daily   Pending tac level

## 2024-05-13 NOTE — LETTER
May 13, 2024        Estefanía Anders  [address] [facility] [address]  Phone: [phone]  Fax: [phone]             Paul Regan- Transplant [facility] Fl  [address]  [facility] [address]  Phone: [phone]   Patient: Adilson Sylvester   MR Number: [id number]   YOB: 1975   Date of Visit: 5/13/2024       Dear Dr. Estefanía Anders    Thank you for referring Adilson Sylvester to me for evaluation. Attached you will find relevant portions of my assessment and plan of care.    If you have questions, please do not hesitate to call me. I look forward to following Adilson Sylvester along with you.    Sincerely,    Radha Kay, NP    Enclosure    If you would like to receive this communication electronically, please contact [email or web address] or [phone] to request Stellar Link access.    Stellar Link is a tool which provides read-only access to select patient information with whom you have a relationship. Its easy to use and provides real time access to review your patients record including encounter summaries, notes, results, and demographic information.    If you feel you have received this communication in error or would no longer like to receive these types of communications, please e-mail [email or web address]

## 2024-05-16 ENCOUNTER — PROCEDURE VISIT (OUTPATIENT)
Dept: UROLOGY | Facility: CLINIC | Age: 49
End: 2024-05-16
Payer: MEDICAID

## 2024-05-16 ENCOUNTER — LAB VISIT (OUTPATIENT)
Dept: LAB | Facility: HOSPITAL | Age: 49
End: 2024-05-16
Attending: INTERNAL MEDICINE
Payer: MEDICAID

## 2024-05-16 VITALS
DIASTOLIC BLOOD PRESSURE: 72 MMHG | WEIGHT: 191.81 LBS | TEMPERATURE: 98 F | HEART RATE: 89 BPM | RESPIRATION RATE: 20 BRPM | SYSTOLIC BLOOD PRESSURE: 133 MMHG | BODY MASS INDEX: 29.16 KG/M2

## 2024-05-16 DIAGNOSIS — E83.39 HYPOPHOSPHATEMIA: ICD-10-CM

## 2024-05-16 DIAGNOSIS — Z94.0 KIDNEY REPLACED BY TRANSPLANT: ICD-10-CM

## 2024-05-16 DIAGNOSIS — Z94.0 KIDNEY REPLACED BY TRANSPLANT: Primary | ICD-10-CM

## 2024-05-16 DIAGNOSIS — E83.42 HYPOMAGNESEMIA: ICD-10-CM

## 2024-05-16 LAB
ALBUMIN SERPL BCP-MCNC: 3.2 G/DL (ref 3.5–5.2)
ANION GAP SERPL CALC-SCNC: 6 MMOL/L (ref 8–16)
BASOPHILS # BLD AUTO: 0.06 K/UL (ref 0–0.2)
BASOPHILS NFR BLD: 0.8 % (ref 0–1.9)
BUN SERPL-MCNC: 23 MG/DL (ref 6–20)
CALCIUM SERPL-MCNC: 9.4 MG/DL (ref 8.7–10.5)
CHLORIDE SERPL-SCNC: 112 MMOL/L (ref 95–110)
CO2 SERPL-SCNC: 22 MMOL/L (ref 23–29)
CREAT SERPL-MCNC: 1.6 MG/DL (ref 0.5–1.4)
DIFFERENTIAL METHOD BLD: ABNORMAL
EOSINOPHIL # BLD AUTO: 0.2 K/UL (ref 0–0.5)
EOSINOPHIL NFR BLD: 2.3 % (ref 0–8)
ERYTHROCYTE [DISTWIDTH] IN BLOOD BY AUTOMATED COUNT: 13.8 % (ref 11.5–14.5)
EST. GFR  (NO RACE VARIABLE): 53.5 ML/MIN/1.73 M^2
GLUCOSE SERPL-MCNC: 81 MG/DL (ref 70–110)
HCT VFR BLD AUTO: 35.9 % (ref 40–54)
HGB BLD-MCNC: 11.4 G/DL (ref 14–18)
IMM GRANULOCYTES # BLD AUTO: 0.08 K/UL (ref 0–0.04)
IMM GRANULOCYTES NFR BLD AUTO: 1 % (ref 0–0.5)
LYMPHOCYTES # BLD AUTO: 0.5 K/UL (ref 1–4.8)
LYMPHOCYTES NFR BLD: 6.6 % (ref 18–48)
MAGNESIUM SERPL-MCNC: 1.2 MG/DL (ref 1.6–2.6)
MCH RBC QN AUTO: 30.2 PG (ref 27–31)
MCHC RBC AUTO-ENTMCNC: 31.8 G/DL (ref 32–36)
MCV RBC AUTO: 95 FL (ref 82–98)
MONOCYTES # BLD AUTO: 0.5 K/UL (ref 0.3–1)
MONOCYTES NFR BLD: 6.2 % (ref 4–15)
NEUTROPHILS # BLD AUTO: 6.5 K/UL (ref 1.8–7.7)
NEUTROPHILS NFR BLD: 83.1 % (ref 38–73)
NRBC BLD-RTO: 0 /100 WBC
PHOSPHATE SERPL-MCNC: 1.6 MG/DL (ref 2.7–4.5)
PLATELET # BLD AUTO: 319 K/UL (ref 150–450)
PMV BLD AUTO: 10.6 FL (ref 9.2–12.9)
POTASSIUM SERPL-SCNC: 4.8 MMOL/L (ref 3.5–5.1)
RBC # BLD AUTO: 3.78 M/UL (ref 4.6–6.2)
SODIUM SERPL-SCNC: 140 MMOL/L (ref 136–145)
TACROLIMUS BLD-MCNC: 7.6 NG/ML (ref 5–15)
WBC # BLD AUTO: 7.78 K/UL (ref 3.9–12.7)

## 2024-05-16 PROCEDURE — 80069 RENAL FUNCTION PANEL: CPT | Performed by: INTERNAL MEDICINE

## 2024-05-16 PROCEDURE — 36415 COLL VENOUS BLD VENIPUNCTURE: CPT | Performed by: INTERNAL MEDICINE

## 2024-05-16 PROCEDURE — 85025 COMPLETE CBC W/AUTO DIFF WBC: CPT | Performed by: INTERNAL MEDICINE

## 2024-05-16 PROCEDURE — 80197 ASSAY OF TACROLIMUS: CPT | Performed by: INTERNAL MEDICINE

## 2024-05-16 PROCEDURE — 52310 CYSTOSCOPY AND TREATMENT: CPT | Mod: S$PBB,,, | Performed by: STUDENT IN AN ORGANIZED HEALTH CARE EDUCATION/TRAINING PROGRAM

## 2024-05-16 PROCEDURE — 83735 ASSAY OF MAGNESIUM: CPT | Performed by: INTERNAL MEDICINE

## 2024-05-16 PROCEDURE — 52310 CYSTOSCOPY AND TREATMENT: CPT | Mod: PBBFAC | Performed by: STUDENT IN AN ORGANIZED HEALTH CARE EDUCATION/TRAINING PROGRAM

## 2024-05-16 RX ORDER — LIDOCAINE HYDROCHLORIDE 20 MG/ML
JELLY TOPICAL
Status: SHIPPED | OUTPATIENT
Start: 2024-05-16

## 2024-05-16 RX ORDER — LIDOCAINE HYDROCHLORIDE 20 MG/ML
JELLY TOPICAL
Status: COMPLETED | OUTPATIENT
Start: 2024-05-16 | End: 2024-05-16

## 2024-05-16 RX ADMIN — LIDOCAINE HYDROCHLORIDE: 20 JELLY TOPICAL at 01:05

## 2024-05-16 NOTE — TELEPHONE ENCOUNTER
Pt notified to start magnesium oxide 800 mg twice a day and start K phos neutral 250 mg three times a day, increase phosphorus in diet. Labs Monday.     ----- Message from Rosemary Tabor DO sent at 5/16/2024  4:23 PM CDT -----  Decreasing creatinine.   Start on MgOX 800 mg BID  Start on phos neutral 250 mg TID. Increase phos in diet

## 2024-05-16 NOTE — PATIENT INSTRUCTIONS
What to Expect After a Cystoscopy with Stent Removal  For the next 24-48 hours, you may feel a mild burning when you urinate. This burning is normal and expected. Drink plenty of water to dilute the urine to help relieve the burning sensation. You may also see a small amount of blood in your urine after the procedure.    Unless you are already taking antibiotics, you may be given an antibiotic after the test to prevent infection.    Signs and Symptoms to Report  Call the [facility] at [phone] if you develop any of the following:  Fever of 101 degrees or higher  Chills or persistent bleeding  Inability to urinate    After hours or on weekends, you may reach a urology resident on call at this number: [phone].

## 2024-05-16 NOTE — PROCEDURES
Office Cystoscopy Procedure Note    Date of Procedure: 05/16/2024    Indication:  Transplant , retained stent     Informed consent:  The risks, benefits, complications, treatment options, and expected outcomes were discussed with the patient. The patient concurred with the proposed plan and provided informed consent.     Anesthesia: Lidocaine jelly 2%     Antibiotic prophylaxis:  Bactrim      Procedure:  The patient was placed in the lithotomy position, was prepped and draped in the usual manner using sterile technique, and 2% lidocaine jelly instilled into the urethra.  A 17 F flexible cystoscope was then inserted into the urethra and the urethra and bladder carefully examined.  A stent grasper was advanced down the stent and the stent grasped and removed.    The following findings were noted:       Findings:   Urethra: normal     Prostate: kissing lobes    Bladder: normal    Ureteral orifices:       -Right: Not identified       -Left: Not identified     Other findings:     Transplant stent seen and removed intact         Specimens: None                   Complications: None; patient tolerated the procedure well            Disposition: Home after brief observation     Condition: Stable     Plan: RTC PRN    Attending Attestation:      I personally performed the procedure.       Sam Roque MD  Urologic Oncology  P: [phone]

## 2024-05-17 RX ORDER — LANOLIN ALCOHOL/MO/W.PET/CERES
800 CREAM (GRAM) TOPICAL DAILY
Qty: 60 TABLET | Refills: 11 | Status: SHIPPED | OUTPATIENT
Start: 2024-05-17 | End: 2024-05-20 | Stop reason: DRUGHIGH

## 2024-05-20 ENCOUNTER — LAB VISIT (OUTPATIENT)
Dept: LAB | Facility: HOSPITAL | Age: 49
End: 2024-05-20
Attending: INTERNAL MEDICINE
Payer: MEDICAID

## 2024-05-20 DIAGNOSIS — E83.42 HYPOMAGNESEMIA: ICD-10-CM

## 2024-05-20 DIAGNOSIS — Z94.0 KIDNEY REPLACED BY TRANSPLANT: ICD-10-CM

## 2024-05-20 LAB
ALBUMIN SERPL BCP-MCNC: 3.2 G/DL (ref 3.5–5.2)
ANION GAP SERPL CALC-SCNC: 7 MMOL/L (ref 8–16)
BASOPHILS # BLD AUTO: 0.08 K/UL (ref 0–0.2)
BASOPHILS NFR BLD: 1.1 % (ref 0–1.9)
BUN SERPL-MCNC: 27 MG/DL (ref 6–20)
CALCIUM SERPL-MCNC: 9.4 MG/DL (ref 8.7–10.5)
CHLORIDE SERPL-SCNC: 111 MMOL/L (ref 95–110)
CO2 SERPL-SCNC: 23 MMOL/L (ref 23–29)
CREAT SERPL-MCNC: 1.7 MG/DL (ref 0.5–1.4)
DIFFERENTIAL METHOD BLD: ABNORMAL
EOSINOPHIL # BLD AUTO: 0.2 K/UL (ref 0–0.5)
EOSINOPHIL NFR BLD: 2.6 % (ref 0–8)
ERYTHROCYTE [DISTWIDTH] IN BLOOD BY AUTOMATED COUNT: 14 % (ref 11.5–14.5)
EST. GFR  (NO RACE VARIABLE): 49.7 ML/MIN/1.73 M^2
GLUCOSE SERPL-MCNC: 87 MG/DL (ref 70–110)
HCT VFR BLD AUTO: 34.7 % (ref 40–54)
HGB BLD-MCNC: 11.3 G/DL (ref 14–18)
IMM GRANULOCYTES # BLD AUTO: 0.09 K/UL (ref 0–0.04)
IMM GRANULOCYTES NFR BLD AUTO: 1.3 % (ref 0–0.5)
LYMPHOCYTES # BLD AUTO: 0.5 K/UL (ref 1–4.8)
LYMPHOCYTES NFR BLD: 7.7 % (ref 18–48)
MAGNESIUM SERPL-MCNC: 1.3 MG/DL (ref 1.6–2.6)
MCH RBC QN AUTO: 30.5 PG (ref 27–31)
MCHC RBC AUTO-ENTMCNC: 32.6 G/DL (ref 32–36)
MCV RBC AUTO: 94 FL (ref 82–98)
MONOCYTES # BLD AUTO: 0.4 K/UL (ref 0.3–1)
MONOCYTES NFR BLD: 6.3 % (ref 4–15)
NEUTROPHILS # BLD AUTO: 5.7 K/UL (ref 1.8–7.7)
NEUTROPHILS NFR BLD: 81 % (ref 38–73)
NRBC BLD-RTO: 0 /100 WBC
PHOSPHATE SERPL-MCNC: 2.6 MG/DL (ref 2.7–4.5)
PLATELET # BLD AUTO: 301 K/UL (ref 150–450)
PMV BLD AUTO: 10 FL (ref 9.2–12.9)
POTASSIUM SERPL-SCNC: 4.4 MMOL/L (ref 3.5–5.1)
RBC # BLD AUTO: 3.7 M/UL (ref 4.6–6.2)
SODIUM SERPL-SCNC: 141 MMOL/L (ref 136–145)
TACROLIMUS BLD-MCNC: 7.8 NG/ML (ref 5–15)
WBC # BLD AUTO: 7.02 K/UL (ref 3.9–12.7)

## 2024-05-20 PROCEDURE — 85025 COMPLETE CBC W/AUTO DIFF WBC: CPT | Performed by: INTERNAL MEDICINE

## 2024-05-20 PROCEDURE — 80197 ASSAY OF TACROLIMUS: CPT | Performed by: INTERNAL MEDICINE

## 2024-05-20 PROCEDURE — 80069 RENAL FUNCTION PANEL: CPT | Performed by: INTERNAL MEDICINE

## 2024-05-20 PROCEDURE — 36415 COLL VENOUS BLD VENIPUNCTURE: CPT | Performed by: INTERNAL MEDICINE

## 2024-05-20 PROCEDURE — 83735 ASSAY OF MAGNESIUM: CPT | Performed by: INTERNAL MEDICINE

## 2024-05-20 RX ORDER — LANOLIN ALCOHOL/MO/W.PET/CERES
800 CREAM (GRAM) TOPICAL 3 TIMES DAILY
Qty: 180 TABLET | Refills: 11 | Status: SHIPPED | OUTPATIENT
Start: 2024-05-20 | End: 2024-05-22 | Stop reason: SDUPTHER

## 2024-05-20 NOTE — PROGRESS NOTES
Cr 1.7 and Na 141: encourage him to drink more fluid.   Mg Oxide to 800 mg TID,. High Mg diet.  . Next clinic visit with dietitian consult

## 2024-05-20 NOTE — TELEPHONE ENCOUNTER
Pt notified to increase hydration, increase magnesium oxide to 800 mg three times a day and follow high magnesium diet. Dietician consulted and message sent.     ----- Message from Maru Back MD sent at 5/20/2024  9:39 AM CDT -----  Cr 1.7 and Na 141: encourage him to drink more fluid.   Mg Oxide to 800 mg TID,. High Mg diet.  . Next clinic visit with dietitian consult

## 2024-05-21 ENCOUNTER — PATIENT MESSAGE (OUTPATIENT)
Dept: TRANSPLANT | Facility: CLINIC | Age: 49
End: 2024-05-21
Payer: MEDICAID

## 2024-05-22 ENCOUNTER — OFFICE VISIT (OUTPATIENT)
Dept: TRANSPLANT | Facility: CLINIC | Age: 49
End: 2024-05-22
Payer: MEDICAID

## 2024-05-22 VITALS
WEIGHT: 198.44 LBS | HEART RATE: 80 BPM | BODY MASS INDEX: 30.07 KG/M2 | DIASTOLIC BLOOD PRESSURE: 78 MMHG | HEIGHT: 68 IN | SYSTOLIC BLOOD PRESSURE: 125 MMHG | OXYGEN SATURATION: 100 % | TEMPERATURE: 98 F | RESPIRATION RATE: 18 BRPM

## 2024-05-22 DIAGNOSIS — Z94.0 S/P KIDNEY TRANSPLANT: ICD-10-CM

## 2024-05-22 DIAGNOSIS — E83.39 HYPOPHOSPHATEMIA: ICD-10-CM

## 2024-05-22 DIAGNOSIS — Z51.81 ENCOUNTER FOR THERAPEUTIC DRUG MONITORING: ICD-10-CM

## 2024-05-22 DIAGNOSIS — Z79.899 IMMUNODEFICIENCY DUE TO LONG TERM IMMUNOSUPPRESSIVE DRUG THERAPY: ICD-10-CM

## 2024-05-22 DIAGNOSIS — E83.42 HYPOMAGNESEMIA: ICD-10-CM

## 2024-05-22 DIAGNOSIS — Z94.0 S/P KIDNEY TRANSPLANT: Primary | ICD-10-CM

## 2024-05-22 DIAGNOSIS — Z01.818 PRE-TRANSPLANT EVALUATION FOR CHRONIC KIDNEY DISEASE: ICD-10-CM

## 2024-05-22 DIAGNOSIS — T45.1X5A IMMUNODEFICIENCY DUE TO LONG TERM IMMUNOSUPPRESSIVE DRUG THERAPY: ICD-10-CM

## 2024-05-22 DIAGNOSIS — E87.5 HYPERKALEMIA: ICD-10-CM

## 2024-05-22 DIAGNOSIS — Z29.89 PROPHYLACTIC IMMUNOTHERAPY: ICD-10-CM

## 2024-05-22 DIAGNOSIS — Z91.89 AT RISK FOR OPPORTUNISTIC INFECTIONS: ICD-10-CM

## 2024-05-22 DIAGNOSIS — Z94.0 KIDNEY REPLACED BY TRANSPLANT: ICD-10-CM

## 2024-05-22 DIAGNOSIS — Z79.899 ENCOUNTER FOR LONG-TERM (CURRENT) USE OF OTHER MEDICATIONS: ICD-10-CM

## 2024-05-22 DIAGNOSIS — D84.821 IMMUNODEFICIENCY DUE TO LONG TERM IMMUNOSUPPRESSIVE DRUG THERAPY: ICD-10-CM

## 2024-05-22 DIAGNOSIS — Z79.60 LONG-TERM USE OF IMMUNOSUPPRESSANT MEDICATION: ICD-10-CM

## 2024-05-22 PROCEDURE — 3066F NEPHROPATHY DOC TX: CPT | Mod: CPTII,,, | Performed by: TRANSPLANT SURGERY

## 2024-05-22 PROCEDURE — 3074F SYST BP LT 130 MM HG: CPT | Mod: CPTII,,, | Performed by: TRANSPLANT SURGERY

## 2024-05-22 PROCEDURE — 3008F BODY MASS INDEX DOCD: CPT | Mod: CPTII,,, | Performed by: TRANSPLANT SURGERY

## 2024-05-22 PROCEDURE — 97802 MEDICAL NUTRITION INDIV IN: CPT | Mod: 59,PBBFAC

## 2024-05-22 PROCEDURE — 1159F MED LIST DOCD IN RCRD: CPT | Mod: CPTII,,, | Performed by: TRANSPLANT SURGERY

## 2024-05-22 PROCEDURE — 1111F DSCHRG MED/CURRENT MED MERGE: CPT | Mod: CPTII,,, | Performed by: TRANSPLANT SURGERY

## 2024-05-22 PROCEDURE — 99213 OFFICE O/P EST LOW 20 MIN: CPT | Mod: PBBFAC | Performed by: TRANSPLANT SURGERY

## 2024-05-22 PROCEDURE — 99999 PR PBB SHADOW E&M-EST. PATIENT-LVL III: CPT | Mod: PBBFAC,,, | Performed by: TRANSPLANT SURGERY

## 2024-05-22 PROCEDURE — 3078F DIAST BP <80 MM HG: CPT | Mod: CPTII,,, | Performed by: TRANSPLANT SURGERY

## 2024-05-22 PROCEDURE — 99214 OFFICE O/P EST MOD 30 MIN: CPT | Mod: 24,S$PBB,, | Performed by: TRANSPLANT SURGERY

## 2024-05-22 PROCEDURE — 99999PBSHW PR PBB SHADOW TECHNICAL ONLY FILED TO HB: Mod: PBBFAC,,,

## 2024-05-22 RX ORDER — TAMSULOSIN HYDROCHLORIDE 0.4 MG/1
0.4 CAPSULE ORAL DAILY
Qty: 30 CAPSULE | Refills: 11 | Status: SHIPPED | OUTPATIENT
Start: 2024-05-22 | End: 2025-05-22

## 2024-05-22 RX ORDER — LANOLIN ALCOHOL/MO/W.PET/CERES
800 CREAM (GRAM) TOPICAL 3 TIMES DAILY
Qty: 180 TABLET | Refills: 11 | Status: SHIPPED | OUTPATIENT
Start: 2024-05-22 | End: 2025-05-22

## 2024-05-22 RX ORDER — SODIUM BICARBONATE 650 MG/1
1300 TABLET ORAL 2 TIMES DAILY
Qty: 120 TABLET | Refills: 11 | Status: SHIPPED | OUTPATIENT
Start: 2024-05-22 | End: 2025-05-22

## 2024-05-22 RX ORDER — PANTOPRAZOLE SODIUM 40 MG/1
40 TABLET, DELAYED RELEASE ORAL DAILY
Qty: 30 TABLET | Refills: 0 | Status: SHIPPED | OUTPATIENT
Start: 2024-05-22

## 2024-05-22 RX ORDER — TACROLIMUS 1 MG/1
4 CAPSULE ORAL EVERY 12 HOURS
Qty: 120 CAPSULE | Refills: 11 | Status: SHIPPED | OUTPATIENT
Start: 2024-05-22 | End: 2024-05-27 | Stop reason: DRUGHIGH

## 2024-05-22 RX ORDER — CALCITRIOL 0.5 UG/1
1 CAPSULE ORAL DAILY
Qty: 60 CAPSULE | Refills: 5 | Status: SHIPPED | OUTPATIENT
Start: 2024-05-22 | End: 2024-11-18

## 2024-05-22 RX ORDER — MYCOPHENOLATE MOFETIL 250 MG/1
1000 CAPSULE ORAL 2 TIMES DAILY
Qty: 240 CAPSULE | Refills: 11 | Status: SHIPPED | OUTPATIENT
Start: 2024-05-22

## 2024-05-22 RX ORDER — NIFEDIPINE 30 MG/1
30 TABLET, EXTENDED RELEASE ORAL DAILY
Qty: 30 TABLET | Refills: 11 | Status: SHIPPED | OUTPATIENT
Start: 2024-05-22 | End: 2025-05-22

## 2024-05-22 RX ORDER — ATOVAQUONE 750 MG/5ML
1500 SUSPENSION ORAL DAILY
Qty: 300 ML | Refills: 5 | Status: SHIPPED | OUTPATIENT
Start: 2024-05-22 | End: 2024-11-09

## 2024-05-22 RX ORDER — ERGOCALCIFEROL 1.25 MG/1
50000 CAPSULE ORAL
Qty: 4 CAPSULE | Refills: 11 | Status: SHIPPED | OUTPATIENT
Start: 2024-05-22 | End: 2025-05-22

## 2024-05-22 RX ORDER — PREDNISONE 5 MG/1
TABLET ORAL
Qty: 70 TABLET | Refills: 11 | Status: SHIPPED | OUTPATIENT
Start: 2024-05-22

## 2024-05-22 NOTE — PROGRESS NOTES
REASON FOR VISIT:   S/p transplant; high Mg diet per MD request     PAST MEDICAL HX:   PKD, HTN, T2DM, s/p kidney transplant #2 4/27/2024    LABS:   Phos 2.6 on 5/20   Mg 1.3 on 5/20    WT: 90 kg (198 lb)  BMI: 30.18 kg/m2     NUTRITION HX:   Pt and caregiver present. Pt reports excellent appetite with no N/V/D/C. Pt states that he takes stool softeners daily. Pt enjoys many foods that are high in Mg including fish, beans, and whole grains.    INTERVENTION/EDUCATION:  Magnesium content of food list provided & reviewed w/ pt; encouraged to increase po intake of high magnesium foods and continue to monitor levels.    Patient voiced understanding of education & goals. Contact information was provided & will f/u as needed at clinic visits.     Consultation Time: 15 minutes

## 2024-05-22 NOTE — PROGRESS NOTES
Transplant Surgery  Kidney Transplant Recipient Follow-up    Referring Physician: Estefanía Anders  Current Nephrologist: Estefanía Anders    Subjective:     Chief Complaint: Adilson Sylvester is a 48 y.o. year old     or   White male who is status post Kidney transplant performed on 4/27/2024.    ORGAN: LEFT KIDNEY    Disease Etiology: Diabetes Mellitus - Type II  Donor Type: Donation after Brain Death    Donor CMV Status: Positive    Donor HBcAB: Negative    Donor HCV Status: Negative      History of Present Illness: He reports no concerns.  From a transplant perspective, he reports normal urination, no incisional pain, and no dysuria.  Adilson is here for management of his immunosuppression medication.  Adilson states that his immunosuppression is being well tolerated.      External provider notes reviewed: Yes    Objective:     Physical Exam  Constitutional:       General: He is not in acute distress.     Appearance: Normal appearance. He is not ill-appearing.   HENT:      Head: Normocephalic and atraumatic.      Right Ear: External ear normal.      Left Ear: External ear normal.   Eyes:      General: No scleral icterus.     Extraocular Movements: Extraocular movements intact.      Pupils: Pupils are equal, round, and reactive to light.   Cardiovascular:      Rate and Rhythm: Normal rate.      Pulses: Normal pulses.   Pulmonary:      Effort: Pulmonary effort is normal.   Abdominal:      Comments: LLQ incision closed with staples, appears well healed.  PD catheter sites closed with staples.  Medial site well healed, lateral site with small amount of skin separation and scab formation   Musculoskeletal:         General: No swelling.      Right lower leg: No edema.      Left lower leg: No edema.   Skin:     General: Skin is warm and dry.      Capillary Refill: Capillary refill takes less than 2 seconds.   Neurological:      General: No focal deficit present.      Mental Status: He is  alert and oriented to person, place, and time.   Psychiatric:         Mood and Affect: Mood normal.       Lab Results   Component Value Date    CREATININE 1.7 (H) 05/20/2024    BUN 27 (H) 05/20/2024     Lab Results   Component Value Date    WBC 7.02 05/20/2024    WBC 3.4 (L) 07/26/2021    HGB 11.3 (L) 05/20/2024    HCT 34.7 (L) 05/20/2024     05/20/2024     Lab Results   Component Value Date    TACROLIMUS 7.8 05/20/2024       Diagnostics:  The following labs have been reviewed: CBC  BMP  TACROLIMUS LEVEL  The following radiology images have been independently reviewed and interpreted: Renal US    Assessment and Plan:        S/P Kidney transplant.  Chronic immunosuppressive medications for rejection prophylaxis at target.  Plan: no adjustment needed.  Continue monitoring symptoms, labs and drug levels for drug-related toxicity and side effects.  Renal hypertension at target.  Removal staples today.  Lateral PD catheter incision may develop some skin dehiscence.      Additional testing to be completed according to the Kidney: Written Order Guideline for Kidney Transplant Follow-Up (KI-09)    Interpretation of tests and discussion of patient management involves all members of the multidisciplinary transplant team.  Patient advised that it is recommended that all transplant candidates, and their close contacts and household members receive Covid vaccination.  Follow-up: Patient reminded to call with any health changes, since these can be early signs of significant complications.  Also, I advised the patient to be sure any new medications or changes of old medications are discussed with either a pharmacist, or physician knowledgeable with transplant to avoid rejection/drug toxicity related to significant drug interactions.    MICKY PEACOCK MD    I have reviewed and concur with the resident's history, physical, assessment, and plan.  I have personally interviewed and examined the patient at bedside.  See below  addendum for my evaluation and additional findings.         UNOS Patient Status  Functional Status: 90% - Able to carry on normal activity: minor symptoms of disease  Physical Capacity: No Limitations

## 2024-05-22 NOTE — LETTER
May 22, 2024        Estefanía Anders  [address] [facility] [address]  Phone: [phone]  Fax: [phone]             Paul Regan- Transplant [facility] Fl  [address]  [facility] [address]  Phone: [phone]   Patient: Adilson Sylvester   MR Number: [id number]   YOB: 1975   Date of Visit: 5/22/2024       Dear Dr. Estefanía Anders    Thank you for referring Adilson Sylvester to me for evaluation. Attached you will find relevant portions of my assessment and plan of care.    If you have questions, please do not hesitate to call me. I look forward to following Adilson Sylvester along with you.    Sincerely,    Kathie Booth MD    Enclosure    If you would like to receive this communication electronically, please contact [email or web address] or [phone] to request Clarity[employer] Link access.    [employer] Link is a tool which provides read-only access to select patient information with whom you have a relationship. Its easy to use and provides real time access to review your patients record including encounter summaries, notes, results, and demographic information.    If you feel you have received this communication in error or would no longer like to receive these types of communications, please e-mail [email or web address]

## 2024-05-22 NOTE — TELEPHONE ENCOUNTER
Al rx moved to local [facility] per pt request    ----- Message from Bela Milan sent at 5/22/2024  2:38 PM CDT -----  Regarding: Patient advice  Contact: Pt  [phone]        Name of Caller:  Adilson      Contact Preference:   [phone]    Nature of Call: Requesting a call back regarding rejection medication would like to discuss issues with dates and insurance approval

## 2024-05-23 ENCOUNTER — TELEPHONE (OUTPATIENT)
Dept: TRANSPLANT | Facility: CLINIC | Age: 49
End: 2024-05-23
Payer: MEDICAID

## 2024-05-23 ENCOUNTER — LAB VISIT (OUTPATIENT)
Dept: LAB | Facility: HOSPITAL | Age: 49
End: 2024-05-23
Attending: INTERNAL MEDICINE
Payer: MEDICAID

## 2024-05-23 DIAGNOSIS — Z94.0 KIDNEY REPLACED BY TRANSPLANT: Primary | ICD-10-CM

## 2024-05-23 DIAGNOSIS — Z94.0 KIDNEY REPLACED BY TRANSPLANT: ICD-10-CM

## 2024-05-23 LAB
ALBUMIN SERPL BCP-MCNC: 3.1 G/DL (ref 3.5–5.2)
ANION GAP SERPL CALC-SCNC: 10 MMOL/L (ref 8–16)
BASOPHILS # BLD AUTO: 0.11 K/UL (ref 0–0.2)
BASOPHILS NFR BLD: 1.5 % (ref 0–1.9)
BUN SERPL-MCNC: 25 MG/DL (ref 6–20)
CALCIUM SERPL-MCNC: 9.3 MG/DL (ref 8.7–10.5)
CHLORIDE SERPL-SCNC: 110 MMOL/L (ref 95–110)
CO2 SERPL-SCNC: 25 MMOL/L (ref 23–29)
CREAT SERPL-MCNC: 2.1 MG/DL (ref 0.5–1.4)
DIFFERENTIAL METHOD BLD: ABNORMAL
EOSINOPHIL # BLD AUTO: 0.2 K/UL (ref 0–0.5)
EOSINOPHIL NFR BLD: 2.5 % (ref 0–8)
ERYTHROCYTE [DISTWIDTH] IN BLOOD BY AUTOMATED COUNT: 14.1 % (ref 11.5–14.5)
EST. GFR  (NO RACE VARIABLE): 38.6 ML/MIN/1.73 M^2
GLUCOSE SERPL-MCNC: 111 MG/DL (ref 70–110)
HCT VFR BLD AUTO: 35.2 % (ref 40–54)
HGB BLD-MCNC: 11.5 G/DL (ref 14–18)
IMM GRANULOCYTES # BLD AUTO: 0.16 K/UL (ref 0–0.04)
IMM GRANULOCYTES NFR BLD AUTO: 2.1 % (ref 0–0.5)
LYMPHOCYTES # BLD AUTO: 0.7 K/UL (ref 1–4.8)
LYMPHOCYTES NFR BLD: 8.6 % (ref 18–48)
MAGNESIUM SERPL-MCNC: 1.3 MG/DL (ref 1.6–2.6)
MCH RBC QN AUTO: 30.5 PG (ref 27–31)
MCHC RBC AUTO-ENTMCNC: 32.7 G/DL (ref 32–36)
MCV RBC AUTO: 93 FL (ref 82–98)
MONOCYTES # BLD AUTO: 0.5 K/UL (ref 0.3–1)
MONOCYTES NFR BLD: 7.1 % (ref 4–15)
NEUTROPHILS # BLD AUTO: 5.9 K/UL (ref 1.8–7.7)
NEUTROPHILS NFR BLD: 78.2 % (ref 38–73)
NRBC BLD-RTO: 0 /100 WBC
PHOSPHATE SERPL-MCNC: 2 MG/DL (ref 2.7–4.5)
PLATELET # BLD AUTO: 308 K/UL (ref 150–450)
PMV BLD AUTO: 10.2 FL (ref 9.2–12.9)
POTASSIUM SERPL-SCNC: 4.6 MMOL/L (ref 3.5–5.1)
RBC # BLD AUTO: 3.77 M/UL (ref 4.6–6.2)
SODIUM SERPL-SCNC: 145 MMOL/L (ref 136–145)
TACROLIMUS BLD-MCNC: 9.3 NG/ML (ref 5–15)
WBC # BLD AUTO: 7.57 K/UL (ref 3.9–12.7)

## 2024-05-23 PROCEDURE — 80197 ASSAY OF TACROLIMUS: CPT | Performed by: INTERNAL MEDICINE

## 2024-05-23 PROCEDURE — 83735 ASSAY OF MAGNESIUM: CPT | Performed by: INTERNAL MEDICINE

## 2024-05-23 PROCEDURE — 80069 RENAL FUNCTION PANEL: CPT | Performed by: INTERNAL MEDICINE

## 2024-05-23 PROCEDURE — 36415 COLL VENOUS BLD VENIPUNCTURE: CPT | Performed by: INTERNAL MEDICINE

## 2024-05-23 PROCEDURE — 85025 COMPLETE CBC W/AUTO DIFF WBC: CPT | Performed by: INTERNAL MEDICINE

## 2024-05-23 NOTE — PROGRESS NOTES
Cr trending up. Kidney US by tomorrow  Is he ill? Encourage good hydration. Check DSA, allosure, BK, UPCR with lab on Monday

## 2024-05-23 NOTE — TELEPHONE ENCOUNTER
Kidney US scheduled for tomorrow at 5 PM. Labs Monday.     ----- Message from Maru Back MD sent at 5/23/2024 10:42 AM CDT -----  Cr trending up. Kidney US by tomorrow  Is he ill? Encourage good hydration. Check DSA, allosure, BK, UPCR with lab on Monday.

## 2024-05-23 NOTE — TELEPHONE ENCOUNTER
----- Message from Maru Back MD sent at 5/23/2024 10:42 AM CDT -----  Cr trending up. Kidney US by tomorrow  Is he ill? Encourage good hydration. Check DSA, allosure, BK, UPCR with lab on Monday

## 2024-05-24 ENCOUNTER — HOSPITAL ENCOUNTER (OUTPATIENT)
Dept: RADIOLOGY | Facility: HOSPITAL | Age: 49
Discharge: HOME | End: 2024-05-24
Attending: INTERNAL MEDICINE
Payer: MEDICAID

## 2024-05-24 DIAGNOSIS — Z94.0 KIDNEY REPLACED BY TRANSPLANT: ICD-10-CM

## 2024-05-24 PROCEDURE — 76776 US EXAM K TRANSPL W/DOPPLER: CPT | Mod: TC

## 2024-05-24 PROCEDURE — 76776 US EXAM K TRANSPL W/DOPPLER: CPT | Mod: 26,,, | Performed by: STUDENT IN AN ORGANIZED HEALTH CARE EDUCATION/TRAINING PROGRAM

## 2024-05-27 ENCOUNTER — LAB VISIT (OUTPATIENT)
Dept: LAB | Facility: HOSPITAL | Age: 49
End: 2024-05-27
Payer: MEDICAID

## 2024-05-27 ENCOUNTER — TELEPHONE (OUTPATIENT)
Dept: TRANSPLANT | Facility: CLINIC | Age: 49
End: 2024-05-27
Payer: MEDICAID

## 2024-05-27 DIAGNOSIS — Z94.0 KIDNEY REPLACED BY TRANSPLANT: ICD-10-CM

## 2024-05-27 DIAGNOSIS — Z94.0 KIDNEY REPLACED BY TRANSPLANT: Primary | ICD-10-CM

## 2024-05-27 LAB
ALBUMIN SERPL BCP-MCNC: 3.5 G/DL (ref 3.5–5.2)
ALBUMIN SERPL BCP-MCNC: 3.5 G/DL (ref 3.5–5.2)
ALP SERPL-CCNC: 159 U/L (ref 55–135)
ALT SERPL W/O P-5'-P-CCNC: 12 U/L (ref 10–44)
ANION GAP SERPL CALC-SCNC: 11 MMOL/L (ref 8–16)
AST SERPL-CCNC: 11 U/L (ref 10–40)
BASOPHILS # BLD AUTO: 0.08 K/UL (ref 0–0.2)
BASOPHILS NFR BLD: 1 % (ref 0–1.9)
BILIRUB DIRECT SERPL-MCNC: <0.1 MG/DL (ref 0.1–0.3)
BILIRUB SERPL-MCNC: 0.3 MG/DL (ref 0.1–1)
BUN SERPL-MCNC: 20 MG/DL (ref 6–20)
CALCIUM SERPL-MCNC: 9.6 MG/DL (ref 8.7–10.5)
CHLORIDE SERPL-SCNC: 107 MMOL/L (ref 95–110)
CO2 SERPL-SCNC: 21 MMOL/L (ref 23–29)
CREAT SERPL-MCNC: 1.9 MG/DL (ref 0.5–1.4)
DIFFERENTIAL METHOD BLD: ABNORMAL
EOSINOPHIL # BLD AUTO: 0.3 K/UL (ref 0–0.5)
EOSINOPHIL NFR BLD: 3.3 % (ref 0–8)
ERYTHROCYTE [DISTWIDTH] IN BLOOD BY AUTOMATED COUNT: 13.7 % (ref 11.5–14.5)
EST. GFR  (NO RACE VARIABLE): 43.5 ML/MIN/1.73 M^2
GLUCOSE SERPL-MCNC: 101 MG/DL (ref 70–110)
HCT VFR BLD AUTO: 37.4 % (ref 40–54)
HCV RNA SERPL QL NAA+PROBE: NOT DETECTED
HCV RNA SPEC NAA+PROBE-ACNC: NOT DETECTED IU/ML
HEPATITIS B VIRUS DNA: NORMAL
HEPATITIS B VIRUS PCR, QUANT: NOT DETECTED IU/ML
HGB BLD-MCNC: 12 G/DL (ref 14–18)
HIV1 RNA # SERPL NAA+PROBE: NOT DETECTED COPIES/ML
HIV1 RNA SERPL QL NAA+PROBE: NOT DETECTED
IMM GRANULOCYTES # BLD AUTO: 0.17 K/UL (ref 0–0.04)
IMM GRANULOCYTES NFR BLD AUTO: 2 % (ref 0–0.5)
LYMPHOCYTES # BLD AUTO: 0.6 K/UL (ref 1–4.8)
LYMPHOCYTES NFR BLD: 6.9 % (ref 18–48)
MAGNESIUM SERPL-MCNC: 1.5 MG/DL (ref 1.6–2.6)
MCH RBC QN AUTO: 29.7 PG (ref 27–31)
MCHC RBC AUTO-ENTMCNC: 32.1 G/DL (ref 32–36)
MCV RBC AUTO: 93 FL (ref 82–98)
MONOCYTES # BLD AUTO: 0.4 K/UL (ref 0.3–1)
MONOCYTES NFR BLD: 5 % (ref 4–15)
NEUTROPHILS # BLD AUTO: 6.9 K/UL (ref 1.8–7.7)
NEUTROPHILS NFR BLD: 81.8 % (ref 38–73)
NRBC BLD-RTO: 0 /100 WBC
PHOSPHATE SERPL-MCNC: 2.2 MG/DL (ref 2.7–4.5)
PLATELET # BLD AUTO: 310 K/UL (ref 150–450)
PMV BLD AUTO: 10.4 FL (ref 9.2–12.9)
POTASSIUM SERPL-SCNC: 4.5 MMOL/L (ref 3.5–5.1)
PROT SERPL-MCNC: 6.5 G/DL (ref 6–8.4)
RBC # BLD AUTO: 4.04 M/UL (ref 4.6–6.2)
SODIUM SERPL-SCNC: 139 MMOL/L (ref 136–145)
TACROLIMUS BLD-MCNC: 11.7 NG/ML (ref 5–15)
WBC # BLD AUTO: 8.41 K/UL (ref 3.9–12.7)

## 2024-05-27 PROCEDURE — 85025 COMPLETE CBC W/AUTO DIFF WBC: CPT | Performed by: INTERNAL MEDICINE

## 2024-05-27 PROCEDURE — 86832 HLA CLASS I HIGH DEFIN QUAL: CPT | Performed by: INTERNAL MEDICINE

## 2024-05-27 PROCEDURE — 86833 HLA CLASS II HIGH DEFIN QUAL: CPT | Performed by: INTERNAL MEDICINE

## 2024-05-27 PROCEDURE — 80197 ASSAY OF TACROLIMUS: CPT | Performed by: INTERNAL MEDICINE

## 2024-05-27 PROCEDURE — 36415 COLL VENOUS BLD VENIPUNCTURE: CPT | Performed by: INTERNAL MEDICINE

## 2024-05-27 PROCEDURE — 84075 ASSAY ALKALINE PHOSPHATASE: CPT | Performed by: INTERNAL MEDICINE

## 2024-05-27 PROCEDURE — 87799 DETECT AGENT NOS DNA QUANT: CPT | Performed by: INTERNAL MEDICINE

## 2024-05-27 PROCEDURE — 87536 HIV-1 QUANT&REVRSE TRNSCRPJ: CPT | Performed by: INTERNAL MEDICINE

## 2024-05-27 PROCEDURE — 87517 HEPATITIS B DNA QUANT: CPT | Performed by: INTERNAL MEDICINE

## 2024-05-27 PROCEDURE — 80069 RENAL FUNCTION PANEL: CPT | Performed by: INTERNAL MEDICINE

## 2024-05-27 PROCEDURE — 83735 ASSAY OF MAGNESIUM: CPT | Performed by: INTERNAL MEDICINE

## 2024-05-27 PROCEDURE — 87522 HEPATITIS C REVRS TRNSCRPJ: CPT | Performed by: INTERNAL MEDICINE

## 2024-05-27 PROCEDURE — 86977 RBC SERUM PRETX INCUBJ/INHIB: CPT | Mod: 59 | Performed by: INTERNAL MEDICINE

## 2024-05-27 RX ORDER — TACROLIMUS 1 MG/1
CAPSULE ORAL
Qty: 210 CAPSULE | Refills: 11 | Status: SHIPPED | OUTPATIENT
Start: 2024-05-27 | End: 2024-06-10

## 2024-05-27 NOTE — TELEPHONE ENCOUNTER
Pt notified to increase hydration. Kidney biopsy order placed and added to IR scheduling sheet for within 1 week if possible.    ----- Message from Maru Back MD sent at 5/27/2024  8:56 AM CDT -----  Cr 1.9: continue good hydration. Kidney biopsy for next week    Wendy Crowley

## 2024-05-27 NOTE — TELEPHONE ENCOUNTER
Pt notified to decrease Prograf to 4 mg in the morning and 3 mg at night.     ----- Message from Maru Back MD sent at 5/27/2024 11:18 AM CDT -----  Tac to 4/3 mg if it is a true level.

## 2024-05-30 DIAGNOSIS — Z01.818 PRE-TRANSPLANT EVALUATION FOR CHRONIC KIDNEY DISEASE: ICD-10-CM

## 2024-05-30 LAB
BKV DNA SERPL NAA+PROBE-ACNC: <125 COPIES/ML
BKV DNA SERPL NAA+PROBE-LOG#: <2.1 LOG (10) COPIES/ML
BKV DNA SERPL QL NAA+PROBE: NOT DETECTED
CLASS I ANTIBODY COMMENTS - LUMINEX: NORMAL
CLASS II ANTIBODY COMMENTS - LUMINEX: NORMAL
DSA1 TESTING DATE: NORMAL
DSA12 TESTING DATE: NORMAL
DSA2 TESTING DATE: NORMAL
SERUM COLLECTION DT - LUMINEX CLASS I: NORMAL
SERUM COLLECTION DT - LUMINEX CLASS II: NORMAL

## 2024-05-30 RX ORDER — VALGANCICLOVIR 450 MG/1
450 TABLET, FILM COATED ORAL
Qty: 24 TABLET | Refills: 0 | Status: SHIPPED | OUTPATIENT
Start: 2024-05-31 | End: 2024-07-27

## 2024-05-30 NOTE — TELEPHONE ENCOUNTER
Pt requesting fill at local pharmacy    ----- Message from Bela Milan sent at 5/30/2024 10:14 AM CDT -----  Regarding: Refill  Contact: Pt  [phone]        Rx Name:   valGANciclovir (VALCYTE) 450 mg Tab      Preferred Pharmacy with number:    [id number] - [place] [id number] [address]  Phone: [phone] Fax: [phone]       Ordering Provider:  Maru Back MD    Contact preference:  [phone]    Comments/Notes: Is completely out

## 2024-05-31 PROBLEM — E66.811 CLASS 1 OBESITY DUE TO EXCESS CALORIES WITH SERIOUS COMORBIDITY AND BODY MASS INDEX (BMI) OF 30.0 TO 30.9 IN ADULT: Status: ACTIVE | Noted: 2024-05-08

## 2024-05-31 PROBLEM — E66.09 CLASS 1 OBESITY DUE TO EXCESS CALORIES WITH SERIOUS COMORBIDITY AND BODY MASS INDEX (BMI) OF 30.0 TO 30.9 IN ADULT: Status: ACTIVE | Noted: 2024-05-08

## 2024-05-31 LAB
ALLOSURE COMMENT: NORMAL
ALLOSURE SCORE KIDNEY: 0.21 %
INFORMATION: NORMAL

## 2024-05-31 NOTE — TELEPHONE ENCOUNTER
Called pt to inform him that he would have to go to our [facility] clinic to have his labs drawn. Pt states that he doesn't have a car at the time. He should have a rental by the end of the week. Pt has to reschedule his Neuro appt at [facility] and will call me to add labs on to that date.    no

## 2024-05-31 NOTE — PROGRESS NOTES
"   Kidney Post-Transplant Assessment    Referring Physician: Estefanía Anders  Current Nephrologist: Estefanía Anders    ORGAN: LEFT KIDNEY  Donor Type: donation after brain death  PHS Increased Risk: yes  Cold Ischemia: 634 mins  Induction Medications:      Subjective:     CC:  Reassessment of renal allograft function and management of chronic immunosuppression.    HPI:  Mr. Sylvester is a 48 y.o. year old     or  White male who received a donation after brain death kidney transplant #2 on 4/27/24. His most recent creatinine is 1.9. He takes mycophenolate mofetil, prednisone, and tacrolimus for maintenance immunosuppression. His post transplant course has been uncomplicated to date.     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 He restarted dialysis/ PD  on 4/20/21.    LOV 5/13/24   Interval HX:  Intake 3L + water daily   UOP- no problems reported   Peripheral edema-no    Appetite-denies N/V/D; tolerating IS meds well    Over all doing well   BP  BP Readings from Last 3 Encounters:   06/03/24 133/85   05/22/24 125/78   05/16/24 133/72       Lab /diagnostic results reviewed with patient today.   All questions answered     Past Medical History:   Diagnosis Date    Diabetes     Diabetes mellitus, type 2     Gout     Hyperkalemia     Hypertension     Hypothyroidism     Hypothyroidism     Kidney transplant recipient     Obesity     Patient on waiting list for kidney transplant 05/23/2023    Polycystic kidney disease     Pre-transplant evaluation for chronic kidney disease 12/14/2021       Review of Systems " "  Constitutional:  Negative for activity change, appetite change, chills, fatigue, fever and unexpected weight change.   HENT:  Negative for congestion, facial swelling, postnasal drip, rhinorrhea, sinus pressure, sore throat and trouble swallowing.    Eyes:  Negative for pain, redness and visual disturbance.   Respiratory:  Negative for cough, chest tightness, shortness of breath and wheezing.    Cardiovascular: Negative.  Negative for chest pain, palpitations and leg swelling.   Gastrointestinal:  Negative for abdominal pain, diarrhea, nausea and vomiting.   Genitourinary:  Negative for dysuria, flank pain and urgency.   Musculoskeletal:  Negative for gait problem, neck pain and neck stiffness.   Skin:  Negative for rash.   Allergic/Immunologic: Positive for immunocompromised state. Negative for environmental allergies and food allergies.   Neurological:  Negative for dizziness, weakness, light-headedness and headaches.   Psychiatric/Behavioral:  Negative for agitation and confusion. The patient is not nervous/anxious.        Objective:   Blood pressure 133/85, pulse 90, temperature 97.3 °F (36.3 °C), temperature source Temporal, height 5' 7" (1.702 m), weight 91.6 kg (201 lb 15.1 oz), SpO2 99%.body mass index is 31.63 kg/m².    Physical Exam  Constitutional:       Appearance: Normal appearance. He is well-developed.   HENT:      Head: Normocephalic.      Nose: Nose normal.   Eyes:      Conjunctiva/sclera: Conjunctivae normal.      Pupils: Pupils are equal, round, and reactive to light.   Cardiovascular:      Rate and Rhythm: Normal rate and regular rhythm.      Heart sounds: Normal heart sounds.   Pulmonary:      Effort: Pulmonary effort is normal.      Breath sounds: Normal breath sounds.   Abdominal:      General: Bowel sounds are normal.      Palpations: Abdomen is soft. There is no hepatomegaly or splenomegaly.      Comments:    No bruit noted over the allograft      Musculoskeletal:      Cervical back: " "Normal range of motion and neck supple.   Skin:     General: Skin is warm and dry.   Neurological:      Mental Status: He is alert and oriented to person, place, and time.   Psychiatric:         Behavior: Behavior normal.         Labs:  Lab Results   Component Value Date    WBC 8.49 06/03/2024    HGB 12.5 (L) 06/03/2024    HCT 39.1 (L) 06/03/2024    LABPLAT 129 (L) 07/26/2021     06/03/2024    K 4.7 06/03/2024     06/03/2024    CO2 26 06/03/2024    BUN 18 06/03/2024    CREATININE 1.9 (H) 06/03/2024    EGFRNORACEVR 43.5 (A) 06/03/2024    CALCIUM 10.2 06/03/2024    PHOS 2.6 (L) 06/03/2024    MG 1.6 06/03/2024    ALBUMIN 3.4 (L) 06/03/2024    AST 11 05/27/2024    ALT 12 05/27/2024    UTPCR Unable to calculate 06/03/2024    PTH 1,232.4 (H) 05/01/2024    TACROLIMUS 9.9 06/03/2024       No results found for: "EXTANC", "EXTWBC", "EXTSEGS", "EXTPLATELETS", "EXTHEMOGLOBI", "EXTHEMATOCRI", "EXTCREATININ", "EXTSODIUM", "EXTPOTASSIUM", "EXTBUN", "EXTCO2", "EXTCALCIUM", "EXTPHOSPHORU", "EXTGLUCOSE", "EXTALBUMIN", "EXTAST", "EXTALT", "EXTBILITOTAL", "EXTLIPASE", "EXTAMYLASE"    No results found for: "EXTCYCLOSLVL", "EXTSIROLIMUS", "EXTTACROLVL", "EXTPROTCRE", "EXTPTHINTACT", "EXTPROTEINUA", "EXTWBCUA", "EXTRBCUA"    Labs were reviewed with the patient    Assessment:     1. S/P kidney transplant    2. Type 2 DM with CKD stage 3 and hypertension    3. PKD (polycystic kidney disease)    4. Secondary hyperparathyroidism of renal origin    5. Anemia in stage 3 chronic kidney disease, unspecified whether stage 3a or 3b CKD    6. At risk for opportunistic infections    7. Immunodeficiency due to long term immunosuppressive drug therapy    8. Class 1 obesity due to excess calories with serious comorbidity and body mass index (BMI) of 30.0 to 30.9 in adult          Plan:           kidney bx scheduled 6/6/24     1) s/p living related kidney transplant              - Graft function CKD 3b    -FK Trough 9.9   - cont on FK 4/3   - " cont on Cellcept 1000 mg BID   -prednisone  taper   -   Atovaquone 1500 mg Qd for PCP prophylaxis    -  Valcyte 450 mg MWF for CMV prophylaxis  -Will continue to monitor for drug toxicities     5/27/24 no DSA  5/27/24 Allo 0.21%  -Scr slightly higher than BL --kidney bx scheduled 6/6/24 to further assess    Lab Results   Component Value Date    CREATININE 1.9 (H) 06/03/2024       eGFR >60 mL/min/1.73 m^2 43.5         2)  HTN: advise low salt diet and home BP monitoring  - Cont  nifedipine and  flomax   BP Readings from Last 3 Encounters:   06/03/24 133/85   05/22/24 125/78   05/16/24 133/72         type II DM:   -  MED REGIME   Mgmt deferred to endocrinology    Lab Results   Component Value Date    HGBA1C 5.4 10/07/2022         3) Hypophosphatemia, Hypomagnesemia, Secondary hyperparathyroidism:  - no intervention required ,will continue to monitor/ guidelines                -continue calcitriol, ERGO     Lab Results   Component Value Date    PTH 1,232.4 (H) 05/01/2024    CALCIUM 10.2 06/03/2024    PHOS 2.6 (L) 06/03/2024        Latest Reference Range & Units POD 37,  Kidney-Post 1 Year  06/03/24 08:23   Magnesium  1.6 - 2.6 mg/dL 1.6           4) Metabolic acidosis/Electrolyte balance:  - no intervention required ,will continue to monitor/ guidelines    -  I cont sodium bicarb  and  Start lokelma 5 gm QD  Lab Results   Component Value Date     06/03/2024    K 4.7 06/03/2024     06/03/2024    CO2 26 06/03/2024         5) Anemia/Cytopenias:   will monitor as per our guidelines. Medicine list reviewed including potential causes of drug-induced cytopenias                - no intervention required ,will continue to monitor/ guidelines      Lab Results   Component Value Date    WBC 8.49 06/03/2024    HGB 12.5 (L) 06/03/2024    HCT 39.1 (L) 06/03/2024    MCV 93 06/03/2024     06/03/2024           6) Proteinuria: continue p/c ratio as per guidelines   Protein Creatinine Ratios:    Creatinine, Urine   Date  Value Ref Range Status   06/03/2024 46.0 23.0 - 375.0 mg/dL Final   05/27/2024 17.0 (L) 23.0 - 375.0 mg/dL Final   05/20/2024 90.0 23.0 - 375.0 mg/dL Final     Protein, Urine Random   Date Value Ref Range Status   06/03/2024 <7 0 - 15 mg/dL Final   05/27/2024 <7 0 - 15 mg/dL Final   05/20/2024 <7 0 - 15 mg/dL Final     Prot/Creat Ratio, Urine   Date Value Ref Range Status   06/03/2024 Unable to calculate 0.00 - 0.20 Final   05/27/2024 Unable to calculate 0.00 - 0.20 Final   05/20/2024 Unable to calculate 0.00 - 0.20 Final        .     7) BK virus infection screening:  will continue to monitor/ guidelines       8) Weight education: provided during the clinic visit   Body mass index is 31.63 kg/m².        Follow-up:   Clinic: return to transplant clinic weekly for the first month after transplant; every 2 weeks during months 2-3; then at 6-, 9-, 12-, 18-, 24-, and 36- months post-transplant to reassess for complications from immunosuppression toxicity and monitor for rejection.  Annually thereafter.    Labs: since patient remains at high risk for rejection and drug-related complications that warrant close monitoring, labs will be ordered as follows: continue twice weekly CBC, renal panel, and drug level for first month; then same labs once weekly through 3rd month post-transplant.  Urine for UA and protein/creatinine ratio monthly.  Serum BK - PCR at 1-, 3-, 6-, 9-, 12-, 18-, 24-, 36-, 48-, and 60 months post-transplant.  Hepatic panel at 1-, 2-, 3-, 6-, 9-, 12-, 18-, 24-, and 36- months post-transplant.    Radha Kay NP       Education:   Material provided to the patient.  Patient reminded to call with any health changes since these can be early signs of significant complications.  Also, I advised the patient to be sure any new medications or changes of old medications are discussed with either a pharmacist or physician knowledgeable with transplant to avoid rejection/drug toxicity related to significant drug  interactions.    Patient advised that it is recommended that all transplanted patients, and their close contacts and household members receive Covid vaccination.

## 2024-06-03 ENCOUNTER — OFFICE VISIT (OUTPATIENT)
Dept: TRANSPLANT | Facility: CLINIC | Age: 49
End: 2024-06-03
Payer: MEDICAID

## 2024-06-03 ENCOUNTER — LAB VISIT (OUTPATIENT)
Dept: LAB | Facility: HOSPITAL | Age: 49
End: 2024-06-03
Attending: INTERNAL MEDICINE
Payer: MEDICAID

## 2024-06-03 VITALS
OXYGEN SATURATION: 99 % | HEART RATE: 90 BPM | DIASTOLIC BLOOD PRESSURE: 85 MMHG | HEIGHT: 67 IN | BODY MASS INDEX: 31.7 KG/M2 | SYSTOLIC BLOOD PRESSURE: 133 MMHG | TEMPERATURE: 97 F | WEIGHT: 201.94 LBS

## 2024-06-03 DIAGNOSIS — E66.09 CLASS 1 OBESITY DUE TO EXCESS CALORIES WITH SERIOUS COMORBIDITY AND BODY MASS INDEX (BMI) OF 30.0 TO 30.9 IN ADULT: ICD-10-CM

## 2024-06-03 DIAGNOSIS — I12.9 TYPE 2 DM WITH CKD STAGE 3 AND HYPERTENSION: ICD-10-CM

## 2024-06-03 DIAGNOSIS — E11.22 TYPE 2 DM WITH CKD STAGE 3 AND HYPERTENSION: ICD-10-CM

## 2024-06-03 DIAGNOSIS — Z94.0 S/P KIDNEY TRANSPLANT: Primary | ICD-10-CM

## 2024-06-03 DIAGNOSIS — N18.30 TYPE 2 DM WITH CKD STAGE 3 AND HYPERTENSION: ICD-10-CM

## 2024-06-03 DIAGNOSIS — D84.821 IMMUNODEFICIENCY DUE TO LONG TERM IMMUNOSUPPRESSIVE DRUG THERAPY: ICD-10-CM

## 2024-06-03 DIAGNOSIS — D63.1 ANEMIA IN STAGE 3 CHRONIC KIDNEY DISEASE, UNSPECIFIED WHETHER STAGE 3A OR 3B CKD: ICD-10-CM

## 2024-06-03 DIAGNOSIS — N25.81 SECONDARY HYPERPARATHYROIDISM OF RENAL ORIGIN: ICD-10-CM

## 2024-06-03 DIAGNOSIS — Z79.899 IMMUNODEFICIENCY DUE TO LONG TERM IMMUNOSUPPRESSIVE DRUG THERAPY: ICD-10-CM

## 2024-06-03 DIAGNOSIS — Q61.3 PKD (POLYCYSTIC KIDNEY DISEASE): ICD-10-CM

## 2024-06-03 DIAGNOSIS — Z91.89 AT RISK FOR OPPORTUNISTIC INFECTIONS: ICD-10-CM

## 2024-06-03 DIAGNOSIS — Z94.0 KIDNEY REPLACED BY TRANSPLANT: ICD-10-CM

## 2024-06-03 DIAGNOSIS — N18.30 ANEMIA IN STAGE 3 CHRONIC KIDNEY DISEASE, UNSPECIFIED WHETHER STAGE 3A OR 3B CKD: ICD-10-CM

## 2024-06-03 DIAGNOSIS — T45.1X5A IMMUNODEFICIENCY DUE TO LONG TERM IMMUNOSUPPRESSIVE DRUG THERAPY: ICD-10-CM

## 2024-06-03 LAB
ALBUMIN SERPL BCP-MCNC: 3.4 G/DL (ref 3.5–5.2)
ANION GAP SERPL CALC-SCNC: 7 MMOL/L (ref 8–16)
BASOPHILS # BLD AUTO: 0.11 K/UL (ref 0–0.2)
BASOPHILS NFR BLD: 1.3 % (ref 0–1.9)
BUN SERPL-MCNC: 18 MG/DL (ref 6–20)
CALCIUM SERPL-MCNC: 10.2 MG/DL (ref 8.7–10.5)
CHLORIDE SERPL-SCNC: 106 MMOL/L (ref 95–110)
CO2 SERPL-SCNC: 26 MMOL/L (ref 23–29)
CREAT SERPL-MCNC: 1.9 MG/DL (ref 0.5–1.4)
DIFFERENTIAL METHOD BLD: ABNORMAL
EOSINOPHIL # BLD AUTO: 0.2 K/UL (ref 0–0.5)
EOSINOPHIL NFR BLD: 1.8 % (ref 0–8)
ERYTHROCYTE [DISTWIDTH] IN BLOOD BY AUTOMATED COUNT: 13.9 % (ref 11.5–14.5)
EST. GFR  (NO RACE VARIABLE): 43.5 ML/MIN/1.73 M^2
GLUCOSE SERPL-MCNC: 98 MG/DL (ref 70–110)
HCT VFR BLD AUTO: 39.1 % (ref 40–54)
HGB BLD-MCNC: 12.5 G/DL (ref 14–18)
IMM GRANULOCYTES # BLD AUTO: 0.17 K/UL (ref 0–0.04)
IMM GRANULOCYTES NFR BLD AUTO: 2 % (ref 0–0.5)
LYMPHOCYTES # BLD AUTO: 0.6 K/UL (ref 1–4.8)
LYMPHOCYTES NFR BLD: 7.3 % (ref 18–48)
MAGNESIUM SERPL-MCNC: 1.6 MG/DL (ref 1.6–2.6)
MCH RBC QN AUTO: 29.8 PG (ref 27–31)
MCHC RBC AUTO-ENTMCNC: 32 G/DL (ref 32–36)
MCV RBC AUTO: 93 FL (ref 82–98)
MONOCYTES # BLD AUTO: 0.6 K/UL (ref 0.3–1)
MONOCYTES NFR BLD: 7.4 % (ref 4–15)
NEUTROPHILS # BLD AUTO: 6.8 K/UL (ref 1.8–7.7)
NEUTROPHILS NFR BLD: 80.2 % (ref 38–73)
NRBC BLD-RTO: 0 /100 WBC
PHOSPHATE SERPL-MCNC: 2.6 MG/DL (ref 2.7–4.5)
PLATELET # BLD AUTO: 328 K/UL (ref 150–450)
PMV BLD AUTO: 10.4 FL (ref 9.2–12.9)
POTASSIUM SERPL-SCNC: 4.7 MMOL/L (ref 3.5–5.1)
RBC # BLD AUTO: 4.2 M/UL (ref 4.6–6.2)
SODIUM SERPL-SCNC: 139 MMOL/L (ref 136–145)
TACROLIMUS BLD-MCNC: 9.9 NG/ML (ref 5–15)
WBC # BLD AUTO: 8.49 K/UL (ref 3.9–12.7)

## 2024-06-03 PROCEDURE — 80069 RENAL FUNCTION PANEL: CPT | Performed by: INTERNAL MEDICINE

## 2024-06-03 PROCEDURE — 99999 PR PBB SHADOW E&M-EST. PATIENT-LVL IV: CPT | Mod: PBBFAC,,, | Performed by: NURSE PRACTITIONER

## 2024-06-03 PROCEDURE — 85025 COMPLETE CBC W/AUTO DIFF WBC: CPT | Performed by: INTERNAL MEDICINE

## 2024-06-03 PROCEDURE — 80197 ASSAY OF TACROLIMUS: CPT | Performed by: INTERNAL MEDICINE

## 2024-06-03 PROCEDURE — 3066F NEPHROPATHY DOC TX: CPT | Mod: CPTII,,, | Performed by: NURSE PRACTITIONER

## 2024-06-03 PROCEDURE — 3079F DIAST BP 80-89 MM HG: CPT | Mod: CPTII,,, | Performed by: NURSE PRACTITIONER

## 2024-06-03 PROCEDURE — 36415 COLL VENOUS BLD VENIPUNCTURE: CPT | Performed by: INTERNAL MEDICINE

## 2024-06-03 PROCEDURE — 99214 OFFICE O/P EST MOD 30 MIN: CPT | Mod: PBBFAC | Performed by: NURSE PRACTITIONER

## 2024-06-03 PROCEDURE — 99215 OFFICE O/P EST HI 40 MIN: CPT | Mod: S$PBB,,, | Performed by: NURSE PRACTITIONER

## 2024-06-03 PROCEDURE — 3075F SYST BP GE 130 - 139MM HG: CPT | Mod: CPTII,,, | Performed by: NURSE PRACTITIONER

## 2024-06-03 PROCEDURE — 1160F RVW MEDS BY RX/DR IN RCRD: CPT | Mod: CPTII,,, | Performed by: NURSE PRACTITIONER

## 2024-06-03 PROCEDURE — 83735 ASSAY OF MAGNESIUM: CPT | Performed by: INTERNAL MEDICINE

## 2024-06-03 PROCEDURE — 1159F MED LIST DOCD IN RCRD: CPT | Mod: CPTII,,, | Performed by: NURSE PRACTITIONER

## 2024-06-03 PROCEDURE — 3008F BODY MASS INDEX DOCD: CPT | Mod: CPTII,,, | Performed by: NURSE PRACTITIONER

## 2024-06-04 ENCOUNTER — PATIENT MESSAGE (OUTPATIENT)
Dept: INTERVENTIONAL RADIOLOGY/VASCULAR | Facility: HOSPITAL | Age: 49
End: 2024-06-04
Payer: MEDICAID

## 2024-06-04 NOTE — NURSING
Pre-procedure call complete.  Pt instructed not to eat or drink anything after midnight the night before procedure.  Pt aware will need someone to provide transport home and monitor pt 8 hours post procedure.  No driving for at least 24 hours after procedure.   Patient advised to take blood pressure, heart medications,  and anti-rejections medications with a sip of water morning of procedure.  Patient verbalized aware of which medications to take.  Do not take  sleep medication (including OTC) and anxiety medication the night before procedure.  Arrival time and location given.  Expected length of stay reviewed.  Covid screening completed.  Pt verbalized understanding of all pre-procedure instructions.  Written instructions and directions sent to patient in [facility] labshart/portal.

## 2024-06-06 ENCOUNTER — DOCUMENTATION ONLY (OUTPATIENT)
Dept: TRANSPLANT | Facility: CLINIC | Age: 49
End: 2024-06-06
Payer: MEDICAID

## 2024-06-06 ENCOUNTER — HOSPITAL ENCOUNTER (OUTPATIENT)
Dept: INTERVENTIONAL RADIOLOGY/VASCULAR | Facility: HOSPITAL | Age: 49
Discharge: HOME | End: 2024-06-06
Attending: INTERNAL MEDICINE
Payer: MEDICAID

## 2024-06-06 VITALS
SYSTOLIC BLOOD PRESSURE: 110 MMHG | BODY MASS INDEX: 28.79 KG/M2 | OXYGEN SATURATION: 100 % | HEART RATE: 59 BPM | TEMPERATURE: 98 F | WEIGHT: 190 LBS | RESPIRATION RATE: 16 BRPM | HEIGHT: 68 IN | DIASTOLIC BLOOD PRESSURE: 72 MMHG

## 2024-06-06 DIAGNOSIS — Z94.0 KIDNEY REPLACED BY TRANSPLANT: Primary | ICD-10-CM

## 2024-06-06 PROCEDURE — 27200940 IR BIOPSY KIDNEY

## 2024-06-06 PROCEDURE — 50200 RENAL BIOPSY PERQ: CPT | Performed by: RADIOLOGY

## 2024-06-06 PROCEDURE — 27201068 IR BIOPSY KIDNEY

## 2024-06-06 PROCEDURE — 76942 ECHO GUIDE FOR BIOPSY: CPT | Mod: TC | Performed by: RADIOLOGY

## 2024-06-06 PROCEDURE — 99152 MOD SED SAME PHYS/QHP 5/>YRS: CPT | Performed by: RADIOLOGY

## 2024-06-06 PROCEDURE — 76942 ECHO GUIDE FOR BIOPSY: CPT | Mod: 26,,, | Performed by: RADIOLOGY

## 2024-06-06 PROCEDURE — 63600175 PHARM REV CODE 636 W HCPCS: Performed by: INTERNAL MEDICINE

## 2024-06-06 PROCEDURE — 99152 MOD SED SAME PHYS/QHP 5/>YRS: CPT | Mod: ,,, | Performed by: RADIOLOGY

## 2024-06-06 PROCEDURE — 25000003 PHARM REV CODE 250: Performed by: INTERNAL MEDICINE

## 2024-06-06 RX ORDER — LIDOCAINE HYDROCHLORIDE 10 MG/ML
1 INJECTION, SOLUTION EPIDURAL; INFILTRATION; INTRACAUDAL; PERINEURAL ONCE
Status: DISCONTINUED | OUTPATIENT
Start: 2024-06-06 | End: 2024-06-07 | Stop reason: HOSPADM

## 2024-06-06 RX ORDER — MIDAZOLAM HYDROCHLORIDE 1 MG/ML
INJECTION, SOLUTION INTRAMUSCULAR; INTRAVENOUS
Status: COMPLETED | OUTPATIENT
Start: 2024-06-06 | End: 2024-06-06

## 2024-06-06 RX ORDER — SODIUM CHLORIDE 9 MG/ML
INJECTION, SOLUTION INTRAVENOUS CONTINUOUS
Status: DISCONTINUED | OUTPATIENT
Start: 2024-06-06 | End: 2024-06-07 | Stop reason: HOSPADM

## 2024-06-06 RX ORDER — LIDOCAINE HYDROCHLORIDE 10 MG/ML
INJECTION INFILTRATION; PERINEURAL
Status: COMPLETED | OUTPATIENT
Start: 2024-06-06 | End: 2024-06-06

## 2024-06-06 RX ORDER — FENTANYL CITRATE 50 UG/ML
INJECTION, SOLUTION INTRAMUSCULAR; INTRAVENOUS
Status: COMPLETED | OUTPATIENT
Start: 2024-06-06 | End: 2024-06-06

## 2024-06-06 RX ADMIN — FENTANYL CITRATE 50 MCG: 50 INJECTION, SOLUTION INTRAMUSCULAR; INTRAVENOUS at 12:06

## 2024-06-06 RX ADMIN — MIDAZOLAM HYDROCHLORIDE 1 MG: 2 INJECTION, SOLUTION INTRAMUSCULAR; INTRAVENOUS at 12:06

## 2024-06-06 RX ADMIN — LIDOCAINE HYDROCHLORIDE 10 ML: 10 INJECTION, SOLUTION INFILTRATION; PERINEURAL at 12:06

## 2024-06-06 RX ADMIN — Medication 1 G: at 12:06

## 2024-06-06 NOTE — DISCHARGE INSTRUCTIONS
For scheduling: Call Molly at [phone]    For questions or concerns call: KASHIF MON-FRI 8 AM- 5PM [phone]. Radiology resident on call [phone].    For immediate concerns that are not emergent, you may call our radiology clinic at: [phone]

## 2024-06-06 NOTE — PROCEDURES
VIR Post-Procedure Note    Pre Op Diagnosis: Renal dysfunction    Post Op Diagnosis: Same    Procedure: Ultrasound guided renal biopsy    Procedure performed by:  Mónica Wagner MD    Written Informed Consent Obtained: Yes    Specimen Removed:  Yes; 4 cores.     Estimated Blood Loss:  Minimal    Findings:  Moderate sedation and local anesthesia were used.    US guided biopsy of LLQ transplant kidney using 18-g biopsy needle / 17-g sheath needle.    The patient tolerated the procedure well and there were no complications.  Please see Imaging report for further details.      Mónica Wagner MD  VIR Fellow

## 2024-06-06 NOTE — PLAN OF CARE
Transplant kidney biopsy procedure completed. Pt tolerated procedure well.Patient AAOx3, no distress noted, respirations even and unlabored. Procedural site covered with band aid, dressing is clean, dry and intact. Pt to be transported to MPU for 2 hour post-procedural sedation recovery per MD. Report to be given at bedside to RN.

## 2024-06-06 NOTE — H&P
Inpatient Radiology Pre-procedure Note    History of Present Illness:  Adilson Sylvester is a 48 y.o. male who presents for LLQ renal allograft biopsy.    Admission H&P reviewed.  Past Medical History:   Diagnosis Date    Diabetes     Diabetes mellitus, type 2     Gout     Hyperkalemia     Hypertension     Hypothyroidism     Hypothyroidism     Kidney transplant recipient     Obesity     Patient on waiting list for kidney transplant 05/23/2023    Polycystic kidney disease     Pre-transplant evaluation for chronic kidney disease 12/14/2021     Past Surgical History:   Procedure Laterality Date    ANKLE SURGERY      HERNIA REPAIR      KIDNEY TRANSPLANT  2015    Quirino Zoroastrian    KIDNEY TRANSPLANT N/A 4/27/2024    Procedure: TRANSPLANT, KIDNEY;  Surgeon: Armond Pina MD;  Location: [facility] OR [address] [place] [address];  Service: Transplant;  Laterality: N/A;    LITHOTRIPSY Left 2017    naitive kidney     PD catheter      PERITONEAL CATHETER INSERTION      PERITONEAL CATHETER REMOVAL N/A 4/27/2024    Procedure: REMOVAL, CATHETER, DIALYSIS, PERITONEAL;  Surgeon: Armond Pina MD;  Location: [facility] OR [address] [place] [address];  Service: Transplant;  Laterality: N/A;       Review of Systems:   As documented in primary team H&P    Home Meds:   Prior to Admission medications    Medication Sig Start Date End Date Taking? Authorizing Provider   atovaquone (MEPRON) 750 mg/5 mL Susp oral liquid Take 10 mLs (1,500 mg total) by mouth once daily. (STOP 10/27/24). 5/22/24 11/9/24  Maru Back MD   calcitRIOL (ROCALTROL) 0.5 MCG Cap Take 2 capsules (1 mcg total) by mouth once daily. 5/22/24 11/18/24  Maru Back MD   ergocalciferol (ERGOCALCIFEROL) 50,000 unit Cap Take 1 capsule (50,000 Units total) by mouth every 7 days. 5/22/24 5/22/25  Maru Back MD   furosemide (LASIX) 40 MG tablet Take 1 tablet (40 mg total) by mouth once daily.  Patient not taking: Reported on 6/3/2024 5/1/24   Maru Back MD   k phos di & mono-sod phos mono  (K-PHOS-NEUTRAL) 250 mg Tab Take 1 tablet by mouth 3 (three) times daily. 5/22/24 5/22/25  Maru Back MD   magnesium oxide (MAG-OX) 400 mg (241.3 mg magnesium) tablet Take 2 tablets (800 mg total) by mouth 3 (three) times daily. 5/22/24 5/22/25  Maru Back MD   multivitamin Tab Take 1 tablet by mouth once daily. 5/22/24   Maru Back MD   mycophenolate (CELLCEPT) 250 mg Cap Take 4 capsules (1,000 mg total) by mouth 2 (two) times daily. 5/22/24   Mrau Back MD   NIFEdipine (PROCARDIA-XL) 30 MG (OSM) 24 hr tablet Take 1 tablet (30 mg total) by mouth once daily. HOLD SBP <120 5/22/24 5/22/25  Maru Back MD   pantoprazole (PROTONIX) 40 MG tablet Take 1 tablet (40 mg total) by mouth once daily. 5/22/24   Maru Back MD   predniSONE (DELTASONE) 5 MG tablet Take by mouth daily: 4/30 to 5/6 20 mg, 5/7 to 5/13 15 mg, 5/14 to 5/20 10 mg, 5/21 5 mg daily. DO NOT STOP. 5/22/24   Maru Back MD   sodium bicarbonate 650 MG tablet Take 2 tablets (1,300 mg total) by mouth 2 (two) times daily. 5/22/24 5/22/25  Maru Back MD   sodium zirconium cyclosilicate (LOKELMA) 5 gram packet Take 1 packet (5 g total) by mouth once daily. Mix entire contents of packet(s) into drinking glass containing 3 tablespoons of water; stir well and drink immediately. Add water and repeat until no powder remains to receive entire dose. 5/22/24 5/22/25  Maru Back MD   tacrolimus (PROGRAF) 1 MG Cap Take 4 capsules (4 mg total) by mouth every morning AND 3 capsules (3 mg total) every evening. 5/27/24 5/27/25  Maru Back MD   tamsulosin (FLOMAX) 0.4 mg Cap Take 1 capsule (0.4 mg total) by mouth once daily. 5/22/24 5/22/25  Maru Back MD   valGANciclovir (VALCYTE) 450 mg Tab Take 1 tablet (450 mg total) by mouth every Mon, Wed, Fri. STOP: 7/27/24 5/31/24 7/27/24  Maru Back MD     Scheduled Meds:    LIDOcaine HCl 2%   Urethral 1 time in Clinic/HOD  "    Continuous Infusions:   PRN Meds:  Anticoagulants/Antiplatelets: no anticoagulation    Allergies: Review of patient's allergies indicates:  No Known Allergies  Sedation Hx: have not been any systemic reactions    Labs:  No results for input(s): "INR", "PT", "PTT" in the last 168 hours.    Recent Labs   Lab 06/03/24  0823   WBC 8.49   HGB 12.5*   HCT 39.1*   MCV 93         Recent Labs   Lab 06/03/24  0823   GLU 98      K 4.7      CO2 26   BUN 18   CREATININE 1.9*   CALCIUM 10.2   MG 1.6   ALBUMIN 3.4*       Vitals:        Physical Exam:  ASA: II  Mallampati: II    General: no acute distress  Mental Status: alert and oriented   HEENT: normocephalic, atraumatic  Chest: unlabored breathing  Abdomen: nondistended  Extremities: moves all extremities    Plan: Proceed with percutaneous LLQ renal allograft biopsy.  Sedation plan: up to moderate.        Cristal López MD  Diagnostic Radiology      "

## 2024-06-06 NOTE — PLAN OF CARE
Pt arrived to IR room 190 for transplant kidney biopsy. Pt oriented to unit and staff. Plan of care reviewed with patient, patient verbalizes understanding. Comfort measures utilized. Pt safely transferred from stretcher to procedural table. Fall risk reviewed with patient, fall risk interventions maintained. Safety strap applied, positioner pillows utilized to minimize pressure points. Blankets applied. Pt prepped and draped utilizing standard sterile technique. Patient placed on continuous monitoring, as required by sedation policy. Timeouts completed utilizing standard universal time-out, per department and facility policy. RN to remain at bedside, continuous monitoring maintained. Pt resting comfortably. Denies pain/discomfort. Will continue to monitor. See flow sheets for monitoring, medication administration, and updates.

## 2024-06-10 ENCOUNTER — LAB VISIT (OUTPATIENT)
Dept: LAB | Facility: HOSPITAL | Age: 49
End: 2024-06-10
Attending: INTERNAL MEDICINE
Payer: MEDICAID

## 2024-06-10 ENCOUNTER — TELEPHONE (OUTPATIENT)
Dept: TRANSPLANT | Facility: CLINIC | Age: 49
End: 2024-06-10
Payer: MEDICAID

## 2024-06-10 DIAGNOSIS — Z94.0 KIDNEY REPLACED BY TRANSPLANT: ICD-10-CM

## 2024-06-10 LAB
ALBUMIN SERPL BCP-MCNC: 3.6 G/DL (ref 3.5–5.2)
ANION GAP SERPL CALC-SCNC: 9 MMOL/L (ref 8–16)
BASOPHILS # BLD AUTO: 0.14 K/UL (ref 0–0.2)
BASOPHILS NFR BLD: 1.5 % (ref 0–1.9)
BUN SERPL-MCNC: 19 MG/DL (ref 6–20)
CALCIUM SERPL-MCNC: 9.5 MG/DL (ref 8.7–10.5)
CHLORIDE SERPL-SCNC: 108 MMOL/L (ref 95–110)
CO2 SERPL-SCNC: 24 MMOL/L (ref 23–29)
CREAT SERPL-MCNC: 1.7 MG/DL (ref 0.5–1.4)
DIFFERENTIAL METHOD BLD: ABNORMAL
EOSINOPHIL # BLD AUTO: 0.2 K/UL (ref 0–0.5)
EOSINOPHIL NFR BLD: 1.8 % (ref 0–8)
ERYTHROCYTE [DISTWIDTH] IN BLOOD BY AUTOMATED COUNT: 13.9 % (ref 11.5–14.5)
EST. GFR  (NO RACE VARIABLE): 49.7 ML/MIN/1.73 M^2
GLUCOSE SERPL-MCNC: 113 MG/DL (ref 70–110)
HCT VFR BLD AUTO: 40.6 % (ref 40–54)
HGB BLD-MCNC: 13 G/DL (ref 14–18)
IMM GRANULOCYTES # BLD AUTO: 0.16 K/UL (ref 0–0.04)
IMM GRANULOCYTES NFR BLD AUTO: 1.7 % (ref 0–0.5)
LYMPHOCYTES # BLD AUTO: 0.8 K/UL (ref 1–4.8)
LYMPHOCYTES NFR BLD: 8.3 % (ref 18–48)
MAGNESIUM SERPL-MCNC: 1.5 MG/DL (ref 1.6–2.6)
MCH RBC QN AUTO: 29.7 PG (ref 27–31)
MCHC RBC AUTO-ENTMCNC: 32 G/DL (ref 32–36)
MCV RBC AUTO: 93 FL (ref 82–98)
MONOCYTES # BLD AUTO: 0.8 K/UL (ref 0.3–1)
MONOCYTES NFR BLD: 8.7 % (ref 4–15)
NEUTROPHILS # BLD AUTO: 7.2 K/UL (ref 1.8–7.7)
NEUTROPHILS NFR BLD: 78 % (ref 38–73)
NRBC BLD-RTO: 0 /100 WBC
PHOSPHATE SERPL-MCNC: 2 MG/DL (ref 2.7–4.5)
PLATELET # BLD AUTO: 321 K/UL (ref 150–450)
PMV BLD AUTO: 10.3 FL (ref 9.2–12.9)
POTASSIUM SERPL-SCNC: 4.5 MMOL/L (ref 3.5–5.1)
RBC # BLD AUTO: 4.38 M/UL (ref 4.6–6.2)
SODIUM SERPL-SCNC: 141 MMOL/L (ref 136–145)
TACROLIMUS BLD-MCNC: 11.6 NG/ML (ref 5–15)
WBC # BLD AUTO: 9.19 K/UL (ref 3.9–12.7)

## 2024-06-10 PROCEDURE — 80069 RENAL FUNCTION PANEL: CPT | Performed by: INTERNAL MEDICINE

## 2024-06-10 PROCEDURE — 85025 COMPLETE CBC W/AUTO DIFF WBC: CPT | Performed by: INTERNAL MEDICINE

## 2024-06-10 PROCEDURE — 36415 COLL VENOUS BLD VENIPUNCTURE: CPT | Performed by: INTERNAL MEDICINE

## 2024-06-10 PROCEDURE — 80197 ASSAY OF TACROLIMUS: CPT | Performed by: INTERNAL MEDICINE

## 2024-06-10 PROCEDURE — 83735 ASSAY OF MAGNESIUM: CPT | Performed by: INTERNAL MEDICINE

## 2024-06-10 RX ORDER — TACROLIMUS 1 MG/1
3 CAPSULE ORAL EVERY 12 HOURS
Qty: 180 CAPSULE | Refills: 11 | Status: SHIPPED | OUTPATIENT
Start: 2024-06-10 | End: 2024-06-19 | Stop reason: DRUGHIGH

## 2024-06-10 NOTE — TELEPHONE ENCOUNTER
Returned call to pt. Awaiting bx results. Pt to have labs done today    ----- Message from Sonal Mckeon RN sent at 6/7/2024 10:36 AM CDT -----  Regarding: FW: Appt  Contact: Pt  [phone]    ----- Message -----  From: Bela Milan  Sent: 6/7/2024  10:30 AM CDT  To: [facility] Post-Kidney Transplant Clinical  Subject: Appt                                                       Name of Caller:  Adilson      Contact Preference:    [phone]    Nature of Call:  Requesting a call back would like to go over results from biopsy on 06/06/24  and questions about being able to go home

## 2024-06-11 LAB
FINAL PATHOLOGIC DIAGNOSIS: NORMAL
GROSS: NORMAL
Lab: NORMAL

## 2024-06-12 ENCOUNTER — PATIENT MESSAGE (OUTPATIENT)
Dept: TRANSPLANT | Facility: CLINIC | Age: 49
End: 2024-06-12
Payer: MEDICAID

## 2024-06-18 ENCOUNTER — DOCUMENTATION ONLY (OUTPATIENT)
Dept: TRANSPLANT | Facility: CLINIC | Age: 49
End: 2024-06-18
Payer: MEDICAID

## 2024-06-18 LAB
EXT BANDS%: 6 (ref 0–5)
EXT BUN: 24 (ref 7–18)
EXT CALCIUM: 8.9 (ref 8.8–10.5)
EXT CHLORIDE: 106 (ref 100–108)
EXT CO2: 28 (ref 21–32)
EXT CREATININE: 1.84 MG/DL (ref 0.7–1.3)
EXT EGFR NO RACE VARIABLE: 40
EXT EOSINOPHIL%: 3 (ref 1–3)
EXT GLUCOSE: 131 (ref 70–110)
EXT HEMATOCRIT: 39.6 (ref 42–52)
EXT HEMOGLOBIN: 13 (ref 14–18)
EXT LYMPH%: 10 (ref 25–40)
EXT MAGNESIUM: 2 (ref 1.8–2.4)
EXT MONOCYTES%: 9 (ref 1–15)
EXT PHOSPHORUS: 2.5 (ref 2.6–4.7)
EXT PLATELETS: 338 (ref 142–424)
EXT POTASSIUM: 4.5 (ref 3.6–5.2)
EXT SEGS%: 72 (ref 37–80)
EXT SODIUM: 140 MMOL/L (ref 135–145)
EXT TACROLIMUS LVL: 4.7
EXT WBC: 8.1 (ref 4.6–10.2)

## 2024-06-19 DIAGNOSIS — Z94.0 KIDNEY REPLACED BY TRANSPLANT: ICD-10-CM

## 2024-06-19 RX ORDER — TACROLIMUS 1 MG/1
4 CAPSULE ORAL EVERY 12 HOURS
Qty: 240 CAPSULE | Refills: 11 | Status: SHIPPED | OUTPATIENT
Start: 2024-06-19 | End: 2025-06-19

## 2024-06-19 NOTE — TELEPHONE ENCOUNTER
Pt notified to increase water intake. Prograf increased to 4 mg twice a day. Repeat level on Monday.    ----- Message from Maru Back MD sent at 6/19/2024  9:24 AM CDT -----  Tac to 4 mg BID and check tac level on Monday. Needs mackenzie water intake.

## 2024-06-21 ENCOUNTER — TELEPHONE (OUTPATIENT)
Dept: TRANSPLANT | Facility: CLINIC | Age: 49
End: 2024-06-21
Payer: MEDICAID

## 2024-06-21 NOTE — TELEPHONE ENCOUNTER
Called [facility]-stated insurance will not not allow for fill medication today because requested too early, advised to attempt refill next week. Notified pt of above information.    ----- Message from Amaris Ortega sent at 6/21/2024 11:07 AM CDT -----  Regarding: mycophenolate pharmacy wont fill until 7/18  Contact: PT  [phone]  The patient called requesting to see what is the issue regarding the fill of his Mycophenolate. He states the pharmacy sent him a message stating that was too early for a fill and that they would fill that medication  on 7/18  - will be out of his medication before then. States he has enough now to get through next Sat 6/29. Please advise at earliest convenience     No further information provided    Patient can be contacted @# [phone]

## 2024-06-24 ENCOUNTER — DOCUMENTATION ONLY (OUTPATIENT)
Dept: TRANSPLANT | Facility: CLINIC | Age: 49
End: 2024-06-24
Payer: MEDICAID

## 2024-06-24 LAB
EXT BK VIRUS DNA QN PCR: NOT DETECTED
EXT BUN: 31 (ref 7–18)
EXT CALCIUM: 9.1 (ref 8.8–10.5)
EXT CHLORIDE: 105 (ref 100–108)
EXT CO2: 27 (ref 21–32)
EXT CREATININE UA: 79 (ref 10–175)
EXT CREATININE: 2.15 MG/DL (ref 0.7–1.3)
EXT EGFR NO RACE VARIABLE: 33
EXT GLUCOSE UA: NORMAL
EXT GLUCOSE: 115 (ref 70–110)
EXT HEMATOCRIT: 39.9 (ref 42–52)
EXT HEMOGLOBIN: 13.1 (ref 14–18)
EXT LYMPH%: 6 (ref 25–40)
EXT MAGNESIUM: 1.8 (ref 1.8–2.4)
EXT MONOCYTES%: 3 (ref 1–15)
EXT NITRITES UA: NEGATIVE
EXT PHOSPHORUS: 2.6 (ref 2.6–4.7)
EXT PLATELETS: 306 (ref 142–424)
EXT POTASSIUM: 4.5 (ref 3.6–5.2)
EXT PROT/CREAT RATIO UR: 0.16 (ref 0.02–0.13)
EXT PROTEIN UA: NEGATIVE
EXT SEGS%: 85 (ref 97–80)
EXT SODIUM: 140 MMOL/L (ref 135–145)
EXT TACROLIMUS LVL: 8.8
EXT URINE PROTEIN: 13 (ref 0–15)
EXT WBC: 8 (ref 4.6–10.2)

## 2024-06-26 ENCOUNTER — TELEPHONE (OUTPATIENT)
Dept: TRANSPLANT | Facility: CLINIC | Age: 49
End: 2024-06-26
Payer: MEDICAID

## 2024-06-26 DIAGNOSIS — Z94.0 KIDNEY REPLACED BY TRANSPLANT: Primary | ICD-10-CM

## 2024-06-26 DIAGNOSIS — T86.10 COMPLICATION OF TRANSPLANTED KIDNEY, UNSPECIFIED COMPLICATION: ICD-10-CM

## 2024-06-26 NOTE — PROGRESS NOTES
Cr worsening. Encourage more water intake. Had kidney biopsy on 6/6/24: VERY PATCHY INFLAMMATION WITHOUT TUBULITIS, NOT MEETING CRITERIA FOR ACUTE T-CELL MEDIATED REJECTION      Please lab on Monday, kidney US, DSA, allosure, BK, UPCR

## 2024-06-26 NOTE — TELEPHONE ENCOUNTER
Pt notified to increase hydration. Repeat labs with DSA and Allo BK UPCR Monday. Kidney txp US on 7/3 at 2:30 PM, earliest available.    ----- Message from Maru Back MD sent at 6/26/2024  1:04 PM CDT -----  Cr worsening. Encourage more water intake. Had kidney biopsy on 6/6/24: VERY PATCHY INFLAMMATION WITHOUT TUBULITIS, NOT MEETING CRITERIA FOR ACUTE T-CELL MEDIATED REJECTION      Please lab on Monday, kidney US, DSA, allosure, BK, UPCR

## 2024-06-28 DIAGNOSIS — Z94.0 KIDNEY REPLACED BY TRANSPLANT: Primary | ICD-10-CM

## 2024-06-28 RX ORDER — MYCOPHENOLATE MOFETIL 250 MG/1
1000 CAPSULE ORAL 2 TIMES DAILY
Qty: 56 CAPSULE | Refills: 0 | Status: SHIPPED | OUTPATIENT
Start: 2024-06-28 | End: 2024-07-05

## 2024-06-28 NOTE — TELEPHONE ENCOUNTER
Sending 1 weeks worth of cellcept. Pt states he has enough of all other medications    ----- Message from Olivia Khanna sent at 6/28/2024 10:50 AM CDT -----  Regarding: Rx Issues      Name Of Caller:   Adilson      Contact Preference:   [phone]      Nature of call:   Requesting to speak with Criselda about his anti-rejection medication. The pharmacy won't fill it and he'll run out in two days.

## 2024-07-01 ENCOUNTER — DOCUMENTATION ONLY (OUTPATIENT)
Dept: TRANSPLANT | Facility: CLINIC | Age: 49
End: 2024-07-01
Payer: MEDICAID

## 2024-07-01 LAB
EXT BK VIRUS DNA QN PCR: ABNORMAL
EXT BUN: 25 (ref 7–18)
EXT CALCIUM: 8.7 (ref 8.8–10.5)
EXT CHLORIDE: 104 (ref 100–108)
EXT CO2: 26 (ref 21–32)
EXT CREATININE: 1.88 MG/DL (ref 0.7–1.3)
EXT EGFR NO RACE VARIABLE: 39
EXT EOSINOPHIL%: 2 (ref 1–3)
EXT GLUCOSE: 127 (ref 70–110)
EXT HEMATOCRIT: 41 (ref 42–52)
EXT HEMOGLOBIN: 13.2 (ref 14–18)
EXT LYMPH%: 13 (ref 25–40)
EXT MAGNESIUM: 1.6 (ref 1.8–2.4)
EXT MONOCYTES%: 6 (ref 1–15)
EXT PHOSPHORUS: 2.6 (ref 2.6–4.7)
EXT PLATELETS: 267 (ref 142–424)
EXT POTASSIUM: 4.4 (ref 3.6–5.2)
EXT PROT/CREAT RATIO UR: ABNORMAL
EXT SEGS%: 77 (ref 37–80)
EXT SODIUM: 139 MMOL/L (ref 135–145)
EXT TACROLIMUS LVL: ABNORMAL
EXT WBC: 7.6 (ref 4.6–10.2)

## 2024-07-03 ENCOUNTER — PATIENT MESSAGE (OUTPATIENT)
Dept: TRANSPLANT | Facility: CLINIC | Age: 49
End: 2024-07-03
Payer: MEDICAID

## 2024-07-03 ENCOUNTER — DOCUMENTATION ONLY (OUTPATIENT)
Dept: TRANSPLANT | Facility: CLINIC | Age: 49
End: 2024-07-03
Payer: MEDICAID

## 2024-07-03 ENCOUNTER — HOSPITAL ENCOUNTER (OUTPATIENT)
Dept: RADIOLOGY | Facility: HOSPITAL | Age: 49
Discharge: HOME | End: 2024-07-03
Attending: INTERNAL MEDICINE
Payer: MEDICAID

## 2024-07-03 DIAGNOSIS — Z94.0 KIDNEY REPLACED BY TRANSPLANT: ICD-10-CM

## 2024-07-03 DIAGNOSIS — T86.10 COMPLICATION OF TRANSPLANTED KIDNEY, UNSPECIFIED COMPLICATION: ICD-10-CM

## 2024-07-03 DIAGNOSIS — Z01.818 PRE-TRANSPLANT EVALUATION FOR CHRONIC KIDNEY DISEASE: ICD-10-CM

## 2024-07-03 LAB
EXT BK VIRUS DNA QN PCR: NORMAL
EXT PROT/CREAT RATIO UR: NORMAL
EXT TACROLIMUS LVL: 9.2

## 2024-07-03 PROCEDURE — 76776 US EXAM K TRANSPL W/DOPPLER: CPT | Mod: TC

## 2024-07-03 RX ORDER — MYCOPHENOLATE MOFETIL 250 MG/1
1000 CAPSULE ORAL 2 TIMES DAILY
Qty: 240 CAPSULE | Refills: 11 | Status: SHIPPED | OUTPATIENT
Start: 2024-07-03

## 2024-07-03 RX ORDER — MYCOPHENOLATE MOFETIL 250 MG/1
1000 CAPSULE ORAL 2 TIMES DAILY
Qty: 56 CAPSULE | Refills: 0 | Status: SHIPPED | OUTPATIENT
Start: 2024-07-03 | End: 2024-07-10

## 2024-07-03 NOTE — TELEPHONE ENCOUNTER
7 day supply sent to local [facility]. Future fills sent to [facility].    ----- Message from Jasvir Cagle sent at 7/3/2024 10:07 AM CDT -----  Regarding: Consult/Advisory  Contact: Adilson Sylvester  Consult/Advisory    Name Of Caller: Adilson Sylvester       Contact Preference: [phone] (home)      Nature of call: Patient calling regarding mycophenolate (CELLCEPT) 250 mg Cap. The system is down for the last 3 days and patient cannot get his medication. Please send to another pharmacy. Patient states it is urgent.    [facility]  [address] [place] [address]  [phone]

## 2024-07-11 ENCOUNTER — DOCUMENTATION ONLY (OUTPATIENT)
Dept: TRANSPLANT | Facility: CLINIC | Age: 49
End: 2024-07-11

## 2024-07-11 DIAGNOSIS — Z94.0 KIDNEY REPLACED BY TRANSPLANT: ICD-10-CM

## 2024-07-11 LAB
EXT BUN: 24 (ref 7–18)
EXT CALCIUM: 9.4 (ref 8.8–10.5)
EXT CHLORIDE: 103 (ref 100–108)
EXT CO2: 25 (ref 21–32)
EXT CREATININE: 1.67 MG/DL (ref 0.7–1.3)
EXT EGFR NO RACE VARIABLE: 45
EXT EOSINOPHIL%: 6 (ref 1–3)
EXT GLUCOSE: 125 (ref 70–110)
EXT HEMATOCRIT: 44.2 (ref 42–52)
EXT HEMOGLOBIN: 14.4 (ref 14–18)
EXT MAGNESIUM: 1.5 (ref 1.8–2.4)
EXT MONOCYTES%: 3 (ref 1–15)
EXT PHOSPHORUS: 2.6 (ref 2.6–4.7)
EXT PLATELETS: 274 (ref 142–424)
EXT POTASSIUM: 4.5 (ref 3.6–5.2)
EXT SEGS%: 80 (ref 37–80)
EXT SODIUM: 137 MMOL/L (ref 135–145)
EXT TACROLIMUS LVL: 10.1
EXT TACROLIMUS LVL: 9.2
EXT WBC: 7.8 (ref 4.6–10.2)
Lab: 11 (ref 25–40)

## 2024-07-11 RX ORDER — TACROLIMUS 1 MG/1
CAPSULE ORAL
Qty: 210 CAPSULE | Refills: 11 | Status: SHIPPED | OUTPATIENT
Start: 2024-07-11 | End: 2025-07-11

## 2024-07-11 NOTE — TELEPHONE ENCOUNTER
Prograf decreased to 4 mg in the morning and 3 mg night. Weekly labs    ----- Message from Claudio Bazan Jr., MD sent at 7/11/2024  2:42 PM CDT -----  If true tac trough, decrease dose from 4/4 to 4/3 and repeat in 7-10 days. Other labs acceptable.

## 2024-07-15 ENCOUNTER — DOCUMENTATION ONLY (OUTPATIENT)
Dept: TRANSPLANT | Facility: CLINIC | Age: 49
End: 2024-07-15

## 2024-07-15 DIAGNOSIS — Z94.0 S/P KIDNEY TRANSPLANT: ICD-10-CM

## 2024-07-15 LAB
EXT BUN: 24 (ref 7–18)
EXT CALCIUM: 8.8 (ref 8.8–10.5)
EXT CHLORIDE: 104 (ref 100–108)
EXT CO2: 28 (ref 21–32)
EXT CREATININE: 1.71 MG/DL (ref 0.7–1.3)
EXT EGFR NO RACE VARIABLE: 43
EXT EOSINOPHIL%: 1 (ref 1–3)
EXT GLUCOSE: 118 (ref 70–110)
EXT HEMATOCRIT: 44.3 (ref 42–52)
EXT HEMOGLOBIN: 14.6 (ref 14–18)
EXT LYMPH%: 11 (ref 25–40)
EXT MAGNESIUM: 1.7 (ref 1.8–2.4)
EXT MONOCYTES%: 3 (ref 1–15)
EXT PHOSPHORUS: 2.6 (ref 2.6–4.7)
EXT PLATELETS: 301 (ref 142–424)
EXT POTASSIUM: 4.3 (ref 3.6–5.2)
EXT SEGS%: 84 (ref 37–80)
EXT SODIUM: 138 MMOL/L (ref 135–145)
EXT TACROLIMUS LVL: 9.4
EXT WBC: 7.6 (ref 4.6–10.2)

## 2024-07-15 RX ORDER — PANTOPRAZOLE SODIUM 40 MG/1
40 TABLET, DELAYED RELEASE ORAL DAILY
Qty: 30 TABLET | Refills: 3 | Status: SHIPPED | OUTPATIENT
Start: 2024-07-15

## 2024-07-15 NOTE — TELEPHONE ENCOUNTER
Pt to contact pharmacy for refill. Pt states has not established with PCP or gen neph. Sending Protonix with 3 refills. Pt notified future refills will need to come from other provider. Verbalized understanding.      ----- Message from Bela Milan sent at 7/15/2024  2:03 PM CDT -----  Regarding: Refill  Contact: Pt  [phone]      Rx Name:   magnesium oxide (MAG-OX) 400 mg (241.3 mg magnesium) tablet                     sodium bicarbonate 650 MG tablet                     pantoprazole (PROTONIX) 40 MG tablet      Preferred Pharmacy with number:    [facility]/pharmacy #[id number] [address] [address]  Phone: [phone] Fax: [phone]    Ordering Provider:  Maru Back MD    Contact preference:  [phone]    Comments/Notes:   Requesting refill        Also have questions about  valGANciclovir (VALCYTE) 450 mg Tab, is out suppose to continue until 07/27/24

## 2024-07-17 ENCOUNTER — OFFICE VISIT (OUTPATIENT)
Dept: TRANSPLANT | Facility: CLINIC | Age: 49
End: 2024-07-17
Payer: MEDICAID

## 2024-07-17 ENCOUNTER — PATIENT MESSAGE (OUTPATIENT)
Dept: TRANSPLANT | Facility: CLINIC | Age: 49
End: 2024-07-17

## 2024-07-17 DIAGNOSIS — Z79.60 LONG-TERM USE OF IMMUNOSUPPRESSANT MEDICATION: ICD-10-CM

## 2024-07-17 DIAGNOSIS — Z94.0 S/P KIDNEY TRANSPLANT: Primary | ICD-10-CM

## 2024-07-17 DIAGNOSIS — Z01.818 PRE-TRANSPLANT EVALUATION FOR CHRONIC KIDNEY DISEASE: ICD-10-CM

## 2024-07-17 DIAGNOSIS — Z99.2 TYPE 2 DIABETES MELLITUS WITH CHRONIC KIDNEY DISEASE ON CHRONIC DIALYSIS, WITH LONG-TERM CURRENT USE OF INSULIN: Chronic | ICD-10-CM

## 2024-07-17 DIAGNOSIS — Z79.4 TYPE 2 DIABETES MELLITUS WITH CHRONIC KIDNEY DISEASE ON CHRONIC DIALYSIS, WITH LONG-TERM CURRENT USE OF INSULIN: Chronic | ICD-10-CM

## 2024-07-17 DIAGNOSIS — N18.6 TYPE 2 DIABETES MELLITUS WITH CHRONIC KIDNEY DISEASE ON CHRONIC DIALYSIS, WITH LONG-TERM CURRENT USE OF INSULIN: Chronic | ICD-10-CM

## 2024-07-17 DIAGNOSIS — E11.22 TYPE 2 DIABETES MELLITUS WITH CHRONIC KIDNEY DISEASE ON CHRONIC DIALYSIS, WITH LONG-TERM CURRENT USE OF INSULIN: Chronic | ICD-10-CM

## 2024-07-17 PROCEDURE — 3066F NEPHROPATHY DOC TX: CPT | Mod: CPTII,95,, | Performed by: NURSE PRACTITIONER

## 2024-07-17 PROCEDURE — 99214 OFFICE O/P EST MOD 30 MIN: CPT | Mod: 95,,, | Performed by: NURSE PRACTITIONER

## 2024-07-17 RX ORDER — VALGANCICLOVIR 450 MG/1
450 TABLET, FILM COATED ORAL DAILY
Qty: 10 TABLET | Refills: 0 | Status: SHIPPED | OUTPATIENT
Start: 2024-07-17 | End: 2024-07-27

## 2024-07-17 NOTE — LETTER
July 17, 2024        Estefanía Anders  [address] [facility] [address]  Phone: [phone]  Fax: [phone]             [facility] Transplant 1st Fl  [address]  [facility] [address]  Phone: [phone]   Patient: Adilson Sylvester   MR Number: [id number]   YOB: 1975   Date of Visit: 7/17/2024       Dear Dr. Estefanía Anders    Thank you for referring Adilson Sylvester to me for evaluation. Attached you will find relevant portions of my assessment and plan of care.    If you have questions, please do not hesitate to call me. I look forward to following Adilson Sylvester along with you.    Sincerely,    Kamille Santana NP    Enclosure    If you would like to receive this communication electronically, please contact [email or web address] or [phone] to request [employer] access.    [employer] is a tool which provides read-only access to select patient information with whom you have a relationship. Its easy to use and provides real time access to review your patients record including encounter summaries, notes, results, and demographic information.    If you feel you have received this communication in error or would no longer like to receive these types of communications, please e-mail [email or web address]

## 2024-07-17 NOTE — PROGRESS NOTES
Kidney Post-Transplant Assessment    Referring Physician: Estefanía Anders  Current Nephrologist: Estefanía Anders    ORGAN: LEFT KIDNEY  Donor Type: donation after brain death  PHS Increased Risk: yes  Cold Ischemia: 634 mins  Induction Medications:      Subjective:   The patient location is: home  The chief complaint leading to consultation is: post kidney transplant    Visit type: audiovisual    Face to Face time with patient: 15 minutes of total time spent on the encounter, which includes face to face time and non-face to face time preparing to see the patient (eg, review of tests), Obtaining and/or reviewing separately obtained history, Documenting clinical information in the electronic or other health record, Independently interpreting results (not separately reported) and communicating results to the patient/family/caregiver, or Care coordination (not separately reported).     Each patient to whom he or she provides medical services by telemedicine is:  (1) informed of the relationship between the physician and patient and the respective role of any other health care provider with respect to management of the patient; and (2) notified that he or she may decline to receive medical services by telemedicine and may withdraw from such care at any time.    CC:  Reassessment of renal allograft function and management of chronic immunosuppression.    HPI:  Mr. Sylvester is a 48 y.o. year old     or  White male who received a donation after brain death kidney transplant #2 on 4/27/24. His most recent creatinine is 1.71. He takes mycophenolate mofetil, prednisone, and tacrolimus for maintenance immunosuppression. His post transplant course has been uncomplicated to date.    BX:  1)  MILD ACUTE KIDNEY INJURY.   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 He restarted dialysis/ PD  on 4/20/21.    Interval HX:  Intake 3L + water daily   UOP- no problems reported   Peripheral edema-no    Appetite-denies N/V/D; tolerating IS meds well    Over all doing well   BP  BP Readings from Last 3 Encounters:   06/06/24 110/72   06/03/24 133/85   05/22/24 125/78       Lab /diagnostic results reviewed with patient today.   All questions answered     Past Medical History:   Diagnosis Date    Diabetes     Diabetes mellitus, type 2     Gout     Hyperkalemia     Hypertension     Hypothyroidism     Hypothyroidism     Kidney transplant recipient     Obesity     Patient on waiting list for kidney transplant 05/23/2023    Polycystic kidney disease     Pre-transplant evaluation for chronic kidney disease 12/14/2021       Review of Systems   Constitutional:  Negative for activity change, appetite change, chills, fatigue, fever and unexpected weight change.   HENT:  Negative for congestion, facial swelling, postnasal drip, rhinorrhea, sinus pressure, sore throat and trouble swallowing.    Eyes:  Negative for pain, redness and visual disturbance.   Respiratory:  Negative for cough, chest tightness, shortness of breath and wheezing.    Cardiovascular: Negative.  Negative for chest pain, palpitations and leg swelling.   Gastrointestinal:  Negative for abdominal pain, diarrhea, nausea and vomiting.   Genitourinary:  Negative for dysuria, flank pain and urgency.   Musculoskeletal:  Negative for gait problem, neck pain and neck stiffness.   Skin:  Negative for rash.   Allergic/Immunologic: Positive for " immunocompromised state. Negative for environmental allergies and food allergies.   Neurological:  Negative for dizziness, weakness, light-headedness and headaches.   Psychiatric/Behavioral:  Negative for agitation and confusion. The patient is not nervous/anxious.        Objective:   There were no vitals taken for this visit.body mass index is unknown because there is no height or weight on file.    Physical Exam    Labs:  Lab Results   Component Value Date    WBC 9.19 06/10/2024    HGB 13.0 (L) 06/10/2024    HCT 40.6 06/10/2024    LABPLAT 129 (L) 07/26/2021     06/10/2024    K 4.5 06/10/2024     06/10/2024    CO2 24 06/10/2024    BUN 19 06/10/2024    CREATININE 1.7 (H) 06/10/2024    EGFRNORACEVR 49.7 (A) 06/10/2024    CALCIUM 9.5 06/10/2024    PHOS 2.0 (L) 06/10/2024    MG 1.5 (L) 06/10/2024    ALBUMIN 3.6 06/10/2024    AST 11 06/06/2024    ALT 10 06/06/2024    UTPCR Unable to calculate 06/03/2024    PTH 1,232.4 (H) 05/01/2024    TACROLIMUS 11.6 06/10/2024       Lab Results   Component Value Date    EXTWBC 7.6 07/15/2024    EXTSEGS 84.0 (H) 07/15/2024    EXTPLATELETS 301 07/15/2024    EXTHEMOGLOBI 14.6 07/15/2024    EXTHEMATOCRI 44.3 07/15/2024    EXTCREATININ 1.71 (H) 07/15/2024    EXTSODIUM 138 07/15/2024    EXTPOTASSIUM 4.3 07/15/2024    EXTBUN 24 (H) 07/15/2024    EXTCO2 28 07/15/2024    EXTCALCIUM 8.8 07/15/2024    EXTPHOSPHORU 2.6 07/15/2024    EXTGLUCOSE 118 (H) 07/15/2024       Lab Results   Component Value Date    EXTTACROLVL 9.4 07/15/2024    EXTPROTCRE pending 07/01/2024    EXTPROTEINUA negative 06/24/2024       Labs were reviewed with the patient    Assessment:     1. S/P kidney transplant    2. Type 2 diabetes mellitus with chronic kidney disease on chronic dialysis, with long-term current use of insulin    3. Long-term use of immunosuppressant medication          Plan:   Routine follow-up and labs  Valcyte changed to daily       1) s/p living related kidney transplant              - Graft  function CKD 3b    -FK Trough 9.9   - cont on FK 4/3   - cont on Cellcept 1000 mg BID   -prednisone  taper   -   Atovaquone 1500 mg Qd for PCP prophylaxis    -  Valcyte 450 mg daily for CMV prophylaxis  -Will continue to monitor for drug toxicities     5/27/24 no DSA  5/27/24 Allo 0.21%  -Scr slightly higher than BL --kidney bx scheduled 6/6/24 to further assess    Lab Results   Component Value Date    CREATININE 1.7 (H) 06/10/2024        Latest Reference Range & Units POD 79,  Kidney-Post 1 Year  07/15/24 09:24   EXT Creatinine 0.70 - 1.30 mg/dL 1.71 (H) (P) (E)   EXT eGFR NO RACE VARIABLE >60  43 (L) (P) (E)   EXT Glucose 70 - 110  118 (H) (P) (E)         2)  HTN: advise low salt diet and home BP monitoring  - Cont  nifedipine and  flomax   BP Readings from Last 3 Encounters:   06/06/24 110/72   06/03/24 133/85   05/22/24 125/78         type II DM:   -  MED REGIME   Mgmt deferred to endocrinology    Lab Results   Component Value Date    HGBA1C 5.4 10/07/2022         3) Hypophosphatemia, Hypomagnesemia, Secondary hyperparathyroidism:  - no intervention required ,will continue to monitor/ guidelines                -continue calcitriol, ERGO     Lab Results   Component Value Date    PTH 1,232.4 (H) 05/01/2024    CALCIUM 9.5 06/10/2024    PHOS 2.0 (L) 06/10/2024      Latest Reference Range & Units POD 79,  Kidney-Post 1 Year  07/15/24 09:24   EXT Magnesium 1.8 - 2.4  1.7 (L) (P) (E)         4) Metabolic acidosis/Electrolyte balance:  - no intervention required ,will continue to monitor/ guidelines    -  I cont sodium bicarb  and  Start lokelma 5 gm QD  Lab Results   Component Value Date     06/10/2024    K 4.5 06/10/2024     06/10/2024    CO2 24 06/10/2024      Latest Reference Range & Units POD 79,  Kidney-Post 1 Year  07/15/24 09:24   EXT Sodium 135 - 145 mmol/L 138 (P) (E)   EXT Potassium 3.6 - 5.2  4.3 (P) (E)   EXT Chloride 100 - 108  104 (P) (E)   EXT CO2 21 - 32  28 (P) (E)   (P): Preliminary  (E):  External lab result      5) Anemia/Cytopenias:   will monitor as per our guidelines. Medicine list reviewed including potential causes of drug-induced cytopenias                - no intervention required ,will continue to monitor/ guidelines      Lab Results   Component Value Date    WBC 9.19 06/10/2024    HGB 13.0 (L) 06/10/2024    HCT 40.6 06/10/2024    MCV 93 06/10/2024     06/10/2024      Latest Reference Range & Units POD 79,  Kidney-Post 1 Year  07/15/24 09:24   EXT WBC 4.6 - 10.2  7.6 (P) (E)   EXT Hemoglobin 14.0 - 18.0  14.6 (P) (E)   EXT Hematocrit 42.0 - 52.0  44.3 (P) (E)   EXT Platelets 142 - 424  301 (P) (E)   (P): Preliminary  (E): External lab result        6) Proteinuria: continue p/c ratio as per guidelines   Protein Creatinine Ratios:   Latest Reference Range & Units POD 58,  Kidney-Post 1 Year  06/24/24 08:18   EXT PROT/CREAT Ratio UR 0.022 - 0.128  0.16 (H) (E)   (H): Data is abnormally high  (E): External lab result    Creatinine, Urine   Date Value Ref Range Status   06/03/2024 46.0 23.0 - 375.0 mg/dL Final   05/27/2024 17.0 (L) 23.0 - 375.0 mg/dL Final   05/20/2024 90.0 23.0 - 375.0 mg/dL Final     Protein, Urine Random   Date Value Ref Range Status   06/03/2024 <7 0 - 15 mg/dL Final   05/27/2024 <7 0 - 15 mg/dL Final   05/20/2024 <7 0 - 15 mg/dL Final     Prot/Creat Ratio, Urine   Date Value Ref Range Status   06/03/2024 Unable to calculate 0.00 - 0.20 Final   05/27/2024 Unable to calculate 0.00 - 0.20 Final   05/20/2024 Unable to calculate 0.00 - 0.20 Final        .     7) BK virus infection screening:  will continue to monitor/ guidelines      POD 58,  Kidney-Post 1 Year  06/24/24 08:18   EXT BK Virus DNA QN PCR NOT DETECTED (E)   (E): External lab result    8) Weight education: provided during the clinic visit   There is no height or weight on file to calculate BMI.        Follow-up:   Clinic: return to transplant clinic weekly for the first month after transplant; every 2 weeks  during months 2-3; then at 6-, 9-, 12-, 18-, 24-, and 36- months post-transplant to reassess for complications from immunosuppression toxicity and monitor for rejection.  Annually thereafter.    Labs: since patient remains at high risk for rejection and drug-related complications that warrant close monitoring, labs will be ordered as follows: continue twice weekly CBC, renal panel, and drug level for first month; then same labs once weekly through 3rd month post-transplant.  Urine for UA and protein/creatinine ratio monthly.  Serum BK - PCR at 1-, 3-, 6-, 9-, 12-, 18-, 24-, 36-, 48-, and 60 months post-transplant.  Hepatic panel at 1-, 2-, 3-, 6-, 9-, 12-, 18-, 24-, and 36- months post-transplant.    Kamille Santana NP       Education:   Material provided to the patient.  Patient reminded to call with any health changes since these can be early signs of significant complications.  Also, I advised the patient to be sure any new medications or changes of old medications are discussed with either a pharmacist or physician knowledgeable with transplant to avoid rejection/drug toxicity related to significant drug interactions.    Patient advised that it is recommended that all transplanted patients, and their close contacts and household members receive Covid vaccination.

## 2024-07-23 ENCOUNTER — TELEPHONE (OUTPATIENT)
Dept: TRANSPLANT | Facility: CLINIC | Age: 49
End: 2024-07-23
Payer: MEDICAID

## 2024-07-23 ENCOUNTER — PATIENT MESSAGE (OUTPATIENT)
Dept: TRANSPLANT | Facility: CLINIC | Age: 49
End: 2024-07-23
Payer: MEDICAID

## 2024-07-23 ENCOUNTER — DOCUMENTATION ONLY (OUTPATIENT)
Dept: TRANSPLANT | Facility: CLINIC | Age: 49
End: 2024-07-23

## 2024-07-23 LAB
EXT BK VIRUS DNA QN PCR: ABNORMAL
EXT BUN: 23 (ref 7–18)
EXT CALCIUM: 9.1 (ref 8.8–10.5)
EXT CHLORIDE: 103 (ref 100–108)
EXT CO2: 24 (ref 21–32)
EXT CREATININE UA: 124 (ref 10–175)
EXT CREATININE: 1.54 MG/DL (ref 0.7–1.3)
EXT EGFR NO RACE VARIABLE: 49
EXT EOSINOPHIL%: 1 (ref 1–3)
EXT GLUCOSE UA: NORMAL
EXT GLUCOSE: 152 (ref 70–110)
EXT HEMATOCRIT: 43.3 (ref 42–52)
EXT HEMOGLOBIN: 14.6 (ref 14–18)
EXT LYMPH%: 11 (ref 25–40)
EXT MAGNESIUM: 1.9 (ref 1.8–2.4)
EXT MONOCYTES%: 11 (ref 1–15)
EXT NITRITES UA: NEGATIVE
EXT PHOSPHORUS: 2.2 (ref 2.6–4.7)
EXT PLATELETS: 302 (ref 142–424)
EXT POTASSIUM: 4.3 (ref 3.6–5.2)
EXT PROT/CREAT RATIO UR: 0.2 (ref 0.02–0.13)
EXT PROTEIN UA: ABNORMAL
EXT SEGS%: 75 (ref 37–80)
EXT SODIUM: 136 MMOL/L (ref 135–145)
EXT TACROLIMUS LVL: ABNORMAL
EXT URINE PROTEIN: 25 (ref 0–15)
EXT WBC: 7.9 (ref 4.6–10.2)

## 2024-07-23 NOTE — TELEPHONE ENCOUNTER
Pt notified to increase phos rich foods in diet    ----- Message from Maru Back MD sent at 7/23/2024 11:11 AM CDT -----  Encourage high PO4 diet.

## 2024-07-24 ENCOUNTER — DOCUMENTATION ONLY (OUTPATIENT)
Dept: TRANSPLANT | Facility: CLINIC | Age: 49
End: 2024-07-24

## 2024-07-24 LAB
EXT BK VIRUS DNA QN PCR: NOT DETECTED
EXT TACROLIMUS LVL: 11.2

## 2024-07-25 DIAGNOSIS — Z94.0 KIDNEY REPLACED BY TRANSPLANT: ICD-10-CM

## 2024-07-25 RX ORDER — TACROLIMUS 1 MG/1
3 CAPSULE ORAL EVERY 12 HOURS
Qty: 180 CAPSULE | Refills: 11 | Status: SHIPPED | OUTPATIENT
Start: 2024-07-25 | End: 2025-07-25

## 2024-07-25 NOTE — TELEPHONE ENCOUNTER
Pt notified to lower Prograf to 3 mg twice a day. Repeat level in 1 week.    ----- Message from Maru Back MD sent at 7/25/2024 10:02 AM CDT -----  Tac to 3 mg BID and check tac level in a week.

## 2024-08-02 ENCOUNTER — DOCUMENTATION ONLY (OUTPATIENT)
Dept: TRANSPLANT | Facility: CLINIC | Age: 49
End: 2024-08-02
Payer: MEDICAID

## 2024-08-02 LAB
EXT BANDS%: 6.5 (ref 1.8–7.7)
EXT BUN: 30 (ref 7–18)
EXT CALCIUM: 9 (ref 8.8–10.5)
EXT CHLORIDE: 101 (ref 100–108)
EXT CO2: 26 (ref 21–32)
EXT CREATININE: 1.85 MG/DL (ref 0.7–1.3)
EXT EGFR NO RACE VARIABLE: 40
EXT EOSINOPHIL%: 1 (ref 1–3)
EXT GLUCOSE: 146 (ref 70–110)
EXT HEMATOCRIT: 44.2 (ref 42–52)
EXT HEMOGLOBIN: 14.3 (ref 14–18)
EXT LYMPH%: 10 (ref 25–40)
EXT MAGNESIUM: 1.7 (ref 1.8–2.4)
EXT MONOCYTES%: 6 (ref 1–15)
EXT PHOSPHORUS: 2.2 (ref 2.6–4.7)
EXT PLATELETS: 246 (ref 142–424)
EXT POTASSIUM: 4.2 (ref 3.6–5.2)
EXT SEGS%: 83 (ref 37–80)
EXT SODIUM: 135 MMOL/L (ref 135–145)
EXT TACROLIMUS LVL: 16.4
EXT WBC: 7.8 (ref 4.6–10.2)

## 2024-08-04 ENCOUNTER — TELEPHONE (OUTPATIENT)
Dept: TRANSPLANT | Facility: CLINIC | Age: 49
End: 2024-08-04
Payer: MEDICAID

## 2024-08-07 ENCOUNTER — DOCUMENTATION ONLY (OUTPATIENT)
Dept: TRANSPLANT | Facility: CLINIC | Age: 49
End: 2024-08-07
Payer: MEDICAID

## 2024-08-07 LAB — EXT TACROLIMUS LVL: 6.4

## 2024-08-08 ENCOUNTER — TELEPHONE (OUTPATIENT)
Dept: TRANSPLANT | Facility: CLINIC | Age: 49
End: 2024-08-08
Payer: MEDICAID

## 2024-08-08 ENCOUNTER — PATIENT MESSAGE (OUTPATIENT)
Dept: TRANSPLANT | Facility: CLINIC | Age: 49
End: 2024-08-08
Payer: MEDICAID

## 2024-08-12 ENCOUNTER — TELEPHONE (OUTPATIENT)
Dept: TRANSPLANT | Facility: CLINIC | Age: 49
End: 2024-08-12
Payer: MEDICAID

## 2024-08-12 NOTE — TELEPHONE ENCOUNTER
"Pt to go to lab tomorrow for repeat prograf level    ----- Message from Laron Higgins sent at 8/12/2024 11:58 AM CDT -----  Consult/Advisory    Name Of Caller: Self    Contact Preference?: [phone]    What is the nature of the call?: Returning call to Criselda    Additional Notes:  "Thank you for all that you do for our patients"  "

## 2024-08-13 ENCOUNTER — TELEPHONE (OUTPATIENT)
Dept: TRANSPLANT | Facility: CLINIC | Age: 49
End: 2024-08-13
Payer: MEDICAID

## 2024-08-13 NOTE — TELEPHONE ENCOUNTER
Returned call to pt. Faxed lab form last week and emailed copy to pt. Resent email to pt. Pt states received email. Pt at lab now    ----- Message from Ihsan Smith sent at 8/13/2024  7:41 AM CDT -----  Type:  Patient Returning Call    Who Called:PT  Who Left Message for Patient:  Does the patient know what this is regarding?:ORDERS  Would the patient rather a call back or a response via [facility]? CALL  Best Call Back Number: [phone]  Additional Information: Pt states he needs to get his labs done but there are no orders in system for him to get them done in [place]. Pt states he would like a call back asap. Thank you

## 2024-08-14 ENCOUNTER — DOCUMENTATION ONLY (OUTPATIENT)
Dept: TRANSPLANT | Facility: CLINIC | Age: 49
End: 2024-08-14
Payer: MEDICAID

## 2024-08-14 LAB
EXT BUN: 22 (ref 7–18)
EXT CALCIUM: 9.5 (ref 8.8–10.5)
EXT CHLORIDE: 100 (ref 100–108)
EXT CO2: 25 (ref 21–32)
EXT CREATININE: 1.49 MG/DL (ref 0.7–1.3)
EXT EGFR NO RACE VARIABLE: 51
EXT EOSINOPHIL%: 1 (ref 1–3)
EXT GLUCOSE: 149 (ref 70–110)
EXT HEMATOCRIT: 46.9 (ref 42–52)
EXT HEMOGLOBIN: 15.6 (ref 14–18)
EXT LYMPH%: 3 (ref 25–40)
EXT MAGNESIUM: 1.7 (ref 1.8–2.4)
EXT MONOCYTES%: 8 (ref 1–15)
EXT PHOSPHORUS: 2.5 (ref 2.6–4.7)
EXT PLATELETS: 209 (ref 142–424)
EXT POTASSIUM: 4.3 (ref 3.6–5.2)
EXT SEGS%: 88 (ref 37–80)
EXT SODIUM: 133 MMOL/L (ref 135–145)
EXT TACROLIMUS LVL: ABNORMAL
EXT WBC: 7.2 (ref 4.6–10.3)

## 2024-08-19 ENCOUNTER — PATIENT MESSAGE (OUTPATIENT)
Dept: TRANSPLANT | Facility: CLINIC | Age: 49
End: 2024-08-19
Payer: MEDICAID

## 2024-08-19 ENCOUNTER — DOCUMENTATION ONLY (OUTPATIENT)
Dept: TRANSPLANT | Facility: CLINIC | Age: 49
End: 2024-08-19
Payer: MEDICAID

## 2024-08-19 LAB — EXT TACROLIMUS LVL: 9.2

## 2024-09-10 NOTE — TELEPHONE ENCOUNTER
Pt notified to decrease Prograf to 3 mg BID. Repeat level 6/17      ----- Message from Maru Back MD sent at 6/10/2024  2:46 PM CDT -----  Tac to 3 mg bid and check tac level in a week    Painful - The patient does not respond to voice, but does respond to a painful stimulus, such as a squeeze to the hand or sternal rub. A noxious stimulous is needed to elicit a response.

## 2024-09-12 ENCOUNTER — DOCUMENTATION ONLY (OUTPATIENT)
Dept: TRANSPLANT | Facility: CLINIC | Age: 49
End: 2024-09-12
Payer: MEDICAID

## 2024-09-12 ENCOUNTER — TELEPHONE (OUTPATIENT)
Dept: TRANSPLANT | Facility: CLINIC | Age: 49
End: 2024-09-12
Payer: MEDICAID

## 2024-09-12 DIAGNOSIS — Z94.0 KIDNEY REPLACED BY TRANSPLANT: Primary | ICD-10-CM

## 2024-09-12 LAB
EXT BANDS%: 1 (ref 0–5)
EXT BUN: 37 (ref 7–18)
EXT CALCIUM: 9.7 (ref 8.8–10.5)
EXT CHLORIDE: 99 (ref 100–108)
EXT CO2: 23 (ref 21–32)
EXT CREATININE: 2.03 MG/DL (ref 0.7–1.3)
EXT EGFR NO RACE VARIABLE: 36
EXT GLUCOSE: 344 (ref 70–110)
EXT HEMATOCRIT: 48.3 (ref 42–52)
EXT HEMOGLOBIN: 16.4 (ref 14–18)
EXT LYMPH%: 8 (ref 25–40)
EXT MAGNESIUM: 1.7 (ref 1.8–2.4)
EXT MONOCYTES%: 5 (ref 1–15)
EXT PHOSPHORUS: 2.6 (ref 2.6–4.7)
EXT PLATELETS: 232 (ref 142–424)
EXT POTASSIUM: 4.3 (ref 3.6–5.2)
EXT SEGS%: 85 (ref 37–80)
EXT SODIUM: 134 MMOL/L (ref 135–145)
EXT TACROLIMUS LVL: 10.8
EXT WBC: 8 (ref 4.6–10.2)

## 2024-09-12 NOTE — TELEPHONE ENCOUNTER
Pt states he does not follow with endocrinology and does not take insulin. Pt states he will contact PCP to inform them of elevated BG. Endocrine consulted.    ----- Message from Maru Back MD sent at 9/12/2024 12:33 PM CDT -----  Cr 2.03, glucose 344: I see no medicine for his high sugar? Does he have DM? What med dose he take?

## 2024-09-22 DIAGNOSIS — Z94.0 KIDNEY REPLACED BY TRANSPLANT: ICD-10-CM

## 2024-10-09 ENCOUNTER — DOCUMENTATION ONLY (OUTPATIENT)
Dept: TRANSPLANT | Facility: CLINIC | Age: 49
End: 2024-10-09
Payer: MEDICAID

## 2024-10-09 LAB
EXT ALBUMIN: 3.6 (ref 2.3–3.5)
EXT ALKALINE PHOSPHATASE: 153 (ref 46–116)
EXT ALLOSURE: ABNORMAL
EXT ALT: 17 (ref 12–78)
EXT AMYLASE: ABNORMAL
EXT ANC: ABNORMAL
EXT AST: 12 (ref 15–37)
EXT BACTERIA UA: ABNORMAL
EXT BANDS%: ABNORMAL
EXT BILIRUBIN DIRECT: 0.1 MG/DL (ref 0–0.3)
EXT BILIRUBIN TOTAL: 0.5 (ref 0–1)
EXT BK VIRUS DNA QN PCR: NOT DETECTED
EXT BUN: 26 (ref 7–18)
EXT C PEPTIDE: ABNORMAL
EXT CALCIUM: 10.3 (ref 8.8–10.5)
EXT CHLORIDE: 99 (ref 100–108)
EXT CHOLESTEROL: ABNORMAL
EXT CMV DNA QUANT. BY PCR: ABNORMAL
EXT CO2: 28 (ref 21–32)
EXT CREATININE UA: 101 (ref 10–175)
EXT CREATININE: 2.1 MG/DL (ref 0.7–1.3)
EXT CYCLOSPORINE LVL: ABNORMAL
EXT EBV DNA BY PCR: ABNORMAL
EXT EBV IGG: ABNORMAL
EXT EGFR NO RACE VARIABLE: 34
EXT EOSINOPHIL%: ABNORMAL
EXT FERRITIN: ABNORMAL
EXT GFR MDRD AF AMER: ABNORMAL
EXT GFR MDRD NON AF AMER: ABNORMAL
EXT GLUCOSE UA: 1000
EXT GLUCOSE: 343 (ref 70–110)
EXT HBV DNA QUANT PCR: ABNORMAL
EXT HCV QUANT: ABNORMAL
EXT HDL: ABNORMAL
EXT HEMATOCRIT: 50 (ref 42–52)
EXT HEMOGLOBIN A1C: ABNORMAL
EXT HEMOGLOBIN: 16.7 (ref 14–18)
EXT HIV RNA QUANT PCR: ABNORMAL
EXT IMMUNKNOW (STIMULATED): ABNORMAL
EXT IRON SATURATION: ABNORMAL
EXT LDH, TOTAL: ABNORMAL
EXT LDL CHOLESTEROL: ABNORMAL
EXT LEFLUNOMIDE METABOLITE: ABNORMAL
EXT LIPASE: ABNORMAL
EXT LYMPH%: 6 (ref 25–40)
EXT MAGNESIUM: 1.5 (ref 1.8–2.4)
EXT MONOCYTES%: 9 (ref 1–15)
EXT NITRITES UA: NEGATIVE
EXT PHOSPHORUS: 2.8 (ref 2.6–4.7)
EXT PLATELETS: 300 (ref 142–424)
EXT POTASSIUM: 4.2 (ref 3.6–5.2)
EXT PROT/CREAT RATIO UR: 0.23 (ref 0.02–0.13)
EXT PROTEIN TOTAL: ABNORMAL
EXT PROTEIN UA: ABNORMAL
EXT PTH, INTACT: ABNORMAL
EXT RBC UA: ABNORMAL
EXT SEGS%: 72 (ref 37–80)
EXT SERUM IRON: ABNORMAL
EXT SIROLIMUS LVL: ABNORMAL
EXT SODIUM: 134 MMOL/L (ref 135–145)
EXT TACROLIMUS LVL: 10.9
EXT TIBC: ABNORMAL
EXT TRIGLYCERIDES: ABNORMAL
EXT URIC ACID: ABNORMAL
EXT URINE CULTURE: ABNORMAL
EXT URINE PROTEIN: 23 (ref 0–15)
EXT VIT D 25 HYDROXY: 59
EXT WBC UA: ABNORMAL
EXT WBC: 8.1 (ref 4.6–10.2)
PARVOVIRUS B19 DNA BY PCR: ABNORMAL
QUANTIFERON GOLD TB: ABNORMAL

## 2024-10-16 ENCOUNTER — PATIENT MESSAGE (OUTPATIENT)
Dept: TRANSPLANT | Facility: CLINIC | Age: 49
End: 2024-10-16
Payer: MEDICAID

## 2024-10-16 ENCOUNTER — OFFICE VISIT (OUTPATIENT)
Dept: TRANSPLANT | Facility: CLINIC | Age: 49
End: 2024-10-16
Payer: MEDICAID

## 2024-10-16 DIAGNOSIS — E11.21 TYPE 2 DIABETES MELLITUS WITH DIABETIC NEPHROPATHY, UNSPECIFIED WHETHER LONG TERM INSULIN USE: ICD-10-CM

## 2024-10-16 DIAGNOSIS — Z91.89 AT RISK FOR OPPORTUNISTIC INFECTIONS: ICD-10-CM

## 2024-10-16 DIAGNOSIS — I15.0 RENOVASCULAR HYPERTENSION: ICD-10-CM

## 2024-10-16 DIAGNOSIS — Q61.3 PKD (POLYCYSTIC KIDNEY DISEASE): ICD-10-CM

## 2024-10-16 DIAGNOSIS — Z94.0 S/P KIDNEY TRANSPLANT: Primary | ICD-10-CM

## 2024-10-16 DIAGNOSIS — Z79.60 LONG-TERM USE OF IMMUNOSUPPRESSANT MEDICATION: ICD-10-CM

## 2024-10-16 NOTE — Clinical Note
Just did a visit with him. He said the whole family had GI illness and he was having vomiting and diarrhea. He started taking antibiotics that he got in [place]. He said diarrhea has stopped and he is no longer vomiting. I told him to stop taking the antibiotics (and to not do that again) and to notify us if diarrhea returns so we can get stool studies. He has a visit with his PCP on 10/22 for DM management.

## 2024-10-16 NOTE — LETTER
October 16, 2024        Estefanía Anders  [address] [address]  Phone: [phone]  Fax: [phone]             [facility] 1st Fl  [address]  [facility] [address]  Phone: [phone]   Patient: Adilson Sylvester   MR Number: [id number]   YOB: 1975   Date of Visit: 10/16/2024       Dear Dr. Estefanía Anders    Thank you for referring Adilson Sylvester to me for evaluation. Attached you will find relevant portions of my assessment and plan of care.    If you have questions, please do not hesitate to call me. I look forward to following Adilson Sylvester along with you.    Sincerely,    Jeni Alonso NP    Enclosure    If you would like to receive this communication electronically, please contact [email or web address] or [phone] to request [facility] access.    [facility] is a tool which provides read-only access to select patient information with whom you have a relationship. Its easy to use and provides real time access to review your patients record including encounter summaries, notes, results, and demographic information.    If you feel you have received this communication in error or would no longer like to receive these types of communications, please e-mail [email or web address]

## 2024-10-17 ENCOUNTER — TELEPHONE (OUTPATIENT)
Dept: TRANSPLANT | Facility: CLINIC | Age: 49
End: 2024-10-17
Payer: MEDICAID

## 2024-10-17 NOTE — TELEPHONE ENCOUNTER
Notified patient of Dr. Back's recommendations to repeat RFP in 2 weeks (10/29/24) after hydrating. Patient verbalized understanding.     ----- Message from Maru Back MD sent at 10/17/2024  8:31 AM CDT -----    Thank you for the update!    Nevin please repeat lab in 2 weeks after good hydration.  ----- Message -----  From: Jeni Alonso NP  Sent: 10/16/2024   3:49 PM CDT  To: Nevin Valenzuela RN; #    Just did a visit with him. He said the whole family had GI illness and he was having vomiting and diarrhea. He started taking antibiotics that he got in [place]. He said diarrhea has stopped and he is no longer vomiting. I told him to stop taking the antibiotics (and to not do that again) and to notify us if diarrhea returns so we can get stool studies. He has a visit with his PCP on 10/22 for DM management.

## 2024-10-29 DIAGNOSIS — Z94.0 S/P KIDNEY TRANSPLANT: ICD-10-CM

## 2024-10-30 ENCOUNTER — DOCUMENTATION ONLY (OUTPATIENT)
Dept: TRANSPLANT | Facility: CLINIC | Age: 49
End: 2024-10-30
Payer: MEDICAID

## 2024-10-30 LAB
EXT ANC: ABNORMAL
EXT BUN: 13 (ref 7–18)
EXT CALCIUM: 8.9 (ref 8.8–10.5)
EXT CHLORIDE: 104 (ref 100–108)
EXT CO2: 22 (ref 21–32)
EXT CREATININE: 1.46 MG/DL (ref 0.7–1.3)
EXT EGFR NO RACE VARIABLE: 59
EXT GLUCOSE: 197 (ref 70–110)
EXT POTASSIUM: 4.9 (ref 3.6–5.2)
EXT SODIUM: 138 MMOL/L (ref 135–145)

## 2024-11-04 DIAGNOSIS — E83.42 HYPOMAGNESEMIA: Primary | ICD-10-CM

## 2024-11-04 DIAGNOSIS — Z94.0 S/P KIDNEY TRANSPLANT: ICD-10-CM

## 2024-11-05 ENCOUNTER — DOCUMENTATION ONLY (OUTPATIENT)
Dept: TRANSPLANT | Facility: CLINIC | Age: 49
End: 2024-11-05
Payer: MEDICAID

## 2024-11-05 LAB
EXT ALBUMIN: 3.6 (ref 3.4–5)
EXT BUN: 15 (ref 7–18)
EXT CALCIUM: 8.6 (ref 8.8–10.5)
EXT CHLORIDE: 106 (ref 100–108)
EXT CO2: 24 (ref 21–32)
EXT CREATININE: 1.52 MG/DL (ref 0.7–1.3)
EXT EGFR NO RACE VARIABLE: 57
EXT GLUCOSE: 135 (ref 70–110)
EXT HEMATOCRIT: 49.9 (ref 42–52)
EXT HEMOGLOBIN: 15.9 (ref 14–18)
EXT LYMPH%: 7 (ref 25–40)
EXT MAGNESIUM: 0.9 (ref 1.8–2.4)
EXT MONOCYTES%: 5 (ref 1–15)
EXT PHOSPHORUS: 1.8 (ref 2.6–4.7)
EXT PLATELETS: 290 (ref 142–424)
EXT POTASSIUM: 4.4 (ref 3.6–5.2)
EXT SEGS%: 73 (ref 37–80)
EXT SODIUM: 140 MMOL/L (ref 135–145)
EXT WBC: 9 (ref 4.6–10.2)

## 2024-11-05 RX ORDER — PANTOPRAZOLE SODIUM 40 MG/1
40 TABLET, DELAYED RELEASE ORAL
Qty: 30 TABLET | Refills: 3 | OUTPATIENT
Start: 2024-11-05

## 2024-11-05 RX ORDER — PANTOPRAZOLE SODIUM 40 MG/1
40 TABLET, DELAYED RELEASE ORAL DAILY
Qty: 90 TABLET | Refills: 3 | Status: SHIPPED | OUTPATIENT
Start: 2024-11-05

## 2024-11-05 RX ORDER — LANOLIN ALCOHOL/MO/W.PET/CERES
400 CREAM (GRAM) TOPICAL 2 TIMES DAILY
Qty: 180 TABLET | Refills: 3 | Status: SHIPPED | OUTPATIENT
Start: 2024-11-05 | End: 2024-11-08 | Stop reason: DRUGHIGH

## 2024-11-05 NOTE — TELEPHONE ENCOUNTER
Notified patient of Dr. Back's review of 11/4/24 abnormal magnesium lab result. Informed patient his level is 0.9. Patient denies symptoms of muscle cramps or spasms. Patient reports he stopped taking magnesium supplements about a month ago due to c/o severe diarrhea with taking Mag ox 800mg 3 x daily. Instructed patient to report to nearest ER if he experiences any symptoms. Instructed patient to restart magnesium oxide 400mg twice daily & increase intake of magnesium rich foods. Patient verbalized understanding.     Patient also reports his PCP ordered stool studies & the results are negative. OK'd patient to take Imodium if needed. Patient verbalized understanding.

## 2024-11-06 ENCOUNTER — TELEPHONE (OUTPATIENT)
Dept: TRANSPLANT | Facility: CLINIC | Age: 49
End: 2024-11-06
Payer: MEDICAID

## 2024-11-06 NOTE — PROGRESS NOTES
Mg oxide 800 mg TID. High Mfg diet. If he is weak, or has muscle spasm , ER visit today   Kphos  2 pills BID . High Phos diet   Check Mg and Phos on Monday

## 2024-11-06 NOTE — TELEPHONE ENCOUNTER
Partial 11/4/24 lab results were reviewed with Dr. Back.  Notified patient of Dr. Back's review of 11/4/24 abnormal magnesium lab result. Informed patient his level is 0.9. Patient denies symptoms of muscle cramps or spasms. Patient reports he stopped taking magnesium supplements about a month ago due to c/o severe diarrhea with taking Mag ox 800mg 3 x daily. Instructed patient to report to nearest ER if he experiences any symptoms. Instructed patient to restart magnesium oxide 400mg twice daily & increase intake of magnesium rich foods. Patient verbalized understanding.      Patient also reports his PCP ordered stool studies & the results are negative. OK'd patient to take Imodium if needed. Patient verbalized understanding.       Electronically signed by Nevin Valenzuela RN at 11/5/2024  8:54 AM      ----- Message from Maru Back MD sent at 11/6/2024  4:53 PM CST -----  Mg oxide 800 mg TID. High Mfg diet. If he is weak, or has muscle spasm , ER visit today   Kphos  2 pills BID . High Phos diet   Check Mg and Phos on Monday

## 2024-11-06 NOTE — TELEPHONE ENCOUNTER
Coordinator received patient's 11/4/24 tacro level 31.7. Contacted patient to inquire if level was a true 12 hour trough. Patient report the level was not a true 12 hour level. Instructed patient to repeat level this week. Patient agrees to repeat tacro level this Firday 11/8/24. Reminded patient level must be a true 12 hour level. Patient verbalized understanding.

## 2024-11-08 ENCOUNTER — DOCUMENTATION ONLY (OUTPATIENT)
Dept: TRANSPLANT | Facility: CLINIC | Age: 49
End: 2024-11-08
Payer: MEDICAID

## 2024-11-08 DIAGNOSIS — E83.42 HYPOMAGNESEMIA: Primary | ICD-10-CM

## 2024-11-08 RX ORDER — LANOLIN ALCOHOL/MO/W.PET/CERES
800 CREAM (GRAM) TOPICAL 3 TIMES DAILY
Qty: 540 TABLET | Refills: 3 | Status: SHIPPED | OUTPATIENT
Start: 2024-11-08 | End: 2025-11-08

## 2024-11-08 NOTE — TELEPHONE ENCOUNTER
Critical lab result received Mag 0.7. Notified Dr. Back. Plan given by Dr. Back.  Needs to go to ER to get Mg infusion. Is he taking his MG?     Contacted patient to inform him of critically low magnesium level 0.7.   Pt's states he didn't actually start taking the mag ox until this AM because he didn't get the medication from [facility] until last night. He only took 1 tab.   Patient refuse to go to ER at this time. Informed patient of risk of having a very low magnesium level ie, severe muscle cramps, spasms, weakness. Strongly advised patient to report to ER if he experiences any symptoms.  Also instructed patient to increase mag ox to 800mg twice daily since he expressed concerns of diarrhea while taking the magnesium & phosphorus supplements. Patient verbalized understanding to all instructions given.

## 2024-11-09 ENCOUNTER — TELEPHONE (OUTPATIENT)
Dept: TRANSPLANT | Facility: CLINIC | Age: 49
End: 2024-11-09
Payer: MEDICAID

## 2024-11-09 LAB
EXT ANC: ABNORMAL
EXT MAGNESIUM: 0.7 (ref 1.8–2.4)
EXT PHOSPHORUS: 2.3 (ref 2.6–4.7)

## 2024-11-12 ENCOUNTER — DOCUMENTATION ONLY (OUTPATIENT)
Dept: TRANSPLANT | Facility: CLINIC | Age: 49
End: 2024-11-12
Payer: MEDICAID

## 2024-11-12 LAB — EXT TACROLIMUS LVL: 23.1

## 2024-11-13 ENCOUNTER — TELEPHONE (OUTPATIENT)
Dept: TRANSPLANT | Facility: CLINIC | Age: 49
End: 2024-11-13
Payer: MEDICAID

## 2024-11-13 NOTE — TELEPHONE ENCOUNTER
Notified patient of Dr. Back's review of 11/8/24 lab results via [facility]. Waiting for patient to reply.     [facility] message sent to patient:  Good morning Mr. De Anda,     Dr. Back reviewed your 11/8/24 prograf level. The level 23.1 is high. She wants you to HOLD your Prograf dose tonight, tomorrow AM & PM then repeat the level on Friday morning. Are able you get your prograf level drawn at the [facility]. I'm asking because she needs the prograf level to result in one day. The [facility] where you go the prograf level takes 3 days to result.     Let me know so I can have the appointment scheduled. If not, I'll send you a lab order for [facility].     Thanks,     Nevin       ----- Message from Maru Back MD sent at 11/13/2024  7:59 AM CST -----  Hold tac and check tac level on Friday

## 2024-11-15 ENCOUNTER — PATIENT MESSAGE (OUTPATIENT)
Dept: TRANSPLANT | Facility: CLINIC | Age: 49
End: 2024-11-15
Payer: MEDICAID

## 2024-11-15 ENCOUNTER — DOCUMENTATION ONLY (OUTPATIENT)
Dept: TRANSPLANT | Facility: CLINIC | Age: 49
End: 2024-11-15
Payer: MEDICAID

## 2024-11-15 DIAGNOSIS — Z94.0 KIDNEY REPLACED BY TRANSPLANT: Primary | ICD-10-CM

## 2024-11-15 LAB
EXT ALBUMIN: ABNORMAL
EXT ALKALINE PHOSPHATASE: ABNORMAL
EXT ALLOSURE: ABNORMAL
EXT ALT: ABNORMAL
EXT AMYLASE: ABNORMAL
EXT ANC: ABNORMAL
EXT AST: ABNORMAL
EXT BACTERIA UA: ABNORMAL
EXT BANDS%: ABNORMAL
EXT BILIRUBIN DIRECT: ABNORMAL
EXT BILIRUBIN TOTAL: ABNORMAL
EXT BK VIRUS DNA QN PCR: ABNORMAL
EXT BUN: ABNORMAL
EXT C PEPTIDE: ABNORMAL
EXT CALCIUM: ABNORMAL
EXT CHLORIDE: ABNORMAL
EXT CHOLESTEROL: ABNORMAL
EXT CMV DNA QUANT. BY PCR: ABNORMAL
EXT CO2: ABNORMAL
EXT CREATININE UA: ABNORMAL
EXT CREATININE: ABNORMAL
EXT CYCLOSPORINE LVL: ABNORMAL
EXT EBV DNA BY PCR: ABNORMAL
EXT EBV IGG: ABNORMAL
EXT EGFR NO RACE VARIABLE: ABNORMAL
EXT EOSINOPHIL%: ABNORMAL
EXT FERRITIN: ABNORMAL
EXT GFR MDRD AF AMER: ABNORMAL
EXT GFR MDRD NON AF AMER: ABNORMAL
EXT GLUCOSE UA: ABNORMAL
EXT GLUCOSE: ABNORMAL
EXT HBV DNA QUANT PCR: ABNORMAL
EXT HCV QUANT: ABNORMAL
EXT HDL: ABNORMAL
EXT HEMATOCRIT: ABNORMAL
EXT HEMOGLOBIN A1C: ABNORMAL
EXT HEMOGLOBIN: ABNORMAL
EXT HIV RNA QUANT PCR: ABNORMAL
EXT IMMUNKNOW (STIMULATED): ABNORMAL
EXT IRON SATURATION: ABNORMAL
EXT LDH, TOTAL: ABNORMAL
EXT LDL CHOLESTEROL: ABNORMAL
EXT LEFLUNOMIDE METABOLITE: ABNORMAL
EXT LIPASE: ABNORMAL
EXT LYMPH%: ABNORMAL
EXT MAGNESIUM: 1 (ref 1.8–2.4)
EXT MONOCYTES%: ABNORMAL
EXT NITRITES UA: ABNORMAL
EXT PHOSPHORUS: 1.3 (ref 2.6–4.7)
EXT PLATELETS: ABNORMAL
EXT POTASSIUM: ABNORMAL
EXT PROT/CREAT RATIO UR: ABNORMAL
EXT PROTEIN TOTAL: ABNORMAL
EXT PROTEIN UA: ABNORMAL
EXT PTH, INTACT: ABNORMAL
EXT RBC UA: ABNORMAL
EXT SEGS%: ABNORMAL
EXT SERUM IRON: ABNORMAL
EXT SIROLIMUS LVL: ABNORMAL
EXT SODIUM: ABNORMAL
EXT TACROLIMUS LVL: ABNORMAL
EXT TIBC: ABNORMAL
EXT TRIGLYCERIDES: ABNORMAL
EXT URIC ACID: ABNORMAL
EXT URINE CULTURE: ABNORMAL
EXT URINE PROTEIN: ABNORMAL
EXT VIT D 25 HYDROXY: ABNORMAL
EXT WBC UA: ABNORMAL
EXT WBC: ABNORMAL
PARVOVIRUS B19 DNA BY PCR: ABNORMAL
QUANTIFERON GOLD TB: ABNORMAL

## 2024-11-15 NOTE — TELEPHONE ENCOUNTER
Notified patient via [facility] message of Dr. Back's recommendations to restart prograf 2mg bid.     Hi Mr. De Anda     Dr. Back wants you to restart your prograf on a lower dose of 2mg twice daily starting with tonight's dose. If the level you had drawn today is still high she may decrease your prograf dose further. But for now take the prograf 2mg twice daily.     We'll need to recheck your labs on Mon 11/25/24.    Thanks,     Nevin

## 2024-11-17 RX ORDER — TACROLIMUS 1 MG/1
2 CAPSULE ORAL EVERY 12 HOURS
Qty: 120 CAPSULE | Refills: 11 | Status: SHIPPED | OUTPATIENT
Start: 2024-11-17 | End: 2025-11-17

## 2024-11-26 ENCOUNTER — DOCUMENTATION ONLY (OUTPATIENT)
Dept: TRANSPLANT | Facility: CLINIC | Age: 49
End: 2024-11-26
Payer: MEDICAID

## 2024-11-26 LAB
EXT BUN: 17 (ref 7–18)
EXT CALCIUM: 9 (ref 8.8–10.5)
EXT CHLORIDE: 108 (ref 100–108)
EXT CO2: 25 (ref 21–32)
EXT CREATININE: 1.3 MG/DL (ref 0.7–1.3)
EXT EGFR NO RACE VARIABLE: 68
EXT MAGNESIUM: 1.3 (ref 1.8–2.4)
EXT PHOSPHORUS: 1.7 (ref 2.6–4.7)
EXT POTASSIUM: 3.8 (ref 3.6–5.2)
EXT SODIUM: 139 MMOL/L (ref 135–145)
EXT TACROLIMUS LVL: 16.6

## 2024-11-27 NOTE — PROGRESS NOTES
If it is a true level, hold tac and recheck tac level on Friday   Kphos 3 pills TID. Encouarge high Phos diet   Check Mg and Phos on Friday as well

## 2024-12-02 ENCOUNTER — DOCUMENTATION ONLY (OUTPATIENT)
Dept: TRANSPLANT | Facility: CLINIC | Age: 49
End: 2024-12-02
Payer: MEDICAID

## 2024-12-02 LAB
EXT ALBUMIN: 3.6 (ref 3.4–5)
EXT BANDS%: 5
EXT BUN: 19 (ref 7–18)
EXT CALCIUM: 9.1 (ref 8.8–10.5)
EXT CHLORIDE: 104 (ref 100–108)
EXT CO2: 25 (ref 21–32)
EXT CREATININE: 1.54 MG/DL (ref 0.7–1.3)
EXT EGFR NO RACE VARIABLE: 56
EXT EOSINOPHIL%: 0
EXT GLUCOSE: 94 (ref 70–110)
EXT HEMATOCRIT: 49.4 (ref 42–52)
EXT HEMOGLOBIN: 16.1 (ref 14–18)
EXT LYMPH%: 6 (ref 25–40)
EXT MAGNESIUM: 1.1 (ref 1.8–2.4)
EXT MONOCYTES%: 5 (ref 0–5)
EXT PHOSPHORUS: 1.7 (ref 2.6–4.7)
EXT PLATELETS: 281 (ref 142–424)
EXT POTASSIUM: 4.7 (ref 3.6–5.2)
EXT SEGS%: 84 (ref 37–80)
EXT SODIUM: 137 MMOL/L (ref 135–145)
EXT TACROLIMUS LVL: 14.6
EXT WBC: 11.9 (ref 1.6–10.2)

## 2024-12-06 ENCOUNTER — PATIENT MESSAGE (OUTPATIENT)
Dept: TRANSPLANT | Facility: CLINIC | Age: 49
End: 2024-12-06
Payer: MEDICAID

## 2024-12-06 DIAGNOSIS — Z94.0 KIDNEY REPLACED BY TRANSPLANT: Primary | ICD-10-CM

## 2024-12-06 RX ORDER — TACROLIMUS 0.5 MG/1
1 CAPSULE ORAL EVERY 12 HOURS
Qty: 120 CAPSULE | Refills: 11 | Status: SHIPPED | OUTPATIENT
Start: 2024-12-08 | End: 2025-12-08

## 2024-12-06 NOTE — TELEPHONE ENCOUNTER
Notified patient of Dr. Back's review of 12/2/24 lab results via telephone & [facility] message.   Instructed patient to HOLD tonight's & tomorrow's dose of prograf; restart on lower dose of 1mg twice daily on Sunday. Next lab appointment will be Tuesday 12/10.24. Patient verbalized understanding.     Hi Mr. De Anda,     Dr. Back reviewed your 12/2/24 lab results. Your prograf level 14.6 is elevated. She wants you to please HOLD your prograf dose tonight, tomorrow morning & evening then restart on Sunday on a lower dose of 1mg twice daily.     She wants you to repeat your prograf level next Tuesday 12/10/24. I'll email you a copy of the lab order.     The new prograf/tacrolimus prescription that's being sent to [facility] will be for 0.5mg capsules. Just in case we need to decrease your dose further.     Thanks,     Nevin     ----- Message from Nevin Valenzuela sent at 12/6/2024  4:51 PM CST -----    ----- Message -----  From: Maru Back MD  Sent: 12/6/2024   2:08 PM CST  To: [facility] Post-Kidney Transplant Clinical    Tac level is still high. Hold it and resume tac at 1 mg bid on Sunday. Check tac level on Tuesday

## 2024-12-13 ENCOUNTER — DOCUMENTATION ONLY (OUTPATIENT)
Dept: TRANSPLANT | Facility: CLINIC | Age: 49
End: 2024-12-13
Payer: MEDICAID

## 2024-12-13 LAB — EXT TACROLIMUS LVL: 8.3

## 2025-01-08 ENCOUNTER — DOCUMENTATION ONLY (OUTPATIENT)
Dept: TRANSPLANT | Facility: CLINIC | Age: 50
End: 2025-01-08
Payer: MEDICAID

## 2025-01-08 LAB
EXT ALBUMIN: 4 (ref 3.4–5)
EXT ALKALINE PHOSPHATASE: 153 (ref 46–116)
EXT ALLOSURE: ABNORMAL
EXT ALT: 28 (ref 12–78)
EXT AMYLASE: ABNORMAL
EXT ANC: ABNORMAL
EXT AST: 19 (ref 15–37)
EXT BACTERIA UA: ABNORMAL
EXT BANDS%: ABNORMAL
EXT BILIRUBIN DIRECT: 0.1 MG/DL (ref 0–0.3)
EXT BILIRUBIN TOTAL: 0.5 (ref 0–1)
EXT BK VIRUS DNA QN PCR: NOT DETECTED
EXT BUN: 12 (ref 7–18)
EXT C PEPTIDE: ABNORMAL
EXT CALCIUM: 10 (ref 8.8–10.5)
EXT CHLORIDE: 107 (ref 100–108)
EXT CHOLESTEROL: ABNORMAL
EXT CMV DNA QUANT. BY PCR: ABNORMAL
EXT CO2: 25 (ref 21–32)
EXT CREATININE UA: 110 (ref 10–175)
EXT CREATININE: 1.35 MG/DL (ref 0.7–1.3)
EXT CYCLOSPORINE LVL: ABNORMAL
EXT EBV DNA BY PCR: ABNORMAL
EXT EBV IGG: ABNORMAL
EXT EGFR NO RACE VARIABLE: 65
EXT EOSINOPHIL%: 1 (ref 1–3)
EXT FERRITIN: ABNORMAL
EXT GFR MDRD AF AMER: ABNORMAL
EXT GFR MDRD NON AF AMER: ABNORMAL
EXT GLUCOSE UA: NORMAL
EXT GLUCOSE: 81 (ref 70–110)
EXT HBV DNA QUANT PCR: ABNORMAL
EXT HCV QUANT: ABNORMAL
EXT HDL: ABNORMAL
EXT HEMATOCRIT: 57 (ref 42–52)
EXT HEMOGLOBIN A1C: ABNORMAL
EXT HEMOGLOBIN: 17.5 (ref 14–18)
EXT HIV RNA QUANT PCR: ABNORMAL
EXT IMMUNKNOW (STIMULATED): ABNORMAL
EXT IRON SATURATION: ABNORMAL
EXT LDH, TOTAL: ABNORMAL
EXT LDL CHOLESTEROL: ABNORMAL
EXT LEFLUNOMIDE METABOLITE: ABNORMAL
EXT LIPASE: ABNORMAL
EXT LYMPH%: 6 (ref 25–40)
EXT MAGNESIUM: 1.7 (ref 1.8–2.4)
EXT MONOCYTES%: 7 (ref 1–15)
EXT NITRITES UA: NEGATIVE
EXT PHOSPHORUS: 2.1 (ref 2.6–4.7)
EXT PLATELETS: 257 (ref 142–424)
EXT POTASSIUM: 5.1 (ref 3.6–5.2)
EXT PROT/CREAT RATIO UR: 0.44 (ref 0.02–0.13)
EXT PROTEIN TOTAL: 7 (ref 6.4–8.2)
EXT PROTEIN UA: 30
EXT PTH, INTACT: ABNORMAL
EXT RBC UA: ABNORMAL
EXT SEGS%: 78 (ref 37–80)
EXT SERUM IRON: ABNORMAL
EXT SIROLIMUS LVL: ABNORMAL
EXT SODIUM: 143 MMOL/L (ref 135–145)
EXT TACROLIMUS LVL: 9.5
EXT TIBC: ABNORMAL
EXT TRIGLYCERIDES: ABNORMAL
EXT URIC ACID: ABNORMAL
EXT URINE CULTURE: ABNORMAL
EXT URINE PROTEIN: 48 (ref 0–15)
EXT VIT D 25 HYDROXY: ABNORMAL
EXT WBC UA: ABNORMAL
EXT WBC: 10.5 (ref 4.6–10.2)
PARVOVIRUS B19 DNA BY PCR: ABNORMAL
QUANTIFERON GOLD TB: ABNORMAL

## 2025-01-14 NOTE — PROGRESS NOTES
Kidney Post-Transplant Assessment    Referring Physician: Estefanía Anders  Current Nephrologist: Estefanía Anders    ORGAN: LEFT KIDNEY  Donor Type: donation after brain death  PHS Increased Risk: yes  Cold Ischemia: 634 mins  Induction Medications: thymo    Subjective:   The patient location is: [place]  The chief complaint leading to consultation is: Kidney Post-Transplant Assessment    Visit type: audiovisual    Face to Face time with patient:   30 minutes of total time spent on the encounter, which includes face to face time and non-face to face time preparing to see the patient (eg, review of tests), Obtaining and/or reviewing separately obtained history, Documenting clinical information in the electronic or other health record, Independently interpreting results (not separately reported) and communicating results to the patient/family/caregiver, or Care coordination (not separately reported).     Each patient to whom he or she provides medical services by telemedicine is:  (1) informed of the relationship between the physician and patient and the respective role of any other health care provider with respect to management of the patient; and (2) notified that he or she may decline to receive medical services by telemedicine and may withdraw from such care at any time.      CC:  Reassessment of renal allograft function and management of chronic immunosuppression.    HPI:  Mr. Sylvester is a 49 y.o. 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  History of ESRD secondary to DM and PKD s/p kidney transplant #1 7/2017 that failed in 2021 due to missing medications due to insurance issues. He has CKD stage 3 - GFR 30-59 and his baseline creatinine is between 1.3-1.7. He takes mycophenolate mofetil, prednisone, and tacrolimus for maintenance immunosuppression.     Feels well without complaints. Does have diarrhea that comes and  goes, he is scheduled for local GI visit. No problems with urination. BP has been excellent at home, most days does not require dose of nifedipine. 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 Energy levels good. No peripheral edema. No fevers or abdominal pain. Tolerating IS without issue, no nausea or vomiting.     Current Outpatient Medications   Medication Sig Dispense Refill    ergocalciferol (ERGOCALCIFEROL) 50,000 unit Cap Take 1 capsule (50,000 Units total) by mouth every 7 days. 4 capsule 11    k phos di & mono-sod phos mono (K-PHOS-NEUTRAL) 250 mg Tab Take 1 tablet by mouth 3 (three) times daily. 90 tablet 11    magnesium oxide (MAG-OX) 400 mg (241.3 mg magnesium) tablet Take 2 tablets (800 mg total) by mouth 3 (three) times daily. 540 tablet 3    multivitamin Tab Take 1 tablet by mouth once daily.      mycophenolate (CELLCEPT) 250 mg Cap Take 4 capsules (1,000 mg total) by mouth 2 (two) times daily. 240 capsule 11    NIFEdipine (PROCARDIA-XL) 30 MG (OSM) 24 hr tablet Take 1 tablet (30 mg total) by mouth once daily. HOLD SBP <120 30 tablet 11    pantoprazole (PROTONIX) 40 MG tablet Take 1 tablet (40 mg total) by mouth once daily. 90 tablet 3    predniSONE (DELTASONE) 5 MG tablet Take by mouth daily: 4/30 to 5/6 20 mg, 5/7 to 5/13 15 mg, 5/14 to 5/20 10 mg, 5/21 5 mg daily. DO NOT STOP. 70 tablet 11    sodium bicarbonate 650 MG tablet Take 2 tablets (1,300 mg total) by mouth 2 (two) times daily. 120 tablet 11    sodium zirconium cyclosilicate (LOKELMA) 5 gram packet Take 1 packet (5 g total) by mouth once daily. Mix entire contents of packet(s) into drinking glass containing 3 tablespoons of water; stir well and drink immediately. Add water and repeat until no powder remains to receive entire dose. 30 packet 11    tacrolimus (PROGRAF) 0.5 MG Cap Take 2 capsules (1 mg total) by mouth every 12 (twelve) hours. Z94.0;Kidney txp on 4/27/24 120 capsule 11    tamsulosin (FLOMAX) 0.4 mg Cap Take  1 capsule (0.4 mg total) by mouth once daily. 30 capsule 11     Current Facility-Administered Medications   Medication Dose Route Frequency Provider Last Rate Last Admin    LIDOcaine HCl 2% urojet   Urethral 1 time in Clinic/HOD Sam Roque MD           Past Medical History:   Diagnosis Date    Diabetes     Diabetes mellitus, type 2     Gout     Hyperkalemia     Hypertension     Hypothyroidism     Hypothyroidism     Kidney transplant recipient     Obesity     Patient on waiting list for kidney transplant 05/23/2023    Polycystic kidney disease     Pre-transplant evaluation for chronic kidney disease 12/14/2021       Review of Systems   Constitutional:  Negative for activity change and fever.   Eyes:  Negative for visual disturbance.   Respiratory:  Negative for cough and shortness of breath.    Cardiovascular:  Negative for chest pain and leg swelling.   Gastrointestinal:  Negative for abdominal pain, constipation, diarrhea and nausea.   Genitourinary:  Negative for difficulty urinating, frequency and hematuria.   Musculoskeletal:  Negative for arthralgias and myalgias.   Skin:  Negative for wound.   Allergic/Immunologic: Positive for immunocompromised state.   Neurological:  Negative for weakness.   Psychiatric/Behavioral:  Negative for sleep disturbance.        Objective:     There were no vitals taken for this visit.body mass index is unknown because there is no height or weight on file.    Physical Exam-VIRTUAL VISIT    Labs:  Lab Results   Component Value Date    WBC 9.19 06/10/2024    HGB 13.0 (L) 06/10/2024    HCT 40.6 06/10/2024    LABPLAT 129 (L) 07/26/2021     06/10/2024    K 4.5 06/10/2024     06/10/2024    CO2 24 06/10/2024    BUN 19 06/10/2024    CREATININE 1.7 (H) 06/10/2024    EGFRNORACEVR 49.7 (A) 06/10/2024    CALCIUM 9.5 06/10/2024    PHOS 2.0 (L) 06/10/2024    MG 1.5 (L) 06/10/2024    ALBUMIN 3.6 06/10/2024    AST 11 06/06/2024    ALT 10 06/06/2024    UTPCR Unable to calculate  06/03/2024    PTH 1,232.4 (H) 05/01/2024    TACROLIMUS 11.6 06/10/2024       Lab Results   Component Value Date    EXTANC  11/08/2024      Comment:      Labs repeated to assess Phos & Mag    EXTWBC 10.5 (H) 01/06/2025    EXTSEGS 78.0 01/06/2025    EXTPLATELETS 257 01/06/2025    EXTHEMOGLOBI 17.5 01/06/2025    EXTHEMATOCRI 57.0 (H) 01/06/2025    EXTCREATININ 1.35 (H) 01/06/2025    EXTCREATININ 110 01/06/2025    EXTSODIUM 143 01/06/2025    EXTPOTASSIUM 5.1 01/06/2025    EXTBUN 12 01/06/2025    EXTCO2 25 01/06/2025    EXTCALCIUM 10.0 01/06/2025    EXTPHOSPHORU 2.1 (L) 01/06/2025    EXTGLUCOSE 81 01/06/2025    EXTALBUMIN 4.0 01/06/2025    EXTAST 19 01/06/2025    EXTALT 28 01/06/2025    EXTBILITOTAL 0.5 01/06/2025       Lab Results   Component Value Date    EXTTACROLVL 9.5 01/06/2025    EXTPROTCRE 0.44 (H) 01/06/2025    EXTPROTEINUA 30 (H) 01/06/2025    EXTWBCUA NONE 10/08/2024    EXTRBCUA NONE 10/08/2024       Labs were reviewed with the patient.    Assessment:     1. S/P kidney transplant #2 4/27/2024    2. PKD (polycystic kidney disease)    3. Long-term use of immunosuppressant medication    4. Post-transplant erythrocytosis    5. At risk for opportunistic infections      Plan:   Needs local heme-onc visit for post transplant erythrocytosis         1. Immunosuppression: Prograf trough 9.5. Continue Prograf 1/1, MMF 1000 mg BID, and Prednisone 5 mg QD. Will continue to monitor for drug toxicities    2. Allograft Function: CKD II. Continue good oral hydration.   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    - History of ESRD secondary to DM and PKD s/p kidney transplant #1 7/2017 that failed in 2021 due to missing medications due to insurance issues.   - baseline creatinine is between 1.3-1.7.   Latest Reference Range & Units 8mo 1wk,  Kidney-Post 1 Year  01/06/25 11:34   EXT Creatinine 0.70 - 1.30 mg/dL 1.35 (H) (P) (E)       3. Hypertension management:   - advise low  salt diet and home BP monitoring      4. DM2   - blood sugars have been better controlled   - following with local PCP       5. Hypomagnesemia    - high magnesium diet   - MagOx 800 mg TID    6. Post transplant erythrocytosis   - need local heme-onc visit   Latest Reference Range & Units 8mo 1wk,  Kidney-Post 1 Year  01/06/25 11:34   EXT Hemoglobin 14.0 - 18.0  17.5 (P) (E)   EXT Hematocrit 42.0 - 52.0  57.0 (H) (P) (E)         7. Proteinuria: continue p/c ratio as per guidelines    - last UPC 0.44    8. BK virus infection screening:  BK PCR per protocol    - last PCR not detected    9. Weight education: provided during the clinic visit   There is no height or weight on file to calculate BMI.     10. Patient safety education regarding immunosuppression including prophylaxis posttransplant for CMV, PCP  - PCP prophylaxis: mepron (stop 10/27/2024)- completed  - CMV prophylaxis: valcyte (stop 7/27/2024)- completed         Follow-up:   Clinic: return to transplant clinic weekly for the first month after transplant; every 2 weeks during months 2-3; then at 6-, 9-, 12-, 18-, 24-, and 36- months post-transplant to reassess for complications from immunosuppression toxicity and monitor for rejection.  Annually thereafter.    Labs: since patient remains at high risk for rejection and drug-related complications that warrant close monitoring, labs will be ordered as follows: continue twice weekly CBC, renal panel, and drug level for first month; then same labs once weekly through 3rd month post-transplant.  Urine for UA and protein/creatinine ratio monthly.  Serum BK - PCR at 1-, 3-, 6-, 9-, 12-, 18-, 24-, 36- 48-, and 60 months post-transplant.  Hepatic panel at 1-, 2-, 3-, 6-, 9-, 12-, 18-, 24-, and 36- months post-transplant.    Jeni Alonso NP

## 2025-01-15 ENCOUNTER — OFFICE VISIT (OUTPATIENT)
Dept: TRANSPLANT | Facility: CLINIC | Age: 50
End: 2025-01-15
Payer: MEDICAID

## 2025-01-15 ENCOUNTER — PATIENT MESSAGE (OUTPATIENT)
Dept: TRANSPLANT | Facility: CLINIC | Age: 50
End: 2025-01-15
Payer: MEDICAID

## 2025-01-15 DIAGNOSIS — Z91.89 AT RISK FOR OPPORTUNISTIC INFECTIONS: ICD-10-CM

## 2025-01-15 DIAGNOSIS — Q61.3 PKD (POLYCYSTIC KIDNEY DISEASE): ICD-10-CM

## 2025-01-15 DIAGNOSIS — Z79.60 LONG-TERM USE OF IMMUNOSUPPRESSANT MEDICATION: ICD-10-CM

## 2025-01-15 DIAGNOSIS — D75.1 POST-TRANSPLANT ERYTHROCYTOSIS: ICD-10-CM

## 2025-01-15 DIAGNOSIS — Z94.0 S/P KIDNEY TRANSPLANT: Primary | ICD-10-CM

## 2025-01-15 NOTE — LETTER
January 15, 2025        Estefanía Anders  [address] [facility] [address]  Phone: [phone]  Fax: [phone]             [facility] 1st Fl  [address]  [facility] [address]  Phone: [phone]   Patient: Adilson Sylvester   MR Number: [id number]   YOB: 1975   Date of Visit: 1/15/2025       Dear Dr. Estefanía Anders    Thank you for referring Adilson Sylvester to me for evaluation. Attached you will find relevant portions of my assessment and plan of care.    If you have questions, please do not hesitate to call me. I look forward to following Adilson Sylvester along with you.    Sincerely,    Jeni Alonso NP    Enclosure    If you would like to receive this communication electronically, please contact [email or web address] or [phone] to request [email or web address] access.    [email or web address] is a tool which provides read-only access to select patient information with whom you have a relationship. Its easy to use and provides real time access to review your patients record including encounter summaries, notes, results, and demographic information.    If you feel you have received this communication in error or would no longer like to receive these types of communications, please e-mail [email or web address]

## 2025-01-16 ENCOUNTER — TELEPHONE (OUTPATIENT)
Dept: TRANSPLANT | Facility: CLINIC | Age: 50
End: 2025-01-16
Payer: MEDICAID

## 2025-01-16 ENCOUNTER — PATIENT MESSAGE (OUTPATIENT)
Dept: TRANSPLANT | Facility: CLINIC | Age: 50
End: 2025-01-16
Payer: MEDICAID

## 2025-01-16 RX ORDER — ASPIRIN 81 MG/1
81 TABLET ORAL DAILY
COMMUNITY

## 2025-01-16 NOTE — TELEPHONE ENCOUNTER
Notified patient of Dr. Back's review of 1/6/25 lab results via [facility].     My [employer] message sent to patient:    Allen De Anda,     Dr. Back reviewed your 1/6/25 lab results. Your hemoglobin is elevated 17.1 She wants you to start taking a baby aspirin 81mg daily. You can purchase it as an over-the counter medication.     She wants you to get a therapeutic phlebotomy. This is a process where you would have a pint of blood removed from & discarded. It's to get rid of the excess RBCs your body produced.  It's like donating a pint of blood but instead the tech would waste that pint of blood. I'm not sure if you ever had this done before. I'll have to set you up with a facility that will do the phlebotomy like a Blood bank or Infusion unit that is close to you.     In addition, a referral has been entered for you to see a hematologist for further evaluation of elevated hemoglobin & hematocrit.     If you smoke she wants you to avoid smoking (first or second hand).      Lastly, you'll need to have labs redrawn next Thursday or Friday. I'll send email you a copy of the lab order.     Thanks,     Nevin   ----- Message from Nevin Valenzuela sent at 1/15/2025  8:08 PM CST -----    ----- Message -----  From: Maru Back MD  Sent: 1/15/2025   7:56 AM CST  To: [facility] Post-Kidney Transplant Clinical    Hb 17. Needs phlebotomy soon   Start ASA 81 mg daily   Avoid smoking ( first or second hand)  Check lab next week   Hematology consult

## 2025-02-14 DIAGNOSIS — R71.8 ELEVATED HEMATOCRIT: ICD-10-CM

## 2025-02-14 DIAGNOSIS — D58.2 ELEVATED HEMOGLOBIN: Primary | ICD-10-CM

## 2025-03-20 ENCOUNTER — DOCUMENTATION ONLY (OUTPATIENT)
Dept: TRANSPLANT | Facility: CLINIC | Age: 50
End: 2025-03-20
Payer: MEDICAID

## 2025-03-20 LAB
EXT ALBUMIN: 3.2 (ref 3.4–5)
EXT ANC: ABNORMAL
EXT BUN: 18 (ref 7–18)
EXT CALCIUM: 8.7 (ref 8.8–10.5)
EXT CHLORIDE: 107 (ref 100–108)
EXT CO2: 23 (ref 21–32)
EXT CREATININE: 1.34 MG/DL (ref 0.7–1.3)
EXT EGFR NO RACE VARIABLE: 65.75
EXT EOSINOPHIL%: 2.4 (ref 0–6)
EXT GLUCOSE: 86 (ref 70–110)
EXT HEMATOCRIT: 50 (ref 42–52)
EXT HEMOGLOBIN: 15.5 (ref 14–18)
EXT LYMPH%: 7.3 (ref 25–40)
EXT MAGNESIUM: 1.6 (ref 1.8–2.4)
EXT MONOCYTES%: 11.3 (ref 2–10)
EXT PHOSPHORUS: 2.5 (ref 2.6–4.7)
EXT PLATELETS: 240 (ref 142–424)
EXT POTASSIUM: 3.8 (ref 3.6–5.2)
EXT SEGS%: 78.4 (ref 50–80)
EXT SODIUM: 139 MMOL/L (ref 135–145)
EXT TACROLIMUS LVL: 7.2
EXT WBC: 5.5 (ref 4.6–10.2)

## 2025-03-23 RX ORDER — CALCITRIOL 0.5 UG/1
1 CAPSULE ORAL
Qty: 180 CAPSULE | Refills: 1 | OUTPATIENT
Start: 2025-03-23

## 2025-04-08 ENCOUNTER — DOCUMENTATION ONLY (OUTPATIENT)
Dept: TRANSPLANT | Facility: CLINIC | Age: 50
End: 2025-04-08
Payer: MEDICAID

## 2025-04-08 LAB
EXT ALBUMIN: 3.4 (ref 3.4–5)
EXT ALKALINE PHOSPHATASE: 153 (ref 46–116)
EXT ALT: 19 (ref 12–78)
EXT ANC: ABNORMAL
EXT AST: 13 (ref 15–37)
EXT BACTERIA UA: ABNORMAL
EXT BILIRUBIN DIRECT: 0.1 MG/DL (ref 0–0.3)
EXT BILIRUBIN TOTAL: 0.4 (ref 0–1)
EXT BK VIRUS DNA QN PCR: NOT DETECTED
EXT BUN: 22 (ref 7–18)
EXT CALCIUM: 9 (ref 8.8–10.5)
EXT CHLORIDE: 109 (ref 100–108)
EXT CO2: 25 (ref 21–32)
EXT CREATININE UA: 107.3
EXT CREATININE: 1.46 MG/DL (ref 0.7–1.3)
EXT EGFR NO RACE VARIABLE: 59.32
EXT EOSINOPHIL%: 2.1 (ref 0–6)
EXT GLUCOSE UA: NEGATIVE
EXT GLUCOSE: 76 (ref 70–110)
EXT HEMATOCRIT: 54.9 (ref 42–52)
EXT HEMOGLOBIN: 16.6 (ref 14–18)
EXT LYMPH%: 9.4 (ref 25–40)
EXT MAGNESIUM: 1.6 (ref 1.8–2.4)
EXT MONOCYTES%: 8.9 (ref 2–10)
EXT NITRITES UA: NEGATIVE
EXT PHOSPHORUS: 2.7 (ref 2.6–4.7)
EXT PLATELETS: 277 (ref 142–424)
EXT POTASSIUM: 4.6 (ref 3.6–5.2)
EXT PROT/CREAT RATIO UR: 0.42
EXT PROTEIN TOTAL: 6.4 (ref 6.4–8.2)
EXT PROTEIN UA: 30
EXT PTH, INTACT: 218 (ref 12–88)
EXT RBC UA: ABNORMAL
EXT SEGS%: 78.8 (ref 50–80)
EXT SODIUM: 140 MMOL/L (ref 135–145)
EXT TACROLIMUS LVL: 8
EXT URINE PROTEIN: 45
EXT VIT D 25 HYDROXY: 54.5
EXT WBC UA: ABNORMAL
EXT WBC: 5.3 (ref 4.6–10.2)

## 2025-04-10 ENCOUNTER — RESULTS FOLLOW-UP (OUTPATIENT)
Dept: TRANSPLANT | Facility: HOSPITAL | Age: 50
End: 2025-04-10
Payer: MEDICAID

## 2025-04-11 NOTE — TELEPHONE ENCOUNTER
----- Message from aMru Back MD sent at 4/10/2025  6:56 PM CDT -----  Calcitriol 0.25 mcg daily x 6 months   ----- Message -----  From: Nevin Valenzuela RN  Sent: 4/10/2025   1:01 PM CDT  To: Maru Back MD

## 2025-04-15 DIAGNOSIS — N25.81 SECONDARY HYPERPARATHYROIDISM: Primary | ICD-10-CM

## 2025-04-15 RX ORDER — CALCITRIOL 0.25 UG/1
0.25 CAPSULE ORAL DAILY
Qty: 90 CAPSULE | Refills: 1 | Status: SHIPPED | OUTPATIENT
Start: 2025-04-15

## 2025-04-15 NOTE — PROGRESS NOTES
Kidney Post-Transplant Assessment    Referring Physician: Estefanía Anders  Current Nephrologist: Estefanía Anders    ORGAN: LEFT KIDNEY  Donor Type: donation after brain death  PHS Increased Risk: yes  Cold Ischemia: 634 mins  Induction Medications: thymo    Subjective:   The patient location is: [place]  The chief complaint leading to consultation is: Kidney Post-Transplant Assessment    Visit type: audiovisual    Face to Face time with patient:   30 minutes of total time spent on the encounter, which includes face to face time and non-face to face time preparing to see the patient (eg, review of tests), Obtaining and/or reviewing separately obtained history, Documenting clinical information in the electronic or other health record, Independently interpreting results (not separately reported) and communicating results to the patient/family/caregiver, or Care coordination (not separately reported).     Each patient to whom he or she provides medical services by telemedicine is:  (1) informed of the relationship between the physician and patient and the respective role of any other health care provider with respect to management of the patient; and (2) notified that he or she may decline to receive medical services by telemedicine and may withdraw from such care at any time.      CC:  Reassessment of renal allograft function and management of chronic immunosuppression.    HPI:  Mr. Sylvester is a 49 y.o. 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  History of ESRD secondary to DM and PKD s/p kidney transplant #1 7/2017 that failed in 2021 due to missing medications due to insurance issues. He has CKD stage 3 - GFR 30-59 and his baseline creatinine is between 1.3-1.7. He takes mycophenolate mofetil, prednisone, and tacrolimus for maintenance immunosuppression.     Now approaching 1 year post transplant. Doing well without  complaints. No problems with urination. Eating well and blood sugars have been better controlled. He has not re-established with general nephrology post transplant, plans to return to Dr. Albarado. Reports diarrhea is much improved. Home BPs 130/80, taking nifedipine PRN. No peripheral edema. No pain over allograft. Tolerating IS without issue, no nausea or vomiting.     Current Outpatient Medications   Medication Sig Dispense Refill    aspirin (ECOTRIN) 81 MG EC tablet Take 81 mg by mouth once daily.      calcitRIOL (ROCALTROL) 0.25 MCG Cap Take 1 capsule (0.25 mcg total) by mouth once daily. For 6 months 90 capsule 1    ergocalciferol (ERGOCALCIFEROL) 50,000 unit Cap Take 1 capsule (50,000 Units total) by mouth every 7 days. 4 capsule 11    k phos di & mono-sod phos mono (K-PHOS-NEUTRAL) 250 mg Tab Take 1 tablet by mouth 3 (three) times daily. 90 tablet 11    magnesium oxide (MAG-OX) 400 mg (241.3 mg magnesium) tablet Take 2 tablets (800 mg total) by mouth 3 (three) times daily. 540 tablet 3    multivitamin Tab Take 1 tablet by mouth once daily.      mycophenolate (CELLCEPT) 250 mg Cap Take 4 capsules (1,000 mg total) by mouth 2 (two) times daily. 240 capsule 11    NIFEdipine (PROCARDIA-XL) 30 MG (OSM) 24 hr tablet Take 1 tablet (30 mg total) by mouth once daily. HOLD SBP <120 30 tablet 11    pantoprazole (PROTONIX) 40 MG tablet Take 1 tablet (40 mg total) by mouth once daily. 90 tablet 3    predniSONE (DELTASONE) 5 MG tablet Take by mouth daily: 4/30 to 5/6 20 mg, 5/7 to 5/13 15 mg, 5/14 to 5/20 10 mg, 5/21 5 mg daily. DO NOT STOP. 70 tablet 11    sodium bicarbonate 650 MG tablet Take 2 tablets (1,300 mg total) by mouth 2 (two) times daily. 120 tablet 11    sodium zirconium cyclosilicate (LOKELMA) 5 gram packet Take 1 packet (5 g total) by mouth once daily. Mix entire contents of packet(s) into drinking glass containing 3 tablespoons of water; stir well and drink immediately. Add water and repeat until no powder  remains to receive entire dose. 30 packet 11    tacrolimus (PROGRAF) 0.5 MG Cap Take 2 capsules (1 mg total) by mouth every 12 (twelve) hours. Z94.0;Kidney txp on 4/27/24 120 capsule 11    tamsulosin (FLOMAX) 0.4 mg Cap Take 1 capsule (0.4 mg total) by mouth once daily. 30 capsule 11     Current Facility-Administered Medications   Medication Dose Route Frequency Provider Last Rate Last Admin    LIDOcaine HCl 2% urojet   Urethral 1 time in Clinic/HOD Sam Roque MD           Past Medical History:   Diagnosis Date    Diabetes     Diabetes mellitus, type 2     Gout     Hyperkalemia     Hypertension     Hypothyroidism     Hypothyroidism     Kidney transplant recipient     Obesity     Patient on waiting list for kidney transplant 05/23/2023    Polycystic kidney disease     Pre-transplant evaluation for chronic kidney disease 12/14/2021       Review of Systems   Constitutional:  Negative for activity change and fever.   Eyes:  Negative for visual disturbance.   Respiratory:  Negative for cough and shortness of breath.    Cardiovascular:  Negative for chest pain and leg swelling.   Gastrointestinal:  Negative for abdominal pain, constipation, diarrhea and nausea.   Genitourinary:  Negative for difficulty urinating, frequency and hematuria.   Musculoskeletal:  Negative for arthralgias and myalgias.   Skin:  Negative for wound.   Allergic/Immunologic: Positive for immunocompromised state.   Neurological:  Negative for weakness.   Psychiatric/Behavioral:  Negative for sleep disturbance.        Objective:     There were no vitals taken for this visit.body mass index is unknown because there is no height or weight on file.    Physical Exam-VIRTUAL VISIT    Labs:  Lab Results   Component Value Date    WBC 9.19 06/10/2024    HGB 13.0 (L) 06/10/2024    HCT 40.6 06/10/2024    LABPLAT 129 (L) 07/26/2021     06/10/2024    K 4.5 06/10/2024     06/10/2024    CO2 24 06/10/2024    BUN 19 06/10/2024    CREATININE 1.7 (H)  06/10/2024    EGFRNORACEVR 49.7 (A) 06/10/2024    CALCIUM 9.5 06/10/2024    PHOS 2.0 (L) 06/10/2024    MG 1.5 (L) 06/10/2024    ALBUMIN 3.6 06/10/2024    AST 11 06/06/2024    ALT 10 06/06/2024    UTPCR Unable to calculate 06/03/2024    PTH 1,232.4 (H) 05/01/2024    TACROLIMUS 11.6 06/10/2024       Lab Results   Component Value Date    EXTANC  04/07/2025      Comment:      KIDNEY;12 month protocol labs; RTC appt 4/16/25    EXTWBC 5.3 04/07/2025    EXTSEGS 78.8 04/07/2025    EXTPLATELETS 277 04/07/2025    EXTHEMOGLOBI 16.6 04/07/2025    EXTHEMATOCRI 54.9 (H) 04/07/2025    EXTCREATININ 1.46 (H) 04/07/2025    EXTCREATININ 107.3 04/07/2025    EXTSODIUM 140 04/07/2025    EXTPOTASSIUM 4.6 04/07/2025    EXTBUN 22 (H) 04/07/2025    EXTCO2 25 04/07/2025    EXTCALCIUM 9.0 04/07/2025    EXTPHOSPHORU 2.7 04/07/2025    EXTGLUCOSE 76 04/07/2025    EXTALBUMIN 3.4 04/07/2025    EXTAST 13 (L) 04/07/2025    EXTALT 19 04/07/2025    EXTBILITOTAL 0.4 04/07/2025       Lab Results   Component Value Date    EXTTACROLVL 8.0 04/07/2025    EXTPROTCRE 0.42 04/07/2025    EXTPTHINTACT 218 (A) 04/07/2025    EXTPROTEINUA 30 04/07/2025    EXTWBCUA NONE 04/07/2025    EXTRBCUA NONE 04/07/2025       Labs were reviewed with the patient.    Assessment:     1. S/P kidney transplant #2 4/27/2024    2. PKD (polycystic kidney disease)    3. Long-term use of immunosuppressant medication    4. Post-transplant erythrocytosis    5. At risk for opportunistic infections      Plan:   Needs local heme-onc visit for post transplant erythrocytosis  Needs to re-establish with general nephrology for CKD care, plans to return to Dr. Albarado   Stressed importance of routine cancer screenings while on immunosuppression (PSA, colonoscopy, dermatology visits for skin checks)      1. Immunosuppression: Prograf trough 8.0. Continue Prograf 1/1, MMF 1000 mg BID, and Prednisone 5 mg QD. Will continue to monitor for drug toxicities    2. Allograft Function: CKD II. Continue good  oral hydration.   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    - History of ESRD secondary to DM and PKD s/p kidney transplant #1 7/2017 that failed in 2021 due to missing medications due to insurance issues.   - baseline creatinine is between 1.3-1.7.   Latest Reference Range & Units 11mo 1wk,  Kidney-Post 1 Year  04/07/25 12:04   EXT Creatinine 0.70 - 1.30 mg/dL 1.46 (H) (E)       3. Hypertension management:   - advise low salt diet and home BP monitoring      4. DM2   - blood sugars have been better controlled   - following with local PCP       5. Hypomagnesemia    - high magnesium diet   - MagOx 800 mg TID    6. Post transplant erythrocytosis   - need local heme-onc visit   Latest Reference Range & Units 11mo 1wk,  Kidney-Post 1 Year  04/07/25 12:04   EXT Hemoglobin 14 - 18  16.6 (E)   EXT Hematocrit 42.0 - 52.0  54.9 (H) (E)       7. Proteinuria: continue p/c ratio as per guidelines    - last UPC 0.42    8. BK virus infection screening:  BK PCR per protocol    - last PCR not detected    9. Weight education: provided during the clinic visit   There is no height or weight on file to calculate BMI.     10. Patient safety education regarding immunosuppression including prophylaxis posttransplant for CMV, PCP  - PCP prophylaxis: mepron (stop 10/27/2024)- completed  - CMV prophylaxis: valcyte (stop 7/27/2024)- completed         Follow-up:   Clinic: return to transplant clinic weekly for the first month after transplant; every 2 weeks during months 2-3; then at 6-, 9-, 12-, 18-, 24-, and 36- months post-transplant to reassess for complications from immunosuppression toxicity and monitor for rejection.  Annually thereafter.    Labs: since patient remains at high risk for rejection and drug-related complications that warrant close monitoring, labs will be ordered as follows: continue twice weekly CBC, renal panel, and drug level for first month; then same labs once  weekly through 3rd month post-transplant.  Urine for UA and protein/creatinine ratio monthly.  Serum BK - PCR at 1-, 3-, 6-, 9-, 12-, 18-, 24-, 36- 48-, and 60 months post-transplant.  Hepatic panel at 1-, 2-, 3-, 6-, 9-, 12-, 18-, 24-, and 36- months post-transplant.    Jeni Alonso, NP

## 2025-04-15 NOTE — TELEPHONE ENCOUNTER
Notified patient of Dr. Back's review of 4/7/25 lab results via [facility].     Hi Mr. De Anda,     Dr. Back reviewed your 4/7/25 lab results. Your PTH level 218 is elevated. She wants you to start taking Calcitriol 0.25mcg every day for 6 months. The prescription will be sent to your local [facility] pharmacy.     Nevin Carrasquillo     ----- Message from Maru Back MD sent at 4/10/2025  6:56 PM CDT -----  Calcitriol 0.25 mcg daily x 6 months   ----- Message -----  From: Nevin Valenzuela RN  Sent: 4/10/2025   1:01 PM CDT  To: Maru Back MD

## 2025-04-16 ENCOUNTER — PATIENT MESSAGE (OUTPATIENT)
Dept: TRANSPLANT | Facility: CLINIC | Age: 50
End: 2025-04-16
Payer: MEDICAID

## 2025-04-16 ENCOUNTER — OFFICE VISIT (OUTPATIENT)
Dept: TRANSPLANT | Facility: CLINIC | Age: 50
End: 2025-04-16
Payer: MEDICAID

## 2025-04-16 DIAGNOSIS — D75.1 POST-TRANSPLANT ERYTHROCYTOSIS: ICD-10-CM

## 2025-04-16 DIAGNOSIS — Z79.60 LONG-TERM USE OF IMMUNOSUPPRESSANT MEDICATION: ICD-10-CM

## 2025-04-16 DIAGNOSIS — Z94.0 S/P KIDNEY TRANSPLANT: Primary | ICD-10-CM

## 2025-04-16 DIAGNOSIS — Q61.3 PKD (POLYCYSTIC KIDNEY DISEASE): ICD-10-CM

## 2025-04-16 DIAGNOSIS — Z91.89 AT RISK FOR OPPORTUNISTIC INFECTIONS: ICD-10-CM

## 2025-04-16 RX ORDER — MYCOPHENOLATE MOFETIL 250 MG/1
1000 CAPSULE ORAL 2 TIMES DAILY
Qty: 240 CAPSULE | Refills: 11 | Status: SHIPPED | OUTPATIENT
Start: 2025-04-16

## 2025-04-16 RX ORDER — PREDNISONE 5 MG/1
5 TABLET ORAL DAILY
Qty: 90 TABLET | Refills: 3 | Status: SHIPPED | OUTPATIENT
Start: 2025-04-16

## 2025-04-16 NOTE — LETTER
April 16, 2025        Estefanía Anders  [address] [facility] [address]  Phone: [phone]  Fax: [phone]             [facility] 1st Fl  [address]  [facility] [address]  Phone: [phone]   Patient: Adilson Sylvester   MR Number: [id number]   YOB: 1975   Date of Visit: 4/16/2025       Dear Dr. Estefanía Anders    Thank you for referring Adilson Sylvester to me for evaluation. Attached you will find relevant portions of my assessment and plan of care.    If you have questions, please do not hesitate to call me. I look forward to following Adilson Sylvester along with you.    Sincerely,    Jeni Alonso NP    Enclosure    If you would like to receive this communication electronically, please contact [email or web address] or [phone] to request [employer] access.    [employer] is a tool which provides read-only access to select patient information with whom you have a relationship. Its easy to use and provides real time access to review your patients record including encounter summaries, notes, results, and demographic information.    If you feel you have received this communication in error or would no longer like to receive these types of communications, please e-mail [email or web address]

## 2025-05-06 ENCOUNTER — DOCUMENTATION ONLY (OUTPATIENT)
Dept: TRANSPLANT | Facility: CLINIC | Age: 50
End: 2025-05-06
Payer: MEDICAID

## 2025-05-06 ENCOUNTER — RESULTS FOLLOW-UP (OUTPATIENT)
Dept: TRANSPLANT | Facility: CLINIC | Age: 50
End: 2025-05-06

## 2025-05-06 LAB
EXT ALBUMIN: 3.4 (ref 3.4–5)
EXT ALKALINE PHOSPHATASE: ABNORMAL
EXT ALLOSURE: ABNORMAL
EXT ALT: ABNORMAL
EXT AMYLASE: ABNORMAL
EXT ANC: ABNORMAL
EXT AST: ABNORMAL
EXT BACTERIA UA: ABNORMAL
EXT BANDS%: ABNORMAL
EXT BILIRUBIN DIRECT: ABNORMAL
EXT BILIRUBIN TOTAL: ABNORMAL
EXT BK VIRUS DNA QN PCR: ABNORMAL
EXT BUN: 18 (ref 7–18)
EXT C PEPTIDE: ABNORMAL
EXT CALCIUM: 8.8 (ref 8.8–10.5)
EXT CHLORIDE: 107 (ref 100–108)
EXT CHOLESTEROL: ABNORMAL
EXT CMV DNA QUANT. BY PCR: ABNORMAL
EXT CO2: 25 (ref 21–32)
EXT CREATININE UA: ABNORMAL
EXT CREATININE: 1.44 MG/DL (ref 0.7–1.3)
EXT CYCLOSPORINE LVL: ABNORMAL
EXT EBV DNA BY PCR: ABNORMAL
EXT EBV IGG: ABNORMAL
EXT EGFR NO RACE VARIABLE: 60
EXT EOSINOPHIL%: 1.9 (ref 0–6)
EXT FERRITIN: ABNORMAL
EXT GFR MDRD AF AMER: ABNORMAL
EXT GFR MDRD NON AF AMER: ABNORMAL
EXT GLUCOSE UA: ABNORMAL
EXT GLUCOSE: 85 (ref 70–110)
EXT HBV DNA QUANT PCR: ABNORMAL
EXT HCV QUANT: ABNORMAL
EXT HDL: ABNORMAL
EXT HEMATOCRIT: 53.5 (ref 42–52)
EXT HEMOGLOBIN A1C: ABNORMAL
EXT HEMOGLOBIN: 16.8 (ref 14–18)
EXT HIV RNA QUANT PCR: ABNORMAL
EXT IMMUNKNOW (STIMULATED): ABNORMAL
EXT IRON SATURATION: ABNORMAL
EXT LDH, TOTAL: ABNORMAL
EXT LDL CHOLESTEROL: ABNORMAL
EXT LEFLUNOMIDE METABOLITE: ABNORMAL
EXT LIPASE: ABNORMAL
EXT LYMPH%: 8.2 (ref 25–40)
EXT MAGNESIUM: 1.6 (ref 1.8–2.4)
EXT MONOCYTES%: 8.6 (ref 2–10)
EXT NITRITES UA: ABNORMAL
EXT PHOSPHORUS: 2.3 (ref 2.6–4.7)
EXT PLATELETS: 241 (ref 142–424)
EXT POTASSIUM: 4.3 (ref 3.6–5.2)
EXT PROT/CREAT RATIO UR: ABNORMAL
EXT PROTEIN TOTAL: ABNORMAL
EXT PROTEIN UA: ABNORMAL
EXT PTH, INTACT: ABNORMAL
EXT RBC UA: ABNORMAL
EXT SEGS%: 80.1 (ref 50–80)
EXT SERUM IRON: ABNORMAL
EXT SIROLIMUS LVL: ABNORMAL
EXT SODIUM: 142 MMOL/L (ref 135–145)
EXT TACROLIMUS LVL: 6.2
EXT TIBC: ABNORMAL
EXT TRIGLYCERIDES: ABNORMAL
EXT URIC ACID: ABNORMAL
EXT URINE CULTURE: ABNORMAL
EXT URINE PROTEIN: ABNORMAL
EXT VIT D 25 HYDROXY: ABNORMAL
EXT WBC UA: ABNORMAL
EXT WBC: 6.4 (ref 4.6–10.2)
PARVOVIRUS B19 DNA BY PCR: ABNORMAL
QUANTIFERON GOLD TB: ABNORMAL

## 2025-05-31 DIAGNOSIS — Z94.0 KIDNEY REPLACED BY TRANSPLANT: ICD-10-CM

## 2025-05-31 DIAGNOSIS — E83.39 HYPOPHOSPHATEMIA: ICD-10-CM

## 2025-06-03 DIAGNOSIS — Z94.0 S/P KIDNEY TRANSPLANT: ICD-10-CM

## 2025-06-03 RX ORDER — TAMSULOSIN HYDROCHLORIDE 0.4 MG/1
0.4 CAPSULE ORAL DAILY
Qty: 30 CAPSULE | Refills: 11 | Status: SHIPPED | OUTPATIENT
Start: 2025-06-03 | End: 2026-06-03

## 2025-06-06 DIAGNOSIS — Z94.0 KIDNEY REPLACED BY TRANSPLANT: ICD-10-CM

## 2025-06-06 DIAGNOSIS — E83.39 HYPOPHOSPHATEMIA: ICD-10-CM

## 2025-06-06 RX ORDER — DIBASIC SODIUM PHOSPHATE, MONOBASIC POTASSIUM PHOSPHATE AND MONOBASIC SODIUM PHOSPHATE 852; 155; 130 MG/1; MG/1; MG/1
1 TABLET ORAL 3 TIMES DAILY
Qty: 90 TABLET | Refills: 11 | OUTPATIENT
Start: 2025-06-06

## (undated) DEVICE — SUT ETHILON 3-0 FS-1 30

## (undated) DEVICE — HEMOSTAT SURGICEL NU-KNIT 6X9

## (undated) DEVICE — TIP YANKAUERS BULB NO VENT

## (undated) DEVICE — Device

## (undated) DEVICE — SET IRR URLGY 2LINE UNIV SPIKE

## (undated) DEVICE — PACK KIDNEY TRANSPLANT CUSTOM

## (undated) DEVICE — SYR ONLY LUER LOCK 20CC

## (undated) DEVICE — DRESSING ABSRBNT ISLAND 3.6X8

## (undated) DEVICE — TOWEL OR XRAY WHITE 17X26IN

## (undated) DEVICE — DECANTER FLUID TRNSF WHITE 9IN

## (undated) DEVICE — SUT SILK 3-0 STRANDS 30IN

## (undated) DEVICE — DRAPE SLUSH WARMER WITH DISC

## (undated) DEVICE — STAPLER SKIN PROXIMATE WIDE

## (undated) DEVICE — PUNCH AORTIC 4.0MM 6/CASE

## (undated) DEVICE — SOL NS 1000CC

## (undated) DEVICE — SUT SILK 2-0 STRANDS 30IN

## (undated) DEVICE — INSERTS STEALTH FIBRA SIZE 1.

## (undated) DEVICE — SUT SILK 3-0 SH 18IN BLACK

## (undated) DEVICE — SUT PROLENE 5-0 36IN C-1

## (undated) DEVICE — SUT 1 48IN PDS II VIO MONO

## (undated) DEVICE — TRAY CATH 1-LYR URIMTR 16FR

## (undated) DEVICE — SUT 2-0 12-18IN SILK

## (undated) DEVICE — FOLEY BLLN 20FR 3WAY 5CC

## (undated) DEVICE — ADHESIVE DERMABOND ADVANCED

## (undated) DEVICE — ELECTRODE REM PLYHSV RETURN 9

## (undated) DEVICE — STOCKINETTE 2INX36

## (undated) DEVICE — SUT 4-0 12-18IN SILK BLACK

## (undated) DEVICE — SUT PDS BV 6-0

## (undated) DEVICE — SUT PROLENE 6-0 BV-1 30IN

## (undated) DEVICE — DRAIN SIL FLAT FULL FLUTD 10FR

## (undated) DEVICE — PLUG CATHETER STERILE FOLEY

## (undated) DEVICE — CLIPPER BLADE MOD 4406 (CAREF)